# Patient Record
Sex: MALE | Race: WHITE | NOT HISPANIC OR LATINO | URBAN - METROPOLITAN AREA
[De-identification: names, ages, dates, MRNs, and addresses within clinical notes are randomized per-mention and may not be internally consistent; named-entity substitution may affect disease eponyms.]

---

## 2017-01-11 ENCOUNTER — HOSPITAL ENCOUNTER (OUTPATIENT)
Dept: URGENT CARE | Facility: CLINIC | Age: 22
Setting detail: SPECIMEN
Discharge: HOME | End: 2017-01-11
Attending: FAMILY MEDICINE | Admitting: FAMILY MEDICINE

## 2017-01-11 LAB
ALBUMIN SERPL-MCNC: 4.4 G/DL (ref 3.5–4.8)
ALBUMIN/GLOB SERPL: 1.8 {RATIO} (ref 1–1.7)
ALP SERPL-CCNC: 56 IU/L (ref 32–91)
ALT SERPL-CCNC: 15 IU/L (ref 17–63)
ANION GAP SERPL CALC-SCNC: 12 MMOL/L (ref 10–20)
AST SERPL-CCNC: 25 IU/L (ref 15–41)
BILIRUB SERPL-MCNC: 0.7 MG/DL (ref 0.3–1.2)
BUN SERPL-MCNC: 6 MG/DL (ref 8–20)
BUN/CREAT SERPL: 7.5 (ref 6.2–20.3)
CALCIUM SERPL-MCNC: 9.9 MG/DL (ref 8.9–10.3)
CHLORIDE SERPL-SCNC: 105 MMOL/L (ref 101–111)
CONV CO2: 30 MMOL/L (ref 22–32)
CONV TOTAL PROTEIN: 6.8 G/DL (ref 6.1–7.9)
CREAT UR-MCNC: 0.8 MG/DL (ref 0.7–1.2)
GLOBULIN UR ELPH-MCNC: 2.4 G/DL (ref 2.5–3.8)
GLUCOSE SERPL-MCNC: 94 MG/DL (ref 65–99)
POTASSIUM SERPL-SCNC: 4 MMOL/L (ref 3.6–5.1)
SODIUM SERPL-SCNC: 143 MMOL/L (ref 136–144)

## 2019-10-28 ENCOUNTER — APPOINTMENT (OUTPATIENT)
Dept: GENERAL RADIOLOGY | Facility: HOSPITAL | Age: 24
End: 2019-10-28

## 2019-10-28 ENCOUNTER — HOSPITAL ENCOUNTER (INPATIENT)
Dept: CARDIOLOGY | Facility: HOSPITAL | Age: 24
Discharge: HOME | End: 2019-10-28

## 2019-10-28 ENCOUNTER — APPOINTMENT (OUTPATIENT)
Dept: CT IMAGING | Facility: HOSPITAL | Age: 24
End: 2019-10-28

## 2019-10-28 ENCOUNTER — HOSPITAL ENCOUNTER (INPATIENT)
Facility: HOSPITAL | Age: 24
LOS: 12 days | Discharge: HOME | End: 2019-11-09
Attending: EMERGENCY MEDICINE | Admitting: INTERNAL MEDICINE

## 2019-10-28 VITALS
BODY MASS INDEX: 19.7 KG/M2 | HEIGHT: 68 IN | DIASTOLIC BLOOD PRESSURE: 59 MMHG | WEIGHT: 130 LBS | SYSTOLIC BLOOD PRESSURE: 114 MMHG

## 2019-10-28 DIAGNOSIS — R79.89 ELEVATED LFTS: ICD-10-CM

## 2019-10-28 DIAGNOSIS — J96.01 ACUTE RESPIRATORY FAILURE WITH HYPOXIA (HCC): ICD-10-CM

## 2019-10-28 DIAGNOSIS — N17.9 ACUTE KIDNEY INJURY (HCC): ICD-10-CM

## 2019-10-28 DIAGNOSIS — R41.82 ALTERED MENTAL STATUS, UNSPECIFIED ALTERED MENTAL STATUS TYPE: Primary | ICD-10-CM

## 2019-10-28 DIAGNOSIS — T68.XXXA HYPOTHERMIA, INITIAL ENCOUNTER: ICD-10-CM

## 2019-10-28 DIAGNOSIS — D72.829 LEUKOCYTOSIS, UNSPECIFIED TYPE: ICD-10-CM

## 2019-10-28 LAB
ALBUMIN SERPL-MCNC: 5.3 G/DL (ref 3.5–5.2)
ALBUMIN/GLOB SERPL: 1.7 G/DL
ALP SERPL-CCNC: 177 U/L (ref 39–117)
ALT SERPL W P-5'-P-CCNC: 1967 U/L (ref 1–41)
AMPHET+METHAMPHET UR QL: POSITIVE
ANION GAP SERPL CALCULATED.3IONS-SCNC: 11 MMOL/L (ref 5–15)
ANION GAP SERPL CALCULATED.3IONS-SCNC: 32 MMOL/L (ref 5–15)
APAP SERPL-MCNC: <5 MCG/ML (ref 10–30)
APTT PPP: 31.9 SECONDS (ref 24–31)
ARTERIAL PATENCY WRIST A: ABNORMAL
AST SERPL-CCNC: 1747 U/L (ref 1–40)
ATMOSPHERIC PRESS: ABNORMAL MM[HG]
B PERT DNA SPEC QL NAA+PROBE: NOT DETECTED
BACTERIA UR QL AUTO: ABNORMAL /HPF
BARBITURATES UR QL SCN: NEGATIVE
BASE EXCESS BLDA CALC-SCNC: -8.9 MMOL/L (ref 0–3)
BDY SITE: ABNORMAL
BENZODIAZ UR QL SCN: NEGATIVE
BH CV ECHO MEAS - ACS: 2.3 CM
BH CV ECHO MEAS - AO MAX PG (FULL): 2.3 MMHG
BH CV ECHO MEAS - AO MAX PG: 7.2 MMHG
BH CV ECHO MEAS - AO MEAN PG (FULL): 1.5 MMHG
BH CV ECHO MEAS - AO MEAN PG: 4 MMHG
BH CV ECHO MEAS - AO ROOT AREA (BSA CORRECTED): 1.8
BH CV ECHO MEAS - AO ROOT AREA: 7.7 CM^2
BH CV ECHO MEAS - AO ROOT DIAM: 3.1 CM
BH CV ECHO MEAS - AO V2 MAX: 134.4 CM/SEC
BH CV ECHO MEAS - AO V2 MEAN: 96 CM/SEC
BH CV ECHO MEAS - AO V2 VTI: 19.9 CM
BH CV ECHO MEAS - AVA(I,A): 2 CM^2
BH CV ECHO MEAS - AVA(I,D): 2 CM^2
BH CV ECHO MEAS - AVA(V,A): 2.2 CM^2
BH CV ECHO MEAS - AVA(V,D): 2.2 CM^2
BH CV ECHO MEAS - BSA(HAYCOCK): 1.7 M^2
BH CV ECHO MEAS - BSA: 1.7 M^2
BH CV ECHO MEAS - BZI_BMI: 19.8 KILOGRAMS/M^2
BH CV ECHO MEAS - BZI_METRIC_HEIGHT: 172.7 CM
BH CV ECHO MEAS - BZI_METRIC_WEIGHT: 59 KG
BH CV ECHO MEAS - EDV(CUBED): 63.9 ML
BH CV ECHO MEAS - EDV(MOD-SP4): 82.4 ML
BH CV ECHO MEAS - EDV(TEICH): 69.9 ML
BH CV ECHO MEAS - EF(CUBED): 65.4 %
BH CV ECHO MEAS - EF(MOD-BP): 53 %
BH CV ECHO MEAS - EF(MOD-SP4): 53.2 %
BH CV ECHO MEAS - EF(TEICH): 57.5 %
BH CV ECHO MEAS - ESV(CUBED): 22.1 ML
BH CV ECHO MEAS - ESV(MOD-SP4): 38.5 ML
BH CV ECHO MEAS - ESV(TEICH): 29.7 ML
BH CV ECHO MEAS - FS: 29.8 %
BH CV ECHO MEAS - IVS/LVPW: 1
BH CV ECHO MEAS - IVSD: 1.1 CM
BH CV ECHO MEAS - LA DIMENSION(2D): 3.4 CM
BH CV ECHO MEAS - LV DIASTOLIC VOL/BSA (35-75): 48.4 ML/M^2
BH CV ECHO MEAS - LV MASS(C)D: 134.4 GRAMS
BH CV ECHO MEAS - LV MASS(C)DI: 79 GRAMS/M^2
BH CV ECHO MEAS - LV MAX PG: 4.9 MMHG
BH CV ECHO MEAS - LV MEAN PG: 2.5 MMHG
BH CV ECHO MEAS - LV SYSTOLIC VOL/BSA (12-30): 22.6 ML/M^2
BH CV ECHO MEAS - LV V1 MAX: 111.2 CM/SEC
BH CV ECHO MEAS - LV V1 MEAN: 73.5 CM/SEC
BH CV ECHO MEAS - LV V1 VTI: 15.5 CM
BH CV ECHO MEAS - LVIDD: 4 CM
BH CV ECHO MEAS - LVIDS: 2.8 CM
BH CV ECHO MEAS - LVOT AREA: 2.6 CM^2
BH CV ECHO MEAS - LVOT DIAM: 1.8 CM
BH CV ECHO MEAS - LVPWD: 1 CM
BH CV ECHO MEAS - MV A MAX VEL: 50.2 CM/SEC
BH CV ECHO MEAS - MV DEC SLOPE: 816.8 CM/SEC^2
BH CV ECHO MEAS - MV DEC TIME: 0.11 SEC
BH CV ECHO MEAS - MV E MAX VEL: 93.9 CM/SEC
BH CV ECHO MEAS - MV E/A: 1.9
BH CV ECHO MEAS - MV MAX PG: 4.7 MMHG
BH CV ECHO MEAS - MV MEAN PG: 2 MMHG
BH CV ECHO MEAS - MV V2 MAX: 108.9 CM/SEC
BH CV ECHO MEAS - MV V2 MEAN: 67.4 CM/SEC
BH CV ECHO MEAS - MV V2 VTI: 27.5 CM
BH CV ECHO MEAS - MVA(VTI): 1.5 CM^2
BH CV ECHO MEAS - PA MAX PG (FULL): 2.7 MMHG
BH CV ECHO MEAS - PA MAX PG: 4.4 MMHG
BH CV ECHO MEAS - PA V2 MAX: 105.4 CM/SEC
BH CV ECHO MEAS - PVA(V,A): 1.1 CM^2
BH CV ECHO MEAS - PVA(V,D): 1.1 CM^2
BH CV ECHO MEAS - QP/QS: 0.46
BH CV ECHO MEAS - RV MAX PG: 1.7 MMHG
BH CV ECHO MEAS - RV MEAN PG: 0.84 MMHG
BH CV ECHO MEAS - RV V1 MAX: 65.7 CM/SEC
BH CV ECHO MEAS - RV V1 MEAN: 43.2 CM/SEC
BH CV ECHO MEAS - RV V1 VTI: 10.2 CM
BH CV ECHO MEAS - RVDD: 3.2 CM
BH CV ECHO MEAS - RVOT AREA: 1.8 CM^2
BH CV ECHO MEAS - RVOT DIAM: 1.5 CM
BH CV ECHO MEAS - SI(AO): 89.9 ML/M^2
BH CV ECHO MEAS - SI(CUBED): 24.6 ML/M^2
BH CV ECHO MEAS - SI(LVOT): 23.7 ML/M^2
BH CV ECHO MEAS - SI(MOD-SP4): 25.8 ML/M^2
BH CV ECHO MEAS - SI(TEICH): 23.6 ML/M^2
BH CV ECHO MEAS - SV(AO): 153 ML
BH CV ECHO MEAS - SV(CUBED): 41.8 ML
BH CV ECHO MEAS - SV(LVOT): 40.4 ML
BH CV ECHO MEAS - SV(MOD-SP4): 43.9 ML
BH CV ECHO MEAS - SV(RVOT): 18.6 ML
BH CV ECHO MEAS - SV(TEICH): 40.2 ML
BH CV ECHO MEAS - TR MAX VEL: 226.7 CM/SEC
BILIRUB SERPL-MCNC: 0.3 MG/DL (ref 0.2–1.2)
BILIRUB UR QL STRIP: NEGATIVE
BUN BLD-MCNC: 16 MG/DL (ref 6–20)
BUN BLD-MCNC: 23 MG/DL (ref 6–20)
BUN/CREAT SERPL: 4.9 (ref 7–25)
BUN/CREAT SERPL: 8.2 (ref 7–25)
C PNEUM DNA NPH QL NAA+NON-PROBE: NOT DETECTED
CA-I SERPL ISE-MCNC: 1.24 MMOL/L (ref 1.2–1.3)
CALCIUM SPEC-SCNC: 10.1 MG/DL (ref 8.6–10.5)
CALCIUM SPEC-SCNC: 7.3 MG/DL (ref 8.6–10.5)
CANNABINOIDS SERPL QL: POSITIVE
CHLORIDE SERPL-SCNC: 100 MMOL/L (ref 98–107)
CHLORIDE SERPL-SCNC: 110 MMOL/L (ref 98–107)
CK SERPL-CCNC: 3669 U/L (ref 20–200)
CK SERPL-CCNC: 9219 U/L (ref 20–200)
CLARITY UR: CLEAR
CO2 BLDA-SCNC: 19.5 MMOL/L (ref 22–29)
CO2 SERPL-SCNC: 17 MMOL/L (ref 22–29)
CO2 SERPL-SCNC: 24 MMOL/L (ref 22–29)
COCAINE UR QL: NEGATIVE
COLOR UR: YELLOW
CREAT BLD-MCNC: 2.79 MG/DL (ref 0.76–1.27)
CREAT BLD-MCNC: 3.24 MG/DL (ref 0.76–1.27)
CRP SERPL-MCNC: 0.83 MG/DL (ref 0–0.5)
D-LACTATE SERPL-SCNC: 10.6 MMOL/L (ref 0.5–2)
D-LACTATE SERPL-SCNC: 2.2 MMOL/L (ref 0.5–2)
DEPRECATED RDW RBC AUTO: 45.9 FL (ref 37–54)
ERYTHROCYTE [DISTWIDTH] IN BLOOD BY AUTOMATED COUNT: 13.6 % (ref 12.3–15.4)
ETHANOL UR QL: 0.01 %
FLUAV H1 2009 PAND RNA NPH QL NAA+PROBE: NOT DETECTED
FLUAV H1 HA GENE NPH QL NAA+PROBE: NOT DETECTED
FLUAV H3 RNA NPH QL NAA+PROBE: NOT DETECTED
FLUAV SUBTYP SPEC NAA+PROBE: NOT DETECTED
FLUBV RNA ISLT QL NAA+PROBE: NOT DETECTED
GFR SERPL CREATININE-BSD FRML MDRD: 23 ML/MIN/1.73
GFR SERPL CREATININE-BSD FRML MDRD: 28 ML/MIN/1.73
GLOBULIN UR ELPH-MCNC: 3.2 GM/DL
GLUCOSE BLD-MCNC: 110 MG/DL (ref 65–99)
GLUCOSE BLD-MCNC: 92 MG/DL (ref 65–99)
GLUCOSE BLDC GLUCOMTR-MCNC: 129 MG/DL (ref 70–105)
GLUCOSE BLDC GLUCOMTR-MCNC: 71 MG/DL (ref 70–105)
GLUCOSE UR STRIP-MCNC: NEGATIVE MG/DL
HADV DNA SPEC NAA+PROBE: NOT DETECTED
HCO3 BLDA-SCNC: 18.2 MMOL/L (ref 21–28)
HCOV 229E RNA SPEC QL NAA+PROBE: NOT DETECTED
HCOV HKU1 RNA SPEC QL NAA+PROBE: NOT DETECTED
HCOV NL63 RNA SPEC QL NAA+PROBE: NOT DETECTED
HCOV OC43 RNA SPEC QL NAA+PROBE: NOT DETECTED
HCT VFR BLD AUTO: 49.8 % (ref 37.5–51)
HEMODILUTION: NO
HGB BLD-MCNC: 16.2 G/DL (ref 13–17.7)
HGB UR QL STRIP.AUTO: ABNORMAL
HMPV RNA NPH QL NAA+NON-PROBE: NOT DETECTED
HOLD SPECIMEN: NORMAL
HOROWITZ INDEX BLD+IHG-RTO: 21 %
HPIV1 RNA SPEC QL NAA+PROBE: NOT DETECTED
HPIV2 RNA SPEC QL NAA+PROBE: NOT DETECTED
HPIV3 RNA NPH QL NAA+PROBE: NOT DETECTED
HPIV4 P GENE NPH QL NAA+PROBE: NOT DETECTED
HYALINE CASTS UR QL AUTO: ABNORMAL /LPF
INR PPP: 1.34 (ref 0.9–1.1)
KETONES UR QL STRIP: NEGATIVE
LEUKOCYTE ESTERASE UR QL STRIP.AUTO: NEGATIVE
LV EF 2D ECHO EST: 60 %
LYMPHOCYTES # BLD MANUAL: 3.68 10*3/MM3 (ref 0.7–3.1)
LYMPHOCYTES NFR BLD MANUAL: 12 % (ref 19.6–45.3)
LYMPHOCYTES NFR BLD MANUAL: 3 % (ref 5–12)
M PNEUMO IGG SER IA-ACNC: NOT DETECTED
MAGNESIUM SERPL-MCNC: 2.9 MG/DL (ref 1.6–2.6)
MCH RBC QN AUTO: 30.7 PG (ref 26.6–33)
MCHC RBC AUTO-ENTMCNC: 32.6 G/DL (ref 31.5–35.7)
MCV RBC AUTO: 94.3 FL (ref 79–97)
METAMYELOCYTES NFR BLD MANUAL: 1 % (ref 0–0)
METHADONE UR QL SCN: NEGATIVE
MODALITY: ABNORMAL
MONOCYTES # BLD AUTO: 0.92 10*3/MM3 (ref 0.1–0.9)
NEUTROPHILS # BLD AUTO: 25.79 10*3/MM3 (ref 1.7–7)
NEUTROPHILS NFR BLD MANUAL: 72 % (ref 42.7–76)
NEUTS BAND NFR BLD MANUAL: 12 % (ref 0–5)
NITRITE UR QL STRIP: NEGATIVE
OPIATES UR QL: NEGATIVE
OXYCODONE UR QL SCN: NEGATIVE
PCO2 BLDA: 42.7 MM HG (ref 35–48)
PH BLDA: 7.24 PH UNITS (ref 7.35–7.45)
PH UR STRIP.AUTO: 7.5 [PH] (ref 5–8)
PHOSPHATE SERPL-MCNC: 7.4 MG/DL (ref 2.5–4.5)
PLAT MORPH BLD: NORMAL
PLATELET # BLD AUTO: 237 10*3/MM3 (ref 140–450)
PMV BLD AUTO: 7 FL (ref 6–12)
PO2 BLDA: 41.2 MM HG (ref 83–108)
POTASSIUM BLD-SCNC: 4 MMOL/L (ref 3.5–5.2)
POTASSIUM BLD-SCNC: 4.1 MMOL/L (ref 3.5–5.2)
PROT SERPL-MCNC: 8.5 G/DL (ref 6–8.5)
PROT UR QL STRIP: NEGATIVE
PROTHROMBIN TIME: 13.4 SECONDS (ref 9.6–11.7)
RBC # BLD AUTO: 5.28 10*6/MM3 (ref 4.14–5.8)
RBC # UR: ABNORMAL /HPF
RBC MORPH BLD: NORMAL
REF LAB TEST METHOD: ABNORMAL
RHINOVIRUS RNA SPEC NAA+PROBE: DETECTED
RSV RNA NPH QL NAA+NON-PROBE: NOT DETECTED
SAO2 % BLDCOA: 67.6 % (ref 94–98)
SODIUM BLD-SCNC: 145 MMOL/L (ref 136–145)
SODIUM BLD-SCNC: 149 MMOL/L (ref 136–145)
SP GR UR STRIP: 1.01 (ref 1–1.03)
SQUAMOUS #/AREA URNS HPF: ABNORMAL /HPF
TROPONIN T SERPL-MCNC: 1.26 NG/ML (ref 0–0.03)
TROPONIN T SERPL-MCNC: 2.51 NG/ML (ref 0–0.03)
URINE MYOGLOBIN, QUALITATIVE: POSITIVE
UROBILINOGEN UR QL STRIP: ABNORMAL
WBC MORPH BLD: NORMAL
WBC NRBC COR # BLD: 30.7 10*3/MM3 (ref 3.4–10.8)
WBC UR QL AUTO: ABNORMAL /HPF
WHOLE BLOOD HOLD SPECIMEN: NORMAL
WHOLE BLOOD HOLD SPECIMEN: NORMAL

## 2019-10-28 PROCEDURE — 94799 UNLISTED PULMONARY SVC/PX: CPT

## 2019-10-28 PROCEDURE — 0099U HC BIOFIRE FILMARRAY RESP PANEL 1: CPT | Performed by: INTERNAL MEDICINE

## 2019-10-28 PROCEDURE — 36600 WITHDRAWAL OF ARTERIAL BLOOD: CPT

## 2019-10-28 PROCEDURE — 80307 DRUG TEST PRSMV CHEM ANLYZR: CPT | Performed by: EMERGENCY MEDICINE

## 2019-10-28 PROCEDURE — 31500 INSERT EMERGENCY AIRWAY: CPT

## 2019-10-28 PROCEDURE — 25010000002 CEFEPIME PER 500 MG: Performed by: NURSE PRACTITIONER

## 2019-10-28 PROCEDURE — 87081 CULTURE SCREEN ONLY: CPT | Performed by: NURSE PRACTITIONER

## 2019-10-28 PROCEDURE — 25010000002 VANCOMYCIN 10 G RECONSTITUTED SOLUTION: Performed by: NURSE PRACTITIONER

## 2019-10-28 PROCEDURE — 87205 SMEAR GRAM STAIN: CPT | Performed by: INTERNAL MEDICINE

## 2019-10-28 PROCEDURE — 93306 TTE W/DOPPLER COMPLETE: CPT

## 2019-10-28 PROCEDURE — C1751 CATH, INF, PER/CENT/MIDLINE: HCPCS

## 2019-10-28 PROCEDURE — 85025 COMPLETE CBC W/AUTO DIFF WBC: CPT | Performed by: EMERGENCY MEDICINE

## 2019-10-28 PROCEDURE — 25010000002 SUCCINYLCHOLINE PER 20 MG

## 2019-10-28 PROCEDURE — 83735 ASSAY OF MAGNESIUM: CPT | Performed by: EMERGENCY MEDICINE

## 2019-10-28 PROCEDURE — 71250 CT THORAX DX C-: CPT

## 2019-10-28 PROCEDURE — 94002 VENT MGMT INPAT INIT DAY: CPT

## 2019-10-28 PROCEDURE — 80053 COMPREHEN METABOLIC PANEL: CPT | Performed by: EMERGENCY MEDICINE

## 2019-10-28 PROCEDURE — 99285 EMERGENCY DEPT VISIT HI MDM: CPT

## 2019-10-28 PROCEDURE — 25010000002 MIDAZOLAM PER 1 MG: Performed by: EMERGENCY MEDICINE

## 2019-10-28 PROCEDURE — 0BH17EZ INSERTION OF ENDOTRACHEAL AIRWAY INTO TRACHEA, VIA NATURAL OR ARTIFICIAL OPENING: ICD-10-PCS | Performed by: EMERGENCY MEDICINE

## 2019-10-28 PROCEDURE — 87185 SC STD ENZYME DETCJ PER NZM: CPT | Performed by: EMERGENCY MEDICINE

## 2019-10-28 PROCEDURE — 85007 BL SMEAR W/DIFF WBC COUNT: CPT | Performed by: EMERGENCY MEDICINE

## 2019-10-28 PROCEDURE — 85610 PROTHROMBIN TIME: CPT | Performed by: EMERGENCY MEDICINE

## 2019-10-28 PROCEDURE — 83874 ASSAY OF MYOGLOBIN: CPT | Performed by: NURSE PRACTITIONER

## 2019-10-28 PROCEDURE — 82330 ASSAY OF CALCIUM: CPT | Performed by: EMERGENCY MEDICINE

## 2019-10-28 PROCEDURE — 5A1935Z RESPIRATORY VENTILATION, LESS THAN 24 CONSECUTIVE HOURS: ICD-10-PCS | Performed by: EMERGENCY MEDICINE

## 2019-10-28 PROCEDURE — 80074 ACUTE HEPATITIS PANEL: CPT | Performed by: NURSE PRACTITIONER

## 2019-10-28 PROCEDURE — 93306 TTE W/DOPPLER COMPLETE: CPT | Performed by: INTERNAL MEDICINE

## 2019-10-28 PROCEDURE — 70450 CT HEAD/BRAIN W/O DYE: CPT

## 2019-10-28 PROCEDURE — 87040 BLOOD CULTURE FOR BACTERIA: CPT | Performed by: EMERGENCY MEDICINE

## 2019-10-28 PROCEDURE — 81001 URINALYSIS AUTO W/SCOPE: CPT | Performed by: EMERGENCY MEDICINE

## 2019-10-28 PROCEDURE — 25010000002 MIDAZOLAM 50 MG/10ML SOLUTION: Performed by: EMERGENCY MEDICINE

## 2019-10-28 PROCEDURE — 82550 ASSAY OF CK (CPK): CPT | Performed by: EMERGENCY MEDICINE

## 2019-10-28 PROCEDURE — 82962 GLUCOSE BLOOD TEST: CPT

## 2019-10-28 PROCEDURE — 87070 CULTURE OTHR SPECIMN AEROBIC: CPT | Performed by: INTERNAL MEDICINE

## 2019-10-28 PROCEDURE — P9612 CATHETERIZE FOR URINE SPEC: HCPCS

## 2019-10-28 PROCEDURE — 83605 ASSAY OF LACTIC ACID: CPT

## 2019-10-28 PROCEDURE — 94760 N-INVAS EAR/PLS OXIMETRY 1: CPT

## 2019-10-28 PROCEDURE — 71045 X-RAY EXAM CHEST 1 VIEW: CPT

## 2019-10-28 PROCEDURE — 86140 C-REACTIVE PROTEIN: CPT | Performed by: EMERGENCY MEDICINE

## 2019-10-28 PROCEDURE — 80307 DRUG TEST PRSMV CHEM ANLYZR: CPT | Performed by: NURSE PRACTITIONER

## 2019-10-28 PROCEDURE — 25010000002 ENOXAPARIN PER 10 MG: Performed by: NURSE PRACTITIONER

## 2019-10-28 PROCEDURE — B548ZZA ULTRASONOGRAPHY OF SUPERIOR VENA CAVA, GUIDANCE: ICD-10-PCS | Performed by: INTERNAL MEDICINE

## 2019-10-28 PROCEDURE — 25010000002 FENTANYL CITRATE (PF) 100 MCG/2ML SOLUTION

## 2019-10-28 PROCEDURE — 82803 BLOOD GASES ANY COMBINATION: CPT

## 2019-10-28 PROCEDURE — 82550 ASSAY OF CK (CPK): CPT | Performed by: NURSE PRACTITIONER

## 2019-10-28 PROCEDURE — 02HV33Z INSERTION OF INFUSION DEVICE INTO SUPERIOR VENA CAVA, PERCUTANEOUS APPROACH: ICD-10-PCS | Performed by: INTERNAL MEDICINE

## 2019-10-28 PROCEDURE — 84100 ASSAY OF PHOSPHORUS: CPT | Performed by: EMERGENCY MEDICINE

## 2019-10-28 PROCEDURE — 93005 ELECTROCARDIOGRAM TRACING: CPT | Performed by: EMERGENCY MEDICINE

## 2019-10-28 PROCEDURE — 25010000002 DOPAMINE PER 40 MG: Performed by: NURSE PRACTITIONER

## 2019-10-28 PROCEDURE — 87086 URINE CULTURE/COLONY COUNT: CPT | Performed by: EMERGENCY MEDICINE

## 2019-10-28 PROCEDURE — 84484 ASSAY OF TROPONIN QUANT: CPT | Performed by: NURSE PRACTITIONER

## 2019-10-28 PROCEDURE — 85730 THROMBOPLASTIN TIME PARTIAL: CPT | Performed by: EMERGENCY MEDICINE

## 2019-10-28 PROCEDURE — 87076 CULTURE ANAEROBE IDENT EACH: CPT | Performed by: EMERGENCY MEDICINE

## 2019-10-28 RX ORDER — MIDAZOLAM HYDROCHLORIDE 1 MG/ML
2 INJECTION INTRAMUSCULAR; INTRAVENOUS ONCE
Status: COMPLETED | OUTPATIENT
Start: 2019-10-28 | End: 2019-10-28

## 2019-10-28 RX ORDER — DEXMEDETOMIDINE HYDROCHLORIDE 4 UG/ML
.2-1.5 INJECTION, SOLUTION INTRAVENOUS
Status: DISCONTINUED | OUTPATIENT
Start: 2019-10-28 | End: 2019-10-29

## 2019-10-28 RX ORDER — SUCCINYLCHOLINE CHLORIDE 20 MG/ML
INJECTION INTRAMUSCULAR; INTRAVENOUS
Status: COMPLETED
Start: 2019-10-28 | End: 2019-10-28

## 2019-10-28 RX ORDER — FENTANYL CITRATE 50 UG/ML
50 INJECTION, SOLUTION INTRAMUSCULAR; INTRAVENOUS
Status: DISCONTINUED | OUTPATIENT
Start: 2019-10-28 | End: 2019-10-29

## 2019-10-28 RX ORDER — NOREPINEPHRINE BIT/0.9 % NACL 8 MG/250ML
.02-.3 INFUSION BOTTLE (ML) INTRAVENOUS
Status: DISCONTINUED | OUTPATIENT
Start: 2019-10-28 | End: 2019-10-31

## 2019-10-28 RX ORDER — FENTANYL CITRATE 50 UG/ML
50 INJECTION, SOLUTION INTRAMUSCULAR; INTRAVENOUS ONCE
Status: COMPLETED | OUTPATIENT
Start: 2019-10-28 | End: 2019-10-28

## 2019-10-28 RX ORDER — VANCOMYCIN HYDROCHLORIDE
1500 ONCE
Status: COMPLETED | OUTPATIENT
Start: 2019-10-28 | End: 2019-10-28

## 2019-10-28 RX ORDER — SODIUM CHLORIDE 0.9 % (FLUSH) 0.9 %
10 SYRINGE (ML) INJECTION AS NEEDED
Status: DISCONTINUED | OUTPATIENT
Start: 2019-10-28 | End: 2019-11-09 | Stop reason: HOSPADM

## 2019-10-28 RX ORDER — HYDROXYZINE 50 MG/1
50 TABLET, FILM COATED ORAL EVERY 6 HOURS PRN
COMMUNITY

## 2019-10-28 RX ORDER — ETOMIDATE 2 MG/ML
INJECTION INTRAVENOUS
Status: DISPENSED
Start: 2019-10-28 | End: 2019-10-29

## 2019-10-28 RX ORDER — SUCCINYLCHOLINE CHLORIDE 20 MG/ML
1.5 INJECTION INTRAMUSCULAR; INTRAVENOUS ONCE
Status: COMPLETED | OUTPATIENT
Start: 2019-10-28 | End: 2019-10-28

## 2019-10-28 RX ORDER — SODIUM CHLORIDE 0.9 % (FLUSH) 0.9 %
10 SYRINGE (ML) INJECTION EVERY 12 HOURS SCHEDULED
Status: DISCONTINUED | OUTPATIENT
Start: 2019-10-28 | End: 2019-11-09 | Stop reason: HOSPADM

## 2019-10-28 RX ORDER — SODIUM CHLORIDE 0.9 % (FLUSH) 0.9 %
10 SYRINGE (ML) INJECTION EVERY 12 HOURS SCHEDULED
Status: DISCONTINUED | OUTPATIENT
Start: 2019-10-28 | End: 2019-11-07

## 2019-10-28 RX ORDER — NICOTINE POLACRILEX 4 MG
15 LOZENGE BUCCAL
Status: DISCONTINUED | OUTPATIENT
Start: 2019-10-28 | End: 2019-11-02

## 2019-10-28 RX ORDER — MIDAZOLAM HYDROCHLORIDE 1 MG/ML
INJECTION INTRAMUSCULAR; INTRAVENOUS
Status: DISPENSED
Start: 2019-10-28 | End: 2019-10-29

## 2019-10-28 RX ORDER — IPRATROPIUM BROMIDE AND ALBUTEROL SULFATE 2.5; .5 MG/3ML; MG/3ML
3 SOLUTION RESPIRATORY (INHALATION)
Status: DISCONTINUED | OUTPATIENT
Start: 2019-10-28 | End: 2019-11-07

## 2019-10-28 RX ORDER — SODIUM CHLORIDE 0.9 % (FLUSH) 0.9 %
20 SYRINGE (ML) INJECTION AS NEEDED
Status: DISCONTINUED | OUTPATIENT
Start: 2019-10-28 | End: 2019-11-09 | Stop reason: HOSPADM

## 2019-10-28 RX ORDER — CITALOPRAM 20 MG/1
20 TABLET ORAL DAILY
COMMUNITY
End: 2023-01-03

## 2019-10-28 RX ORDER — MIDAZOLAM HYDROCHLORIDE 1 MG/ML
2 INJECTION INTRAMUSCULAR; INTRAVENOUS CONTINUOUS
Status: DISCONTINUED | OUTPATIENT
Start: 2019-10-28 | End: 2019-10-28

## 2019-10-28 RX ORDER — DOPAMINE HYDROCHLORIDE 160 MG/100ML
2-20 INJECTION, SOLUTION INTRAVENOUS
Status: DISCONTINUED | OUTPATIENT
Start: 2019-10-28 | End: 2019-10-28 | Stop reason: SDUPTHER

## 2019-10-28 RX ORDER — ETOMIDATE 2 MG/ML
0.3 INJECTION INTRAVENOUS ONCE
Status: COMPLETED | OUTPATIENT
Start: 2019-10-28 | End: 2019-10-28

## 2019-10-28 RX ORDER — MIDAZOLAM HCL IN 0.9 % NACL/PF 1 MG/ML
2 PLASTIC BAG, INJECTION (ML) INTRAVENOUS
Status: DISCONTINUED | OUTPATIENT
Start: 2019-10-28 | End: 2019-10-29

## 2019-10-28 RX ORDER — SODIUM CHLORIDE 0.9 % (FLUSH) 0.9 %
10 SYRINGE (ML) INJECTION AS NEEDED
Status: DISCONTINUED | OUTPATIENT
Start: 2019-10-28 | End: 2019-11-04

## 2019-10-28 RX ORDER — FENTANYL CITRATE 50 UG/ML
INJECTION, SOLUTION INTRAMUSCULAR; INTRAVENOUS
Status: COMPLETED
Start: 2019-10-28 | End: 2019-10-28

## 2019-10-28 RX ORDER — DOPAMINE HYDROCHLORIDE 160 MG/100ML
2-20 INJECTION, SOLUTION INTRAVENOUS
Status: DISCONTINUED | OUTPATIENT
Start: 2019-10-28 | End: 2019-10-28

## 2019-10-28 RX ORDER — PANTOPRAZOLE SODIUM 40 MG/10ML
40 INJECTION, POWDER, LYOPHILIZED, FOR SOLUTION INTRAVENOUS
Status: DISCONTINUED | OUTPATIENT
Start: 2019-10-28 | End: 2019-11-01

## 2019-10-28 RX ORDER — DEXTROSE MONOHYDRATE 25 G/50ML
25 INJECTION, SOLUTION INTRAVENOUS
Status: DISCONTINUED | OUTPATIENT
Start: 2019-10-28 | End: 2019-11-02

## 2019-10-28 RX ADMIN — DEXMEDETOMIDINE HYDROCHLORIDE 0.5 MCG/KG/HR: 100 INJECTION, SOLUTION INTRAVENOUS at 22:23

## 2019-10-28 RX ADMIN — MIDAZOLAM 2 MG: 1 INJECTION INTRAMUSCULAR; INTRAVENOUS at 12:59

## 2019-10-28 RX ADMIN — SUCCINYLCHOLINE CHLORIDE 88.6 MG: 20 INJECTION INTRAMUSCULAR; INTRAVENOUS at 12:50

## 2019-10-28 RX ADMIN — PANTOPRAZOLE SODIUM 40 MG: 40 INJECTION, POWDER, FOR SOLUTION INTRAVENOUS at 17:23

## 2019-10-28 RX ADMIN — Medication 10 ML: at 21:08

## 2019-10-28 RX ADMIN — IPRATROPIUM BROMIDE AND ALBUTEROL SULFATE 3 ML: .5; 3 SOLUTION RESPIRATORY (INHALATION) at 19:38

## 2019-10-28 RX ADMIN — FENTANYL CITRATE 50 MCG: 50 INJECTION, SOLUTION INTRAMUSCULAR; INTRAVENOUS at 21:24

## 2019-10-28 RX ADMIN — Medication 0.02 MCG/KG/MIN: at 18:15

## 2019-10-28 RX ADMIN — MIDAZOLAM 2 MG/HR: 5 INJECTION INTRAMUSCULAR; INTRAVENOUS at 13:24

## 2019-10-28 RX ADMIN — Medication 10 ML: at 21:09

## 2019-10-28 RX ADMIN — ETOMIDATE 17.7 MG: 40 INJECTION, SOLUTION INTRAVENOUS at 13:03

## 2019-10-28 RX ADMIN — Medication 10 ML: at 15:12

## 2019-10-28 RX ADMIN — MIDAZOLAM 2 MG: 1 INJECTION INTRAMUSCULAR; INTRAVENOUS at 11:51

## 2019-10-28 RX ADMIN — DOPAMINE HYDROCHLORIDE 2 MCG/KG/MIN: 160 INJECTION, SOLUTION INTRAVENOUS at 14:19

## 2019-10-28 RX ADMIN — VANCOMYCIN HYDROCHLORIDE 1500 MG: 10 INJECTION, POWDER, LYOPHILIZED, FOR SOLUTION INTRAVENOUS at 18:16

## 2019-10-28 RX ADMIN — DEXTROSE MONOHYDRATE: 5 INJECTION, SOLUTION INTRAVENOUS at 15:04

## 2019-10-28 RX ADMIN — ENOXAPARIN SODIUM 30 MG: 30 INJECTION SUBCUTANEOUS at 17:23

## 2019-10-28 RX ADMIN — DEXTROSE MONOHYDRATE: 5 INJECTION, SOLUTION INTRAVENOUS at 22:33

## 2019-10-28 RX ADMIN — IPRATROPIUM BROMIDE AND ALBUTEROL SULFATE 3 ML: .5; 3 SOLUTION RESPIRATORY (INHALATION) at 23:28

## 2019-10-28 RX ADMIN — CEFEPIME HYDROCHLORIDE 2 G: 2 INJECTION, POWDER, FOR SOLUTION INTRAVENOUS at 14:19

## 2019-10-28 RX ADMIN — SODIUM CHLORIDE 2000 ML: 900 INJECTION, SOLUTION INTRAVENOUS at 13:15

## 2019-10-28 RX ADMIN — SODIUM CHLORIDE 2000 ML: 900 INJECTION, SOLUTION INTRAVENOUS at 13:48

## 2019-10-28 RX ADMIN — SUCCINYLCHOLINE CHLORIDE 88.6 MG: 20 INJECTION, SOLUTION INTRAMUSCULAR; INTRAVENOUS at 12:50

## 2019-10-29 ENCOUNTER — HOSPITAL ENCOUNTER (INPATIENT)
Dept: CARDIOLOGY | Facility: HOSPITAL | Age: 24
Discharge: HOME | End: 2019-10-29

## 2019-10-29 ENCOUNTER — APPOINTMENT (OUTPATIENT)
Dept: GENERAL RADIOLOGY | Facility: HOSPITAL | Age: 24
End: 2019-10-29

## 2019-10-29 ENCOUNTER — APPOINTMENT (OUTPATIENT)
Dept: ULTRASOUND IMAGING | Facility: HOSPITAL | Age: 24
End: 2019-10-29

## 2019-10-29 LAB
ANION GAP SERPL CALCULATED.3IONS-SCNC: 11 MMOL/L (ref 5–15)
APTT PPP: 28.7 SECONDS (ref 24–31)
APTT PPP: 70 SECONDS (ref 61–76.5)
APTT PPP: 95.6 SECONDS (ref 61–76.5)
ARTERIAL PATENCY WRIST A: POSITIVE
ATMOSPHERIC PRESS: ABNORMAL MM[HG]
BACTERIA SPEC AEROBE CULT: NO GROWTH
BASE EXCESS BLDA CALC-SCNC: -0.3 MMOL/L (ref 0–3)
BDY SITE: ABNORMAL
BH CV LOWER VASCULAR LEFT COMMON FEMORAL AUGMENT: NORMAL
BH CV LOWER VASCULAR LEFT COMMON FEMORAL COMPETENT: NORMAL
BH CV LOWER VASCULAR LEFT COMMON FEMORAL COMPRESS: NORMAL
BH CV LOWER VASCULAR LEFT COMMON FEMORAL PHASIC: NORMAL
BH CV LOWER VASCULAR LEFT COMMON FEMORAL SPONT: NORMAL
BH CV LOWER VASCULAR LEFT DISTAL FEMORAL COMPRESS: NORMAL
BH CV LOWER VASCULAR LEFT GASTRONEMIUS COMPRESS: NORMAL
BH CV LOWER VASCULAR LEFT GREATER SAPH AK COMPRESS: NORMAL
BH CV LOWER VASCULAR LEFT GREATER SAPH BK COMPRESS: NORMAL
BH CV LOWER VASCULAR LEFT LESSER SAPH COMPRESS: NORMAL
BH CV LOWER VASCULAR LEFT MID FEMORAL AUGMENT: NORMAL
BH CV LOWER VASCULAR LEFT MID FEMORAL COMPETENT: NORMAL
BH CV LOWER VASCULAR LEFT MID FEMORAL COMPRESS: NORMAL
BH CV LOWER VASCULAR LEFT MID FEMORAL PHASIC: NORMAL
BH CV LOWER VASCULAR LEFT MID FEMORAL SPONT: NORMAL
BH CV LOWER VASCULAR LEFT PERONEAL COMPRESS: NORMAL
BH CV LOWER VASCULAR LEFT POPLITEAL AUGMENT: NORMAL
BH CV LOWER VASCULAR LEFT POPLITEAL COMPETENT: NORMAL
BH CV LOWER VASCULAR LEFT POPLITEAL COMPRESS: NORMAL
BH CV LOWER VASCULAR LEFT POPLITEAL PHASIC: NORMAL
BH CV LOWER VASCULAR LEFT POPLITEAL SPONT: NORMAL
BH CV LOWER VASCULAR LEFT POSTERIOR TIBIAL COMPRESS: NORMAL
BH CV LOWER VASCULAR LEFT PROXIMAL FEMORAL COMPRESS: NORMAL
BH CV LOWER VASCULAR LEFT SAPHENOFEMORAL JUNCTION AUGMENT: NORMAL
BH CV LOWER VASCULAR LEFT SAPHENOFEMORAL JUNCTION COMPETENT: NORMAL
BH CV LOWER VASCULAR LEFT SAPHENOFEMORAL JUNCTION COMPRESS: NORMAL
BH CV LOWER VASCULAR LEFT SAPHENOFEMORAL JUNCTION PHASIC: NORMAL
BH CV LOWER VASCULAR LEFT SAPHENOFEMORAL JUNCTION SPONT: NORMAL
BH CV LOWER VASCULAR RIGHT COMMON FEMORAL AUGMENT: NORMAL
BH CV LOWER VASCULAR RIGHT COMMON FEMORAL COMPETENT: NORMAL
BH CV LOWER VASCULAR RIGHT COMMON FEMORAL COMPRESS: NORMAL
BH CV LOWER VASCULAR RIGHT COMMON FEMORAL PHASIC: NORMAL
BH CV LOWER VASCULAR RIGHT COMMON FEMORAL SPONT: NORMAL
BH CV LOWER VASCULAR RIGHT DISTAL FEMORAL COMPRESS: NORMAL
BH CV LOWER VASCULAR RIGHT GASTRONEMIUS COMPRESS: NORMAL
BH CV LOWER VASCULAR RIGHT GREATER SAPH AK COMPRESS: NORMAL
BH CV LOWER VASCULAR RIGHT GREATER SAPH BK COMPRESS: NORMAL
BH CV LOWER VASCULAR RIGHT LESSER SAPH COMPRESS: NORMAL
BH CV LOWER VASCULAR RIGHT MID FEMORAL AUGMENT: NORMAL
BH CV LOWER VASCULAR RIGHT MID FEMORAL COMPETENT: NORMAL
BH CV LOWER VASCULAR RIGHT MID FEMORAL COMPRESS: NORMAL
BH CV LOWER VASCULAR RIGHT MID FEMORAL PHASIC: NORMAL
BH CV LOWER VASCULAR RIGHT MID FEMORAL SPONT: NORMAL
BH CV LOWER VASCULAR RIGHT PERONEAL COMPRESS: NORMAL
BH CV LOWER VASCULAR RIGHT POPLITEAL AUGMENT: NORMAL
BH CV LOWER VASCULAR RIGHT POPLITEAL COMPETENT: NORMAL
BH CV LOWER VASCULAR RIGHT POPLITEAL COMPRESS: NORMAL
BH CV LOWER VASCULAR RIGHT POPLITEAL PHASIC: NORMAL
BH CV LOWER VASCULAR RIGHT POPLITEAL SPONT: NORMAL
BH CV LOWER VASCULAR RIGHT POSTERIOR TIBIAL COMPRESS: NORMAL
BH CV LOWER VASCULAR RIGHT PROXIMAL FEMORAL COMPRESS: NORMAL
BH CV LOWER VASCULAR RIGHT SAPHENOFEMORAL JUNCTION AUGMENT: NORMAL
BH CV LOWER VASCULAR RIGHT SAPHENOFEMORAL JUNCTION COMPETENT: NORMAL
BH CV LOWER VASCULAR RIGHT SAPHENOFEMORAL JUNCTION COMPRESS: NORMAL
BH CV LOWER VASCULAR RIGHT SAPHENOFEMORAL JUNCTION PHASIC: NORMAL
BH CV LOWER VASCULAR RIGHT SAPHENOFEMORAL JUNCTION SPONT: NORMAL
BUN BLD-MCNC: 34 MG/DL (ref 6–20)
BUN/CREAT SERPL: 10.1 (ref 7–25)
CALCIUM SPEC-SCNC: 6.8 MG/DL (ref 8.6–10.5)
CHLORIDE SERPL-SCNC: 102 MMOL/L (ref 98–107)
CK SERPL-CCNC: ABNORMAL U/L (ref 20–200)
CO2 BLDA-SCNC: 28 MMOL/L (ref 22–29)
CO2 SERPL-SCNC: 31 MMOL/L (ref 22–29)
CREAT BLD-MCNC: 3.37 MG/DL (ref 0.76–1.27)
DEPRECATED RDW RBC AUTO: 42.4 FL (ref 37–54)
DEPRECATED RDW RBC AUTO: 43.3 FL (ref 37–54)
EOSINOPHIL # BLD MANUAL: 0.19 10*3/MM3 (ref 0–0.4)
EOSINOPHIL NFR BLD MANUAL: 1 % (ref 0.3–6.2)
ERYTHROCYTE [DISTWIDTH] IN BLOOD BY AUTOMATED COUNT: 13.7 % (ref 12.3–15.4)
ERYTHROCYTE [DISTWIDTH] IN BLOOD BY AUTOMATED COUNT: 13.8 % (ref 12.3–15.4)
GFR SERPL CREATININE-BSD FRML MDRD: 22 ML/MIN/1.73
GLUCOSE BLD-MCNC: 164 MG/DL (ref 65–99)
GLUCOSE BLDC GLUCOMTR-MCNC: 114 MG/DL (ref 70–105)
GLUCOSE BLDC GLUCOMTR-MCNC: 116 MG/DL (ref 70–105)
GLUCOSE BLDC GLUCOMTR-MCNC: 122 MG/DL (ref 70–105)
GLUCOSE BLDC GLUCOMTR-MCNC: 131 MG/DL (ref 70–105)
GLUCOSE BLDC GLUCOMTR-MCNC: 148 MG/DL (ref 70–105)
HAV IGM SERPL QL IA: ABNORMAL
HBV CORE IGM SERPL QL IA: ABNORMAL
HBV SURFACE AG SERPL QL IA: ABNORMAL
HCO3 BLDA-SCNC: 26.4 MMOL/L (ref 21–28)
HCT VFR BLD AUTO: 36.7 % (ref 37.5–51)
HCT VFR BLD AUTO: 38.9 % (ref 37.5–51)
HCV AB SER DONR QL: REACTIVE
HEMODILUTION: NO
HGB BLD-MCNC: 12.9 G/DL (ref 13–17.7)
HGB BLD-MCNC: 13.5 G/DL (ref 13–17.7)
HOROWITZ INDEX BLD+IHG-RTO: 60 %
INR PPP: 1.4 (ref 0.9–1.1)
LYMPHOCYTES # BLD MANUAL: 2.67 10*3/MM3 (ref 0.7–3.1)
LYMPHOCYTES # BLD MANUAL: 2.72 10*3/MM3 (ref 0.7–3.1)
LYMPHOCYTES NFR BLD MANUAL: 10 % (ref 5–12)
LYMPHOCYTES NFR BLD MANUAL: 13 % (ref 19.6–45.3)
LYMPHOCYTES NFR BLD MANUAL: 14 % (ref 19.6–45.3)
LYMPHOCYTES NFR BLD MANUAL: 3 % (ref 5–12)
MAGNESIUM SERPL-MCNC: 1.6 MG/DL (ref 1.6–2.6)
MCH RBC QN AUTO: 31.4 PG (ref 26.6–33)
MCH RBC QN AUTO: 31.4 PG (ref 26.6–33)
MCHC RBC AUTO-ENTMCNC: 34.8 G/DL (ref 31.5–35.7)
MCHC RBC AUTO-ENTMCNC: 35.1 G/DL (ref 31.5–35.7)
MCV RBC AUTO: 89.5 FL (ref 79–97)
MCV RBC AUTO: 90.4 FL (ref 79–97)
MODALITY: ABNORMAL
MONOCYTES # BLD AUTO: 0.58 10*3/MM3 (ref 0.1–0.9)
MONOCYTES # BLD AUTO: 2.05 10*3/MM3 (ref 0.1–0.9)
MRSA SPEC QL CULT: NORMAL
MYOGLOBIN SERPL-MCNC: ABNORMAL NG/ML (ref 28–72)
NEUTROPHILS # BLD AUTO: 15.79 10*3/MM3 (ref 1.7–7)
NEUTROPHILS # BLD AUTO: 15.91 10*3/MM3 (ref 1.7–7)
NEUTROPHILS NFR BLD MANUAL: 47 % (ref 42.7–76)
NEUTROPHILS NFR BLD MANUAL: 72 % (ref 42.7–76)
NEUTS BAND NFR BLD MANUAL: 10 % (ref 0–5)
NEUTS BAND NFR BLD MANUAL: 30 % (ref 0–5)
PCO2 BLDA: 50.9 MM HG (ref 35–48)
PEEP RESPIRATORY: 5 CM[H2O]
PH BLDA: 7.32 PH UNITS (ref 7.35–7.45)
PH UR: 5 [PH] (ref 4.5–8)
PLAT MORPH BLD: NORMAL
PLAT MORPH BLD: NORMAL
PLATELET # BLD AUTO: 125 10*3/MM3 (ref 140–450)
PLATELET # BLD AUTO: 132 10*3/MM3 (ref 140–450)
PMV BLD AUTO: 7.3 FL (ref 6–12)
PMV BLD AUTO: 7.4 FL (ref 6–12)
PO2 BLDA: 119.5 MM HG (ref 83–108)
POTASSIUM BLD-SCNC: 4.3 MMOL/L (ref 3.5–5.2)
PROTHROMBIN TIME: 13.9 SECONDS (ref 9.6–11.7)
RBC # BLD AUTO: 4.1 10*6/MM3 (ref 4.14–5.8)
RBC # BLD AUTO: 4.3 10*6/MM3 (ref 4.14–5.8)
RBC MORPH BLD: NORMAL
RBC MORPH BLD: NORMAL
RESPIRATORY RATE: 18
SAO2 % BLDCOA: 98.3 % (ref 94–98)
SCAN SLIDE: NORMAL
SCAN SLIDE: NORMAL
SODIUM BLD-SCNC: 144 MMOL/L (ref 136–145)
TROPONIN T SERPL-MCNC: 2.34 NG/ML (ref 0–0.03)
TROPONIN T SERPL-MCNC: 3.34 NG/ML (ref 0–0.03)
VANCOMYCIN SERPL-MCNC: 25 MCG/ML (ref 5–40)
VENTILATOR MODE: ABNORMAL
VT ON VENT VENT: 400 ML
WBC MORPH BLD: NORMAL
WBC MORPH BLD: NORMAL
WBC NRBC COR # BLD: 19.4 10*3/MM3 (ref 3.4–10.8)
WBC NRBC COR # BLD: 20.5 10*3/MM3 (ref 3.4–10.8)

## 2019-10-29 PROCEDURE — 82550 ASSAY OF CK (CPK): CPT | Performed by: INTERNAL MEDICINE

## 2019-10-29 PROCEDURE — 80202 ASSAY OF VANCOMYCIN: CPT | Performed by: NURSE PRACTITIONER

## 2019-10-29 PROCEDURE — 84484 ASSAY OF TROPONIN QUANT: CPT | Performed by: NURSE PRACTITIONER

## 2019-10-29 PROCEDURE — 94003 VENT MGMT INPAT SUBQ DAY: CPT

## 2019-10-29 PROCEDURE — 94799 UNLISTED PULMONARY SVC/PX: CPT

## 2019-10-29 PROCEDURE — 80048 BASIC METABOLIC PNL TOTAL CA: CPT | Performed by: NURSE PRACTITIONER

## 2019-10-29 PROCEDURE — 92610 EVALUATE SWALLOWING FUNCTION: CPT

## 2019-10-29 PROCEDURE — 25010000002 MAGNESIUM SULFATE 2 GM/50ML SOLUTION: Performed by: INTERNAL MEDICINE

## 2019-10-29 PROCEDURE — 85610 PROTHROMBIN TIME: CPT | Performed by: NURSE PRACTITIONER

## 2019-10-29 PROCEDURE — 85730 THROMBOPLASTIN TIME PARTIAL: CPT | Performed by: NURSE PRACTITIONER

## 2019-10-29 PROCEDURE — 83735 ASSAY OF MAGNESIUM: CPT | Performed by: NURSE PRACTITIONER

## 2019-10-29 PROCEDURE — 85025 COMPLETE CBC W/AUTO DIFF WBC: CPT | Performed by: NURSE PRACTITIONER

## 2019-10-29 PROCEDURE — 81003 URINALYSIS AUTO W/O SCOPE: CPT | Performed by: INTERNAL MEDICINE

## 2019-10-29 PROCEDURE — 25010000002 CEFEPIME PER 500 MG: Performed by: NURSE PRACTITIONER

## 2019-10-29 PROCEDURE — 71045 X-RAY EXAM CHEST 1 VIEW: CPT

## 2019-10-29 PROCEDURE — 25010000002 ONDANSETRON PER 1 MG: Performed by: NURSE PRACTITIONER

## 2019-10-29 PROCEDURE — 99222 1ST HOSP IP/OBS MODERATE 55: CPT | Performed by: INTERNAL MEDICINE

## 2019-10-29 PROCEDURE — 25010000002 FENTANYL CITRATE (PF) 100 MCG/2ML SOLUTION: Performed by: NURSE PRACTITIONER

## 2019-10-29 PROCEDURE — 93970 EXTREMITY STUDY: CPT

## 2019-10-29 PROCEDURE — 25010000002 HEPARIN (PORCINE) PER 1000 UNITS: Performed by: NURSE PRACTITIONER

## 2019-10-29 PROCEDURE — 85007 BL SMEAR W/DIFF WBC COUNT: CPT | Performed by: NURSE PRACTITIONER

## 2019-10-29 PROCEDURE — 85730 THROMBOPLASTIN TIME PARTIAL: CPT | Performed by: INTERNAL MEDICINE

## 2019-10-29 PROCEDURE — 82962 GLUCOSE BLOOD TEST: CPT

## 2019-10-29 PROCEDURE — 76775 US EXAM ABDO BACK WALL LIM: CPT

## 2019-10-29 PROCEDURE — 36600 WITHDRAWAL OF ARTERIAL BLOOD: CPT

## 2019-10-29 PROCEDURE — 82803 BLOOD GASES ANY COMBINATION: CPT

## 2019-10-29 PROCEDURE — 94760 N-INVAS EAR/PLS OXIMETRY 1: CPT

## 2019-10-29 RX ORDER — HEPARIN SODIUM 10000 [USP'U]/100ML
18 INJECTION, SOLUTION INTRAVENOUS
Status: DISCONTINUED | OUTPATIENT
Start: 2019-10-29 | End: 2019-10-31

## 2019-10-29 RX ORDER — SODIUM CHLORIDE FOR INHALATION 0.9 %
3 VIAL, NEBULIZER (ML) INHALATION
Status: DISCONTINUED | OUTPATIENT
Start: 2019-10-29 | End: 2019-10-29

## 2019-10-29 RX ORDER — SODIUM CHLORIDE 9 MG/ML
175 INJECTION, SOLUTION INTRAVENOUS CONTINUOUS
Status: DISCONTINUED | OUTPATIENT
Start: 2019-10-29 | End: 2019-10-30

## 2019-10-29 RX ORDER — HYDROXYZINE HYDROCHLORIDE 25 MG/1
50 TABLET, FILM COATED ORAL EVERY 6 HOURS PRN
Status: DISCONTINUED | OUTPATIENT
Start: 2019-10-29 | End: 2019-11-02

## 2019-10-29 RX ORDER — HEPARIN SODIUM 10000 [USP'U]/100ML
18 INJECTION, SOLUTION INTRAVENOUS
Status: DISCONTINUED | OUTPATIENT
Start: 2019-10-29 | End: 2019-10-29

## 2019-10-29 RX ORDER — MAGNESIUM SULFATE HEPTAHYDRATE 40 MG/ML
2 INJECTION, SOLUTION INTRAVENOUS ONCE
Status: COMPLETED | OUTPATIENT
Start: 2019-10-29 | End: 2019-10-29

## 2019-10-29 RX ORDER — ONDANSETRON 2 MG/ML
4 INJECTION INTRAMUSCULAR; INTRAVENOUS EVERY 6 HOURS PRN
Status: DISCONTINUED | OUTPATIENT
Start: 2019-10-29 | End: 2019-11-04

## 2019-10-29 RX ADMIN — HYDROXYZINE HYDROCHLORIDE 50 MG: 25 TABLET ORAL at 20:55

## 2019-10-29 RX ADMIN — RACEPINEPHRINE HYDROCHLORIDE 0.5 ML: 11.25 SOLUTION RESPIRATORY (INHALATION) at 09:42

## 2019-10-29 RX ADMIN — Medication 10 ML: at 20:02

## 2019-10-29 RX ADMIN — HEPARIN SODIUM 22 UNITS/KG/HR: 10000 INJECTION, SOLUTION INTRAVENOUS at 09:50

## 2019-10-29 RX ADMIN — PANTOPRAZOLE SODIUM 40 MG: 40 INJECTION, POWDER, FOR SOLUTION INTRAVENOUS at 05:39

## 2019-10-29 RX ADMIN — IPRATROPIUM BROMIDE AND ALBUTEROL SULFATE 3 ML: .5; 3 SOLUTION RESPIRATORY (INHALATION) at 07:20

## 2019-10-29 RX ADMIN — IPRATROPIUM BROMIDE AND ALBUTEROL SULFATE 3 ML: .5; 3 SOLUTION RESPIRATORY (INHALATION) at 19:13

## 2019-10-29 RX ADMIN — HEPARIN SODIUM 22 UNITS/KG/HR: 10000 INJECTION, SOLUTION INTRAVENOUS at 09:44

## 2019-10-29 RX ADMIN — Medication 10 ML: at 08:48

## 2019-10-29 RX ADMIN — Medication 0.23 MCG/KG/MIN: at 04:33

## 2019-10-29 RX ADMIN — CEFEPIME HYDROCHLORIDE 2 G: 2 INJECTION, POWDER, FOR SOLUTION INTRAVENOUS at 03:19

## 2019-10-29 RX ADMIN — IPRATROPIUM BROMIDE AND ALBUTEROL SULFATE 3 ML: .5; 3 SOLUTION RESPIRATORY (INHALATION) at 14:15

## 2019-10-29 RX ADMIN — SODIUM CHLORIDE 175 ML/HR: 900 INJECTION, SOLUTION INTRAVENOUS at 20:04

## 2019-10-29 RX ADMIN — FENTANYL CITRATE 50 MCG: 50 INJECTION, SOLUTION INTRAMUSCULAR; INTRAVENOUS at 03:44

## 2019-10-29 RX ADMIN — SODIUM CHLORIDE 150 ML/HR: 900 INJECTION, SOLUTION INTRAVENOUS at 10:32

## 2019-10-29 RX ADMIN — ONDANSETRON 4 MG: 2 INJECTION INTRAMUSCULAR; INTRAVENOUS at 15:00

## 2019-10-29 RX ADMIN — DEXTROSE MONOHYDRATE: 5 INJECTION, SOLUTION INTRAVENOUS at 04:33

## 2019-10-29 RX ADMIN — IPRATROPIUM BROMIDE AND ALBUTEROL SULFATE 3 ML: .5; 3 SOLUTION RESPIRATORY (INHALATION) at 04:01

## 2019-10-29 RX ADMIN — IPRATROPIUM BROMIDE AND ALBUTEROL SULFATE 3 ML: .5; 3 SOLUTION RESPIRATORY (INHALATION) at 11:20

## 2019-10-29 RX ADMIN — SODIUM CHLORIDE 175 ML/HR: 900 INJECTION, SOLUTION INTRAVENOUS at 16:11

## 2019-10-29 RX ADMIN — MAGNESIUM SULFATE HEPTAHYDRATE 2 G: 40 INJECTION, SOLUTION INTRAVENOUS at 10:32

## 2019-10-30 ENCOUNTER — APPOINTMENT (OUTPATIENT)
Dept: GENERAL RADIOLOGY | Facility: HOSPITAL | Age: 24
End: 2019-10-30

## 2019-10-30 LAB
ANION GAP SERPL CALCULATED.3IONS-SCNC: 17 MMOL/L (ref 5–15)
ANION GAP SERPL CALCULATED.3IONS-SCNC: 18 MMOL/L (ref 5–15)
APTT PPP: 107.6 SECONDS (ref 24–31)
APTT PPP: 34.1 SECONDS (ref 61–76.5)
APTT PPP: 46.3 SECONDS (ref 61–76.5)
ARTERIAL PATENCY WRIST A: POSITIVE
ATMOSPHERIC PRESS: ABNORMAL MM[HG]
BASE EXCESS BLDA CALC-SCNC: -4.1 MMOL/L (ref 0–3)
BASOPHILS # BLD AUTO: 0.1 10*3/MM3 (ref 0–0.2)
BASOPHILS NFR BLD AUTO: 0.4 % (ref 0–1.5)
BDY SITE: ABNORMAL
BUN BLD-MCNC: 45 MG/DL (ref 6–20)
BUN BLD-MCNC: 52 MG/DL (ref 6–20)
BUN/CREAT SERPL: 10.9 (ref 7–25)
BUN/CREAT SERPL: 11.2 (ref 7–25)
CALCIUM SPEC-SCNC: 6.5 MG/DL (ref 8.6–10.5)
CALCIUM SPEC-SCNC: 6.6 MG/DL (ref 8.6–10.5)
CHLORIDE SERPL-SCNC: 91 MMOL/L (ref 98–107)
CHLORIDE SERPL-SCNC: 97 MMOL/L (ref 98–107)
CK SERPL-CCNC: ABNORMAL U/L (ref 20–200)
CO2 BLDA-SCNC: 22 MMOL/L (ref 22–29)
CO2 SERPL-SCNC: 21 MMOL/L (ref 22–29)
CO2 SERPL-SCNC: 25 MMOL/L (ref 22–29)
CREAT BLD-MCNC: 4.03 MG/DL (ref 0.76–1.27)
CREAT BLD-MCNC: 4.75 MG/DL (ref 0.76–1.27)
DEPRECATED RDW RBC AUTO: 42.4 FL (ref 37–54)
EOSINOPHIL # BLD AUTO: 0 10*3/MM3 (ref 0–0.4)
EOSINOPHIL NFR BLD AUTO: 0 % (ref 0.3–6.2)
ERYTHROCYTE [DISTWIDTH] IN BLOOD BY AUTOMATED COUNT: 13.5 % (ref 12.3–15.4)
GFR SERPL CREATININE-BSD FRML MDRD: 15 ML/MIN/1.73
GFR SERPL CREATININE-BSD FRML MDRD: 18 ML/MIN/1.73
GLUCOSE BLD-MCNC: 138 MG/DL (ref 65–99)
GLUCOSE BLD-MCNC: 156 MG/DL (ref 65–99)
GLUCOSE BLDC GLUCOMTR-MCNC: 123 MG/DL (ref 70–105)
GLUCOSE BLDC GLUCOMTR-MCNC: 140 MG/DL (ref 70–105)
GLUCOSE BLDC GLUCOMTR-MCNC: 144 MG/DL (ref 70–105)
GLUCOSE BLDC GLUCOMTR-MCNC: 150 MG/DL (ref 70–105)
HBV SURFACE AG SERPL QL IA: NORMAL
HCO3 BLDA-SCNC: 20.9 MMOL/L (ref 21–28)
HCT VFR BLD AUTO: 35.8 % (ref 37.5–51)
HEMODILUTION: NO
HGB BLD-MCNC: 12.4 G/DL (ref 13–17.7)
HOROWITZ INDEX BLD+IHG-RTO: 100 %
LYMPHOCYTES # BLD AUTO: 0.6 10*3/MM3 (ref 0.7–3.1)
LYMPHOCYTES NFR BLD AUTO: 3.8 % (ref 19.6–45.3)
MAGNESIUM SERPL-MCNC: 1.9 MG/DL (ref 1.6–2.6)
MCH RBC QN AUTO: 31 PG (ref 26.6–33)
MCHC RBC AUTO-ENTMCNC: 34.8 G/DL (ref 31.5–35.7)
MCV RBC AUTO: 89.3 FL (ref 79–97)
MODALITY: ABNORMAL
MONOCYTES # BLD AUTO: 0.2 10*3/MM3 (ref 0.1–0.9)
MONOCYTES NFR BLD AUTO: 1.1 % (ref 5–12)
NEUTROPHILS # BLD AUTO: 14.6 10*3/MM3 (ref 1.7–7)
NEUTROPHILS NFR BLD AUTO: 94.7 % (ref 42.7–76)
NRBC BLD AUTO-RTO: 0 /100 WBC (ref 0–0.2)
PCO2 BLDA: 36.8 MM HG (ref 35–48)
PEEP RESPIRATORY: 5 CM[H2O]
PH BLDA: 7.36 PH UNITS (ref 7.35–7.45)
PH UR: 5 [PH] (ref 4.5–8)
PLATELET # BLD AUTO: 96 10*3/MM3 (ref 140–450)
PMV BLD AUTO: 7.9 FL (ref 6–12)
PO2 BLDA: 111.7 MM HG (ref 83–108)
POTASSIUM BLD-SCNC: 4.3 MMOL/L (ref 3.5–5.2)
POTASSIUM BLD-SCNC: 4.5 MMOL/L (ref 3.5–5.2)
RBC # BLD AUTO: 4.01 10*6/MM3 (ref 4.14–5.8)
RESPIRATORY RATE: 12
SAO2 % BLDCOA: 98.2 % (ref 94–98)
SODIUM BLD-SCNC: 130 MMOL/L (ref 136–145)
SODIUM BLD-SCNC: 139 MMOL/L (ref 136–145)
TROPONIN T SERPL-MCNC: 1.37 NG/ML (ref 0–0.03)
TROPONIN T SERPL-MCNC: 1.99 NG/ML (ref 0–0.03)
VANCOMYCIN SERPL-MCNC: 10.5 MCG/ML (ref 5–40)
VENTILATOR MODE: ABNORMAL
VT ON VENT VENT: 500 ML
WBC NRBC COR # BLD: 15.4 10*3/MM3 (ref 3.4–10.8)

## 2019-10-30 PROCEDURE — 63710000001 INSULIN LISPRO (HUMAN) PER 5 UNITS: Performed by: NURSE PRACTITIONER

## 2019-10-30 PROCEDURE — 82962 GLUCOSE BLOOD TEST: CPT

## 2019-10-30 PROCEDURE — 25010000002 HEPARIN (PORCINE) PER 1000 UNITS: Performed by: INTERNAL MEDICINE

## 2019-10-30 PROCEDURE — 84484 ASSAY OF TROPONIN QUANT: CPT | Performed by: NURSE PRACTITIONER

## 2019-10-30 PROCEDURE — 94660 CPAP INITIATION&MGMT: CPT

## 2019-10-30 PROCEDURE — 82803 BLOOD GASES ANY COMBINATION: CPT

## 2019-10-30 PROCEDURE — 85025 COMPLETE CBC W/AUTO DIFF WBC: CPT | Performed by: NURSE PRACTITIONER

## 2019-10-30 PROCEDURE — 25010000002 METHYLPREDNISOLONE PER 40 MG: Performed by: NURSE PRACTITIONER

## 2019-10-30 PROCEDURE — 94799 UNLISTED PULMONARY SVC/PX: CPT

## 2019-10-30 PROCEDURE — 82550 ASSAY OF CK (CPK): CPT | Performed by: INTERNAL MEDICINE

## 2019-10-30 PROCEDURE — 5A1D70Z PERFORMANCE OF URINARY FILTRATION, INTERMITTENT, LESS THAN 6 HOURS PER DAY: ICD-10-PCS | Performed by: INTERNAL MEDICINE

## 2019-10-30 PROCEDURE — 94640 AIRWAY INHALATION TREATMENT: CPT

## 2019-10-30 PROCEDURE — B548ZZA ULTRASONOGRAPHY OF SUPERIOR VENA CAVA, GUIDANCE: ICD-10-PCS | Performed by: INTERNAL MEDICINE

## 2019-10-30 PROCEDURE — 99233 SBSQ HOSP IP/OBS HIGH 50: CPT | Performed by: INTERNAL MEDICINE

## 2019-10-30 PROCEDURE — 80202 ASSAY OF VANCOMYCIN: CPT | Performed by: INTERNAL MEDICINE

## 2019-10-30 PROCEDURE — 80048 BASIC METABOLIC PNL TOTAL CA: CPT | Performed by: NURSE PRACTITIONER

## 2019-10-30 PROCEDURE — C1751 CATH, INF, PER/CENT/MIDLINE: HCPCS

## 2019-10-30 PROCEDURE — 25010000002 CEFEPIME PER 500 MG: Performed by: INTERNAL MEDICINE

## 2019-10-30 PROCEDURE — 25010000002 VANCOMYCIN 10 G RECONSTITUTED SOLUTION: Performed by: INTERNAL MEDICINE

## 2019-10-30 PROCEDURE — 85730 THROMBOPLASTIN TIME PARTIAL: CPT | Performed by: INTERNAL MEDICINE

## 2019-10-30 PROCEDURE — 81003 URINALYSIS AUTO W/O SCOPE: CPT | Performed by: INTERNAL MEDICINE

## 2019-10-30 PROCEDURE — 80048 BASIC METABOLIC PNL TOTAL CA: CPT | Performed by: INTERNAL MEDICINE

## 2019-10-30 PROCEDURE — 83735 ASSAY OF MAGNESIUM: CPT | Performed by: NURSE PRACTITIONER

## 2019-10-30 PROCEDURE — 02HV33Z INSERTION OF INFUSION DEVICE INTO SUPERIOR VENA CAVA, PERCUTANEOUS APPROACH: ICD-10-PCS | Performed by: INTERNAL MEDICINE

## 2019-10-30 PROCEDURE — 36600 WITHDRAWAL OF ARTERIAL BLOOD: CPT

## 2019-10-30 PROCEDURE — 94760 N-INVAS EAR/PLS OXIMETRY 1: CPT

## 2019-10-30 PROCEDURE — 85730 THROMBOPLASTIN TIME PARTIAL: CPT | Performed by: NURSE PRACTITIONER

## 2019-10-30 PROCEDURE — 87340 HEPATITIS B SURFACE AG IA: CPT | Performed by: INTERNAL MEDICINE

## 2019-10-30 PROCEDURE — 71045 X-RAY EXAM CHEST 1 VIEW: CPT

## 2019-10-30 RX ORDER — METHYLPREDNISOLONE SODIUM SUCCINATE 40 MG/ML
40 INJECTION, POWDER, LYOPHILIZED, FOR SOLUTION INTRAMUSCULAR; INTRAVENOUS EVERY 8 HOURS
Status: COMPLETED | OUTPATIENT
Start: 2019-10-30 | End: 2019-10-31

## 2019-10-30 RX ORDER — HEPARIN SODIUM 1000 [USP'U]/ML
3000 INJECTION, SOLUTION INTRAVENOUS; SUBCUTANEOUS
Status: DISCONTINUED | OUTPATIENT
Start: 2019-10-30 | End: 2019-11-08

## 2019-10-30 RX ORDER — MANNITOL 20 G/100ML
125 INJECTION, SOLUTION INTRAVENOUS ONCE
Status: COMPLETED | OUTPATIENT
Start: 2019-10-30 | End: 2019-10-30

## 2019-10-30 RX ORDER — SODIUM CHLORIDE FOR INHALATION 0.9 %
3 VIAL, NEBULIZER (ML) INHALATION ONCE
Status: COMPLETED | OUTPATIENT
Start: 2019-10-30 | End: 2019-10-30

## 2019-10-30 RX ORDER — BUMETANIDE 0.25 MG/ML
1 INJECTION INTRAMUSCULAR; INTRAVENOUS ONCE
Status: DISCONTINUED | OUTPATIENT
Start: 2019-10-30 | End: 2019-11-02

## 2019-10-30 RX ADMIN — METHYLPREDNISOLONE SODIUM SUCCINATE 40 MG: 40 INJECTION, POWDER, FOR SOLUTION INTRAMUSCULAR; INTRAVENOUS at 00:29

## 2019-10-30 RX ADMIN — Medication 10 ML: at 08:24

## 2019-10-30 RX ADMIN — Medication 3 ML: at 00:30

## 2019-10-30 RX ADMIN — METHYLPREDNISOLONE SODIUM SUCCINATE 40 MG: 40 INJECTION, POWDER, FOR SOLUTION INTRAMUSCULAR; INTRAVENOUS at 08:23

## 2019-10-30 RX ADMIN — Medication 10 ML: at 20:51

## 2019-10-30 RX ADMIN — METHYLPREDNISOLONE SODIUM SUCCINATE 40 MG: 40 INJECTION, POWDER, FOR SOLUTION INTRAMUSCULAR; INTRAVENOUS at 17:13

## 2019-10-30 RX ADMIN — HEPARIN SODIUM 3000 UNITS: 1000 INJECTION INTRAVENOUS; SUBCUTANEOUS at 19:30

## 2019-10-30 RX ADMIN — IPRATROPIUM BROMIDE AND ALBUTEROL SULFATE 3 ML: .5; 3 SOLUTION RESPIRATORY (INHALATION) at 10:50

## 2019-10-30 RX ADMIN — IPRATROPIUM BROMIDE AND ALBUTEROL SULFATE 3 ML: .5; 3 SOLUTION RESPIRATORY (INHALATION) at 15:20

## 2019-10-30 RX ADMIN — IPRATROPIUM BROMIDE AND ALBUTEROL SULFATE 3 ML: .5; 3 SOLUTION RESPIRATORY (INHALATION) at 23:27

## 2019-10-30 RX ADMIN — PANTOPRAZOLE SODIUM 40 MG: 40 INJECTION, POWDER, FOR SOLUTION INTRAVENOUS at 05:22

## 2019-10-30 RX ADMIN — RACEPINEPHRINE HYDROCHLORIDE 0.5 ML: 11.25 SOLUTION RESPIRATORY (INHALATION) at 00:29

## 2019-10-30 RX ADMIN — VANCOMYCIN HYDROCHLORIDE 1250 MG: 10 INJECTION, POWDER, LYOPHILIZED, FOR SOLUTION INTRAVENOUS at 08:23

## 2019-10-30 RX ADMIN — IPRATROPIUM BROMIDE AND ALBUTEROL SULFATE 3 ML: .5; 3 SOLUTION RESPIRATORY (INHALATION) at 00:07

## 2019-10-30 RX ADMIN — Medication 10 ML: at 20:53

## 2019-10-30 RX ADMIN — IPRATROPIUM BROMIDE AND ALBUTEROL SULFATE 3 ML: .5; 3 SOLUTION RESPIRATORY (INHALATION) at 20:07

## 2019-10-30 RX ADMIN — CEFEPIME HYDROCHLORIDE 2 G: 2 INJECTION, POWDER, FOR SOLUTION INTRAVENOUS at 05:21

## 2019-10-30 RX ADMIN — MANNITOL 25 G: 20 INJECTION, SOLUTION INTRAVENOUS at 09:43

## 2019-10-30 RX ADMIN — IPRATROPIUM BROMIDE AND ALBUTEROL SULFATE 3 ML: .5; 3 SOLUTION RESPIRATORY (INHALATION) at 07:44

## 2019-10-30 RX ADMIN — HEPARIN SODIUM AND DEXTROSE 16 UNITS/KG/HR: 10000; 5 INJECTION INTRAVENOUS at 06:20

## 2019-10-30 RX ADMIN — INSULIN LISPRO 2 UNITS: 100 INJECTION, SOLUTION INTRAVENOUS; SUBCUTANEOUS at 08:23

## 2019-10-31 ENCOUNTER — APPOINTMENT (OUTPATIENT)
Dept: GENERAL RADIOLOGY | Facility: HOSPITAL | Age: 24
End: 2019-10-31

## 2019-10-31 ENCOUNTER — APPOINTMENT (OUTPATIENT)
Dept: CT IMAGING | Facility: HOSPITAL | Age: 24
End: 2019-10-31

## 2019-10-31 LAB
ANION GAP SERPL CALCULATED.3IONS-SCNC: 15 MMOL/L (ref 5–15)
APTT PPP: 62.7 SECONDS (ref 61–76.5)
APTT PPP: 67.4 SECONDS (ref 61–76.5)
BACTERIA SPEC AEROBE CULT: ABNORMAL
BACTERIA SPEC RESP CULT: NORMAL
BASOPHILS # BLD AUTO: 0 10*3/MM3 (ref 0–0.2)
BASOPHILS NFR BLD AUTO: 0.2 % (ref 0–1.5)
BUN BLD-MCNC: 37 MG/DL (ref 6–20)
BUN/CREAT SERPL: 9 (ref 7–25)
CALCIUM SPEC-SCNC: 7.6 MG/DL (ref 8.6–10.5)
CHLORIDE SERPL-SCNC: 91 MMOL/L (ref 98–107)
CK SERPL-CCNC: 4185 U/L (ref 20–200)
CK SERPL-CCNC: 6001 U/L (ref 20–200)
CK SERPL-CCNC: 7765 U/L (ref 20–200)
CK SERPL-CCNC: ABNORMAL U/L (ref 20–200)
CO2 SERPL-SCNC: 27 MMOL/L (ref 22–29)
CREAT BLD-MCNC: 4.11 MG/DL (ref 0.76–1.27)
DEPRECATED RDW RBC AUTO: 41.6 FL (ref 37–54)
EOSINOPHIL # BLD AUTO: 0 10*3/MM3 (ref 0–0.4)
EOSINOPHIL NFR BLD AUTO: 0 % (ref 0.3–6.2)
ERYTHROCYTE [DISTWIDTH] IN BLOOD BY AUTOMATED COUNT: 13.4 % (ref 12.3–15.4)
GFR SERPL CREATININE-BSD FRML MDRD: 18 ML/MIN/1.73
GLUCOSE BLD-MCNC: 143 MG/DL (ref 65–99)
GLUCOSE BLDC GLUCOMTR-MCNC: 74 MG/DL (ref 70–105)
GLUCOSE BLDC GLUCOMTR-MCNC: 81 MG/DL (ref 70–105)
GLUCOSE BLDC GLUCOMTR-MCNC: 94 MG/DL (ref 70–105)
GRAM STN SPEC: ABNORMAL
GRAM STN SPEC: ABNORMAL
GRAM STN SPEC: NORMAL
GRAM STN SPEC: NORMAL
HCT VFR BLD AUTO: 34 % (ref 37.5–51)
HGB BLD-MCNC: 11.8 G/DL (ref 13–17.7)
ISOLATED FROM: ABNORMAL
LYMPHOCYTES # BLD AUTO: 0.7 10*3/MM3 (ref 0.7–3.1)
LYMPHOCYTES NFR BLD AUTO: 3.2 % (ref 19.6–45.3)
MAGNESIUM SERPL-MCNC: 2 MG/DL (ref 1.6–2.6)
MCH RBC QN AUTO: 30.8 PG (ref 26.6–33)
MCHC RBC AUTO-ENTMCNC: 34.7 G/DL (ref 31.5–35.7)
MCV RBC AUTO: 88.6 FL (ref 79–97)
MONOCYTES # BLD AUTO: 0.7 10*3/MM3 (ref 0.1–0.9)
MONOCYTES NFR BLD AUTO: 3.3 % (ref 5–12)
NEUTROPHILS # BLD AUTO: 20.4 10*3/MM3 (ref 1.7–7)
NEUTROPHILS NFR BLD AUTO: 93.3 % (ref 42.7–76)
NRBC BLD AUTO-RTO: 0.2 /100 WBC (ref 0–0.2)
PLATELET # BLD AUTO: 93 10*3/MM3 (ref 140–450)
PMV BLD AUTO: 8 FL (ref 6–12)
POTASSIUM BLD-SCNC: 3.8 MMOL/L (ref 3.5–5.2)
RBC # BLD AUTO: 3.84 10*6/MM3 (ref 4.14–5.8)
SODIUM BLD-SCNC: 133 MMOL/L (ref 136–145)
WBC NRBC COR # BLD: 21.9 10*3/MM3 (ref 3.4–10.8)

## 2019-10-31 PROCEDURE — 94660 CPAP INITIATION&MGMT: CPT

## 2019-10-31 PROCEDURE — 94760 N-INVAS EAR/PLS OXIMETRY 1: CPT

## 2019-10-31 PROCEDURE — 85025 COMPLETE CBC W/AUTO DIFF WBC: CPT | Performed by: NURSE PRACTITIONER

## 2019-10-31 PROCEDURE — 94799 UNLISTED PULMONARY SVC/PX: CPT

## 2019-10-31 PROCEDURE — 25010000002 CEFEPIME PER 500 MG: Performed by: INTERNAL MEDICINE

## 2019-10-31 PROCEDURE — 99232 SBSQ HOSP IP/OBS MODERATE 35: CPT | Performed by: INTERNAL MEDICINE

## 2019-10-31 PROCEDURE — 25010000002 METHYLPREDNISOLONE PER 40 MG: Performed by: NURSE PRACTITIONER

## 2019-10-31 PROCEDURE — 85730 THROMBOPLASTIN TIME PARTIAL: CPT | Performed by: NURSE PRACTITIONER

## 2019-10-31 PROCEDURE — 82962 GLUCOSE BLOOD TEST: CPT

## 2019-10-31 PROCEDURE — 83735 ASSAY OF MAGNESIUM: CPT | Performed by: NURSE PRACTITIONER

## 2019-10-31 PROCEDURE — 71250 CT THORAX DX C-: CPT

## 2019-10-31 PROCEDURE — 25010000002 PIPERACILLIN SOD-TAZOBACTAM PER 1 G: Performed by: INTERNAL MEDICINE

## 2019-10-31 PROCEDURE — 71045 X-RAY EXAM CHEST 1 VIEW: CPT

## 2019-10-31 PROCEDURE — 80048 BASIC METABOLIC PNL TOTAL CA: CPT | Performed by: NURSE PRACTITIONER

## 2019-10-31 PROCEDURE — 25010000002 HEPARIN (PORCINE) PER 1000 UNITS: Performed by: INTERNAL MEDICINE

## 2019-10-31 PROCEDURE — 25010000002 METHYLPREDNISOLONE PER 40 MG: Performed by: INTERNAL MEDICINE

## 2019-10-31 PROCEDURE — 82550 ASSAY OF CK (CPK): CPT | Performed by: INTERNAL MEDICINE

## 2019-10-31 RX ORDER — HEPARIN SODIUM 10000 [USP'U]/100ML
15 INJECTION, SOLUTION INTRAVENOUS
Status: DISCONTINUED | OUTPATIENT
Start: 2019-10-31 | End: 2019-11-01

## 2019-10-31 RX ADMIN — Medication 10 ML: at 08:59

## 2019-10-31 RX ADMIN — Medication 10 ML: at 20:42

## 2019-10-31 RX ADMIN — IPRATROPIUM BROMIDE AND ALBUTEROL SULFATE 3 ML: .5; 3 SOLUTION RESPIRATORY (INHALATION) at 11:30

## 2019-10-31 RX ADMIN — PIPERACILLIN AND TAZOBACTAM 3.38 G: 3; .375 INJECTION, POWDER, LYOPHILIZED, FOR SOLUTION INTRAVENOUS at 12:36

## 2019-10-31 RX ADMIN — IPRATROPIUM BROMIDE AND ALBUTEROL SULFATE 3 ML: .5; 3 SOLUTION RESPIRATORY (INHALATION) at 03:13

## 2019-10-31 RX ADMIN — IPRATROPIUM BROMIDE AND ALBUTEROL SULFATE 3 ML: .5; 3 SOLUTION RESPIRATORY (INHALATION) at 23:15

## 2019-10-31 RX ADMIN — METHYLPREDNISOLONE SODIUM SUCCINATE 40 MG: 40 INJECTION, POWDER, FOR SOLUTION INTRAMUSCULAR; INTRAVENOUS at 00:37

## 2019-10-31 RX ADMIN — METHYLPREDNISOLONE SODIUM SUCCINATE 40 MG: 40 INJECTION, POWDER, FOR SOLUTION INTRAMUSCULAR; INTRAVENOUS at 08:57

## 2019-10-31 RX ADMIN — HEPARIN SODIUM 3000 UNITS: 1000 INJECTION INTRAVENOUS; SUBCUTANEOUS at 14:15

## 2019-10-31 RX ADMIN — Medication 10 ML: at 08:58

## 2019-10-31 RX ADMIN — PIPERACILLIN AND TAZOBACTAM 3.38 G: 3; .375 INJECTION, POWDER, LYOPHILIZED, FOR SOLUTION INTRAVENOUS at 20:41

## 2019-10-31 RX ADMIN — IPRATROPIUM BROMIDE AND ALBUTEROL SULFATE 3 ML: .5; 3 SOLUTION RESPIRATORY (INHALATION) at 15:39

## 2019-10-31 RX ADMIN — IPRATROPIUM BROMIDE AND ALBUTEROL SULFATE 3 ML: .5; 3 SOLUTION RESPIRATORY (INHALATION) at 19:31

## 2019-10-31 RX ADMIN — Medication 10 ML: at 20:41

## 2019-10-31 RX ADMIN — IPRATROPIUM BROMIDE AND ALBUTEROL SULFATE 3 ML: .5; 3 SOLUTION RESPIRATORY (INHALATION) at 07:45

## 2019-10-31 RX ADMIN — CEFEPIME HYDROCHLORIDE 2 G: 2 INJECTION, POWDER, FOR SOLUTION INTRAVENOUS at 05:09

## 2019-10-31 RX ADMIN — METHYLPREDNISOLONE SODIUM SUCCINATE 40 MG: 40 INJECTION, POWDER, FOR SOLUTION INTRAMUSCULAR; INTRAVENOUS at 17:54

## 2019-10-31 RX ADMIN — PANTOPRAZOLE SODIUM 40 MG: 40 INJECTION, POWDER, FOR SOLUTION INTRAVENOUS at 05:09

## 2019-11-01 ENCOUNTER — APPOINTMENT (OUTPATIENT)
Dept: GENERAL RADIOLOGY | Facility: HOSPITAL | Age: 24
End: 2019-11-01

## 2019-11-01 ENCOUNTER — APPOINTMENT (OUTPATIENT)
Dept: CT IMAGING | Facility: HOSPITAL | Age: 24
End: 2019-11-01

## 2019-11-01 LAB
ALBUMIN FLD-MCNC: 1.9 G/DL
ANION GAP SERPL CALCULATED.3IONS-SCNC: 15 MMOL/L (ref 5–15)
ANION GAP SERPL CALCULATED.3IONS-SCNC: 19 MMOL/L (ref 5–15)
APPEARANCE FLD: CLEAR
APTT PPP: 24.8 SECONDS (ref 24–31)
APTT PPP: 42.1 SECONDS (ref 61–76.5)
BUN BLD-MCNC: 52 MG/DL (ref 6–20)
BUN BLD-MCNC: 59 MG/DL (ref 6–20)
BUN/CREAT SERPL: 11.5 (ref 7–25)
BUN/CREAT SERPL: 11.8 (ref 7–25)
CALCIUM SPEC-SCNC: 8.1 MG/DL (ref 8.6–10.5)
CALCIUM SPEC-SCNC: 8.3 MG/DL (ref 8.6–10.5)
CHLORIDE SERPL-SCNC: 99 MMOL/L (ref 98–107)
CHLORIDE SERPL-SCNC: 99 MMOL/L (ref 98–107)
CK SERPL-CCNC: 1137 U/L (ref 20–200)
CK SERPL-CCNC: 1798 U/L (ref 20–200)
CK SERPL-CCNC: 2375 U/L (ref 20–200)
CK SERPL-CCNC: 3016 U/L (ref 20–200)
CO2 SERPL-SCNC: 21 MMOL/L (ref 22–29)
CO2 SERPL-SCNC: 23 MMOL/L (ref 22–29)
COLOR FLD: YELLOW
CREAT BLD-MCNC: 4.54 MG/DL (ref 0.76–1.27)
CREAT BLD-MCNC: 5.02 MG/DL (ref 0.76–1.27)
DEPRECATED RDW RBC AUTO: 42.9 FL (ref 37–54)
ERYTHROCYTE [DISTWIDTH] IN BLOOD BY AUTOMATED COUNT: 13.6 % (ref 12.3–15.4)
GFR SERPL CREATININE-BSD FRML MDRD: 14 ML/MIN/1.73
GFR SERPL CREATININE-BSD FRML MDRD: 16 ML/MIN/1.73
GFR SERPL CREATININE-BSD FRML MDRD: ABNORMAL ML/MIN/{1.73_M2}
GIANT PLATELETS: ABNORMAL
GIE STN SPEC: NORMAL
GLUCOSE BLD-MCNC: 115 MG/DL (ref 65–99)
GLUCOSE BLD-MCNC: 117 MG/DL (ref 65–99)
GLUCOSE BLDC GLUCOMTR-MCNC: 113 MG/DL (ref 70–105)
GLUCOSE BLDC GLUCOMTR-MCNC: 99 MG/DL (ref 70–105)
GLUCOSE FLD-MCNC: 108 MG/DL
HCT VFR BLD AUTO: 32.2 % (ref 37.5–51)
HGB BLD-MCNC: 11 G/DL (ref 13–17.7)
HIV1+2 AB SER QL: NORMAL
LDH FLD-CCNC: >1066 U/L
LYMPHOCYTES # BLD MANUAL: 0.7 10*3/MM3 (ref 0.7–3.1)
LYMPHOCYTES NFR BLD MANUAL: 3 % (ref 5–12)
LYMPHOCYTES NFR BLD MANUAL: 4 % (ref 19.6–45.3)
LYMPHOCYTES NFR FLD MANUAL: 6 %
MAGNESIUM SERPL-MCNC: 2.5 MG/DL (ref 1.6–2.6)
MCH RBC QN AUTO: 30.8 PG (ref 26.6–33)
MCHC RBC AUTO-ENTMCNC: 34.3 G/DL (ref 31.5–35.7)
MCV RBC AUTO: 89.9 FL (ref 79–97)
MESOTHL CELL NFR FLD MANUAL: 8 %
METHOD: NORMAL
MONOCYTES # BLD AUTO: 0.53 10*3/MM3 (ref 0.1–0.9)
MONOCYTES NFR FLD: 7 %
NEUTROPHILS # BLD AUTO: 16.28 10*3/MM3 (ref 1.7–7)
NEUTROPHILS NFR BLD MANUAL: 73 % (ref 42.7–76)
NEUTROPHILS NFR FLD MANUAL: 79 %
NEUTS BAND NFR BLD MANUAL: 20 % (ref 0–5)
NEUTS VAC BLD QL SMEAR: ABNORMAL
NUC CELL # FLD: 356 /MM3
PH FLD: 8 [PH] (ref 6.5–7.5)
PLATELET # BLD AUTO: 111 10*3/MM3 (ref 140–450)
PMV BLD AUTO: 8.3 FL (ref 6–12)
POTASSIUM BLD-SCNC: 3.9 MMOL/L (ref 3.5–5.2)
POTASSIUM BLD-SCNC: 4.1 MMOL/L (ref 3.5–5.2)
PROT FLD-MCNC: 3.1 G/DL
RBC # BLD AUTO: 3.58 10*6/MM3 (ref 4.14–5.8)
RBC MORPH BLD: NORMAL
SCAN SLIDE: NORMAL
SODIUM BLD-SCNC: 137 MMOL/L (ref 136–145)
SODIUM BLD-SCNC: 139 MMOL/L (ref 136–145)
WBC NRBC COR # BLD: 17.5 10*3/MM3 (ref 3.4–10.8)

## 2019-11-01 PROCEDURE — 84157 ASSAY OF PROTEIN OTHER: CPT | Performed by: NURSE PRACTITIONER

## 2019-11-01 PROCEDURE — 85730 THROMBOPLASTIN TIME PARTIAL: CPT | Performed by: NURSE PRACTITIONER

## 2019-11-01 PROCEDURE — 25010000002 ONDANSETRON PER 1 MG: Performed by: NURSE PRACTITIONER

## 2019-11-01 PROCEDURE — 94799 UNLISTED PULMONARY SVC/PX: CPT

## 2019-11-01 PROCEDURE — 87206 SMEAR FLUORESCENT/ACID STAI: CPT | Performed by: NURSE PRACTITIONER

## 2019-11-01 PROCEDURE — 25010000002 PIPERACILLIN SOD-TAZOBACTAM PER 1 G: Performed by: INTERNAL MEDICINE

## 2019-11-01 PROCEDURE — 87522 HEPATITIS C REVRS TRNSCRPJ: CPT | Performed by: INTERNAL MEDICINE

## 2019-11-01 PROCEDURE — 87015 SPECIMEN INFECT AGNT CONCNTJ: CPT | Performed by: NURSE PRACTITIONER

## 2019-11-01 PROCEDURE — 80048 BASIC METABOLIC PNL TOTAL CA: CPT | Performed by: NURSE PRACTITIONER

## 2019-11-01 PROCEDURE — 71045 X-RAY EXAM CHEST 1 VIEW: CPT

## 2019-11-01 PROCEDURE — 88184 FLOWCYTOMETRY/ TC 1 MARKER: CPT

## 2019-11-01 PROCEDURE — 94760 N-INVAS EAR/PLS OXIMETRY 1: CPT

## 2019-11-01 PROCEDURE — 74176 CT ABD & PELVIS W/O CONTRAST: CPT

## 2019-11-01 PROCEDURE — G0432 EIA HIV-1/HIV-2 SCREEN: HCPCS | Performed by: NURSE PRACTITIONER

## 2019-11-01 PROCEDURE — 87902 NFCT AGT GNTYP ALYS HEP C: CPT | Performed by: INTERNAL MEDICINE

## 2019-11-01 PROCEDURE — 25010000002 FUROSEMIDE PER 20 MG: Performed by: INTERNAL MEDICINE

## 2019-11-01 PROCEDURE — 92526 ORAL FUNCTION THERAPY: CPT

## 2019-11-01 PROCEDURE — 94660 CPAP INITIATION&MGMT: CPT

## 2019-11-01 PROCEDURE — 82550 ASSAY OF CK (CPK): CPT | Performed by: INTERNAL MEDICINE

## 2019-11-01 PROCEDURE — 99233 SBSQ HOSP IP/OBS HIGH 50: CPT | Performed by: INTERNAL MEDICINE

## 2019-11-01 PROCEDURE — 85007 BL SMEAR W/DIFF WBC COUNT: CPT | Performed by: NURSE PRACTITIONER

## 2019-11-01 PROCEDURE — 87205 SMEAR GRAM STAIN: CPT | Performed by: NURSE PRACTITIONER

## 2019-11-01 PROCEDURE — 85025 COMPLETE CBC W/AUTO DIFF WBC: CPT | Performed by: NURSE PRACTITIONER

## 2019-11-01 PROCEDURE — 87102 FUNGUS ISOLATION CULTURE: CPT | Performed by: NURSE PRACTITIONER

## 2019-11-01 PROCEDURE — 84311 SPECTROPHOTOMETRY: CPT | Performed by: NURSE PRACTITIONER

## 2019-11-01 PROCEDURE — 83735 ASSAY OF MAGNESIUM: CPT | Performed by: NURSE PRACTITIONER

## 2019-11-01 PROCEDURE — 82042 OTHER SOURCE ALBUMIN QUAN EA: CPT | Performed by: NURSE PRACTITIONER

## 2019-11-01 PROCEDURE — 82945 GLUCOSE OTHER FLUID: CPT | Performed by: NURSE PRACTITIONER

## 2019-11-01 PROCEDURE — 25010000003 AMPICILLIN-SULBACTAM PER 1.5 G: Performed by: INTERNAL MEDICINE

## 2019-11-01 PROCEDURE — 87040 BLOOD CULTURE FOR BACTERIA: CPT | Performed by: INTERNAL MEDICINE

## 2019-11-01 PROCEDURE — 80048 BASIC METABOLIC PNL TOTAL CA: CPT | Performed by: INTERNAL MEDICINE

## 2019-11-01 PROCEDURE — 87075 CULTR BACTERIA EXCEPT BLOOD: CPT | Performed by: NURSE PRACTITIONER

## 2019-11-01 PROCEDURE — 25010000002 HEPARIN (PORCINE) PER 1000 UNITS: Performed by: INTERNAL MEDICINE

## 2019-11-01 PROCEDURE — 87070 CULTURE OTHR SPECIMN AEROBIC: CPT | Performed by: NURSE PRACTITIONER

## 2019-11-01 PROCEDURE — 0W993ZX DRAINAGE OF RIGHT PLEURAL CAVITY, PERCUTANEOUS APPROACH, DIAGNOSTIC: ICD-10-PCS | Performed by: NURSE PRACTITIONER

## 2019-11-01 PROCEDURE — 87116 MYCOBACTERIA CULTURE: CPT | Performed by: NURSE PRACTITIONER

## 2019-11-01 PROCEDURE — 88185 FLOWCYTOMETRY/TC ADD-ON: CPT

## 2019-11-01 PROCEDURE — 84478 ASSAY OF TRIGLYCERIDES: CPT | Performed by: NURSE PRACTITIONER

## 2019-11-01 PROCEDURE — 83615 LACTATE (LD) (LDH) ENZYME: CPT | Performed by: NURSE PRACTITIONER

## 2019-11-01 PROCEDURE — 83986 ASSAY PH BODY FLUID NOS: CPT | Performed by: NURSE PRACTITIONER

## 2019-11-01 PROCEDURE — 88108 CYTOPATH CONCENTRATE TECH: CPT | Performed by: NURSE PRACTITIONER

## 2019-11-01 PROCEDURE — 89051 BODY FLUID CELL COUNT: CPT | Performed by: NURSE PRACTITIONER

## 2019-11-01 PROCEDURE — 85730 THROMBOPLASTIN TIME PARTIAL: CPT | Performed by: INTERNAL MEDICINE

## 2019-11-01 PROCEDURE — 82962 GLUCOSE BLOOD TEST: CPT

## 2019-11-01 RX ORDER — ACETAMINOPHEN 325 MG/1
650 TABLET ORAL ONCE
Status: COMPLETED | OUTPATIENT
Start: 2019-11-01 | End: 2019-11-01

## 2019-11-01 RX ORDER — ALBUMIN (HUMAN) 12.5 G/50ML
12.5 SOLUTION INTRAVENOUS AS NEEDED
Status: ACTIVE | OUTPATIENT
Start: 2019-11-01 | End: 2019-11-02

## 2019-11-01 RX ORDER — LIDOCAINE HYDROCHLORIDE 20 MG/ML
INJECTION, SOLUTION INFILTRATION; PERINEURAL
Status: COMPLETED
Start: 2019-11-01 | End: 2019-11-01

## 2019-11-01 RX ORDER — PANTOPRAZOLE SODIUM 40 MG/1
40 TABLET, DELAYED RELEASE ORAL
Status: DISCONTINUED | OUTPATIENT
Start: 2019-11-02 | End: 2019-11-04

## 2019-11-01 RX ADMIN — Medication 10 ML: at 08:15

## 2019-11-01 RX ADMIN — Medication 10 ML: at 20:23

## 2019-11-01 RX ADMIN — Medication 10 ML: at 20:24

## 2019-11-01 RX ADMIN — LIDOCAINE HYDROCHLORIDE: 20 INJECTION, SOLUTION INFILTRATION; PERINEURAL at 02:07

## 2019-11-01 RX ADMIN — FUROSEMIDE 20 MG/HR: 10 INJECTION, SOLUTION INTRAVENOUS at 10:20

## 2019-11-01 RX ADMIN — Medication 10 ML: at 08:16

## 2019-11-01 RX ADMIN — PIPERACILLIN AND TAZOBACTAM 3.38 G: 3; .375 INJECTION, POWDER, LYOPHILIZED, FOR SOLUTION INTRAVENOUS at 08:15

## 2019-11-01 RX ADMIN — HYDROXYZINE HYDROCHLORIDE 50 MG: 25 TABLET ORAL at 00:13

## 2019-11-01 RX ADMIN — HEPARIN SODIUM AND DEXTROSE 18 UNITS/KG/HR: 10000; 5 INJECTION INTRAVENOUS at 06:33

## 2019-11-01 RX ADMIN — IPRATROPIUM BROMIDE AND ALBUTEROL SULFATE 3 ML: .5; 3 SOLUTION RESPIRATORY (INHALATION) at 14:46

## 2019-11-01 RX ADMIN — IPRATROPIUM BROMIDE AND ALBUTEROL SULFATE 3 ML: .5; 3 SOLUTION RESPIRATORY (INHALATION) at 07:58

## 2019-11-01 RX ADMIN — HYDROXYZINE HYDROCHLORIDE 50 MG: 25 TABLET ORAL at 16:15

## 2019-11-01 RX ADMIN — IPRATROPIUM BROMIDE AND ALBUTEROL SULFATE 3 ML: .5; 3 SOLUTION RESPIRATORY (INHALATION) at 23:12

## 2019-11-01 RX ADMIN — IPRATROPIUM BROMIDE AND ALBUTEROL SULFATE 3 ML: .5; 3 SOLUTION RESPIRATORY (INHALATION) at 04:14

## 2019-11-01 RX ADMIN — Medication 10 ML: at 20:26

## 2019-11-01 RX ADMIN — IPRATROPIUM BROMIDE AND ALBUTEROL SULFATE 3 ML: .5; 3 SOLUTION RESPIRATORY (INHALATION) at 19:02

## 2019-11-01 RX ADMIN — ONDANSETRON 4 MG: 2 INJECTION INTRAMUSCULAR; INTRAVENOUS at 02:20

## 2019-11-01 RX ADMIN — ACETAMINOPHEN 650 MG: 325 TABLET ORAL at 20:58

## 2019-11-01 RX ADMIN — IPRATROPIUM BROMIDE AND ALBUTEROL SULFATE 3 ML: .5; 3 SOLUTION RESPIRATORY (INHALATION) at 11:14

## 2019-11-01 RX ADMIN — ONDANSETRON 4 MG: 2 INJECTION INTRAMUSCULAR; INTRAVENOUS at 20:23

## 2019-11-01 RX ADMIN — SODIUM CHLORIDE 3 G: 900 INJECTION INTRAVENOUS at 15:10

## 2019-11-01 NOTE — PROGRESS NOTES
CARDIOLOGY FOLLOW-UP PROGRESS NOTE      Reason for follow-up:    DAR/RVE-secondary to extensive interstitial lung disease and hypoxemia  Elevated troponin; RV/LV strain  Respiratory failure  Pan lobar pneumonia-probably aspiration  ARF/rhabdo  Polysubstance abuse     Attending: Olu Fuentes MD      SUBJECTIVE:    Currently maintained on BiPAP   Anticipating pleurocentesis today    Review of Systems   General: Lethargic, weak on BiPAP; provides very little in the way of verbal responses but typically responds in the negative to questioning  Eyes: denies blurring, diplopia  Ear/Nose/Throat: denies ear pain, nosebleeds; modest sore throat from extubating himself  Cardiovascular: See HPI  Respiratory: denies excessive sputum, hemoptysis, wheezing  Gastrointestinal: denies nausea, vomiting, change in bowel habits, abdominal pain  Genitourinary: denies dysuria and hematuria  Musculoskeletal: denies back pain, joint pain, joint swelling, muscle cramps, weakness  Skin: denies rashes, itching, suspicious lesions  Neurologic: denies focal neuro deficits  Psychiatric: denies depression, anxiety  Endocrine: denies cold intolerance, heat intolerance  Hematologic/Lymphatic: denies abnormal bruising, bleeding  Allergic/Immunologic: denies urticaria or persistent infections      Allergies: Penicillins    Scheduled Meds:    ampicillin-sulbactam 3 g Intravenous Q24H   bumetanide 1 mg Intravenous Once   insulin lispro 0-7 Units Subcutaneous 4x Daily With Meals & Nightly   ipratropium-albuterol 3 mL Nebulization Q4H - RT   [START ON 11/2/2019] pantoprazole 40 mg Oral Q AM   sodium chloride 10 mL Intravenous Q12H   sodium chloride 10 mL Intravenous Q12H   sodium chloride 10 mL Intravenous Q12H   sodium chloride 10 mL Intravenous Q12H       Continuous Infusions:    furosemide (LASIX) infusion 1 mg/mL 20 mg/hr Last Rate: 20 mg/hr (11/01/19 1020)   heparin 15 Units/kg/hr Last Rate: 18 Units/kg/hr (11/01/19 0633)   hold 1 each         PRN Meds:.•  albumin human  •  dextrose  •  dextrose  •  glucagon (human recombinant)  •  heparin (porcine)  •  heparin  •  heparin  •  hold  •  hydrOXYzine  •  ondansetron  •  racemic epinephrine **AND** [DISCONTINUED] sodium chloride  •  sodium chloride  •  sodium chloride  •  sodium chloride  •  sodium chloride    Objective     Vital Signs  Vitals:    11/01/19 1210 11/01/19 1240 11/01/19 1446 11/01/19 1450   BP: 118/89 126/73     Pulse: 80 89 93    Resp:   20 20   Temp:       TempSrc:       SpO2: 98% 97% 95%    Weight:       Height:         Wt Readings from Last 1 Encounters:   11/01/19 70.1 kg (154 lb 8.7 oz)       Physical Exam:     General Appearance:   Lethargic but responsive to verbal questioning despite being on BiPAP   Neck:  Supple without JVD   Lungs:    Currently on precision flow with adequate global airflow and coarse tubular lung sounds with scattered crackles both bases    Heart:   Remains in sinus rhythm with hyperdynamic heart tones, prominent S2 no S3-S4 click murmur or rub detected   Chest Wall/Thorax:    No abnormalities observed   Abdomen:     Normal bowel sounds, no masses, no organomegaly, soft        non-tender, non-distended, no guarding, no rebound                tenderness   Extremities:  Range of motion adequate, no edema, no cyanosis, no             redness   Pulses:   Pulses palpable and equal bilaterally   Skin:   No bleeding, bruising or rash   Neurologic:  No focal sensory or motor deficits.  Mental status appropriate        Results Review:     CBC    Results from last 7 days   Lab Units 11/01/19  0500 10/31/19  0427 10/30/19  0535 10/29/19  0841 10/29/19  0539 10/28/19  1154   WBC 10*3/mm3 17.50* 21.90* 15.40* 19.40* 20.50* 30.70*   HEMOGLOBIN g/dL 11.0* 11.8* 12.4* 12.9* 13.5 16.2   PLATELETS 10*3/mm3 111* 93* 96* 125* 132* 237     BMP   Results from last 7 days   Lab Units 11/01/19  0816 11/01/19  0500 10/31/19  0427 10/30/19  1334 10/30/19  0535 10/29/19  0539  10/28/19  1707 10/28/19  1155   SODIUM mmol/L 139 137 133* 130* 139 144 145 149*   POTASSIUM mmol/L 4.1 3.9 3.8 4.5 4.3 4.3 4.1 4.0   CHLORIDE mmol/L 99 99 91* 91* 97* 102 110* 100   CO2 mmol/L 21.0* 23.0 27.0 21.0* 25.0 31.0* 24.0 17.0*   BUN mg/dL 59* 52* 37* 52* 45* 34* 23* 16   CREATININE mg/dL 5.02* 4.54* 4.11* 4.75* 4.03* 3.37* 2.79* 3.24*   GLUCOSE mg/dL 117* 115* 143* 138* 156* 164* 110* 92   MAGNESIUM mg/dL  --  2.5 2.0  --  1.9 1.6  --  2.9*   PHOSPHORUS mg/dL  --   --   --   --   --   --   --  7.4*     Coag   Results from last 7 days   Lab Units 11/01/19  1342 11/01/19  0500 10/31/19  1234 10/31/19  0427 10/30/19  2054 10/30/19  1119 10/30/19  0534  10/29/19  0841 10/28/19  1155   INR   --   --   --   --   --   --   --   --  1.40* 1.34*   APTT seconds 42.1* 24.8 67.4 62.7 46.3* 34.1* 107.6*   < > 28.7 31.9*    < > = values in this interval not displayed.     CMP   Results from last 7 days   Lab Units 11/01/19  0816 11/01/19  0500 10/31/19  0427 10/30/19  1334 10/30/19  0535 10/29/19  0539 10/28/19  1707 10/28/19  1155   SODIUM mmol/L 139 137 133* 130* 139 144 145 149*   POTASSIUM mmol/L 4.1 3.9 3.8 4.5 4.3 4.3 4.1 4.0   CHLORIDE mmol/L 99 99 91* 91* 97* 102 110* 100   CO2 mmol/L 21.0* 23.0 27.0 21.0* 25.0 31.0* 24.0 17.0*   BUN mg/dL 59* 52* 37* 52* 45* 34* 23* 16   CREATININE mg/dL 5.02* 4.54* 4.11* 4.75* 4.03* 3.37* 2.79* 3.24*   GLUCOSE mg/dL 117* 115* 143* 138* 156* 164* 110* 92   ALBUMIN g/dL  --   --   --   --   --   --   --  5.30*   BILIRUBIN mg/dL  --   --   --   --   --   --   --  0.3   ALK PHOS U/L  --   --   --   --   --   --   --  177*   AST (SGOT) U/L  --   --   --   --   --   --   --  1,747*   ALT (SGPT) U/L  --   --   --   --   --   --   --  1,967*     ABG    Results from last 7 days   Lab Units 10/30/19  1106 10/29/19  0420 10/28/19  1234   PH, ARTERIAL pH units 7.362 7.323* 7.238*   PCO2, ARTERIAL mm Hg 36.8 50.9* 42.7   PO2 ART mm Hg 111.7* 119.5* 41.2*   O2 SATURATION ART % 98.2* 98.3*  67.6*   BASE EXCESS ART mmol/L -4.1* -0.3* -8.9*     Radiology(recent) Ct Chest Without Contrast    Result Date: 10/31/2019  Interval increase in the size of the pleural effusions and interval worsening of bilateral lower lobe and upper lobe areas of infiltrate and air bronchograms. The findings are consistent with worsening of pneumonia.  Electronically Signed By-Arturo Thompson On:10/31/2019 9:45 PM This report was finalized on [id number] by  Arturo Thompson, .    Xr Chest 1 View    Result Date: 11/1/2019  1.Decreased right pleural effusion and decreased right lung opacities postthoracentesis. No definite pneumothorax is seen. 2.Persistent left pleural effusion with left mid and lower lung airspace opacities and consolidation. 3.Right arm PICC and right IJ catheter appear in expected positions.  Electronically Signed By-DR. Omari Glover MD On:11/1/2019 6:54 AM This report was finalized on [id number] by DR. Omari Glover MD.    Xr Chest 1 View    Result Date: 10/31/2019  1. Support lines and tubes in expected position. 2. Bilateral interstitial and alveolar opacities concerning for edema or infection. 3. Bilateral layering pleural effusions, right greater than left. The right-sided pleural effusion is mildly decreased from the prior exam.  Electronically Signed By-Baldemar Dailey On:10/31/2019 10:36 AM This report was finalized on [id number] by  Baldemar Dailey, .      Cardiac Studies:  Echo shows moderate right atrial and right ventricular enlargement with normal global contractility no specific valvular pathology nor shunting and no pericardial effusion or increased RVSP    Medication Review:   Scheduled Meds:    ampicillin-sulbactam 3 g Intravenous Q24H   bumetanide 1 mg Intravenous Once   insulin lispro 0-7 Units Subcutaneous 4x Daily With Meals & Nightly   ipratropium-albuterol 3 mL Nebulization Q4H - RT   [START ON 11/2/2019] pantoprazole 40 mg Oral Q AM   sodium chloride 10 mL Intravenous Q12H   sodium  chloride 10 mL Intravenous Q12H   sodium chloride 10 mL Intravenous Q12H   sodium chloride 10 mL Intravenous Q12H     Continuous Infusions:    furosemide (LASIX) infusion 1 mg/mL 20 mg/hr Last Rate: 20 mg/hr (11/01/19 1020)   heparin 15 Units/kg/hr Last Rate: 18 Units/kg/hr (11/01/19 0633)   hold 1 each      PRN Meds:.•  albumin human  •  dextrose  •  dextrose  •  glucagon (human recombinant)  •  heparin (porcine)  •  heparin  •  heparin  •  hold  •  hydrOXYzine  •  ondansetron  •  racemic epinephrine **AND** [DISCONTINUED] sodium chloride  •  sodium chloride  •  sodium chloride  •  sodium chloride  •  sodium chloride    Assessment:  24-year-old WM with long history of polysubstance abuse admitted after 12 hours of being found down in a barn with hypothermia and hypotension and rhabdomyolysis with urine screen positive for methamphetamines and THC     Elevated troponin most likely due to polysubstance abuse with RV and LV strain/demand ischemia; doubt acute ischemic coronary event with no evidence of wall motion abnormalities by echocardiogram and no ST nor T wave abnormalities on ECG     Right heart enlargement with RAD and RVE.significant valvular heart disease or evidence of pulmonary embolus-likely related to pan lobar pneumonia  -Doubt PE in view of normal RV systolic pressure and negative lower extremity venous Doppler exam       Rhabdomyolysis with KENA/ATN-creatinine greater than 5 today consistent with worsening renal insufficiency     Respiratory failure with CT evidence of pan lobar pneumonia; improved post 1 L pleurocentesis on 10/31/2019  -No evidence of pulmonary emboli based on:  chest CT, normal RV systolic pressure and negative lower extremity venous Dopplers bilaterally     Shock liver-secondary to hypotension, hypothermia, polysubstance abuse.    Plan:  Continue very aggressive supportive care and medical therapy including  Precision flow FiO2  Empiric antibiotic coverage  Acute hemodialysis  Now  off of pressor support  Heparin drip- will discontinue with no evidence of pulmonary emboli  Would not proceed with VQ scan at this time is highly unlikely to demonstrate meaningful probability with profound ventilation abnormalities  We will follow peripherally over the weekend and see as needed for any specific hemodynamic/rhythm issues  We will repeat echo once pneumonia and hypoxemia resolves    I discussed the patients findings and my recommendations with patient and bedside nurse for coordination of care      KAYCE Mullins MD  11/01/19  4:42 PM

## 2019-11-01 NOTE — PROGRESS NOTES
Pulmonary/ Critical Care/ sleep medicine progress Note        Patient Name:  Vicente Cunha    :  1995    Medical Record:  [id number]    Primary Care Physician     Gary Llanos MD    JOE Cunha is a 24 y.o. male who presented to the emergency department via EMS for evaluation of altered mental status.  EMS reported that the patient was found down in a bar and this morning where he had apparently been overnight.  By report the patient is a polysubstance abuser and was offered shelter in the barn as he is homeless.  The family stated that they saw the patient last at 9 PM last night and then found him in the morning this morning at 11 AM.  He may have received bystander CPR however this is unclear and there are no family members to verify this account.  EMS stated that when they arrived the patient had altered mental status with groaning and agitation but no purposeful responses.  EMS administered 8 mg of Narcan without improvement.  In the emergency department the patient continued to be hypoxic and minimally responsive and was intubated.  No other information is obtainable as there is no family at the bedside.  On review of documentation the patient has had other emergency department evaluations elated to heroin use    In the emergency department the patient was found to have a temperature of 90.4 °F, systolic blood pressure in the 60s.  The patient received 2 L warm IV fluid resuscitation and a bear hugger was placed.  The patient continued to be hypotensive and hypothermic and 2 more liters of warmed IV fluid were ordered.  Blood cultures and urinalysis were obtained and the patient received vancomycin and cefepime empirically.  WBCs were elevated at 30,000.  Creatinine 3.24.  Elevated LFTs.  CK elevated.  Urine drug screen was positive for methamphetamines and THC    Patient will be admitted for further evaluation and treatment of sepsis, respiratory failure, hypothermia    10/29/19:  Self  extubated this AM, on 4L.    10/30/19:  Now on BIPAP.  Decreased urine output   10/31/19:  Remains on AVAPs, decreased urine output.  On HD  : Status post thoracentesis this morning.  Respiratory status improving and now off BiPAP.  Remains on precision flow    Review of Systems    As above    Medical History    Past Medical History:   Diagnosis Date   • Anxiety    • Depression       Polysubstance abuse    Surgical History    History reviewed. No pertinent surgical history.     Family History    History reviewed. No pertinent family history.    Social History    Social History     Tobacco Use   • Smoking status: Unknown If Ever Smoked   Substance Use Topics   • Alcohol use: Not on file   Polysubstance abuse    Allergies    Allergies   Allergen Reactions   • Penicillins Anxiety       Medications    Scheduled Meds:    bumetanide 1 mg Intravenous Once   insulin lispro 0-7 Units Subcutaneous 4x Daily With Meals & Nightly   ipratropium-albuterol 3 mL Nebulization Q4H - RT   pantoprazole 40 mg Intravenous Q AM   piperacillin-tazobactam 3.375 g Intravenous Q12H   sodium chloride 10 mL Intravenous Q12H   sodium chloride 10 mL Intravenous Q12H   sodium chloride 10 mL Intravenous Q12H   sodium chloride 10 mL Intravenous Q12H     Continuous Infusions:    furosemide (LASIX) infusion 1 mg/mL 20 mg/hr    heparin 15 Units/kg/hr Last Rate: 18 Units/kg/hr (19 0633)   hold 1 each      PRN Meds:.•  albumin human  •  dextrose  •  dextrose  •  glucagon (human recombinant)  •  heparin (porcine)  •  heparin  •  heparin  •  hold  •  hydrOXYzine  •  ondansetron  •  racemic epinephrine **AND** [DISCONTINUED] sodium chloride  •  sodium chloride  •  sodium chloride  •  sodium chloride  •  sodium chloride      Physical Exam    tMax 24 hrs:  Temp (24hrs), Av.3 °F (36.8 °C), Min:96.7 °F (35.9 °C), Max:99.6 °F (37.6 °C)    Vitals Ranges:  Temp:  [96.7 °F (35.9 °C)-99.6 °F (37.6 °C)] 96.7 °F (35.9 °C)  Heart Rate:  [] 91  Resp:   [18-27] 18  BP: (133-148)/() 136/84  Intake and Output Last 3 Shifts:  I/O last 3 completed shifts:  In: 810 [I.V.:710; IV Piggyback:100]  Out: 3465 [Urine:65; Other:3400]    Constitutional:  NAD  Eyes:  PERRL, conjunctiva normal   HENT:  Atraumatic, external ears normal, nose normal, oral ET tube  Respiratory: coarse but clear no rales, no wheezing   Cardiovascular: Sinus tachycardia, no murmurs, no gallops, no rubs   GI:  Soft, nondistended, normal bowel sounds  : Cannon catheter present  Musculoskeletal:  No edema, no cyanosis or clubbing  Integument:  Well hydrated, no rash   Neurologic: awake and alert   Psychiatric: calm    labs    Lab Results (last 24 hours)     Procedure Component Value Units Date/Time    CK [[id number]]  (Abnormal) Collected:  11/01/19 0816    Specimen:  Blood Updated:  11/01/19 0916     Creatine Kinase 1,798 U/L     HIV-1 & HIV-2 Antibodies [[id number]] Collected:  11/01/19 0816    Specimen:  Blood Updated:  11/01/19 0906    Narrative:       The following orders were created for panel order HIV-1 & HIV-2 Antibodies.  Procedure                               Abnormality         Status                     ---------                               -----------         ------                     HIV-1 / O / 2 Ag / Antib...[[id number]]  Normal              Final result                 Please view results for these tests on the individual orders.    HIV-1 / O / 2 Ag / Antibody 4th Generation [[id number]]  (Normal) Collected:  11/01/19 0816    Specimen:  Blood Updated:  11/01/19 0906     HIV-1/ HIV-2 Non-Reactive     Comment: A non-reactive test result does not preclude the possibility of exposure to HIV or infection with HIV. An antibody response to recent exposure may take several months to reach detectable levels.       Narrative:       The HIV antibody/antigen combo assay is a qualitative assay for HIV that includes the p24 antigen as well as antibodies to HIV types 1 and 2. This test is  intended to be used as a screening assay in the diagnosis of HIV infection in patients over the age of 2.    Body Fluid Culture - Body Fluid, Pleural Cavity [[id number]] Collected:  11/01/19 0202    Specimen:  Body Fluid from Pleural Cavity Updated:  11/01/19 0803     Gram Stain Rare (1+) WBCs per low power field      No organisms seen    CBC & Differential [[id number]] Collected:  11/01/19 0500    Specimen:  Blood Updated:  11/01/19 0550    Narrative:       The following orders were created for panel order CBC & Differential.  Procedure                               Abnormality         Status                     ---------                               -----------         ------                     CBC Auto Differential[[id number]]        Abnormal            Final result                 Please view results for these tests on the individual orders.    CBC Auto Differential [[id number]]  (Abnormal) Collected:  11/01/19 0500    Specimen:  Blood Updated:  11/01/19 0550     WBC 17.50 10*3/mm3      RBC 3.58 10*6/mm3      Hemoglobin 11.0 g/dL      Hematocrit 32.2 %      MCV 89.9 fL      MCH 30.8 pg      MCHC 34.3 g/dL      RDW 13.6 %      RDW-SD 42.9 fl      MPV 8.3 fL      Platelets 111 10*3/mm3     Scan Slide [[id number]] Collected:  11/01/19 0500    Specimen:  Blood Updated:  11/01/19 0550     Scan Slide --     Comment: See Manual Differential Results       Manual Differential [[id number]]  (Abnormal) Collected:  11/01/19 0500    Specimen:  Blood Updated:  11/01/19 0550     Neutrophil % 73.0 %      Lymphocyte % 4.0 %      Monocyte % 3.0 %      Bands %  20.0 %      Neutrophils Absolute 16.28 10*3/mm3      Lymphocytes Absolute 0.70 10*3/mm3      Monocytes Absolute 0.53 10*3/mm3      RBC Morphology Normal     Vacuolated Neutrophils Slight/1+     Giant Platelets Slight/1+    CK [[id number]]  (Abnormal) Collected:  11/01/19 0500    Specimen:  Blood Updated:  11/01/19 0542     Creatine Kinase 2,375 U/L     Magnesium [[id number]]   (Normal) Collected:  11/01/19 0500    Specimen:  Blood Updated:  11/01/19 0532     Magnesium 2.5 mg/dL     Basic Metabolic Panel [[id number]]  (Abnormal) Collected:  11/01/19 0500    Specimen:  Blood Updated:  11/01/19 0528     Glucose 115 mg/dL      BUN 52 mg/dL      Creatinine 4.54 mg/dL      Sodium 137 mmol/L      Potassium 3.9 mmol/L      Chloride 99 mmol/L      CO2 23.0 mmol/L      Calcium 8.3 mg/dL      eGFR Non African Amer 16 mL/min/1.73      BUN/Creatinine Ratio 11.5     Anion Gap 15.0 mmol/L     Narrative:       GFR Normal >60  Chronic Kidney Disease <60  Kidney Failure <15    aPTT [[id number]]  (Normal) Collected:  11/01/19 0500    Specimen:  Blood Updated:  11/01/19 0522     PTT 24.8 seconds      Comment: Result checked        Body Fluid Cell Count With Differential - Body Fluid, Pleural Cavity [[id number]] Collected:  11/01/19 0202    Specimen:  Body Fluid from Pleural Cavity Updated:  11/01/19 0434    Narrative:       The following orders were created for panel order Body Fluid Cell Count With Differential - Body Fluid, Pleural Cavity.  Procedure                               Abnormality         Status                     ---------                               -----------         ------                     Body fluid cell count - ...[[id number]]                      Final result               Body fluid differential ...[[id number]]                      Final result                 Please view results for these tests on the individual orders.    Body fluid differential - Body Fluid, Pleural Cavity [[id number]] Collected:  11/01/19 0202    Specimen:  Body Fluid from Pleural Cavity Updated:  11/01/19 0434     Neutrophils, Fluid 79 %      Lymphocytes, Fluid 6 %      Monocytes, Fluid 7 %      Mesothelial Cells, Fluid 8 %     Narrative:       Concentrated Smear by Cytocentrifuge Prep.    Body fluid cell count - Body Fluid, Pleural Cavity [[id number]] Collected:  11/01/19 0202    Specimen:  Body Fluid from Pleural  Cavity Updated:  11/01/19 0432     Color, Fluid Yellow     Appearance, Fluid Clear     Nucleated Cells, Fluid 356 /mm3      Method: Automated DXH Analyzer    pH, Body Fluid - Body Fluid, Pleural Cavity [[id number]]  (Abnormal) Collected:  11/01/19 0202    Specimen:  Body Fluid from Pleural Cavity Updated:  11/01/19 0409     pH, Fluid 8.00    Lactate Dehydrogenase, Body Fluid - Body Fluid, Pleural Cavity [[id number]] Collected:  11/01/19 0202    Specimen:  Body Fluid from Pleural Cavity Updated:  11/01/19 0312    Non-gynecologic Cytology [[id number]] Collected:  11/01/19 0202    Specimen:  Body Fluid from Pleural Cavity Updated:  11/01/19 0312    Flow Cytometry [[id number]] Collected:  11/01/19 0202    Specimen:  Body Fluid from Pleural Cavity Updated:  11/01/19 0312    AFB Culture - Body Fluid, Pleural Cavity [[id number]] Collected:  11/01/19 0202    Specimen:  Body Fluid from Pleural Cavity Updated:  11/01/19 0312    Fungus Culture - Body Fluid, Pleural Cavity [[id number]] Collected:  11/01/19 0202    Specimen:  Body Fluid from Pleural Cavity Updated:  11/01/19 0312    Fungus Smear - Body Fluid, Pleural Cavity [[id number]] Collected:  11/01/19 0202    Specimen:  Body Fluid from Pleural Cavity Updated:  11/01/19 0312    Albumin, Fluid - Pleural Fluid, Pleural Cavity [[id number]] Collected:  11/01/19 0201    Specimen:  Pleural Fluid from Pleural Cavity Updated:  11/01/19 0312    Protein, Body Fluid - Pleural Fluid, Pleural Cavity [[id number]] Collected:  11/01/19 0201    Specimen:  Pleural Fluid from Pleural Cavity Updated:  11/01/19 0312    Glucose, Body Fluid - Pleural Fluid, Pleural Cavity [[id number]] Collected:  11/01/19 0201    Specimen:  Pleural Fluid from Pleural Cavity Updated:  11/01/19 0312    Triglycerides, Body Fluid - Pleural Fluid, Pleural Cavity [[id number]] Collected:  11/01/19 0201    Specimen:  Pleural Fluid from Pleural Cavity Updated:  11/01/19 0312    Cholesterol, Body Fluid - Pleural Fluid, Pleural Cavity  [[id number]] Collected:  11/01/19 0201    Specimen:  Pleural Fluid from Pleural Cavity Updated:  11/01/19 0312    Anaerobic Culture - Pleural Fluid, Pleural Cavity [[id number]] Collected:  11/01/19 0201    Specimen:  Pleural Fluid from Pleural Cavity Updated:  11/01/19 0312    CK [[id number]]  (Abnormal) Collected:  10/31/19 2334    Specimen:  Blood Updated:  11/01/19 0017     Creatine Kinase 3,016 U/L     POC Glucose Once [[id number]]  (Normal) Collected:  10/31/19 2029    Specimen:  Blood Updated:  10/31/19 2030     Glucose 94 mg/dL      Comment: Serial Number: [id number]:  [id number]       CK [[id number]]  (Abnormal) Collected:  10/31/19 1754    Specimen:  Blood Updated:  10/31/19 1836     Creatine Kinase 4,185 U/L     POC Glucose Once [[id number]]  (Normal) Collected:  10/31/19 1759    Specimen:  Blood Updated:  10/31/19 1808     Glucose 74 mg/dL      Comment: Serial Number: [id number]:  [id number]       CK [[id number]]  (Abnormal) Collected:  10/31/19 1234    Specimen:  Blood Updated:  10/31/19 1322     Creatine Kinase 6,001 U/L     Blood Culture - Blood, Arm, Left [[id number]]  (Abnormal) Collected:  10/28/19 1155    Specimen:  Blood from Arm, Left Updated:  10/31/19 1310     Blood Culture Scant growth (1+) Actinomyces species     Isolated from Anaerobic Bottle     Gram Stain Anaerobic Bottle Gram positive bacilli      Aerobic Bottle Gram positive bacilli    Narrative:       Blood culture does not meet the specified criteria for PCR identification.  If pregnant, immunocompromised, or clinical concern for meningitis, call lab to run BCID for Listeria monocytogenes.    aPTT [[id number]]  (Normal) Collected:  10/31/19 1234    Specimen:  Blood Updated:  10/31/19 1259     PTT 67.4 seconds     POC Glucose Once [[id number]]  (Normal) Collected:  10/31/19 1235    Specimen:  Blood Updated:  10/31/19 1259     Glucose 81 mg/dL      Comment: Serial Number: [id number]:  [id number]             Imaging & Other  Studies    Imaging Results (last 72 hours)     Procedure Component Value Units Date/Time    XR Chest 1 View [[id number]] Collected:  10/28/19 1258     Updated:  10/28/19 1302    Narrative:       DATE OF EXAM:  10/28/2019 12:56 PM     PROCEDURE:  XR CHEST 1 VW-     INDICATIONS:  Severe Sepsis Protocol     COMPARISON:  Study of December 23, 2015     TECHNIQUE:   Single radiographic AP view of the chest was obtained.     FINDINGS:  Today's study was obtained October 28, 2019 at 12:50 PM.     The ET tube is in good position above the martha. The heart size is  normal. The peripheral lung zones are clear. The heart borders and  hemidiaphragms appear sharp except, for mild vague density in the left  lung base which may represent early atelectasis or pneumonia medially.  The upper abdomen is unremarkable. The bony structures are intact.       Impression:          1. Satisfactory placement of ET tube with tip in good position above  martha.  2. Minimal opacity left lung base medially  3. The lungs are otherwise clear  4. Mild change from study of December 23, 2015     Electronically Signed By-Everett Verdin Jr. On:10/28/2019 1:00 PM  This report was finalized on [id number] by  Everett Verdin Jr., .          Assessment    Acute hypoxic respiratory failure secondary to polysubstance abuse, now volume overlaoded  Altered mental status  Septic shock-resolved  Acute kidney injury-worsening with oliguria  Liver injury  Rhabdomyolysis, creatinine kinase 3669 on admission  Polysubstance abuse, UDS positive for methamphetamine and THC  Hypothermia due to environmental exposure  Hyperphosphatemia      Plan    Intubated for mechanical ventilation.    Self extubated 10/29/19, required BiPAP post extubation now on Precision flow 30L/80% FiO2  S/p thoracentesis 11/1/19.  Will follow pleural fluid studies.  Reviewed ABG  Patient received an excess of 30 mL/kg IV fluid resuscitation  Continue IV Zosyn for aspiration pneumonia.  D/C  vancomycin  Cultures negative to date   Urinalysis reflexed for culture, no growth  Sputum culture pending  Sodium bicarb drip changed to NS, stop IVFs  CK > 18,053  Off LEVO  Mannitol given per renal 10/30  Bare Hugger as needed   heparin gtt. 2D echocardiogram reviewed.  Enlarged RA and RV.  Consult cardiology   LE doppler negative   VQ scan pending however less likely a PE. Remains Pending  Worsening renal function, nephrology following  Creat 4.03, worsening  Daryl placed 10/30  HD yesterday and today       Will recommend behavioral health evaluation and possible outpatient treatment for polysubstance abuse upon discharge    PUD prophylaxis with Protonix  DVT prophylaxis with hepatin gtt     PICC line. Will ERNESTINE Brito     Remains critically ill.  Critical care time 32 minutes excluding any time spent for the procedures

## 2019-11-01 NOTE — THERAPY TREATMENT NOTE
Acute Care - Speech Language Pathology   Swallow Progress Note  Ronald     Patient Name: Vicente Cunha  : 1995  MRN: [id number]  Today's Date: 2019               Admit Date: 10/28/2019    Visit Dx:      ICD-10-CM ICD-9-CM   1. Altered mental status, unspecified altered mental status type R41.82 780.97   2. Acute respiratory failure with hypoxia (CMS/[facility]) J96.01 518.81   3. Acute kidney injury (CMS/[facility]) N17.9 584.9   4. Elevated LFTs R94.5 790.6   5. Leukocytosis, unspecified type D72.829 288.60   6. Hypothermia, initial encounter T68.XXXA 991.6     Patient Active Problem List   Diagnosis   • Altered mental status       Therapy Treatment      Outcome Summary         SLP GOALS     Row Name 19 1600       Oral Nutrition/Hydration Goal 1 (SLP)    Oral Nutrition/Hydration Goal 1, SLP  The patient will improve swallow function to be able to maintain least restrictive po diet, exhibiting no complications of aspiration, adequate po intake and use of safe swallow compensations as utilized by trained staff/caregivers.   -SM    Time Frame (Oral Nutrition/Hydration Goal 1, SLP)  by discharge  -SM    Barriers (Oral Nutrition/Hydration Goal 1, SLP)  Pt has had a decline decline in respiratory status past 2 days requiring increased need for supplemental support including BiPAP and now pt on precision flow, 30 L, 80% via NC. Pt was NPO for 2 days prior however now resumed previously recommended diet of regular consistency, thin liquids. PT reported he only ate jello for breakfast this am due to poor appetite.   -SM    Progress/Outcomes (Oral Nutrition/Hydration Goal 1, SLP)  goal ongoing  -SM       Oral Nutrition/Hydration Goal 2 (SLP)    Oral Nutrition/Hydration Goal 2, SLP  The patient will participate in a full meal assessment to assess ability to consume recommended diet with no complications of aspiration and/or recommendations for possible diet change as indicated  -SM    Time Frame (Oral  Nutrition/Hydration Goal 2, SLP)  1 day;2 days  -SM    Barriers (Oral Nutrition/Hydration Goal 2, SLP)  Pt assessed today during established noon meal. Pt ordered water and vegetable soup for lunch only. (Pt had Gatorade in room and drank this during janene). Pt able to sit up in bed at 90 degree angle. Pt self feed. Pt appropriately taking small bites, small sips, eating at slow rate of intake and clearing oral cavity before next bite. No overt s/s of difficulty noted. Pt denied difficulty during breakfast. Nurse confirms good tolerance of meals and no difficulties with medications.   -SM       Oral Nutrition/Hydration Goal (SLP)    Oral Nutrition/Hydration Goal, SLP  Pt/caregiver teaching with further goals as indicated  -SM    Time Frame (Oral Nutrition/Hydration Goal, SLP)  by discharge  -SM    Barriers (Oral Nutrition/Hydration Goal, SLP)  No caregivers in room today. Teaching conducted with pt. He expressed understanding and in agreement w/plan  -SM      User Key  (r) = Recorded By, (t) = Taken By, (c) = Cosigned By    Initials Name Provider Type    Margi Molina, SLP Speech and Language Pathologist          EDUCATION  The patient has been educated in the following areas:   Dysphagia (Swallowing Impairment).    SLP Recommendation and Plan   Recommend pt continue current diet (regular consistency, thin liquids)  ST will continue to follow to insure safety of po diet, use of safe swallow compensations and additional goals as indicated  Continue pt/caregiver teaching.  Available for instrumental assessment of swallow as indicated        Time Calculation:                  Margi Almanzar, SLP  11/1/2019

## 2019-11-01 NOTE — PROGRESS NOTES
"                                                                                                                                      Nephrology  Progress Note                                        [facility]    Patient Identification    Name: Vicente Cunha  Age: 24 y.o.  Sex: male  :  1995  MRN: [id number]      DATE OF SERVICE:  2019        Subective    Patient is on 80% Oxymizer  No urine output     Objective   Scheduled Meds:    bumetanide 1 mg Intravenous Once   insulin lispro 0-7 Units Subcutaneous 4x Daily With Meals & Nightly   ipratropium-albuterol 3 mL Nebulization Q4H - RT   pantoprazole 40 mg Intravenous Q AM   piperacillin-tazobactam 3.375 g Intravenous Q12H   sodium chloride 10 mL Intravenous Q12H   sodium chloride 10 mL Intravenous Q12H   sodium chloride 10 mL Intravenous Q12H   sodium chloride 10 mL Intravenous Q12H         Continuous Infusions:    heparin 15 Units/kg/hr Last Rate: 18 Units/kg/hr (19 0633)   hold 1 each        PRN Meds:•  albumin human  •  dextrose  •  dextrose  •  glucagon (human recombinant)  •  heparin (porcine)  •  heparin  •  heparin  •  hold  •  hydrOXYzine  •  ondansetron  •  racemic epinephrine **AND** [DISCONTINUED] sodium chloride  •  sodium chloride  •  sodium chloride  •  sodium chloride  •  sodium chloride     Exam:  /84   Pulse 96   Temp 98.2 °F (36.8 °C) (Axillary)   Resp 22   Ht 172.7 cm (68\")   Wt 70.1 kg (154 lb 8.7 oz)   SpO2 95%   BMI 23.50 kg/m²     Intake/Output last 3 shifts:  I/O last 3 completed shifts:  In: 1102 [P.O.:240; I.V.:862]  Out: 2550 [Urine:150; Other:2400]    Intake/Output this shift:  I/O this shift:  In: 548 [I.V.:448; IV Piggyback:100]  Out: 1015 [Urine:15; Other:1000]    Physical exam:  Awake and 80% Oxymizer  PERTL  No JVD  Chest: decreased BS occasional ronchi  CVS Regular rate and rhythm, S1 and S2 normal  Abdomen:  Soft, non-tender, bowel sounds active   LE: 2 edema  Skin:  No rashes or " lesions       Data Review:  All labs (24hrs):   Recent Results (from the past 24 hour(s))   aPTT    Collection Time: 10/31/19 12:34 PM   Result Value Ref Range    PTT 67.4 61.0 - 76.5 seconds   CK    Collection Time: 10/31/19 12:34 PM   Result Value Ref Range    Creatine Kinase 6,001 (H) 20 - 200 U/L   POC Glucose Once    Collection Time: 10/31/19 12:35 PM   Result Value Ref Range    Glucose 81 70 - 105 mg/dL   CK    Collection Time: 10/31/19  5:54 PM   Result Value Ref Range    Creatine Kinase 4,185 (H) 20 - 200 U/L   POC Glucose Once    Collection Time: 10/31/19  5:59 PM   Result Value Ref Range    Glucose 74 70 - 105 mg/dL   POC Glucose Once    Collection Time: 10/31/19  8:29 PM   Result Value Ref Range    Glucose 94 70 - 105 mg/dL   CK    Collection Time: 10/31/19 11:34 PM   Result Value Ref Range    Creatine Kinase 3,016 (H) 20 - 200 U/L   pH, Body Fluid - Body Fluid, Pleural Cavity    Collection Time: 11/01/19  2:02 AM   Result Value Ref Range    pH, Fluid 8.00 (H) 6.50 - 7.50   Body fluid cell count - Body Fluid, Pleural Cavity    Collection Time: 11/01/19  2:02 AM   Result Value Ref Range    Color, Fluid Yellow     Appearance, Fluid Clear Clear    Nucleated Cells, Fluid 356 /mm3    Method: Automated DXH Analyzer    Body fluid differential - Body Fluid, Pleural Cavity    Collection Time: 11/01/19  2:02 AM   Result Value Ref Range    Neutrophils, Fluid 79 %    Lymphocytes, Fluid 6 %    Monocytes, Fluid 7 %    Mesothelial Cells, Fluid 8 %   Basic Metabolic Panel    Collection Time: 11/01/19  5:00 AM   Result Value Ref Range    Glucose 115 (H) 65 - 99 mg/dL    BUN 52 (H) 6 - 20 mg/dL    Creatinine 4.54 (H) 0.76 - 1.27 mg/dL    Sodium 137 136 - 145 mmol/L    Potassium 3.9 3.5 - 5.2 mmol/L    Chloride 99 98 - 107 mmol/L    CO2 23.0 22.0 - 29.0 mmol/L    Calcium 8.3 (L) 8.6 - 10.5 mg/dL    eGFR Non African Amer 16 (L) >60 mL/min/1.73    BUN/Creatinine Ratio 11.5 7.0 - 25.0    Anion Gap 15.0 5.0 - 15.0 mmol/L    Magnesium    Collection Time: 11/01/19  5:00 AM   Result Value Ref Range    Magnesium 2.5 1.6 - 2.6 mg/dL   aPTT    Collection Time: 11/01/19  5:00 AM   Result Value Ref Range    PTT 24.8 24.0 - 31.0 seconds   CK    Collection Time: 11/01/19  5:00 AM   Result Value Ref Range    Creatine Kinase 2,375 (H) 20 - 200 U/L   CBC Auto Differential    Collection Time: 11/01/19  5:00 AM   Result Value Ref Range    WBC 17.50 (H) 3.40 - 10.80 10*3/mm3    RBC 3.58 (L) 4.14 - 5.80 10*6/mm3    Hemoglobin 11.0 (L) 13.0 - 17.7 g/dL    Hematocrit 32.2 (L) 37.5 - 51.0 %    MCV 89.9 79.0 - 97.0 fL    MCH 30.8 26.6 - 33.0 pg    MCHC 34.3 31.5 - 35.7 g/dL    RDW 13.6 12.3 - 15.4 %    RDW-SD 42.9 37.0 - 54.0 fl    MPV 8.3 6.0 - 12.0 fL    Platelets 111 (L) 140 - 450 10*3/mm3   Scan Slide    Collection Time: 11/01/19  5:00 AM   Result Value Ref Range    Scan Slide     Manual Differential    Collection Time: 11/01/19  5:00 AM   Result Value Ref Range    Neutrophil % 73.0 42.7 - 76.0 %    Lymphocyte % 4.0 (L) 19.6 - 45.3 %    Monocyte % 3.0 (L) 5.0 - 12.0 %    Bands %  20.0 (H) 0.0 - 5.0 %    Neutrophils Absolute 16.28 (H) 1.70 - 7.00 10*3/mm3    Lymphocytes Absolute 0.70 0.70 - 3.10 10*3/mm3    Monocytes Absolute 0.53 0.10 - 0.90 10*3/mm3    RBC Morphology Normal Normal    Vacuolated Neutrophils Slight/1+ None Seen    Giant Platelets Slight/1+ None Seen          Imaging:  [unfilled]    Assessment/Plan:     Altered mental status       Acute kidney injury nonoliguric worsening kidney function due to rhabdomyolysis and ATN              -Dialysis again today and tomorrow   -Add Lasix  Rhabdomyolysis              -CPK peaked at 20,000 on October 29 now 2300   -Mannitol and dialysis              -Alkalinization of the urine  Respiratory failure  Metabolic acidosis  Altered mental status  Hypothermia  Hyperphosphatemia  Liver injury  Hypomagnesemia    Dialysis today possible dialysis tomorrow again   Cussed with the nurse and the patient

## 2019-11-01 NOTE — PROGRESS NOTES
Nutrition Services    Patient Name:  Vicente Cunha  YOB: 1995  MRN: [id number]  Admit Date:  10/28/2019    Short Note: Diet now advanced back to regular. RDN will continue with follow up as planned.    Electronically signed by:  Ruth Stovall RD  11/01/19 3:24 PM

## 2019-11-01 NOTE — PROGRESS NOTES
Continued Stay Note  MILENA Ronald     Patient Name: Vicente Cunha  MRN: [id number]  Today's Date: 11/1/2019    Admit Date: 10/28/2019    Discharge Plan     Row Name 11/01/19 1105       Plan    Plan  pt has been accepted at [facility] upon discharge though pt may not be medically stable and may need rehab first .     Plan Comments  Pt is in with polysubstance abuse , aspirate PNA and rhabdo . Pt currently is on avaps and is getting dialysis .         Discharge Codes    No documentation.             Fransisca Bui RN

## 2019-11-01 NOTE — PLAN OF CARE
Problem: Patient Care Overview  Goal: Plan of Care Review   11/01/19 0612   Coping/Psychosocial   Plan of Care Reviewed With patient;significant other   Plan of Care Review   Progress improving   OTHER   Outcome Summary Right thoracentesis performed overnight, 1L removed. Pt reported feeling better post thoracentesis. Fio2 titrated down to 80% on AVAPS with pt tolerating well. He was put on precision flow this morning at 30L/80%, tolerating well. Plan to do left side thoracentesis tonight.     Goal: Individualization and Mutuality  Outcome: Ongoing (interventions implemented as appropriate)    Goal: Discharge Needs Assessment  Outcome: Ongoing (interventions implemented as appropriate)    Goal: Interprofessional Rounds/Family Conf  Outcome: Ongoing (interventions implemented as appropriate)      Problem: Skin Injury Risk (Adult)  Goal: Skin Health and Integrity  Outcome: Ongoing (interventions implemented as appropriate)

## 2019-11-01 NOTE — CONSULTS
Infectious Diseases Consult Note    Referring Provider: Olu Fuentes MD    Reason for Consultation: Positive blood culture    Patient Care Team:  Gary Llanos MD as PCP - General    Chief complaint   Abdominal pain and shortness of breath    Subjective     History of present illness:     This is 24-year-old white male with past medical history significant for IV drug use.  Patient has been using IV drugs since age 15.  The patient was apparently admitted to [facility] on October 28, 2019 after he was found down in [place].  He was found to have acute kidney failure and rhabdomyolysis.  The patient was intubated initially and he was self extubated.  The patient is currently on high flow oxygen.  The patient remained with low urine output and he was started this admission on hemodialysis.  I was consulted to evaluate patient since patient has positive blood culture on admission for actinomyces.  Apparently it was only one set after one set collected.  The patient denied having any dental disease or any jaw lesion.  He was complaining of some pain in the right upper quadrant.  The patient was found to have bilateral pleural effusion and he underwent right thoracentesis earlier.  Post to have a left thoracentesis later.      Review of Systems   Review of Systems   Respiratory: Positive for cough and shortness of breath.    Gastrointestinal: Positive for abdominal pain.   Genitourinary:        Decreased urine output       Medications  Medications Prior to Admission   Medication Sig Dispense Refill Last Dose   • citalopram (CeleXA) 20 MG tablet Take 20 mg by mouth Daily.      • hydrOXYzine (ATARAX) 50 MG tablet Take 50 mg by mouth Every 6 (Six) Hours As Needed for Itching, Allergies or Anxiety.          History  Past Medical History:   Diagnosis Date   • Anxiety    • Depression      History reviewed. No pertinent surgical history.    Family History  History reviewed. No pertinent family  history.    Social History       Allergies  Penicillins    Objective     Vital Signs   Vital Signs (last 24 hours)       10/31 0700  -  11/01 0659 11/01 0700  -  11/01 1150   Most Recent    Temp (°F) 97.6 -  99.6      96.7     96.7 (35.9)    Heart Rate 85 -  116    86 -  91     86    Resp 22 -  (!)40      18     18    /83 -  148/96       136/84    SpO2 (%) 90 -  100    96 -  99     99          Physical Exam:  Physical Exam   Constitutional: He is oriented to person, place, and time. He appears well-developed and well-nourished.   HENT:   Head: Normocephalic and atraumatic.   Eyes: EOM are normal. Pupils are equal, round, and reactive to light.   Neck: Normal range of motion. Neck supple.   Cardiovascular: Normal rate, regular rhythm and normal heart sounds.   Pulmonary/Chest: He is in respiratory distress. He has rales.   Diminished breathing sounds at the left base   Abdominal: Soft. Bowel sounds are normal. He exhibits no distension and no mass. There is tenderness. There is no rebound and no guarding.   Musculoskeletal: Normal range of motion. He exhibits no edema or deformity.   Neurological: He is alert and oriented to person, place, and time. No cranial nerve deficit.   Skin: Skin is warm. No rash noted. No erythema.   Psychiatric: He has a normal mood and affect.   Nursing note and vitals reviewed.      Microbiology  Microbiology Results (last 10 days)     Procedure Component Value - Date/Time    AFB Culture - Body Fluid, Pleural Cavity [[id number]] Collected:  11/01/19 0202    Lab Status:  Preliminary result Specimen:  Body Fluid from Pleural Cavity Updated:  11/01/19 1036     AFB Stain No acid fast bacilli seen    Body Fluid Culture - Body Fluid, Pleural Cavity [[id number]] Collected:  11/01/19 0202    Lab Status:  Preliminary result Specimen:  Body Fluid from Pleural Cavity Updated:  11/01/19 0803     Gram Stain Rare (1+) WBCs per low power field      No organisms seen    Fungus Smear - Body Fluid,  Pleural Cavity [[id number]] Collected:  11/01/19 0202    Lab Status:  Final result Specimen:  Body Fluid from Pleural Cavity Updated:  11/01/19 1122     Fungal Stain No fungal elements seen    Respiratory Culture - Sputum, ET Suction [[id number]] Collected:  10/28/19 2215    Lab Status:  Final result Specimen:  Sputum from ET Suction Updated:  10/31/19 0937     Respiratory Culture Scant growth (1+) Normal Respiratory Aby     Gram Stain Many (4+) WBCs per low power field      Many (4+) Mixed bacterial morphotypes seen on Gram Stain    MRSA Screen Culture - Swab, Nares [[id number]]  (Normal) Collected:  10/28/19 1515    Lab Status:  Final result Specimen:  Swab from Nares Updated:  10/29/19 1813     MRSA SCREEN CX No Methicillin Resistant Staphylococcus aureus isolated    Respiratory Panel, PCR - Swab, Nasopharynx [[id number]]  (Abnormal) Collected:  10/28/19 1515    Lab Status:  Final result Specimen:  Swab from Nasopharynx Updated:  10/28/19 1741     ADENOVIRUS, PCR Not Detected     Coronavirus 229E Not Detected     Coronavirus HKU1 Not Detected     Coronavirus NL63 Not Detected     Coronavirus OC43 Not Detected     Human Metapneumovirus Not Detected     Human Rhinovirus/Enterovirus Detected     Influenza B PCR Not Detected     Parainfluenza Virus 1 Not Detected     Parainfluenza Virus 2 Not Detected     Parainfluenza Virus 3 Not Detected     Parainfluenza Virus 4 Not Detected     Bordetella pertussis pcr Not Detected     Influenza A H1 2009 PCR Not Detected     Chlamydophila pneumoniae PCR Not Detected     Mycoplasma pneumo by PCR Not Detected     Influenza A PCR Not Detected     Influenza A H3 Not Detected     Influenza A H1 Not Detected     RSV, PCR Not Detected    Urine Culture - Urine, Urine, Catheter [[id number]]  (Normal) Collected:  10/28/19 1212    Lab Status:  Final result Specimen:  Urine, Catheter Updated:  10/29/19 1210     Urine Culture No growth    Blood Culture - Blood, Arm, Left [[id number]]  (Abnormal)  Collected:  10/28/19 1155    Lab Status:  Final result Specimen:  Blood from Arm, Left Updated:  10/31/19 1310     Blood Culture Scant growth (1+) Actinomyces species     Isolated from Anaerobic Bottle     Gram Stain Anaerobic Bottle Gram positive bacilli      Aerobic Bottle Gram positive bacilli    Narrative:       Blood culture does not meet the specified criteria for PCR identification.  If pregnant, immunocompromised, or clinical concern for meningitis, call lab to run BCID for Listeria monocytogenes.          Laboratory  Results from last 7 days   Lab Units 11/01/19  0500   WBC 10*3/mm3 17.50*   HEMOGLOBIN g/dL 11.0*   HEMATOCRIT % 32.2*   PLATELETS 10*3/mm3 111*     Results from last 7 days   Lab Units 11/01/19  0500   SODIUM mmol/L 137   POTASSIUM mmol/L 3.9   CHLORIDE mmol/L 99   CO2 mmol/L 23.0   BUN mg/dL 52*   CREATININE mg/dL 4.54*   GLUCOSE mg/dL 115*   CALCIUM mg/dL 8.3*     Results from last 7 days   Lab Units 11/01/19  0500   SODIUM mmol/L 137   POTASSIUM mmol/L 3.9   CHLORIDE mmol/L 99   CO2 mmol/L 23.0   BUN mg/dL 52*   CREATININE mg/dL 4.54*   GLUCOSE mg/dL 115*   CALCIUM mg/dL 8.3*     Results from last 7 days   Lab Units 11/01/19  0816   CK TOTAL U/L 1,798*               Radiology  Imaging Results (Last 72 Hours)     Procedure Component Value Units Date/Time    XR Chest 1 View [[id number]] Collected:  11/01/19 0645     Updated:  11/01/19 0657    Narrative:       DATE OF EXAM:  11/1/2019 1:50 AM     PROCEDURE:  XR CHEST 1 VW-     INDICATIONS:  post thoracentesis; R41.82-Altered mental status, unspecified;  J96.01-Acute respiratory failure with hypoxia; N17.9-Acute kidney  failure, unspecified; R94.5-Abnormal results of liver function studies;  D72.829-Elevated white blood cell count, unspecified;  T68.XXXA-Hypothermia, initial encounter     COMPARISON:  October 31, 2019     TECHNIQUE:   Single radiographic view of the chest was obtained.     FINDINGS:  There appears to have been interval right  thoracentesis with decreased  right pleural effusion. Small right pleural effusion persists. There are  also decreased airspace opacities in the right mid and lower lung.       No definite pneumothorax is seen.  There is a persistent left pleural  effusion with left mid and lower lung airspace opacities and left  basilar consolidation. Right IJ catheter and right PICC terminating in  the lower SVC. Heart size and mediastinal contour appear within normal  limits.       Impression:       1.Decreased right pleural effusion and decreased right lung opacities  postthoracentesis. No definite pneumothorax is seen.  2.Persistent left pleural effusion with left mid and lower lung airspace  opacities and consolidation.  3.Right arm PICC and right IJ catheter appear in expected positions.     Electronically Signed By-DR. Omari Glover MD On:11/1/2019 6:54 AM  This report was finalized on [id number] by DR. Omari Glover MD.    CT Chest Without Contrast [[id number]] Collected:  10/31/19 2140     Updated:  10/31/19 2156    Narrative:          DATE OF EXAM:  10/31/2019 9:19 PM     PROCEDURE:  CT CHEST WO CONTRAST-     INDICATIONS:   Pleural effusion; R41.82-Altered mental status, unspecified;  J96.01-Acute respiratory failure with hypoxia; N17.9-Acute kidney  failure, unspecified; R94.5-Abnormal results of liver function studies;  D72.829-Elevated white blood cell count, unspecified;  T68.XXXA-Hypothermia, initial encounter     COMPARISON:   No Comparisons Available     TECHNIQUE:  Routine transaxial slices were obtained through the chest without the  administration of intravenous contrast. Reconstructed coronal and  sagittal images were also obtained. Automated exposure control and  iterative construction methods were used.     FINDINGS:  There are moderate-sized bilateral pleural effusions. There is bilateral  lower lobe airspace consolidation with air bronchograms most likely  pneumonia. This has worsened. Patchy areas of  groundglass attenuation in  the bilateral upper lobes has also worsened in the interval.        Impression:       Interval increase in the size of the pleural effusions and interval  worsening of bilateral lower lobe and upper lobe areas of infiltrate and  air bronchograms. The findings are consistent with worsening of  pneumonia.     Electronically Signed ByMandeep Thompson On:10/31/2019 9:45 PM  This report was finalized on [id number] by  Arturo Thompson, .    XR Chest 1 View [[id number]] Collected:  10/31/19 1034     Updated:  10/31/19 1038    Narrative:       Examination: XR CHEST 1 VW-     Date of Exam: 10/31/2019 9:41 AM     Indication: SOA; R41.82-Altered mental status, unspecified; J96.01-Acute  respiratory failure with hypoxia; N17.9-Acute kidney failure,  unspecified; R94.5-Abnormal results of liver function studies;  D72.829-Elevated white blood cell count, unspecified;  T68.XXXA-Hypothermia, initial encounter.     Comparison: Chest radiograph dated 10/30/2019     Technique: Portable AP view of the chest was obtained.     Findings:  There is a right-sided central line with tip terminating over the  cavoatrial junction. There is a right-sided PICC line with tip  terminating over the mid SVC. The cardiomediastinal silhouette is within  normal limits. There are bilateral interstitial and alveolar opacities  which appear similar to the prior examination. There are bilateral  layering pleural effusions which appears decreased in size on the right  when compared to the prior exam. This appears similar on the left.       Impression:       1. Support lines and tubes in expected position.  2. Bilateral interstitial and alveolar opacities concerning for edema or  infection.  3. Bilateral layering pleural effusions, right greater than left. The  right-sided pleural effusion is mildly decreased from the prior exam.     Electronically Signed By-Baldemar Dailey On:10/31/2019 10:36 AM  This report was finalized on  [id number] by  Baldemar Dailey .    XR Chest 1 View [[id number]] Collected:  10/30/19 1450     Updated:  10/30/19 1454    Narrative:       DATE OF EXAM:  10/30/2019 2:38 PM     PROCEDURE:  XR CHEST 1 VW-     INDICATIONS:  shiley placement; R41.82-Altered mental status, unspecified;  J96.01-Acute respiratory failure with hypoxia; N17.9-Acute kidney  failure, unspecified; R94.5-Abnormal results of liver function studies;  D72.829-Elevated white blood cell count, unspecified;  T68.XXXA-Hypothermia, initial encounter       COMPARISON:  AP portable chest 10/29/2019     TECHNIQUE:   Single radiographic view of the chest was obtained.     FINDINGS:  Marked interval worsening in chest findings since yesterday's exam. New  dense airspace disease changes have developed within both lungs,  greatest in the mid to lower lung zones. New small to moderate right  pleural effusion has developed. Heart size is stable and within normal  limits. Right arm approach PICC remains in the cavoatrial junction.        A right IJ approach central line has been placed with tip terminating at  the cavoatrial junction. The ET tube is been removed. No visible  pneumothorax.       Impression:          1. Right IJ central line placement with the tip terminating at the  cavoatrial junction. No visible pneumothorax.        2. Marked interval development of mid to lower lung zone dense airspace  disease could reflect changes of pneumonia or sequelae of aspiration.  Development of small to moderate right pleural effusion.  3. ET tube is been removed.     Electronically Signed By-Dr. Usha Guallpa MD On:10/30/2019 2:52 PM  This report was finalized on [id number] by Dr. Usha Guallpa MD.    US Renal Bilateral [[id number]] Collected:  10/29/19 1845     Updated:  10/29/19 1850    Narrative:       DATE OF EXAM:  10/29/2019 4:31 PM     PROCEDURE:  US RENAL BILATERAL-     INDICATIONS:  cayetano; R41.82-Altered mental status, unspecified; J96.01-Acute  respiratory  failure with hypoxia; N17.9-Acute kidney failure, unspecified;  R94.5-Abnormal results of liver function studies; D72.829-Elevated white  blood cell count, unspecified; T68.XXXA-Hypothermia, initial encounter     COMPARISON:  No Comparisons Available     TECHNIQUE:   Grayscale and color Doppler ultrasound evaluation of the kidneys and  urinary bladder was performed.        FINDINGS:  The right kidney is 11.9 x 5.4 x 4.7 cm. The left kidney is 12.1 x 5.5 x  4.9 cm. There is increased cortical echogenicity with prominence of the  renal pyramids consistent with medical renal disease. There is no  hydronephrosis. There is a prominent right extrarenal pelvis. There is  trace perinephric fluid around the right kidney. The bladder is not well  seen. Incidental note is made of a left pleural effusion.        Impression:       Increased renal cortical echogenicity consistent with medical renal  disease. No hydronephrosis. Mild right perinephric fluid with incidental  left pleural effusion.     Electronically Signed By-Arturo Thompson On:10/29/2019 6:47 PM  This report was finalized on [id number] by  Arturo Thompson, .          Cardiology      Results Review:  I have reviewed all clinical data, test, lab, and imaging results.       Schedule Meds    bumetanide 1 mg Intravenous Once   insulin lispro 0-7 Units Subcutaneous 4x Daily With Meals & Nightly   ipratropium-albuterol 3 mL Nebulization Q4H - RT   [START ON 11/2/2019] pantoprazole 40 mg Oral Q AM   piperacillin-tazobactam 3.375 g Intravenous Q12H   sodium chloride 10 mL Intravenous Q12H   sodium chloride 10 mL Intravenous Q12H   sodium chloride 10 mL Intravenous Q12H   sodium chloride 10 mL Intravenous Q12H       Infusion Meds    furosemide (LASIX) infusion 1 mg/mL 20 mg/hr Last Rate: 20 mg/hr (11/01/19 1020)   heparin 15 Units/kg/hr Last Rate: 18 Units/kg/hr (11/01/19 0633)   hold 1 each        PRN Meds  •  albumin human  •  dextrose  •  dextrose  •  glucagon  (human recombinant)  •  heparin (porcine)  •  heparin  •  heparin  •  hold  •  hydrOXYzine  •  ondansetron  •  racemic epinephrine **AND** [DISCONTINUED] sodium chloride  •  sodium chloride  •  sodium chloride  •  sodium chloride  •  sodium chloride      Assessment/Plan       Assessment    Positive blood culture for actinomyces species on admission.  Unfortunately ER performed only one set of blood culture.  This is usually not sufficient to diagnose bacteremia.  Actinomyces is a very rare cause of true bacteremia and usually associated with oral maxillary disease or intra-abdominal abscesses or occasionally pulmonary disease.  The patient has pulmonary infiltrates but not consistent with actinomycosis.    IV drug use.  Patient was using IV heroin and IV methamphetamine    Hepatitis C infection.  Treatment naïve    Bilateral pulmonary infiltrates associated with bilateral effusion.  Most likely secondary to aspiration pneumonia    Rhabdomyolysis    Acute kidney failure.  Most likely multifactorial secondary to rhabdomyolysis and sepsis.  The patient is currently on hemodialysis    Rhinovirus infection      Plan    Discontinue IV Zosyn  Start patient on Unasyn 3 grams IV daily  Repeat 2 sets of blood cultures  CT scan of the abdomen and pelvis.  Unfortunately we cannot do it with a contrast because of acute kidney failure  Supportive care  Check for hepatitis C RNA and reflex genotype  Labs in a.m.  Droplet isolation for rhinovirus    Valarie Castro MD  11/01/19  11:50 AM     Note is dictated utilizing voice recognition software/Dragon

## 2019-11-01 NOTE — PROCEDURES
"Bedside Thoracentesis Without  Radiology  Date/Time: 11/1/2019 1:30 AM  Performed by: George Williamson APRN  Authorized by: George Williamson APRN   Consent: Written consent obtained.  Consent given by: patient  Patient understanding: patient states understanding of the procedure being performed  Patient consent: the patient's understanding of the procedure matches consent given  Procedure consent: procedure consent matches procedure scheduled  Relevant documents: relevant documents present and verified  Test results: test results available and properly labeled  Site marked: the operative site was marked  Imaging studies: imaging studies available  Required items: required blood products, implants, devices, and special equipment available  Time out: Immediately prior to procedure a \"time out\" was called to verify the correct patient, procedure, equipment, support staff and site/side marked as required.  Procedure purpose: diagnostic and therapeutic  Indications: pleural effusion  Preparation: Patient was prepped and draped in the usual sterile fashion.  Local anesthesia used: yes    Anesthesia:  Local anesthesia used: yes  Local Anesthetic: lidocaine 1% without epinephrine  Preparation: skin prepped with ChloraPrep  Ultrasound guidance: yes  Location: right posterior  Puncture method: over-the-needle catheter  Number of attempts: 1  Drainage amount: 1000 ml  Drainage characteristics: clear  Patient tolerance: Patient tolerated the procedure well with no immediate complications  Chest x-ray performed: yes  Chest x-ray interpreted by me.  Chest x-ray findings: pleural effusion              "

## 2019-11-02 ENCOUNTER — APPOINTMENT (OUTPATIENT)
Dept: ULTRASOUND IMAGING | Facility: HOSPITAL | Age: 24
End: 2019-11-02

## 2019-11-02 LAB
ALBUMIN SERPL-MCNC: 3.2 G/DL (ref 3.5–5.2)
ALBUMIN/GLOB SERPL: 1.3 G/DL
ALP SERPL-CCNC: 57 U/L (ref 39–117)
ALT SERPL W P-5'-P-CCNC: 627 U/L (ref 1–41)
ANION GAP SERPL CALCULATED.3IONS-SCNC: 14 MMOL/L (ref 5–15)
APTT PPP: 25 SECONDS (ref 24–31)
AST SERPL-CCNC: 124 U/L (ref 1–40)
BILIRUB SERPL-MCNC: 0.5 MG/DL (ref 0.2–1.2)
BUN BLD-MCNC: 61 MG/DL (ref 6–20)
BUN/CREAT SERPL: 11.6 (ref 7–25)
CALCIUM SPEC-SCNC: 8.4 MG/DL (ref 8.6–10.5)
CHLORIDE SERPL-SCNC: 100 MMOL/L (ref 98–107)
CK SERPL-CCNC: 595 U/L (ref 20–200)
CK SERPL-CCNC: 601 U/L (ref 20–200)
CK SERPL-CCNC: 663 U/L (ref 20–200)
CK SERPL-CCNC: 849 U/L (ref 20–200)
CO2 SERPL-SCNC: 21 MMOL/L (ref 22–29)
CREAT BLD-MCNC: 5.27 MG/DL (ref 0.76–1.27)
DEPRECATED RDW RBC AUTO: 41.6 FL (ref 37–54)
ERYTHROCYTE [DISTWIDTH] IN BLOOD BY AUTOMATED COUNT: 13.4 % (ref 12.3–15.4)
GFR SERPL CREATININE-BSD FRML MDRD: 13 ML/MIN/1.73
GFR SERPL CREATININE-BSD FRML MDRD: ABNORMAL ML/MIN/{1.73_M2}
GLOBULIN UR ELPH-MCNC: 2.4 GM/DL
GLUCOSE BLD-MCNC: 91 MG/DL (ref 65–99)
GLUCOSE BLDC GLUCOMTR-MCNC: 85 MG/DL (ref 70–105)
HCT VFR BLD AUTO: 29.2 % (ref 37.5–51)
HGB BLD-MCNC: 10.3 G/DL (ref 13–17.7)
LYMPHOCYTES # BLD MANUAL: 1.46 10*3/MM3 (ref 0.7–3.1)
LYMPHOCYTES NFR BLD MANUAL: 10 % (ref 19.6–45.3)
LYMPHOCYTES NFR BLD MANUAL: 5 % (ref 5–12)
MAGNESIUM SERPL-MCNC: 2.6 MG/DL (ref 1.6–2.6)
MCH RBC QN AUTO: 31.5 PG (ref 26.6–33)
MCHC RBC AUTO-ENTMCNC: 35.2 G/DL (ref 31.5–35.7)
MCV RBC AUTO: 89.5 FL (ref 79–97)
METAMYELOCYTES NFR BLD MANUAL: 1 % (ref 0–0)
MONOCYTES # BLD AUTO: 0.73 10*3/MM3 (ref 0.1–0.9)
MYELOCYTES NFR BLD MANUAL: 1 % (ref 0–0)
NEUTROPHILS # BLD AUTO: 11.97 10*3/MM3 (ref 1.7–7)
NEUTROPHILS NFR BLD MANUAL: 74 % (ref 42.7–76)
NEUTS BAND NFR BLD MANUAL: 8 % (ref 0–5)
PLAT MORPH BLD: NORMAL
PLATELET # BLD AUTO: 122 10*3/MM3 (ref 140–450)
PMV BLD AUTO: 7.1 FL (ref 6–12)
POTASSIUM BLD-SCNC: 3.7 MMOL/L (ref 3.5–5.2)
PROT SERPL-MCNC: 5.6 G/DL (ref 6–8.5)
RBC # BLD AUTO: 3.26 10*6/MM3 (ref 4.14–5.8)
RBC MORPH BLD: NORMAL
SCAN SLIDE: NORMAL
SODIUM BLD-SCNC: 135 MMOL/L (ref 136–145)
TRIGL FLD-MCNC: 39 MG/DL
VARIANT LYMPHS NFR BLD MANUAL: 1 % (ref 0–5)
WBC MORPH BLD: NORMAL
WBC NRBC COR # BLD: 14.6 10*3/MM3 (ref 3.4–10.8)

## 2019-11-02 PROCEDURE — 83735 ASSAY OF MAGNESIUM: CPT | Performed by: NURSE PRACTITIONER

## 2019-11-02 PROCEDURE — 25010000002 FUROSEMIDE PER 20 MG: Performed by: INTERNAL MEDICINE

## 2019-11-02 PROCEDURE — 94799 UNLISTED PULMONARY SVC/PX: CPT

## 2019-11-02 PROCEDURE — 76705 ECHO EXAM OF ABDOMEN: CPT

## 2019-11-02 PROCEDURE — 82962 GLUCOSE BLOOD TEST: CPT

## 2019-11-02 PROCEDURE — 25010000002 ONDANSETRON PER 1 MG: Performed by: NURSE PRACTITIONER

## 2019-11-02 PROCEDURE — 99222 1ST HOSP IP/OBS MODERATE 55: CPT | Performed by: SURGERY

## 2019-11-02 PROCEDURE — 25010000003 AMPICILLIN-SULBACTAM PER 1.5 G: Performed by: INTERNAL MEDICINE

## 2019-11-02 PROCEDURE — 85730 THROMBOPLASTIN TIME PARTIAL: CPT | Performed by: NURSE PRACTITIONER

## 2019-11-02 PROCEDURE — 80053 COMPREHEN METABOLIC PANEL: CPT | Performed by: INTERNAL MEDICINE

## 2019-11-02 PROCEDURE — 87324 CLOSTRIDIUM AG IA: CPT | Performed by: INTERNAL MEDICINE

## 2019-11-02 PROCEDURE — 87449 NOS EACH ORGANISM AG IA: CPT | Performed by: INTERNAL MEDICINE

## 2019-11-02 PROCEDURE — 25010000002 HEPARIN (PORCINE) PER 1000 UNITS: Performed by: INTERNAL MEDICINE

## 2019-11-02 PROCEDURE — 82550 ASSAY OF CK (CPK): CPT | Performed by: INTERNAL MEDICINE

## 2019-11-02 PROCEDURE — 25010000002 DIPHENHYDRAMINE PER 50 MG: Performed by: NURSE PRACTITIONER

## 2019-11-02 PROCEDURE — 85007 BL SMEAR W/DIFF WBC COUNT: CPT | Performed by: NURSE PRACTITIONER

## 2019-11-02 PROCEDURE — 5A1D70Z PERFORMANCE OF URINARY FILTRATION, INTERMITTENT, LESS THAN 6 HOURS PER DAY: ICD-10-PCS | Performed by: INTERNAL MEDICINE

## 2019-11-02 PROCEDURE — 85025 COMPLETE CBC W/AUTO DIFF WBC: CPT | Performed by: NURSE PRACTITIONER

## 2019-11-02 RX ORDER — ALBUMIN (HUMAN) 12.5 G/50ML
12.5 SOLUTION INTRAVENOUS AS NEEDED
Status: ACTIVE | OUTPATIENT
Start: 2019-11-02 | End: 2019-11-03

## 2019-11-02 RX ORDER — HYDROXYZINE HYDROCHLORIDE 25 MG/1
50 TABLET, FILM COATED ORAL EVERY 6 HOURS PRN
Status: DISCONTINUED | OUTPATIENT
Start: 2019-11-02 | End: 2019-11-09 | Stop reason: HOSPADM

## 2019-11-02 RX ORDER — HEPARIN SODIUM 1000 [USP'U]/ML
3000 INJECTION, SOLUTION INTRAVENOUS; SUBCUTANEOUS ONCE
Status: COMPLETED | OUTPATIENT
Start: 2019-11-02 | End: 2019-11-02

## 2019-11-02 RX ORDER — ACETAMINOPHEN 325 MG/1
650 TABLET ORAL EVERY 6 HOURS PRN
Status: DISCONTINUED | OUTPATIENT
Start: 2019-11-02 | End: 2019-11-09 | Stop reason: HOSPADM

## 2019-11-02 RX ORDER — ONDANSETRON 2 MG/ML
4 INJECTION INTRAMUSCULAR; INTRAVENOUS EVERY 4 HOURS PRN
Status: DISCONTINUED | OUTPATIENT
Start: 2019-11-02 | End: 2019-11-09 | Stop reason: HOSPADM

## 2019-11-02 RX ORDER — DIPHENHYDRAMINE HYDROCHLORIDE 50 MG/ML
25 INJECTION INTRAMUSCULAR; INTRAVENOUS ONCE
Status: COMPLETED | OUTPATIENT
Start: 2019-11-02 | End: 2019-11-02

## 2019-11-02 RX ADMIN — Medication 10 ML: at 21:05

## 2019-11-02 RX ADMIN — HYDROXYZINE HYDROCHLORIDE 50 MG: 25 TABLET, FILM COATED ORAL at 23:11

## 2019-11-02 RX ADMIN — Medication 10 ML: at 09:53

## 2019-11-02 RX ADMIN — ACETAMINOPHEN 650 MG: 325 TABLET ORAL at 21:44

## 2019-11-02 RX ADMIN — IPRATROPIUM BROMIDE AND ALBUTEROL SULFATE 3 ML: .5; 3 SOLUTION RESPIRATORY (INHALATION) at 06:19

## 2019-11-02 RX ADMIN — SODIUM CHLORIDE 3 G: 900 INJECTION INTRAVENOUS at 15:56

## 2019-11-02 RX ADMIN — Medication 10 ML: at 21:04

## 2019-11-02 RX ADMIN — HYDROXYZINE HYDROCHLORIDE 50 MG: 25 TABLET, FILM COATED ORAL at 17:02

## 2019-11-02 RX ADMIN — IPRATROPIUM BROMIDE AND ALBUTEROL SULFATE 3 ML: .5; 3 SOLUTION RESPIRATORY (INHALATION) at 23:35

## 2019-11-02 RX ADMIN — IPRATROPIUM BROMIDE AND ALBUTEROL SULFATE 3 ML: .5; 3 SOLUTION RESPIRATORY (INHALATION) at 18:40

## 2019-11-02 RX ADMIN — HEPARIN SODIUM 3000 UNITS: 1000 INJECTION INTRAVENOUS; SUBCUTANEOUS at 13:26

## 2019-11-02 RX ADMIN — IPRATROPIUM BROMIDE AND ALBUTEROL SULFATE 3 ML: .5; 3 SOLUTION RESPIRATORY (INHALATION) at 10:15

## 2019-11-02 RX ADMIN — IPRATROPIUM BROMIDE AND ALBUTEROL SULFATE 3 ML: .5; 3 SOLUTION RESPIRATORY (INHALATION) at 14:43

## 2019-11-02 RX ADMIN — Medication 10 ML: at 21:06

## 2019-11-02 RX ADMIN — IPRATROPIUM BROMIDE AND ALBUTEROL SULFATE 3 ML: .5; 3 SOLUTION RESPIRATORY (INHALATION) at 03:48

## 2019-11-02 RX ADMIN — DIPHENHYDRAMINE HYDROCHLORIDE 25 MG: 50 INJECTION, SOLUTION INTRAMUSCULAR; INTRAVENOUS at 08:01

## 2019-11-02 RX ADMIN — ONDANSETRON 4 MG: 2 INJECTION INTRAMUSCULAR; INTRAVENOUS at 08:02

## 2019-11-02 RX ADMIN — FUROSEMIDE 20 MG/HR: 10 INJECTION, SOLUTION INTRAVENOUS at 00:16

## 2019-11-02 RX ADMIN — PANTOPRAZOLE SODIUM 40 MG: 40 TABLET, DELAYED RELEASE ORAL at 05:47

## 2019-11-02 NOTE — PROGRESS NOTES
"                                                                                                                                      Nephrology  Progress Note                                        [facility]    Patient Identification    Name: Vicente Cunha  Age: 24 y.o.  Sex: male  :  1995  MRN: [id number]      DATE OF SERVICE:  2019        Subective    Chest finished dialysis oxygenating better     Objective   Scheduled Meds:    ampicillin-sulbactam 3 g Intravenous Q24H   bumetanide 1 mg Intravenous Once   insulin lispro 0-7 Units Subcutaneous 4x Daily With Meals & Nightly   ipratropium-albuterol 3 mL Nebulization Q4H - RT   pantoprazole 40 mg Oral Q AM   sodium chloride 10 mL Intravenous Q12H   sodium chloride 10 mL Intravenous Q12H   sodium chloride 10 mL Intravenous Q12H   sodium chloride 10 mL Intravenous Q12H         Continuous Infusions:    furosemide (LASIX) infusion 1 mg/mL 20 mg/hr Last Rate: 20 mg/hr (19 0016)   hold 1 each        PRN Meds:•  albumin human  •  dextrose  •  dextrose  •  glucagon (human recombinant)  •  heparin (porcine)  •  hold  •  hydrOXYzine  •  ondansetron  •  ondansetron  •  racemic epinephrine **AND** [DISCONTINUED] sodium chloride  •  sodium chloride  •  sodium chloride  •  sodium chloride  •  sodium chloride     Exam:  /79   Pulse 92   Temp 97.8 °F (36.6 °C)   Resp 19   Ht 172.7 cm (68\")   Wt 70.1 kg (154 lb 8.7 oz)   SpO2 90%   BMI 23.50 kg/m²     Intake/Output last 3 shifts:  I/O last 3 completed shifts:  In: 1517 [I.V.:1417; IV Piggyback:100]  Out: 3155 [Urine:155; Other:3000]    Intake/Output this shift:  No intake/output data recorded.    Physical exam:  Awake and 40% Oxymizer  PERTL  No JVD  Chest: decreased BS occasional ronchi  CVS Regular rate and rhythm, S1 and S2 normal  Abdomen:  Soft, non-tender, bowel sounds active   LE: 2 edema  Skin:  No rashes or lesions       Data Review:  All labs (24hrs):   Recent Results " (from the past 24 hour(s))   POC Glucose Once    Collection Time: 11/01/19 12:33 PM   Result Value Ref Range    Glucose 99 70 - 105 mg/dL   aPTT    Collection Time: 11/01/19  1:42 PM   Result Value Ref Range    PTT 42.1 (L) 61.0 - 76.5 seconds   CK    Collection Time: 11/01/19  6:34 PM   Result Value Ref Range    Creatine Kinase 1,137 (H) 20 - 200 U/L   POC Glucose Once    Collection Time: 11/01/19  8:27 PM   Result Value Ref Range    Glucose 113 (H) 70 - 105 mg/dL   CK    Collection Time: 11/02/19 12:16 AM   Result Value Ref Range    Creatine Kinase 849 (H) 20 - 200 U/L   CBC Auto Differential    Collection Time: 11/02/19  5:47 AM   Result Value Ref Range    WBC 14.60 (H) 3.40 - 10.80 10*3/mm3    RBC 3.26 (L) 4.14 - 5.80 10*6/mm3    Hemoglobin 10.3 (L) 13.0 - 17.7 g/dL    Hematocrit 29.2 (L) 37.5 - 51.0 %    MCV 89.5 79.0 - 97.0 fL    MCH 31.5 26.6 - 33.0 pg    MCHC 35.2 31.5 - 35.7 g/dL    RDW 13.4 12.3 - 15.4 %    RDW-SD 41.6 37.0 - 54.0 fl    MPV 7.1 6.0 - 12.0 fL    Platelets 122 (L) 140 - 450 10*3/mm3   Magnesium    Collection Time: 11/02/19  5:47 AM   Result Value Ref Range    Magnesium 2.6 1.6 - 2.6 mg/dL   aPTT    Collection Time: 11/02/19  5:47 AM   Result Value Ref Range    PTT 25.0 24.0 - 31.0 seconds   CK    Collection Time: 11/02/19  5:47 AM   Result Value Ref Range    Creatine Kinase 663 (H) 20 - 200 U/L   Comprehensive Metabolic Panel    Collection Time: 11/02/19  5:47 AM   Result Value Ref Range    Glucose 91 65 - 99 mg/dL    BUN 61 (H) 6 - 20 mg/dL    Creatinine 5.27 (H) 0.76 - 1.27 mg/dL    Sodium 135 (L) 136 - 145 mmol/L    Potassium 3.7 3.5 - 5.2 mmol/L    Chloride 100 98 - 107 mmol/L    CO2 21.0 (L) 22.0 - 29.0 mmol/L    Calcium 8.4 (L) 8.6 - 10.5 mg/dL    Total Protein 5.6 (L) 6.0 - 8.5 g/dL    Albumin 3.20 (L) 3.50 - 5.20 g/dL    ALT (SGPT) 627 (H) 1 - 41 U/L    AST (SGOT) 124 (H) 1 - 40 U/L    Alkaline Phosphatase 57 39 - 117 U/L    Total Bilirubin 0.5 0.2 - 1.2 mg/dL    eGFR Non African  Amer 13 (L) >60 mL/min/1.73    eGFR  African Amer      Globulin 2.4 gm/dL    A/G Ratio 1.3 g/dL    BUN/Creatinine Ratio 11.6 7.0 - 25.0    Anion Gap 14.0 5.0 - 15.0 mmol/L   Scan Slide    Collection Time: 11/02/19  5:47 AM   Result Value Ref Range    Scan Slide     Manual Differential    Collection Time: 11/02/19  5:47 AM   Result Value Ref Range    Neutrophil % 74.0 42.7 - 76.0 %    Lymphocyte % 10.0 (L) 19.6 - 45.3 %    Monocyte % 5.0 5.0 - 12.0 %    Bands %  8.0 (H) 0.0 - 5.0 %    Metamyelocyte % 1.0 (H) 0.0 - 0.0 %    Myelocyte % 1.0 (H) 0.0 - 0.0 %    Atypical Lymphocyte % 1.0 0.0 - 5.0 %    Neutrophils Absolute 11.97 (H) 1.70 - 7.00 10*3/mm3    Lymphocytes Absolute 1.46 0.70 - 3.10 10*3/mm3    Monocytes Absolute 0.73 0.10 - 0.90 10*3/mm3    RBC Morphology Normal Normal    WBC Morphology Normal Normal    Platelet Morphology Normal Normal          Imaging:  [unfilled]    Assessment/Plan:     Altered mental status       Acute kidney injury nonoliguric worsening kidney function due to rhabdomyolysis and ATN              -Dialysis again today and tomorrow   -Add Lasix  Rhabdomyolysis              -CPK trending down   -Mannitol and dialysis              -Alkalinization of the urine  Respiratory failure  Metabolic acidosis  Altered mental status  Hypothermia  Hyperphosphatemia  Liver injury  Hypomagnesemia    Dialysis today possible dialysis tomorrow again   Cussed with the nurse and the patient

## 2019-11-02 NOTE — CONSULTS
Inpatient General Surgery Consult  Consult performed by: Tyrell Ty MD  Consult ordered by: Nadine Chavez MD  Reason for consult: Clinical correlation of abdominal CT findings evaluate for small bowel obstruction and acute cholecystitis          General Surgery Consult Note        Subjective     24-year-old man with a history of IV drug abuse and hepatitis C who had an acute drug overdose with methamphetamines and was found down.  He was brought to the hospital about a week ago and has been aggressively treated for his critical illness.  He has had ongoing dialysis for his kidney injury and rhabdo, is on high flow nasal cannula but ultimately over the last couple of days has describes some vague abdominal pain.  He says that it is generalized across his abdomen sometimes in the left side sometimes in the right side it is worse with all oral intake.  He has had some nausea but no vomiting.  He has had loose bowel movements.  He denies any significant abdominal distention or constipation.  He has never had this pain before.  He does have a leukocytosis to 14,000 and elevation in his AST and ALT therefore a CT scan was performed which is read as some small bowel distention proximally and generalized decompression distally concern for possible partial small bowel obstruction and some gallbladder wall thickening concerning for possible cholecystitis.  I have been asked to see this patient and clinically correlate these imaging findings.    History  Past Medical History:   Diagnosis Date   • Anxiety    • Depression      History reviewed. No pertinent surgical history.  History reviewed. No pertinent family history.  Social History     Tobacco Use   • Smoking status: Unknown If Ever Smoked   Substance Use Topics   • Alcohol use: Not on file   • Drug use: Not on file     Medications Prior to Admission   Medication Sig Dispense Refill Last Dose   • citalopram (CeleXA) 20 MG tablet Take 20 mg by mouth Daily.      •  hydrOXYzine (ATARAX) 50 MG tablet Take 50 mg by mouth Every 6 (Six) Hours As Needed for Itching, Allergies or Anxiety.        Allergies:  Penicillins    Review of Systems   Constitutional: Negative for chills and fever.   HENT: Negative for sore throat and trouble swallowing.    Eyes: Negative for blurred vision and double vision.   Respiratory: Positive for cough and shortness of breath.    Cardiovascular: Positive for chest pain. Negative for leg swelling.   Gastrointestinal: Positive for abdominal pain and nausea. Negative for abdominal distention, blood in stool, constipation, diarrhea and vomiting.   Genitourinary: Negative for dysuria and hematuria.   Skin: Negative for rash and bruise.   Neurological: Negative for dizziness and confusion.   Psychiatric/Behavioral: Negative for agitation and depressed mood.        Objective     Vital Signs  Temp:  [97.7 °F (36.5 °C)-98.8 °F (37.1 °C)] 98.6 °F (37 °C)  Heart Rate:  [] 94  Resp:  [18-21] 19  BP: (113-135)/(67-98) 131/91    Physical Exam   Constitutional: He appears well-developed and well-nourished.   HENT:   Head: Normocephalic and atraumatic.   Eyes: Conjunctivae are normal. No scleral icterus.   Neck: No tracheal deviation present.   Cardiovascular: Normal rate and intact distal pulses.   Pulmonary/Chest: Effort normal. No respiratory distress.   On the high flow nasal cannula   Abdominal: Soft.   Nondistended generalized tenderness to palpation doubt peritonitis but he does have some focal tenderness to palpation right upper quadrant   Musculoskeletal: He exhibits no deformity.   Generalized edema   Lymphadenopathy:     He has no cervical adenopathy.   Neurological: He is alert. No cranial nerve deficit.   Skin: Skin is warm and dry.   Psychiatric: He has a normal mood and affect. His behavior is normal.       Results Review:  Lab Results (last 24 hours)     Procedure Component Value Units Date/Time    Blood Culture - Blood, Blood, Central Line  [[id number]] Collected:  11/01/19 1342    Specimen:  Blood, Central Line Updated:  11/02/19 1445     Blood Culture No growth at 24 hours    Clostridium Difficile EIA - Stool, Per Rectum [[id number]] Collected:  11/02/19 1348    Specimen:  Stool from Per Rectum Updated:  11/02/19 1354    Triglycerides, Body Fluid - Pleural Fluid, Pleural Cavity [[id number]] Collected:  11/01/19 0201    Specimen:  Pleural Fluid from Pleural Cavity Updated:  11/02/19 1307     Triglycerides, Fluid 39 mg/dL      Comment: The reference intervals and other method performance specifications  have not been established for this test. The test result should be  integrated into the clinical context for interpretation.  The reference interval(s) and other method performance specifications  have not been established for this body fluid. The test result must be  integrated into the clinical context for interpretation.       Narrative:       Performed at:  [address] [place] [address]  [id number]  : Everette Read PhD, Phone:  [phone]    CK [[id number]]  (Abnormal) Collected:  11/02/19 1057    Specimen:  Blood Updated:  11/02/19 1143     Creatine Kinase 601 U/L     Blood Culture - Blood, Arm, Right [[id number]] Collected:  11/01/19 1020    Specimen:  Blood from Arm, Right Updated:  11/02/19 1045     Blood Culture No growth at 24 hours    POC Glucose Once [[id number]]  (Normal) Collected:  11/02/19 0816    Specimen:  Blood Updated:  11/02/19 0827     Glucose 85 mg/dL      Comment: Serial Number: [id number]:  [id number]       Body Fluid Culture - Body Fluid, Pleural Cavity [[id number]] Collected:  11/01/19 0202    Specimen:  Body Fluid from Pleural Cavity Updated:  11/02/19 0806     Body Fluid Culture No growth at 24 hours     Gram Stain Rare (1+) WBCs per low power field      No organisms seen    Scan Slide [[id number]] Collected:  11/02/19 0547    Specimen:  Blood Updated:  11/02/19 0724     Scan Slide --      Comment: See Manual Differential Results       Manual Differential [[id number]]  (Abnormal) Collected:  11/02/19 0547    Specimen:  Blood Updated:  11/02/19 0724     Neutrophil % 74.0 %      Lymphocyte % 10.0 %      Monocyte % 5.0 %      Bands %  8.0 %      Metamyelocyte % 1.0 %      Myelocyte % 1.0 %      Atypical Lymphocyte % 1.0 %      Neutrophils Absolute 11.97 10*3/mm3      Lymphocytes Absolute 1.46 10*3/mm3      Monocytes Absolute 0.73 10*3/mm3      RBC Morphology Normal     WBC Morphology Normal     Platelet Morphology Normal    Narrative:       Slide Reviewed    CBC & Differential [[id number]] Collected:  11/02/19 0547    Specimen:  Blood Updated:  11/02/19 0724    Narrative:       The following orders were created for panel order CBC & Differential.  Procedure                               Abnormality         Status                     ---------                               -----------         ------                     CBC Auto Differential[[id number]]        Abnormal            Final result                 Please view results for these tests on the individual orders.    CBC Auto Differential [[id number]]  (Abnormal) Collected:  11/02/19 0547    Specimen:  Blood Updated:  11/02/19 0724     WBC 14.60 10*3/mm3      RBC 3.26 10*6/mm3      Hemoglobin 10.3 g/dL      Hematocrit 29.2 %      MCV 89.5 fL      MCH 31.5 pg      MCHC 35.2 g/dL      RDW 13.4 %      RDW-SD 41.6 fl      MPV 7.1 fL      Platelets 122 10*3/mm3     Comprehensive Metabolic Panel [[id number]]  (Abnormal) Collected:  11/02/19 0547    Specimen:  Blood Updated:  11/02/19 0630     Glucose 91 mg/dL      BUN 61 mg/dL      Creatinine 5.27 mg/dL      Sodium 135 mmol/L      Potassium 3.7 mmol/L      Chloride 100 mmol/L      CO2 21.0 mmol/L      Calcium 8.4 mg/dL      Total Protein 5.6 g/dL      Albumin 3.20 g/dL      ALT (SGPT) 627 U/L      AST (SGOT) 124 U/L      Alkaline Phosphatase 57 U/L      Total Bilirubin 0.5 mg/dL      eGFR Non African Amer 13  mL/min/1.73      Comment: <15 Indicative of kidney failure.        eGFR   Amer --     Comment: <15 Indicative of kidney failure.        Globulin 2.4 gm/dL      A/G Ratio 1.3 g/dL      BUN/Creatinine Ratio 11.6     Anion Gap 14.0 mmol/L     Narrative:       GFR Normal >60  Chronic Kidney Disease <60  Kidney Failure <15    Magnesium [[id number]]  (Normal) Collected:  11/02/19 0547    Specimen:  Blood Updated:  11/02/19 0628     Magnesium 2.6 mg/dL     CK [[id number]]  (Abnormal) Collected:  11/02/19 0547    Specimen:  Blood Updated:  11/02/19 0628     Creatine Kinase 663 U/L     aPTT [[id number]]  (Normal) Collected:  11/02/19 0547    Specimen:  Blood Updated:  11/02/19 0608     PTT 25.0 seconds     CK [[id number]]  (Abnormal) Collected:  11/02/19 0016    Specimen:  Blood Updated:  11/02/19 0039     Creatine Kinase 849 U/L     POC Glucose Once [[id number]]  (Abnormal) Collected:  11/01/19 2027    Specimen:  Blood Updated:  11/01/19 2032     Glucose 113 mg/dL      Comment: Serial Number: [id number]:  [id number]       CK [[id number]]  (Abnormal) Collected:  11/01/19 1834    Specimen:  Blood Updated:  11/01/19 1856     Creatine Kinase 1,137 U/L     Hepatitis C RNA, Quantitative, PCR (graph) [[id number]] Collected:  11/01/19 1834    Specimen:  Blood Updated:  11/01/19 1838    Hepatitis C Genotype [[id number]] Collected:  11/01/19 1342    Specimen:  Blood Updated:  11/01/19 1503        Imaging Results (Last 24 Hours)     Procedure Component Value Units Date/Time    CT Abdomen Pelvis Without Contrast [[id number]] Collected:  11/01/19 2213     Updated:  11/01/19 2244    Narrative:          DATE OF EXAM:  11/1/2019 10:10 PM     PROCEDURE:  CT ABDOMEN PELVIS WO CONTRAST-     INDICATIONS:  Abdominal pain and bacteremia; R41.82-Altered mental status,  unspecified; J96.01-Acute respiratory failure with hypoxia; N17.9-Acute  kidney failure, unspecified; R94.5-Abnormal results of liver function  studies; D72.829-Elevated white  blood cell count, unspecified;  T68.XXXA-Hypothermia, initial encounter     COMPARISON:  No Comparisons Available     TECHNIQUE:  Routine transaxial slices were obtained through the abdomen and pelvis  without the administration of intravenous contrast. Reconstructed  coronal and sagittal images were also obtained. Automated exposure  control and iterative construction methods were used.     FINDINGS:  Moderate size bilateral pleural effusions. Bilateral basilar areas of  airspace disease with air bronchograms consistent with pneumonia.     There is fluid around the gallbladder versus gallbladder wall  thickening. There is slight fat stranding around the right kidney of  uncertain significance or etiology. The unenhanced kidneys, adrenal  glands, pancreas, liver and spleen are otherwise normal.     There are several dilated loops of small bowel in the left upper  quadrant of the abdomen transition point of the small bowel appears to  be in the lower abdomen near the pelvic inlet. The distal small bowel  loops in the colon are decompressed. There is some fluid in the deep  pelvis. There is a Cannon catheter in bladder.        Impression:          1. Bilateral pleural effusions are moderate in size with areas of  airspace disease which may be pneumonia.  2. Gallbladder wall thickening versus pericholecystic fluid.  3. Dilated small bowel loops throughout the left upper quadrant of the  abdomen with decompression of the distal small bowel. The findings are  likely secondary to at least incomplete small bowel obstruction. Exact  etiology and location is difficult to determine without oral or IV  contrast suspected transition point is in the left lower abdomen.     Electronically Signed By-Arturo Thompson On:11/1/2019 10:21 PM  This report was finalized on [id number] by  Arturo Thompson, .          I reviewed the patient's other test results and agree with the interpretation  I reviewed the patient's new imaging results and  agree with the interpretation.    Assessment/Plan     Active Problems:    Altered mental status  Pneumonia  Acute kidney injury, rhabdomyolysis  Abdominal pain  Polysubstance abuse    I suspect his abdominal pain is likely multifactorial related to volume overload and could possibly even be due to his pneumonias.  On his CT scan he does have pleural effusions and opacification of the lungs which can suggest and he does have a known pneumonia.  He is tolerating a diet and having loose bowel movements.  He does have right upper quadrant abdominal pain.  He has a known history of hepatitis C.    A right upper quadrant ultrasound is pending will follow the results and see if he has gallstones, however gallbladder wall thickening is expected given his volume overload and also his hepatitis.        I do not recommend cholecystectomy nor do I recommend laparotomy for his bowel obstruction.  Recommend diet as tolerated and bowel if constipated.       I discussed the patients findings and my recommendations with patient.     Tyrell Ty MD  11/02/19  2:46 PM

## 2019-11-02 NOTE — PROGRESS NOTES
Infectious Diseases Progress Note      LOS: 5 days   Patient Care Team:  Gary Llanos MD as PCP - General    Chief Complaint: Shortness of breath and abdominal pain    Subjective     The patient had no fever during the last 24 hours.  The patient remained hemodynamically stable.  The patient continued low urine output.  He remained hypoxic and currently on Precision flow on 25 L of oxygen with 60%.  He complained about diarrhea started yesterday had multiple bouts    Review of Systems:   Review of Systems   Constitutional: Negative.    HENT: Negative.    Eyes: Negative.    Respiratory: Positive for shortness of breath.    Cardiovascular: Negative.    Gastrointestinal: Positive for abdominal pain and diarrhea.   Genitourinary: Negative.    Musculoskeletal: Negative.    Skin: Negative.    Neurological: Negative.    Hematological: Negative.    Psychiatric/Behavioral: Negative.         Objective     Vital Signs  Temp:  [97.7 °F (36.5 °C)-98.8 °F (37.1 °C)] 98.6 °F (37 °C)  Heart Rate:  [] 94  Resp:  [18-21] 19  BP: (113-135)/(67-98) 131/91    Physical Exam:  Physical Exam   Constitutional: He is oriented to person, place, and time. He appears well-developed and well-nourished.   HENT:   Head: Normocephalic and atraumatic.   Eyes: EOM are normal. Pupils are equal, round, and reactive to light.   Neck: Normal range of motion. Neck supple.   Cardiovascular: Normal rate, regular rhythm and normal heart sounds.   Pulmonary/Chest: He is in respiratory distress. He has no wheezes. He has rales.   Abdominal: Soft. Bowel sounds are normal. He exhibits no distension and no mass. There is tenderness. There is no rebound and no guarding.   Right upper quadrant tenderness   Musculoskeletal: Normal range of motion. He exhibits no edema or deformity.   Neurological: He is alert and oriented to person, place, and time. No cranial nerve deficit.   Skin: Skin is warm. No rash noted. No erythema.   Psychiatric: He has a  normal mood and affect.   Nursing note and vitals reviewed.       Results Review:    I have reviewed all clinical data, test, lab, and imaging results.     Radiology  Ct Abdomen Pelvis Without Contrast    Result Date: 11/1/2019   DATE OF EXAM: 11/1/2019 10:10 PM  PROCEDURE: CT ABDOMEN PELVIS WO CONTRAST-  INDICATIONS: Abdominal pain and bacteremia; R41.82-Altered mental status, unspecified; J96.01-Acute respiratory failure with hypoxia; N17.9-Acute kidney failure, unspecified; R94.5-Abnormal results of liver function studies; D72.829-Elevated white blood cell count, unspecified; T68.XXXA-Hypothermia, initial encounter  COMPARISON: No Comparisons Available  TECHNIQUE: Routine transaxial slices were obtained through the abdomen and pelvis without the administration of intravenous contrast. Reconstructed coronal and sagittal images were also obtained. Automated exposure control and iterative construction methods were used.  FINDINGS: Moderate size bilateral pleural effusions. Bilateral basilar areas of airspace disease with air bronchograms consistent with pneumonia.  There is fluid around the gallbladder versus gallbladder wall thickening. There is slight fat stranding around the right kidney of uncertain significance or etiology. The unenhanced kidneys, adrenal glands, pancreas, liver and spleen are otherwise normal.  There are several dilated loops of small bowel in the left upper quadrant of the abdomen transition point of the small bowel appears to be in the lower abdomen near the pelvic inlet. The distal small bowel loops in the colon are decompressed. There is some fluid in the deep pelvis. There is a Cannon catheter in bladder.       1. Bilateral pleural effusions are moderate in size with areas of airspace disease which may be pneumonia. 2. Gallbladder wall thickening versus pericholecystic fluid. 3. Dilated small bowel loops throughout the left upper quadrant of the abdomen with decompression of the distal  small bowel. The findings are likely secondary to at least incomplete small bowel obstruction. Exact etiology and location is difficult to determine without oral or IV contrast suspected transition point is in the left lower abdomen.  Electronically Signed By-Arturo Thompson On:11/1/2019 10:21 PM This report was finalized on [id number] by  Arturo Thompson, .      Cardiology    Laboratory  Results from last 7 days   Lab Units 11/02/19  0547   WBC 10*3/mm3 14.60*   HEMOGLOBIN g/dL 10.3*   HEMATOCRIT % 29.2*   PLATELETS 10*3/mm3 122*     Results from last 7 days   Lab Units 11/02/19  0547   SODIUM mmol/L 135*   POTASSIUM mmol/L 3.7   CHLORIDE mmol/L 100   CO2 mmol/L 21.0*   BUN mg/dL 61*   CREATININE mg/dL 5.27*   GLUCOSE mg/dL 91   CALCIUM mg/dL 8.4*     Results from last 7 days   Lab Units 11/02/19  0547   SODIUM mmol/L 135*   POTASSIUM mmol/L 3.7   CHLORIDE mmol/L 100   CO2 mmol/L 21.0*   BUN mg/dL 61*   CREATININE mg/dL 5.27*   GLUCOSE mg/dL 91   CALCIUM mg/dL 8.4*     Results from last 7 days   Lab Units 11/02/19  1057   CK TOTAL U/L 601*             Microbiology   Microbiology Results (last 10 days)     Procedure Component Value - Date/Time    Blood Culture - Blood, Arm, Right [[id number]] Collected:  11/01/19 1020    Lab Status:  Preliminary result Specimen:  Blood from Arm, Right Updated:  11/02/19 1045     Blood Culture No growth at 24 hours    AFB Culture - Body Fluid, Pleural Cavity [[id number]] Collected:  11/01/19 0202    Lab Status:  Preliminary result Specimen:  Body Fluid from Pleural Cavity Updated:  11/01/19 1036     AFB Stain No acid fast bacilli seen    Body Fluid Culture - Body Fluid, Pleural Cavity [[id number]] Collected:  11/01/19 0202    Lab Status:  Preliminary result Specimen:  Body Fluid from Pleural Cavity Updated:  11/02/19 0806     Body Fluid Culture No growth at 24 hours     Gram Stain Rare (1+) WBCs per low power field      No organisms seen    Fungus Smear - Body Fluid, Pleural Cavity  [[id number]] Collected:  11/01/19 0202    Lab Status:  Final result Specimen:  Body Fluid from Pleural Cavity Updated:  11/01/19 1122     Fungal Stain No fungal elements seen    Respiratory Culture - Sputum, ET Suction [[id number]] Collected:  10/28/19 2215    Lab Status:  Final result Specimen:  Sputum from ET Suction Updated:  10/31/19 0937     Respiratory Culture Scant growth (1+) Normal Respiratory Aby     Gram Stain Many (4+) WBCs per low power field      Many (4+) Mixed bacterial morphotypes seen on Gram Stain    MRSA Screen Culture - Swab, Nares [[id number]]  (Normal) Collected:  10/28/19 1515    Lab Status:  Final result Specimen:  Swab from Nares Updated:  10/29/19 1813     MRSA SCREEN CX No Methicillin Resistant Staphylococcus aureus isolated    Respiratory Panel, PCR - Swab, Nasopharynx [[id number]]  (Abnormal) Collected:  10/28/19 1515    Lab Status:  Final result Specimen:  Swab from Nasopharynx Updated:  10/28/19 1741     ADENOVIRUS, PCR Not Detected     Coronavirus 229E Not Detected     Coronavirus HKU1 Not Detected     Coronavirus NL63 Not Detected     Coronavirus OC43 Not Detected     Human Metapneumovirus Not Detected     Human Rhinovirus/Enterovirus Detected     Influenza B PCR Not Detected     Parainfluenza Virus 1 Not Detected     Parainfluenza Virus 2 Not Detected     Parainfluenza Virus 3 Not Detected     Parainfluenza Virus 4 Not Detected     Bordetella pertussis pcr Not Detected     Influenza A H1 2009 PCR Not Detected     Chlamydophila pneumoniae PCR Not Detected     Mycoplasma pneumo by PCR Not Detected     Influenza A PCR Not Detected     Influenza A H3 Not Detected     Influenza A H1 Not Detected     RSV, PCR Not Detected    Urine Culture - Urine, Urine, Catheter [[id number]]  (Normal) Collected:  10/28/19 1212    Lab Status:  Final result Specimen:  Urine, Catheter Updated:  10/29/19 1210     Urine Culture No growth    Blood Culture - Blood, Arm, Left [[id number]]  (Abnormal) Collected:   10/28/19 1155    Lab Status:  Final result Specimen:  Blood from Arm, Left Updated:  10/31/19 1310     Blood Culture Scant growth (1+) Actinomyces species     Isolated from Anaerobic Bottle     Gram Stain Anaerobic Bottle Gram positive bacilli      Aerobic Bottle Gram positive bacilli    Narrative:       Blood culture does not meet the specified criteria for PCR identification.  If pregnant, immunocompromised, or clinical concern for meningitis, call lab to run BCID for Listeria monocytogenes.          Medication Review:       Schedule Meds    ampicillin-sulbactam 3 g Intravenous Q24H   bumetanide 1 mg Intravenous Once   heparin (porcine) 3,000 Units Intracatheter Once   insulin lispro 0-7 Units Subcutaneous 4x Daily With Meals & Nightly   ipratropium-albuterol 3 mL Nebulization Q4H - RT   pantoprazole 40 mg Oral Q AM   sodium chloride 10 mL Intravenous Q12H   sodium chloride 10 mL Intravenous Q12H   sodium chloride 10 mL Intravenous Q12H   sodium chloride 10 mL Intravenous Q12H       Infusion Meds    furosemide (LASIX) infusion 1 mg/mL 20 mg/hr Last Rate: 20 mg/hr (11/02/19 0016)   hold 1 each        PRN Meds  •  albumin human  •  albumin human  •  dextrose  •  dextrose  •  glucagon (human recombinant)  •  heparin (porcine)  •  hold  •  hydrOXYzine  •  ondansetron  •  ondansetron  •  racemic epinephrine **AND** [DISCONTINUED] sodium chloride  •  sodium chloride  •  sodium chloride  •  sodium chloride  •  sodium chloride        Assessment/Plan       Antimicrobial Therapy   1.  IV Unasyn      day  2.      Day  3.      Day  4.      Day  5.      Day      Assessment     Positive blood culture for actinomyces species on admission.  Unfortunately ER performed only one set of blood culture.  This is usually not sufficient to diagnose bacteremia.  Actinomyces is a very rare cause of true bacteremia and usually associated with oral maxillary disease or intra-abdominal abscesses or occasionally pulmonary disease.  The patient  has pulmonary infiltrates but not consistent with actinomycosis.     IV drug use.  Patient was using IV heroin and IV methamphetamine     Hepatitis C infection.  Treatment naïve     Bilateral pulmonary infiltrates associated with bilateral effusion.  Most likely secondary to aspiration pneumonia     Rhabdomyolysis     Acute kidney failure.  Most likely multifactorial secondary to rhabdomyolysis and sepsis.  The patient is currently on hemodialysis     Rhinovirus infection    Diarrhea.  Rule out C. difficile colitis    Right upper quadrant pain.  CT scan of the abdomen and pelvis was done without contrast and showed thickening of the gallbladder.  We need to rule out acute cholecystitis        Plan     Continue patient on Unasyn 3 grams IV daily  Repeat 2 sets of blood cultures pending so far negative  Right upper quadrant ultrasound  Supportive care  Check for hepatitis C RNA and reflex genotype.  Pending  Labs in a.m.  Droplet isolation for rhinovirus  Stool for C. difficile toxin and antigen if patient continued to have diarrhea           Valarie Castro MD  11/02/19  1:04 PM      Note is dictated utilizing voice recognition software/Dragon

## 2019-11-02 NOTE — PLAN OF CARE
Problem: Fall Risk (Adult)  Goal: Absence of Fall  Outcome: Ongoing (interventions implemented as appropriate)   11/02/19 0445   Fall Risk (Adult)   Absence of Fall achieves outcome       Problem: Patient Care Overview  Goal: Plan of Care Review  Outcome: Ongoing (interventions implemented as appropriate)   11/02/19 0445   Coping/Psychosocial   Plan of Care Reviewed With patient;significant other   Plan of Care Review   Progress improving   OTHER   Outcome Summary Pt stable over night. On precision flow at 25L and 60%. States he feels much better than yesterday other than some abdominal pain. Will continue to monitor.       Problem: Skin Injury Risk (Adult)  Goal: Skin Health and Integrity  Outcome: Ongoing (interventions implemented as appropriate)   11/02/19 0445   Skin Injury Risk (Adult)   Skin Health and Integrity making progress toward outcome

## 2019-11-02 NOTE — PLAN OF CARE
Problem: Restraint, Behavioral (Acute Care)  Goal: Behavioral Restraint: Discontinuation Criteria Achieved  Outcome: Resolved for upgrade, new template will be applied      Problem: Fall Risk (Adult)  Goal: Absence of Fall  Outcome: Ongoing (interventions implemented as appropriate)      Problem: Patient Care Overview  Goal: Plan of Care Review  Outcome: Ongoing (interventions implemented as appropriate)    Goal: Individualization and Mutuality  Outcome: Ongoing (interventions implemented as appropriate)      Problem: Skin Injury Risk (Adult)  Goal: Skin Health and Integrity  Outcome: Ongoing (interventions implemented as appropriate)

## 2019-11-02 NOTE — PROGRESS NOTES
Pulmonary/ Critical Care/ sleep medicine progress Note        Patient Name:  Vicente Cunha    :  1995    Medical Record:  [id number]    Primary Care Physician     Gary Llanos MD    JOE Cunha is a 24 y.o. male who presented to the emergency department via EMS for evaluation of altered mental status.  EMS reported that the patient was found down in a bar and this morning where he had apparently been overnight.  By report the patient is a polysubstance abuser and was offered shelter in the barn as he is homeless.  The family stated that they saw the patient last at 9 PM last night and then found him in the morning this morning at 11 AM.  He may have received bystander CPR however this is unclear and there are no family members to verify this account.  EMS stated that when they arrived the patient had altered mental status with groaning and agitation but no purposeful responses.  EMS administered 8 mg of Narcan without improvement.  In the emergency department the patient continued to be hypoxic and minimally responsive and was intubated.  No other information is obtainable as there is no family at the bedside.  On review of documentation the patient has had other emergency department evaluations elated to heroin use    In the emergency department the patient was found to have a temperature of 90.4 °F, systolic blood pressure in the 60s.  The patient received 2 L warm IV fluid resuscitation and a bear hugger was placed.  The patient continued to be hypotensive and hypothermic and 2 more liters of warmed IV fluid were ordered.  Blood cultures and urinalysis were obtained and the patient received vancomycin and cefepime empirically.  WBCs were elevated at 30,000.  Creatinine 3.24.  Elevated LFTs.  CK elevated.  Urine drug screen was positive for methamphetamines and THC    Patient will be admitted for further evaluation and treatment of sepsis, respiratory failure, hypothermia    Subjective:  still on precion flow, on lasix drip, undergoing HD; he complains of RUQ pain which is severe    Review of Systems    As above    Medical History    Past Medical History:   Diagnosis Date   • Anxiety    • Depression       Polysubstance abuse    Surgical History    History reviewed. No pertinent surgical history.     Family History    History reviewed. No pertinent family history.    Social History    Social History     Tobacco Use   • Smoking status: Unknown If Ever Smoked   Substance Use Topics   • Alcohol use: Not on file   Polysubstance abuse    Allergies    Allergies   Allergen Reactions   • Penicillins Anxiety       Medications    Scheduled Meds:    ampicillin-sulbactam 3 g Intravenous Q24H   heparin (porcine) 3,000 Units Intracatheter Once   insulin lispro 0-7 Units Subcutaneous 4x Daily With Meals & Nightly   ipratropium-albuterol 3 mL Nebulization Q4H - RT   pantoprazole 40 mg Oral Q AM   sodium chloride 10 mL Intravenous Q12H   sodium chloride 10 mL Intravenous Q12H   sodium chloride 10 mL Intravenous Q12H   sodium chloride 10 mL Intravenous Q12H     Continuous Infusions:    furosemide (LASIX) infusion 1 mg/mL 20 mg/hr Last Rate: 20 mg/hr (19 0016)   hold 1 each      PRN Meds:.•  albumin human  •  albumin human  •  dextrose  •  dextrose  •  glucagon (human recombinant)  •  heparin (porcine)  •  hold  •  hydrOXYzine  •  ondansetron  •  ondansetron  •  racemic epinephrine **AND** [DISCONTINUED] sodium chloride  •  sodium chloride  •  sodium chloride  •  sodium chloride  •  sodium chloride      Physical Exam    tMax 24 hrs:  Temp (24hrs), Av.2 °F (36.8 °C), Min:97.7 °F (36.5 °C), Max:98.8 °F (37.1 °C)    Vitals Ranges:  Temp:  [97.7 °F (36.5 °C)-98.8 °F (37.1 °C)] 98.6 °F (37 °C)  Heart Rate:  [] 94  Resp:  [18-21] 19  BP: (113-135)/(67-98) 131/91  Intake and Output Last 3 Shifts:  I/O last 3 completed shifts:  In: 1517 [I.V.:1417; IV Piggyback:100]  Out: 3155 [Urine:155;  Other:3000]    Constitutional:  NAD  Eyes:  PERRL, conjunctiva normal   HENT:  Atraumatic, external ears normal, nose normal, oral ET tube  Respiratory: coarse but clear no rales, no wheezing   Cardiovascular: Sinus tachycardia, no murmurs, no gallops, no rubs   GI:  Soft, nondistended, normal bowel sounds, Right upper quadrant tender  : Cannon catheter present  Musculoskeletal:  No edema, no cyanosis or clubbing  Integument:  Well hydrated, no rash   Neurologic: awake and alert   Psychiatric: calm    labs    Lab Results (last 24 hours)     Procedure Component Value Units Date/Time    Triglycerides, Body Fluid - Pleural Fluid, Pleural Cavity [[id number]] Collected:  11/01/19 0201    Specimen:  Pleural Fluid from Pleural Cavity Updated:  11/02/19 1307     Triglycerides, Fluid 39 mg/dL      Comment: The reference intervals and other method performance specifications  have not been established for this test. The test result should be  integrated into the clinical context for interpretation.  The reference interval(s) and other method performance specifications  have not been established for this body fluid. The test result must be  integrated into the clinical context for interpretation.       Narrative:       Performed at:  [address] [place], [place] [id number]  : Everette Read PhD, Phone:  [phone]    CK [[id number]]  (Abnormal) Collected:  11/02/19 1057    Specimen:  Blood Updated:  11/02/19 1143     Creatine Kinase 601 U/L     Blood Culture - Blood, Arm, Right [[id number]] Collected:  11/01/19 1020    Specimen:  Blood from Arm, Right Updated:  11/02/19 1045     Blood Culture No growth at 24 hours    POC Glucose Once [[id number]]  (Normal) Collected:  11/02/19 0816    Specimen:  Blood Updated:  11/02/19 0827     Glucose 85 mg/dL      Comment: Serial Number: [id number]:  [id number]       Body Fluid Culture - Body Fluid, Pleural Cavity [[id number]] Collected:  11/01/19 0202     Specimen:  Body Fluid from Pleural Cavity Updated:  11/02/19 0806     Body Fluid Culture No growth at 24 hours     Gram Stain Rare (1+) WBCs per low power field      No organisms seen    Scan Slide [[id number]] Collected:  11/02/19 0547    Specimen:  Blood Updated:  11/02/19 0724     Scan Slide --     Comment: See Manual Differential Results       Manual Differential [[id number]]  (Abnormal) Collected:  11/02/19 0547    Specimen:  Blood Updated:  11/02/19 0724     Neutrophil % 74.0 %      Lymphocyte % 10.0 %      Monocyte % 5.0 %      Bands %  8.0 %      Metamyelocyte % 1.0 %      Myelocyte % 1.0 %      Atypical Lymphocyte % 1.0 %      Neutrophils Absolute 11.97 10*3/mm3      Lymphocytes Absolute 1.46 10*3/mm3      Monocytes Absolute 0.73 10*3/mm3      RBC Morphology Normal     WBC Morphology Normal     Platelet Morphology Normal    Narrative:       Slide Reviewed    CBC & Differential [[id number]] Collected:  11/02/19 0547    Specimen:  Blood Updated:  11/02/19 0724    Narrative:       The following orders were created for panel order CBC & Differential.  Procedure                               Abnormality         Status                     ---------                               -----------         ------                     CBC Auto Differential[[id number]]        Abnormal            Final result                 Please view results for these tests on the individual orders.    CBC Auto Differential [[id number]]  (Abnormal) Collected:  11/02/19 0547    Specimen:  Blood Updated:  11/02/19 0724     WBC 14.60 10*3/mm3      RBC 3.26 10*6/mm3      Hemoglobin 10.3 g/dL      Hematocrit 29.2 %      MCV 89.5 fL      MCH 31.5 pg      MCHC 35.2 g/dL      RDW 13.4 %      RDW-SD 41.6 fl      MPV 7.1 fL      Platelets 122 10*3/mm3     Comprehensive Metabolic Panel [[id number]]  (Abnormal) Collected:  11/02/19 0547    Specimen:  Blood Updated:  11/02/19 0630     Glucose 91 mg/dL      BUN 61 mg/dL      Creatinine 5.27 mg/dL       Sodium 135 mmol/L      Potassium 3.7 mmol/L      Chloride 100 mmol/L      CO2 21.0 mmol/L      Calcium 8.4 mg/dL      Total Protein 5.6 g/dL      Albumin 3.20 g/dL      ALT (SGPT) 627 U/L      AST (SGOT) 124 U/L      Alkaline Phosphatase 57 U/L      Total Bilirubin 0.5 mg/dL      eGFR Non African Amer 13 mL/min/1.73      Comment: <15 Indicative of kidney failure.        eGFR   Amer --     Comment: <15 Indicative of kidney failure.        Globulin 2.4 gm/dL      A/G Ratio 1.3 g/dL      BUN/Creatinine Ratio 11.6     Anion Gap 14.0 mmol/L     Narrative:       GFR Normal >60  Chronic Kidney Disease <60  Kidney Failure <15    Magnesium [[id number]]  (Normal) Collected:  11/02/19 0547    Specimen:  Blood Updated:  11/02/19 0628     Magnesium 2.6 mg/dL     CK [[id number]]  (Abnormal) Collected:  11/02/19 0547    Specimen:  Blood Updated:  11/02/19 0628     Creatine Kinase 663 U/L     aPTT [[id number]]  (Normal) Collected:  11/02/19 0547    Specimen:  Blood Updated:  11/02/19 0608     PTT 25.0 seconds     CK [[id number]]  (Abnormal) Collected:  11/02/19 0016    Specimen:  Blood Updated:  11/02/19 0039     Creatine Kinase 849 U/L     POC Glucose Once [[id number]]  (Abnormal) Collected:  11/01/19 2027    Specimen:  Blood Updated:  11/01/19 2032     Glucose 113 mg/dL      Comment: Serial Number: [id number]:  [id number]       CK [[id number]]  (Abnormal) Collected:  11/01/19 1834    Specimen:  Blood Updated:  11/01/19 1856     Creatine Kinase 1,137 U/L     Hepatitis C RNA, Quantitative, PCR (graph) [[id number]] Collected:  11/01/19 1834    Specimen:  Blood Updated:  11/01/19 1838    Hepatitis C Genotype [[id number]] Collected:  11/01/19 1342    Specimen:  Blood Updated:  11/01/19 1503    Non-gynecologic Cytology [[id number]] Collected:  11/01/19 0202    Specimen:  Body Fluid from Pleural Cavity Updated:  11/01/19 1437    aPTT [[id number]]  (Abnormal) Collected:  11/01/19 1342    Specimen:  Blood Updated:  11/01/19 1433      PTT 42.1 seconds      Comment: Result checked       Blood Culture - Blood, Blood, Central Line [[id number]] Collected:  11/01/19 1342    Specimen:  Blood, Central Line Updated:  11/01/19 1433          Imaging & Other Studies    Imaging Results (last 72 hours)     Procedure Component Value Units Date/Time    XR Chest 1 View [[id number]] Collected:  10/28/19 1258     Updated:  10/28/19 1302    Narrative:       DATE OF EXAM:  10/28/2019 12:56 PM     PROCEDURE:  XR CHEST 1 VW-     INDICATIONS:  Severe Sepsis Protocol     COMPARISON:  Study of December 23, 2015     TECHNIQUE:   Single radiographic AP view of the chest was obtained.     FINDINGS:  Today's study was obtained October 28, 2019 at 12:50 PM.     The ET tube is in good position above the martha. The heart size is  normal. The peripheral lung zones are clear. The heart borders and  hemidiaphragms appear sharp except, for mild vague density in the left  lung base which may represent early atelectasis or pneumonia medially.  The upper abdomen is unremarkable. The bony structures are intact.       Impression:          1. Satisfactory placement of ET tube with tip in good position above  martha.  2. Minimal opacity left lung base medially  3. The lungs are otherwise clear  4. Mild change from study of December 23, 2015     Electronically Signed By-Everett Verdin Jr. On:10/28/2019 1:00 PM  This report was finalized on [id number] by  Everett Verdin Jr., .          Assessment    Acute hypoxic respiratory failure secondary to polysubstance abuse, now volume overlaoded  Altered mental status  Septic shock-resolved  Acute kidney injury-worsening with oliguria  Liver injury  Rhabdomyolysis, creatinine kinase 3669 on admission  Polysubstance abuse, UDS positive for methamphetamine and THC  Hypothermia due to environmental exposure  Hyperphosphatemia  ?SBO  ? cholecystitis    Plan      Self extubated 10/29/19, required BiPAP post extubation now on Precision flow 25 L/50%  FiO2  S/p thoracentesis 11/1/19.  Will follow pleural fluid studies.  On unasyn per ID for pneumonia, cxr reviewed and dense consolidation noted, s/p thora with 1000 cc evacuated, not empyema but exudative   Cultures negative to date   Now on lasix drip and HD, will stop lasix drip, I suspect his hypoxia is due to likely pneumonia, HD for fluid removal  CK > 18,053  Off LEVO  Mannitol given per renal 10/30  heparin gtt. 2D echocardiogram reviewed.  Enlarged RA and RV.  LE doppler negative   Worsening renal function, nephrology following  Creat 5 worsening  Shiley placed 10/30  HD  today   CT abdomen reviewed with ? SBO and GB thickening, patient with severe pain RUQ- will consult surgery    Will recommend behavioral health evaluation and possible outpatient treatment for polysubstance abuse upon discharge    PUD prophylaxis with Protonix  DVT prophylaxis with hepatin gtt     PICC line. Will ERNESTINE  Daryl     Remains critically ill.  Critical care time 32 minutes excluding any time spent for the procedures

## 2019-11-03 LAB
ALBUMIN SERPL-MCNC: 3.1 G/DL (ref 3.5–5.2)
ALBUMIN/GLOB SERPL: 1.2 G/DL
ALP SERPL-CCNC: 57 U/L (ref 39–117)
ALT SERPL W P-5'-P-CCNC: 413 U/L (ref 1–41)
ANION GAP SERPL CALCULATED.3IONS-SCNC: 13 MMOL/L (ref 5–15)
APTT PPP: 25.2 SECONDS (ref 24–31)
AST SERPL-CCNC: 67 U/L (ref 1–40)
BILIRUB SERPL-MCNC: 0.5 MG/DL (ref 0.2–1.2)
BUN BLD-MCNC: 44 MG/DL (ref 6–20)
BUN/CREAT SERPL: 8.1 (ref 7–25)
C DIFF GDH STL QL: NEGATIVE
CALCIUM SPEC-SCNC: 8.3 MG/DL (ref 8.6–10.5)
CHLORIDE SERPL-SCNC: 102 MMOL/L (ref 98–107)
CK SERPL-CCNC: 357 U/L (ref 20–200)
CO2 SERPL-SCNC: 23 MMOL/L (ref 22–29)
CREAT BLD-MCNC: 5.46 MG/DL (ref 0.76–1.27)
DEPRECATED RDW RBC AUTO: 42.4 FL (ref 37–54)
EOSINOPHIL # BLD MANUAL: 0.41 10*3/MM3 (ref 0–0.4)
EOSINOPHIL NFR BLD MANUAL: 3 % (ref 0.3–6.2)
ERYTHROCYTE [DISTWIDTH] IN BLOOD BY AUTOMATED COUNT: 13.7 % (ref 12.3–15.4)
GFR SERPL CREATININE-BSD FRML MDRD: 13 ML/MIN/1.73
GFR SERPL CREATININE-BSD FRML MDRD: ABNORMAL ML/MIN/{1.73_M2}
GLOBULIN UR ELPH-MCNC: 2.6 GM/DL
GLUCOSE BLD-MCNC: 88 MG/DL (ref 65–99)
GLUCOSE BLDC GLUCOMTR-MCNC: 92 MG/DL (ref 70–105)
HCT VFR BLD AUTO: 31.2 % (ref 37.5–51)
HGB BLD-MCNC: 11 G/DL (ref 13–17.7)
LYMPHOCYTES # BLD MANUAL: 0.95 10*3/MM3 (ref 0.7–3.1)
LYMPHOCYTES NFR BLD MANUAL: 6 % (ref 5–12)
LYMPHOCYTES NFR BLD MANUAL: 7 % (ref 19.6–45.3)
MAGNESIUM SERPL-MCNC: 2.4 MG/DL (ref 1.6–2.6)
MCH RBC QN AUTO: 31.2 PG (ref 26.6–33)
MCHC RBC AUTO-ENTMCNC: 35.3 G/DL (ref 31.5–35.7)
MCV RBC AUTO: 88.5 FL (ref 79–97)
MONOCYTES # BLD AUTO: 0.81 10*3/MM3 (ref 0.1–0.9)
NEUTROPHILS # BLD AUTO: 11.34 10*3/MM3 (ref 1.7–7)
NEUTROPHILS NFR BLD MANUAL: 81 % (ref 42.7–76)
NEUTS BAND NFR BLD MANUAL: 3 % (ref 0–5)
PLAT MORPH BLD: NORMAL
PLATELET # BLD AUTO: 146 10*3/MM3 (ref 140–450)
PMV BLD AUTO: 6.6 FL (ref 6–12)
POTASSIUM BLD-SCNC: 3.6 MMOL/L (ref 3.5–5.2)
PROT SERPL-MCNC: 5.7 G/DL (ref 6–8.5)
RBC # BLD AUTO: 3.52 10*6/MM3 (ref 4.14–5.8)
RBC MORPH BLD: NORMAL
SCAN SLIDE: NORMAL
SODIUM BLD-SCNC: 138 MMOL/L (ref 136–145)
WBC MORPH BLD: NORMAL
WBC NRBC COR # BLD: 13.5 10*3/MM3 (ref 3.4–10.8)

## 2019-11-03 PROCEDURE — 94799 UNLISTED PULMONARY SVC/PX: CPT

## 2019-11-03 PROCEDURE — 82550 ASSAY OF CK (CPK): CPT | Performed by: INTERNAL MEDICINE

## 2019-11-03 PROCEDURE — 85025 COMPLETE CBC W/AUTO DIFF WBC: CPT | Performed by: NURSE PRACTITIONER

## 2019-11-03 PROCEDURE — 85730 THROMBOPLASTIN TIME PARTIAL: CPT | Performed by: NURSE PRACTITIONER

## 2019-11-03 PROCEDURE — 99232 SBSQ HOSP IP/OBS MODERATE 35: CPT | Performed by: SURGERY

## 2019-11-03 PROCEDURE — 85007 BL SMEAR W/DIFF WBC COUNT: CPT | Performed by: NURSE PRACTITIONER

## 2019-11-03 PROCEDURE — 5A1D70Z PERFORMANCE OF URINARY FILTRATION, INTERMITTENT, LESS THAN 6 HOURS PER DAY: ICD-10-PCS | Performed by: INTERNAL MEDICINE

## 2019-11-03 PROCEDURE — 94660 CPAP INITIATION&MGMT: CPT

## 2019-11-03 PROCEDURE — 25010000002 ONDANSETRON PER 1 MG: Performed by: NURSE PRACTITIONER

## 2019-11-03 PROCEDURE — 80053 COMPREHEN METABOLIC PANEL: CPT | Performed by: INTERNAL MEDICINE

## 2019-11-03 PROCEDURE — 83735 ASSAY OF MAGNESIUM: CPT | Performed by: NURSE PRACTITIONER

## 2019-11-03 PROCEDURE — 25010000002 HEPARIN (PORCINE) PER 1000 UNITS: Performed by: INTERNAL MEDICINE

## 2019-11-03 PROCEDURE — 25010000003 AMPICILLIN-SULBACTAM PER 1.5 G: Performed by: INTERNAL MEDICINE

## 2019-11-03 RX ORDER — HEPARIN SODIUM 1000 [USP'U]/ML
2000 INJECTION, SOLUTION INTRAVENOUS; SUBCUTANEOUS ONCE
Status: COMPLETED | OUTPATIENT
Start: 2019-11-03 | End: 2019-11-03

## 2019-11-03 RX ORDER — HEPARIN SODIUM 1000 [USP'U]/ML
3000 INJECTION, SOLUTION INTRAVENOUS; SUBCUTANEOUS ONCE
Status: COMPLETED | OUTPATIENT
Start: 2019-11-03 | End: 2019-11-03

## 2019-11-03 RX ADMIN — HEPARIN SODIUM 2000 UNITS: 1000 INJECTION INTRAVENOUS; SUBCUTANEOUS at 12:57

## 2019-11-03 RX ADMIN — Medication 10 ML: at 08:59

## 2019-11-03 RX ADMIN — Medication 10 ML: at 20:11

## 2019-11-03 RX ADMIN — IPRATROPIUM BROMIDE AND ALBUTEROL SULFATE 3 ML: .5; 3 SOLUTION RESPIRATORY (INHALATION) at 18:57

## 2019-11-03 RX ADMIN — IPRATROPIUM BROMIDE AND ALBUTEROL SULFATE 3 ML: .5; 3 SOLUTION RESPIRATORY (INHALATION) at 06:53

## 2019-11-03 RX ADMIN — IPRATROPIUM BROMIDE AND ALBUTEROL SULFATE 3 ML: .5; 3 SOLUTION RESPIRATORY (INHALATION) at 23:16

## 2019-11-03 RX ADMIN — ACETAMINOPHEN 650 MG: 325 TABLET ORAL at 15:51

## 2019-11-03 RX ADMIN — HEPARIN SODIUM 3000 UNITS: 1000 INJECTION INTRAVENOUS; SUBCUTANEOUS at 13:00

## 2019-11-03 RX ADMIN — SODIUM CHLORIDE 3 G: 900 INJECTION INTRAVENOUS at 15:10

## 2019-11-03 RX ADMIN — ACETAMINOPHEN 650 MG: 325 TABLET ORAL at 05:33

## 2019-11-03 RX ADMIN — IPRATROPIUM BROMIDE AND ALBUTEROL SULFATE 3 ML: .5; 3 SOLUTION RESPIRATORY (INHALATION) at 10:34

## 2019-11-03 RX ADMIN — HYDROXYZINE HYDROCHLORIDE 50 MG: 25 TABLET, FILM COATED ORAL at 15:51

## 2019-11-03 RX ADMIN — ONDANSETRON 4 MG: 2 INJECTION INTRAMUSCULAR; INTRAVENOUS at 20:09

## 2019-11-03 RX ADMIN — HYDROXYZINE HYDROCHLORIDE 50 MG: 25 TABLET, FILM COATED ORAL at 22:09

## 2019-11-03 RX ADMIN — PANTOPRAZOLE SODIUM 40 MG: 40 TABLET, DELAYED RELEASE ORAL at 05:33

## 2019-11-03 RX ADMIN — HYDROXYZINE HYDROCHLORIDE 50 MG: 25 TABLET, FILM COATED ORAL at 05:33

## 2019-11-03 RX ADMIN — IPRATROPIUM BROMIDE AND ALBUTEROL SULFATE 3 ML: .5; 3 SOLUTION RESPIRATORY (INHALATION) at 14:39

## 2019-11-03 RX ADMIN — Medication 10 ML: at 20:10

## 2019-11-03 RX ADMIN — ACETAMINOPHEN 650 MG: 325 TABLET ORAL at 22:09

## 2019-11-03 NOTE — PROGRESS NOTES
General Surgery Progress Note     LOS: 6 days   Patient Care Team:  Gary Llanos MD as PCP - General    Subjective     Interval History:   Actually says his abdominal pain is much better than yesterday.  Tolerating food and he says his bowel movements are more formed.  Right upper quadrant ultrasound yesterday showed gallbladder wall thickening which was expected given his overall volume status and underlying hepatitis and the gallbladder did contain some sludge.    Objective     Vital Signs  Temp:  [97.8 °F (36.6 °C)-99.2 °F (37.3 °C)] 97.8 °F (36.6 °C)  Heart Rate:  [] 95  Resp:  [16-20] 18  BP: (125-173)/() 145/97    Physical Exam   Constitutional: He appears well-developed and well-nourished.   HENT:   Head: Normocephalic and atraumatic.   Eyes: Conjunctivae are normal. No scleral icterus.   Cardiovascular: Normal rate.   Pulmonary/Chest: Effort normal.   Abdominal: Soft.   Minimal distention mild tenderness to palpation better than yesterday no peritoneal signs   Neurological: He is alert. No cranial nerve deficit.   Skin: Skin is warm and dry.   Psychiatric: He has a normal mood and affect. His behavior is normal.          Results Review:    Lab Results (last 24 hours)     Procedure Component Value Units Date/Time    Blood Culture - Blood, Arm, Right [[id number]] Collected:  11/01/19 1020    Specimen:  Blood from Arm, Right Updated:  11/03/19 0945     Blood Culture No growth at 2 days    CBC & Differential [[id number]] Collected:  11/03/19 0535    Specimen:  Blood Updated:  11/03/19 0837    Narrative:       The following orders were created for panel order CBC & Differential.  Procedure                               Abnormality         Status                     ---------                               -----------         ------                     CBC Auto Differential[[id number]]        Abnormal            Final result                 Please view results for these tests on the individual  orders.    CBC Auto Differential [[id number]]  (Abnormal) Collected:  11/03/19 0535    Specimen:  Blood Updated:  11/03/19 0837     WBC 13.50 10*3/mm3      RBC 3.52 10*6/mm3      Hemoglobin 11.0 g/dL      Hematocrit 31.2 %      MCV 88.5 fL      MCH 31.2 pg      MCHC 35.3 g/dL      RDW 13.7 %      RDW-SD 42.4 fl      MPV 6.6 fL      Platelets 146 10*3/mm3     Scan Slide [[id number]] Collected:  11/03/19 0535    Specimen:  Blood Updated:  11/03/19 0837     Scan Slide --     Comment: See Manual Differential Results       Manual Differential [[id number]]  (Abnormal) Collected:  11/03/19 0535    Specimen:  Blood Updated:  11/03/19 0837     Neutrophil % 81.0 %      Lymphocyte % 7.0 %      Monocyte % 6.0 %      Eosinophil % 3.0 %      Bands %  3.0 %      Neutrophils Absolute 11.34 10*3/mm3      Lymphocytes Absolute 0.95 10*3/mm3      Monocytes Absolute 0.81 10*3/mm3      Eosinophils Absolute 0.41 10*3/mm3      RBC Morphology Normal     WBC Morphology Normal     Platelet Morphology Normal    Body Fluid Culture - Body Fluid, Pleural Cavity [[id number]] Collected:  11/01/19 0202    Specimen:  Body Fluid from Pleural Cavity Updated:  11/03/19 0812     Body Fluid Culture No growth at 2 days     Gram Stain Rare (1+) WBCs per low power field      No organisms seen    Clostridium Difficile EIA - Stool, Per Rectum [[id number]]  (Normal) Collected:  11/02/19 1348    Specimen:  Stool from Per Rectum Updated:  11/03/19 0714     C Diff GDH / Toxin Negative    CK [[id number]]  (Abnormal) Collected:  11/03/19 0535    Specimen:  Blood Updated:  11/03/19 0631     Creatine Kinase 357 U/L     Comprehensive Metabolic Panel [[id number]]  (Abnormal) Collected:  11/03/19 0535    Specimen:  Blood Updated:  11/03/19 0631     Glucose 88 mg/dL      BUN 44 mg/dL      Creatinine 5.46 mg/dL      Sodium 138 mmol/L      Potassium 3.6 mmol/L      Chloride 102 mmol/L      CO2 23.0 mmol/L      Calcium 8.3 mg/dL      Total Protein 5.7 g/dL      Albumin 3.10 g/dL       ALT (SGPT) 413 U/L      AST (SGOT) 67 U/L      Alkaline Phosphatase 57 U/L      Total Bilirubin 0.5 mg/dL      eGFR Non African Amer 13 mL/min/1.73      Comment: <15 Indicative of kidney failure.        eGFR   Amer --     Comment: <15 Indicative of kidney failure.        Globulin 2.6 gm/dL      A/G Ratio 1.2 g/dL      BUN/Creatinine Ratio 8.1     Anion Gap 13.0 mmol/L     Narrative:       GFR Normal >60  Chronic Kidney Disease <60  Kidney Failure <15    Magnesium [[id number]]  (Normal) Collected:  11/03/19 0535    Specimen:  Blood Updated:  11/03/19 0631     Magnesium 2.4 mg/dL     aPTT [[id number]]  (Normal) Collected:  11/03/19 0535    Specimen:  Blood Updated:  11/03/19 0611     PTT 25.2 seconds     CK [[id number]]  (Abnormal) Collected:  11/02/19 1703    Specimen:  Blood Updated:  11/02/19 1740     Creatine Kinase 595 U/L     Blood Culture - Blood, Blood, Central Line [[id number]] Collected:  11/01/19 1342    Specimen:  Blood, Central Line Updated:  11/02/19 1445     Blood Culture No growth at 24 hours        Imaging Results (Last 24 Hours)     Procedure Component Value Units Date/Time    US Gallbladder [[id number]] Collected:  11/02/19 1712     Updated:  11/02/19 1715    Narrative:       US GALLBLADDER-     Date of Exam: 11/2/2019 4:06 PM     Indication: Right upper quadrant tenderness and abdominal CT scan;  R41.82-Altered mental status, unspecified; J96.01-Acute respiratory  failure with hypoxia; N17.9-Acute kidney failure, unspecified;  R94.5-Abnormal results of liver function studies; D72.829-Elevated white  blood cell count, unspecified; T68.XXXA-Hypothermia, initial encounter.     Comparison: CT 11/01/2019     Technique: Transverse and sagittal ultrasound images of the right upper  quadrant were obtained. Doppler evaluation was also conducted.     FINDINGS:  Visualized portions of the head and body of the pancreas are normal.  Evaluation is limited due to surrounding bowel gas.     Liver has  homogenous echogenicity and normal echotexture.  No focal  hepatic lesions.  Portal and hepatic veins are patent and have normal  flow direction and waveforms.      Gallbladder is normal in caliber. Pericholecystic fluid is present.  Sludge is present within the gallbladder lumen. The sonographic Turner's  sign is negative. No definite stones identified. The biliary system is  nondilated.      The right kidney is normal in size with no evidence of hydronephrosis.  No suspicious focal lesions.        Impression:       A small amount of sludge is present within the gallbladder lumen. Mild  gallbladder wall thickening is present which is nonspecific. There is no  evidence of biliary ductal dilatation. The sonographic Turner's sign was  negative. Acute cholecystitis cannot be excluded.     Electronically Signed By-Joanne Granda On:11/2/2019 5:13 PM  This report was finalized on [id number] by  Joanne Granda, .         I reviewed the patient's new clinical results.    Medication Review:    Current Facility-Administered Medications:   •  acetaminophen (TYLENOL) tablet 650 mg, 650 mg, Oral, Q6H PRN, George Williamson APRN, 650 mg at 11/03/19 0533  •  albumin human 25 % IV SOLN 12.5 g, 12.5 g, Intravenous, PRN, Jn Ortega MD  •  ampicillin-sulbactam (UNASYN) 3 g in sodium chloride 0.9 % 100 mL IVPB-MBP, 3 g, Intravenous, Q24H, Valarie Castro MD, 3 g at 11/02/19 1556  •  heparin (porcine) injection 3,000 Units, 3,000 Units, Intracatheter, Once per day on Mon Wed Fri, Jn Ortega MD, 3,000 Units at 10/31/19 1415  •  Hold medication, 1 each, Does not apply, Continuous PRN, George Williamson APRN  •  hydrOXYzine (ATARAX) tablet 50 mg, 50 mg, Oral, Q6H PRN, Olu Fuentes MD, 50 mg at 11/03/19 0533  •  influenza vac split quad (FLUZONE,FLUARIX,AFLURIA) injection 0.5 mL, 0.5 mL, Intramuscular, During Hospitalization, Olu Fuentes MD  •  ipratropium-albuterol (DUO-NEB) nebulizer solution 3 mL, 3 mL, Nebulization, Q4H - RT,  Day, Elodia, APRN, 3 mL at 11/03/19 1034  •  ondansetron (ZOFRAN) injection 4 mg, 4 mg, Intravenous, Q6H PRN, Brandee Batista, APRN, 4 mg at 11/01/19 2023  •  ondansetron (ZOFRAN) injection 4 mg, 4 mg, Intravenous, Q4H PRN, Day, Elodia, APRN, 4 mg at 11/02/19 0802  •  pantoprazole (PROTONIX) EC tablet 40 mg, 40 mg, Oral, Q AM, Olu Fuentes MD, 40 mg at 11/03/19 0533  •  racemic epinephrine (RACEPINEPHRINE) nebulizer solution 0.5 mL, 0.5 mL, Nebulization, Q2H PRN, 0.5 mL at 10/29/19 0942 **AND** [DISCONTINUED] sodium chloride 0.9 % nebulizer solution 3 mL, 3 mL, Nebulization, TID - RT, Olu Fuentes MD  •  sodium chloride 0.9 % flush 10 mL, 10 mL, Intravenous, PRN, Antonio Lance MD  •  sodium chloride 0.9 % flush 10 mL, 10 mL, Intravenous, Q12H, Day, Elodia, APRN, 10 mL at 11/03/19 0859  •  sodium chloride 0.9 % flush 10 mL, 10 mL, Intravenous, PRN, Day, Elodia, APRN  •  sodium chloride 0.9 % flush 10 mL, 10 mL, Intravenous, Q12H, Day, Elodia, APRN, 10 mL at 11/03/19 0859  •  sodium chloride 0.9 % flush 10 mL, 10 mL, Intravenous, Q12H, Day, Elodia, APRN, 10 mL at 11/03/19 0859  •  sodium chloride 0.9 % flush 10 mL, 10 mL, Intravenous, Q12H, Day, Elodia, APRN, 10 mL at 11/03/19 0859  •  sodium chloride 0.9 % flush 10 mL, 10 mL, Intravenous, PRN, Day, Elodia, APRN  •  sodium chloride 0.9 % flush 20 mL, 20 mL, Intravenous, PRN, Day, Elodia, APRN    Assessment/Plan     Active Problems:    Altered mental status  Rule out cholecystitis rule out bowel obstruction    Patient symptoms are improving.  Recommend diet as tolerated and bowel regimen as needed.  With regards the right upper quadrant abdominal pain is likely related to underlying edema and his hepatitis.  I do not recommend cholecystectomy or percutaneous cholecystostomy tube at this time.  Abdomen is flat with mild tenderness to palpation he is having bowel function I do not think this represents a bowel obstruction.    Please call if clinical change for  reevaluation      Tyrell Ty MD  11/03/19  1:32 PM

## 2019-11-03 NOTE — PROGRESS NOTES
Infectious Diseases Progress Note      LOS: 6 days   Patient Care Team:  Gary Llanos MD as PCP - General    Chief Complaint: Feeling much better today     Subjective     The patient had no fever during the last 24 hours.  The patient remained hemodynamically stable but is currently receiving dialysis.  States that he is not having any abdominal pain and that his diarrhea is much less today.  He denies any significant shortness of breath or any other major complaints.  He is currently on 3 L of oxygen by high flow nasal cannula.          Review of Systems:   Review of Systems   Constitutional: Negative.    HENT: Negative.    Eyes: Negative.    Respiratory: Negative.    Cardiovascular: Negative.    Gastrointestinal: Positive for diarrhea.   Genitourinary: Negative.    Musculoskeletal: Negative.    Skin: Negative.    Neurological: Negative.    Hematological: Negative.    Psychiatric/Behavioral: Negative.         Objective     Vital Signs  Temp:  [97.7 °F (36.5 °C)-98.7 °F (37.1 °C)] 97.7 °F (36.5 °C)  Heart Rate:  [] 100  Resp:  [16-20] 16  BP: (125-155)/() 154/100    Physical Exam:  Physical Exam   Constitutional: He is oriented to person, place, and time. He appears well-developed and well-nourished.   HENT:   Head: Normocephalic and atraumatic.   Eyes: EOM are normal. Pupils are equal, round, and reactive to light.   Neck: Normal range of motion. Neck supple.   Cardiovascular: Normal rate, regular rhythm and normal heart sounds.   Pulmonary/Chest: Effort normal. No respiratory distress. He has no wheezes.   Diminished in lower lobes   Abdominal: Soft. Bowel sounds are normal. He exhibits no distension and no mass. There is tenderness. There is no rebound and no guarding.   Right upper quadrant tenderness   Musculoskeletal: Normal range of motion. He exhibits no edema or deformity.   Neurological: He is alert and oriented to person, place, and time. No cranial nerve deficit.   Skin: Skin is warm.  No rash noted. No erythema.   Psychiatric: He has a normal mood and affect.   Nursing note and vitals reviewed.       Results Review:    I have reviewed all clinical data, test, lab, and imaging results.     Radiology  No Radiology Exams Resulted Within Past 24 Hours    Cardiology    Laboratory  Results from last 7 days   Lab Units 11/03/19  0535   WBC 10*3/mm3 13.50*   HEMOGLOBIN g/dL 11.0*   HEMATOCRIT % 31.2*   PLATELETS 10*3/mm3 146     Results from last 7 days   Lab Units 11/03/19  0535   SODIUM mmol/L 138   POTASSIUM mmol/L 3.6   CHLORIDE mmol/L 102   CO2 mmol/L 23.0   BUN mg/dL 44*   CREATININE mg/dL 5.46*   GLUCOSE mg/dL 88   CALCIUM mg/dL 8.3*     Results from last 7 days   Lab Units 11/03/19  0535   SODIUM mmol/L 138   POTASSIUM mmol/L 3.6   CHLORIDE mmol/L 102   CO2 mmol/L 23.0   BUN mg/dL 44*   CREATININE mg/dL 5.46*   GLUCOSE mg/dL 88   CALCIUM mg/dL 8.3*     Results from last 7 days   Lab Units 11/03/19  0535   CK TOTAL U/L 357*             Microbiology   Microbiology Results (last 10 days)     Procedure Component Value - Date/Time    Clostridium Difficile EIA - Stool, Per Rectum [[id number]]  (Normal) Collected:  11/02/19 1348    Lab Status:  Final result Specimen:  Stool from Per Rectum Updated:  11/03/19 0714     C Diff GDH / Toxin Negative    Blood Culture - Blood, Blood, Central Line [[id number]] Collected:  11/01/19 1342    Lab Status:  Preliminary result Specimen:  Blood, Central Line Updated:  11/03/19 1345     Blood Culture No growth at 2 days    Blood Culture - Blood, Arm, Right [[id number]] Collected:  11/01/19 1020    Lab Status:  Preliminary result Specimen:  Blood from Arm, Right Updated:  11/03/19 0945     Blood Culture No growth at 2 days    AFB Culture - Body Fluid, Pleural Cavity [[id number]] Collected:  11/01/19 0202    Lab Status:  Preliminary result Specimen:  Body Fluid from Pleural Cavity Updated:  11/01/19 1036     AFB Stain No acid fast bacilli seen    Body Fluid Culture - Body  Fluid, Pleural Cavity [[id number]] Collected:  11/01/19 0202    Lab Status:  Preliminary result Specimen:  Body Fluid from Pleural Cavity Updated:  11/03/19 0812     Body Fluid Culture No growth at 2 days     Gram Stain Rare (1+) WBCs per low power field      No organisms seen    Fungus Smear - Body Fluid, Pleural Cavity [[id number]] Collected:  11/01/19 0202    Lab Status:  Final result Specimen:  Body Fluid from Pleural Cavity Updated:  11/01/19 1122     Fungal Stain No fungal elements seen    Respiratory Culture - Sputum, ET Suction [[id number]] Collected:  10/28/19 2215    Lab Status:  Final result Specimen:  Sputum from ET Suction Updated:  10/31/19 0937     Respiratory Culture Scant growth (1+) Normal Respiratory Aby     Gram Stain Many (4+) WBCs per low power field      Many (4+) Mixed bacterial morphotypes seen on Gram Stain    MRSA Screen Culture - Swab, Nares [[id number]]  (Normal) Collected:  10/28/19 1515    Lab Status:  Final result Specimen:  Swab from Nares Updated:  10/29/19 1813     MRSA SCREEN CX No Methicillin Resistant Staphylococcus aureus isolated    Respiratory Panel, PCR - Swab, Nasopharynx [[id number]]  (Abnormal) Collected:  10/28/19 1515    Lab Status:  Final result Specimen:  Swab from Nasopharynx Updated:  10/28/19 1741     ADENOVIRUS, PCR Not Detected     Coronavirus 229E Not Detected     Coronavirus HKU1 Not Detected     Coronavirus NL63 Not Detected     Coronavirus OC43 Not Detected     Human Metapneumovirus Not Detected     Human Rhinovirus/Enterovirus Detected     Influenza B PCR Not Detected     Parainfluenza Virus 1 Not Detected     Parainfluenza Virus 2 Not Detected     Parainfluenza Virus 3 Not Detected     Parainfluenza Virus 4 Not Detected     Bordetella pertussis pcr Not Detected     Influenza A H1 2009 PCR Not Detected     Chlamydophila pneumoniae PCR Not Detected     Mycoplasma pneumo by PCR Not Detected     Influenza A PCR Not Detected     Influenza A H3 Not Detected      Influenza A H1 Not Detected     RSV, PCR Not Detected    Urine Culture - Urine, Urine, Catheter [[id number]]  (Normal) Collected:  10/28/19 1212    Lab Status:  Final result Specimen:  Urine, Catheter Updated:  10/29/19 1210     Urine Culture No growth    Blood Culture - Blood, Arm, Left [[id number]]  (Abnormal) Collected:  10/28/19 1155    Lab Status:  Final result Specimen:  Blood from Arm, Left Updated:  10/31/19 1310     Blood Culture Scant growth (1+) Actinomyces species     Isolated from Anaerobic Bottle     Gram Stain Anaerobic Bottle Gram positive bacilli      Aerobic Bottle Gram positive bacilli    Narrative:       Blood culture does not meet the specified criteria for PCR identification.  If pregnant, immunocompromised, or clinical concern for meningitis, call lab to run BCID for Listeria monocytogenes.          Medication Review:       Schedule Meds    ampicillin-sulbactam 3 g Intravenous Q24H   ipratropium-albuterol 3 mL Nebulization Q4H - RT   pantoprazole 40 mg Oral Q AM   sodium chloride 10 mL Intravenous Q12H   sodium chloride 10 mL Intravenous Q12H   sodium chloride 10 mL Intravenous Q12H   sodium chloride 10 mL Intravenous Q12H       Infusion Meds    hold 1 each       PRN Meds  •  acetaminophen  •  albumin human  •  heparin (porcine)  •  hold  •  hydrOXYzine  •  influenza vaccine  •  ondansetron  •  ondansetron  •  racemic epinephrine **AND** [DISCONTINUED] sodium chloride  •  sodium chloride  •  sodium chloride  •  sodium chloride  •  sodium chloride        Assessment/Plan       Antimicrobial Therapy   1.  IV Unasyn     Day 3  2.      Day  3.      Day  4.      Day  5.      Day      Assessment     Positive blood culture for actinomyces species on admission.  Unfortunately ER performed only one set of blood culture.  This is usually not sufficient to diagnose bacteremia.  Actinomyces is a very rare cause of true bacteremia and usually associated with oral maxillary disease or intra-abdominal abscesses  or occasionally pulmonary disease.  The patient has pulmonary infiltrates but not consistent with actinomycosis.  -Repeat blood cultures are negative so far     IV drug use.  Patient was using IV heroin and IV methamphetamine     Hepatitis C infection.  Treatment naïve     Bilateral pulmonary infiltrates associated with bilateral effusion.  Most likely secondary to aspiration pneumonia     Rhabdomyolysis     Acute kidney failure.  Most likely multifactorial secondary to rhabdomyolysis and sepsis.  The patient is currently on hemodialysis     Rhinovirus infection    Diarrhea.  C. difficile colitis screen was negative    Right upper quadrant pain.  CT scan of the abdomen and pelvis was done without contrast and showed thickening of the gallbladder.  -Doppler ultrasound did not show any definitive acute cholecystitis       Plan     Continue patient on Unasyn 3 grams IV daily  Supportive care  Check for hepatitis C RNA and reflex genotype.  Pending  Labs in a.m.  Droplet isolation for rhinovirus             STEPHANE Falcon  11/03/19  6:54 PM      Note is dictated utilizing voice recognition software/Dragon

## 2019-11-03 NOTE — PROGRESS NOTES
"                                                                                                                                      Nephrology  Progress Note                                        [facility]    Patient Identification    Name: Vicente Cunha  Age: 24 y.o.  Sex: male  :  1995  MRN: [id number]      DATE OF SERVICE:  11/3/2019        Subective    better     Objective   Scheduled Meds:    ampicillin-sulbactam 3 g Intravenous Q24H   ipratropium-albuterol 3 mL Nebulization Q4H - RT   pantoprazole 40 mg Oral Q AM   sodium chloride 10 mL Intravenous Q12H   sodium chloride 10 mL Intravenous Q12H   sodium chloride 10 mL Intravenous Q12H   sodium chloride 10 mL Intravenous Q12H         Continuous Infusions:    hold 1 each       PRN Meds:•  acetaminophen  •  albumin human  •  heparin (porcine)  •  hold  •  hydrOXYzine  •  ondansetron  •  ondansetron  •  racemic epinephrine **AND** [DISCONTINUED] sodium chloride  •  sodium chloride  •  sodium chloride  •  sodium chloride  •  sodium chloride     Exam:  /91 (BP Location: Left arm, Patient Position: Sitting)   Pulse 93   Temp 98.7 °F (37.1 °C) (Oral)   Resp 18   Ht 172.7 cm (68\")   Wt 68 kg (149 lb 14.6 oz)   SpO2 99%   BMI 22.79 kg/m²     Intake/Output last 3 shifts:  I/O last 3 completed shifts:  In:  [P.O.:480; I.V.:1468; IV Piggyback:100]  Out: 2865 [Urine:315; Emesis/NG output:50; Other:2500]    Intake/Output this shift:  No intake/output data recorded.    Physical exam:  Awake    \PERTL  No JVD  Chest: decreased BS occasional ronchi  CVS Regular rate and rhythm, S1 and S2 normal  Abdomen:  Soft, non-tender, bowel sounds active   LE: 2 edema  Skin:  No rashes or lesions       Data Review:  All labs (24hrs):   Recent Results (from the past 24 hour(s))   CK    Collection Time: 19 10:57 AM   Result Value Ref Range    Creatine Kinase 601 (H) 20 - 200 U/L   Clostridium Difficile EIA - Stool, Per Rectum    Collection " Time: 11/02/19  1:48 PM   Result Value Ref Range    C Diff GDH / Toxin Negative Negative   CK    Collection Time: 11/02/19  5:03 PM   Result Value Ref Range    Creatine Kinase 595 (H) 20 - 200 U/L   Magnesium    Collection Time: 11/03/19  5:35 AM   Result Value Ref Range    Magnesium 2.4 1.6 - 2.6 mg/dL   aPTT    Collection Time: 11/03/19  5:35 AM   Result Value Ref Range    PTT 25.2 24.0 - 31.0 seconds   CK    Collection Time: 11/03/19  5:35 AM   Result Value Ref Range    Creatine Kinase 357 (H) 20 - 200 U/L   Comprehensive Metabolic Panel    Collection Time: 11/03/19  5:35 AM   Result Value Ref Range    Glucose 88 65 - 99 mg/dL    BUN 44 (H) 6 - 20 mg/dL    Creatinine 5.46 (H) 0.76 - 1.27 mg/dL    Sodium 138 136 - 145 mmol/L    Potassium 3.6 3.5 - 5.2 mmol/L    Chloride 102 98 - 107 mmol/L    CO2 23.0 22.0 - 29.0 mmol/L    Calcium 8.3 (L) 8.6 - 10.5 mg/dL    Total Protein 5.7 (L) 6.0 - 8.5 g/dL    Albumin 3.10 (L) 3.50 - 5.20 g/dL    ALT (SGPT) 413 (H) 1 - 41 U/L    AST (SGOT) 67 (H) 1 - 40 U/L    Alkaline Phosphatase 57 39 - 117 U/L    Total Bilirubin 0.5 0.2 - 1.2 mg/dL    eGFR Non African Amer 13 (L) >60 mL/min/1.73    eGFR  African Amer      Globulin 2.6 gm/dL    A/G Ratio 1.2 g/dL    BUN/Creatinine Ratio 8.1 7.0 - 25.0    Anion Gap 13.0 5.0 - 15.0 mmol/L   CBC Auto Differential    Collection Time: 11/03/19  5:35 AM   Result Value Ref Range    WBC 13.50 (H) 3.40 - 10.80 10*3/mm3    RBC 3.52 (L) 4.14 - 5.80 10*6/mm3    Hemoglobin 11.0 (L) 13.0 - 17.7 g/dL    Hematocrit 31.2 (L) 37.5 - 51.0 %    MCV 88.5 79.0 - 97.0 fL    MCH 31.2 26.6 - 33.0 pg    MCHC 35.3 31.5 - 35.7 g/dL    RDW 13.7 12.3 - 15.4 %    RDW-SD 42.4 37.0 - 54.0 fl    MPV 6.6 6.0 - 12.0 fL    Platelets 146 140 - 450 10*3/mm3   Scan Slide    Collection Time: 11/03/19  5:35 AM   Result Value Ref Range    Scan Slide     Manual Differential    Collection Time: 11/03/19  5:35 AM   Result Value Ref Range    Neutrophil % 81.0 (H) 42.7 - 76.0 %     Lymphocyte % 7.0 (L) 19.6 - 45.3 %    Monocyte % 6.0 5.0 - 12.0 %    Eosinophil % 3.0 0.3 - 6.2 %    Bands %  3.0 0.0 - 5.0 %    Neutrophils Absolute 11.34 (H) 1.70 - 7.00 10*3/mm3    Lymphocytes Absolute 0.95 0.70 - 3.10 10*3/mm3    Monocytes Absolute 0.81 0.10 - 0.90 10*3/mm3    Eosinophils Absolute 0.41 (H) 0.00 - 0.40 10*3/mm3    RBC Morphology Normal Normal    WBC Morphology Normal Normal    Platelet Morphology Normal Normal          Imaging:  [unfilled]    Assessment/Plan:     Altered mental status       Acute kidney injury nonoliguric worsening kidney function due to rhabdomyolysis and ATN              -Dialysis again today and tomorrow   -cont Lasix  Rhabdomyolysis              -CPK trending down   -Mannitol and dialysis              -Alkalinization of the urine  Respiratory failure  Metabolic acidosis  Altered mental status  Hypothermia  Hyperphosphatemia  Liver injury  Hypomagnesemia    Dialysis today possible dialysis tomorrow again   Cussed with the nurse and the patient

## 2019-11-03 NOTE — PROGRESS NOTES
Pulmonary/ Critical Care/ sleep medicine progress Note        Patient Name:  Vicente Cunha    :  1995    Medical Record:  [id number]    Primary Care Physician     Gary Llanos MD    JOE Cunha is a 24 y.o. male who presented to the emergency department via EMS for evaluation of altered mental status.  EMS reported that the patient was found down in a bar and this morning where he had apparently been overnight.  By report the patient is a polysubstance abuser and was offered shelter in the barn as he is homeless.  The family stated that they saw the patient last at 9 PM last night and then found him in the morning this morning at 11 AM.  He may have received bystander CPR however this is unclear and there are no family members to verify this account.  EMS stated that when they arrived the patient had altered mental status with groaning and agitation but no purposeful responses.  EMS administered 8 mg of Narcan without improvement.  In the emergency department the patient continued to be hypoxic and minimally responsive and was intubated.  No other information is obtainable as there is no family at the bedside.  On review of documentation the patient has had other emergency department evaluations elated to heroin use    In the emergency department the patient was found to have a temperature of 90.4 °F, systolic blood pressure in the 60s.  The patient received 2 L warm IV fluid resuscitation and a bear hugger was placed.  The patient continued to be hypotensive and hypothermic and 2 more liters of warmed IV fluid were ordered.  Blood cultures and urinalysis were obtained and the patient received vancomycin and cefepime empirically.  WBCs were elevated at 30,000.  Creatinine 3.24.  Elevated LFTs.  CK elevated.  Urine drug screen was positive for methamphetamines and THC    Patient will be admitted for further evaluation and treatment of sepsis, respiratory failure, hypothermia    Subjective:  offprecision flow, now on 6 L HF; undergoing HD    Review of Systems    As above    Medical History    Past Medical History:   Diagnosis Date   • Anxiety    • Depression       Polysubstance abuse    Surgical History    History reviewed. No pertinent surgical history.     Family History    History reviewed. No pertinent family history.    Social History    Social History     Tobacco Use   • Smoking status: Unknown If Ever Smoked   Substance Use Topics   • Alcohol use: Not on file   Polysubstance abuse    Allergies    Allergies   Allergen Reactions   • Penicillins Anxiety       Medications    Scheduled Meds:    ampicillin-sulbactam 3 g Intravenous Q24H   heparin (porcine) 2,000 Units Intravenous Once   heparin (porcine) 3,000 Units Intracatheter Once   ipratropium-albuterol 3 mL Nebulization Q4H - RT   pantoprazole 40 mg Oral Q AM   sodium chloride 10 mL Intravenous Q12H   sodium chloride 10 mL Intravenous Q12H   sodium chloride 10 mL Intravenous Q12H   sodium chloride 10 mL Intravenous Q12H     Continuous Infusions:    hold 1 each     PRN Meds:.•  acetaminophen  •  albumin human  •  heparin (porcine)  •  hold  •  hydrOXYzine  •  ondansetron  •  ondansetron  •  racemic epinephrine **AND** [DISCONTINUED] sodium chloride  •  sodium chloride  •  sodium chloride  •  sodium chloride  •  sodium chloride      Physical Exam    tMax 24 hrs:  Temp (24hrs), Av.6 °F (37 °C), Min:97.8 °F (36.6 °C), Max:99.2 °F (37.3 °C)    Vitals Ranges:  Temp:  [97.8 °F (36.6 °C)-99.2 °F (37.3 °C)] 97.8 °F (36.6 °C)  Heart Rate:  [] 96  Resp:  [16-20] 18  BP: (125-173)/() 132/91  Intake and Output Last 3 Shifts:  I/O last 3 completed shifts:  In:  [P.O.:480; I.V.:1468; IV Piggyback:100]  Out: 286 [Urine:315; Emesis/NG output:50; Other:2500]    Constitutional:  NAD  Eyes:  PERRL, conjunctiva normal   HENT:  Atraumatic, external ears normal, nose normal, oral ET tube  Respiratory: coarse but clear no rales, no wheezing    Cardiovascular: Sinus tachycardia, no murmurs, no gallops, no rubs   GI:  Soft, nondistended, normal bowel sounds, Right upper quadrant tender  : Cannon catheter present  Musculoskeletal:  No edema, no cyanosis or clubbing  Integument:  Well hydrated, no rash   Neurologic: awake and alert   Psychiatric: calm    labs    Lab Results (last 24 hours)     Procedure Component Value Units Date/Time    Blood Culture - Blood, Arm, Right [[id number]] Collected:  11/01/19 1020    Specimen:  Blood from Arm, Right Updated:  11/03/19 0945     Blood Culture No growth at 2 days    CBC & Differential [[id number]] Collected:  11/03/19 0535    Specimen:  Blood Updated:  11/03/19 0837    Narrative:       The following orders were created for panel order CBC & Differential.  Procedure                               Abnormality         Status                     ---------                               -----------         ------                     CBC Auto Differential[[id number]]        Abnormal            Final result                 Please view results for these tests on the individual orders.    CBC Auto Differential [[id number]]  (Abnormal) Collected:  11/03/19 0535    Specimen:  Blood Updated:  11/03/19 0837     WBC 13.50 10*3/mm3      RBC 3.52 10*6/mm3      Hemoglobin 11.0 g/dL      Hematocrit 31.2 %      MCV 88.5 fL      MCH 31.2 pg      MCHC 35.3 g/dL      RDW 13.7 %      RDW-SD 42.4 fl      MPV 6.6 fL      Platelets 146 10*3/mm3     Scan Slide [[id number]] Collected:  11/03/19 0535    Specimen:  Blood Updated:  11/03/19 0837     Scan Slide --     Comment: See Manual Differential Results       Manual Differential [[id number]]  (Abnormal) Collected:  11/03/19 0535    Specimen:  Blood Updated:  11/03/19 0837     Neutrophil % 81.0 %      Lymphocyte % 7.0 %      Monocyte % 6.0 %      Eosinophil % 3.0 %      Bands %  3.0 %      Neutrophils Absolute 11.34 10*3/mm3      Lymphocytes Absolute 0.95 10*3/mm3      Monocytes Absolute 0.81  10*3/mm3      Eosinophils Absolute 0.41 10*3/mm3      RBC Morphology Normal     WBC Morphology Normal     Platelet Morphology Normal    Body Fluid Culture - Body Fluid, Pleural Cavity [[id number]] Collected:  11/01/19 0202    Specimen:  Body Fluid from Pleural Cavity Updated:  11/03/19 0812     Body Fluid Culture No growth at 2 days     Gram Stain Rare (1+) WBCs per low power field      No organisms seen    Clostridium Difficile EIA - Stool, Per Rectum [[id number]]  (Normal) Collected:  11/02/19 1348    Specimen:  Stool from Per Rectum Updated:  11/03/19 0714     C Diff GDH / Toxin Negative    CK [[id number]]  (Abnormal) Collected:  11/03/19 0535    Specimen:  Blood Updated:  11/03/19 0631     Creatine Kinase 357 U/L     Comprehensive Metabolic Panel [[id number]]  (Abnormal) Collected:  11/03/19 0535    Specimen:  Blood Updated:  11/03/19 0631     Glucose 88 mg/dL      BUN 44 mg/dL      Creatinine 5.46 mg/dL      Sodium 138 mmol/L      Potassium 3.6 mmol/L      Chloride 102 mmol/L      CO2 23.0 mmol/L      Calcium 8.3 mg/dL      Total Protein 5.7 g/dL      Albumin 3.10 g/dL      ALT (SGPT) 413 U/L      AST (SGOT) 67 U/L      Alkaline Phosphatase 57 U/L      Total Bilirubin 0.5 mg/dL      eGFR Non African Amer 13 mL/min/1.73      Comment: <15 Indicative of kidney failure.        eGFR   Amer --     Comment: <15 Indicative of kidney failure.        Globulin 2.6 gm/dL      A/G Ratio 1.2 g/dL      BUN/Creatinine Ratio 8.1     Anion Gap 13.0 mmol/L     Narrative:       GFR Normal >60  Chronic Kidney Disease <60  Kidney Failure <15    Magnesium [[id number]]  (Normal) Collected:  11/03/19 0535    Specimen:  Blood Updated:  11/03/19 0631     Magnesium 2.4 mg/dL     aPTT [[id number]]  (Normal) Collected:  11/03/19 0535    Specimen:  Blood Updated:  11/03/19 0611     PTT 25.2 seconds     CK [[id number]]  (Abnormal) Collected:  11/02/19 1703    Specimen:  Blood Updated:  11/02/19 1740     Creatine Kinase 595 U/L     Blood  Culture - Blood, Blood, Central Line [[id number]] Collected:  11/01/19 1342    Specimen:  Blood, Central Line Updated:  11/02/19 1445     Blood Culture No growth at 24 hours          Imaging & Other Studies    Imaging Results (last 72 hours)     Procedure Component Value Units Date/Time    XR Chest 1 View [[id number]] Collected:  10/28/19 1258     Updated:  10/28/19 1302    Narrative:       DATE OF EXAM:  10/28/2019 12:56 PM     PROCEDURE:  XR CHEST 1 VW-     INDICATIONS:  Severe Sepsis Protocol     COMPARISON:  Study of December 23, 2015     TECHNIQUE:   Single radiographic AP view of the chest was obtained.     FINDINGS:  Today's study was obtained October 28, 2019 at 12:50 PM.     The ET tube is in good position above the martha. The heart size is  normal. The peripheral lung zones are clear. The heart borders and  hemidiaphragms appear sharp except, for mild vague density in the left  lung base which may represent early atelectasis or pneumonia medially.  The upper abdomen is unremarkable. The bony structures are intact.       Impression:          1. Satisfactory placement of ET tube with tip in good position above  martha.  2. Minimal opacity left lung base medially  3. The lungs are otherwise clear  4. Mild change from study of December 23, 2015     Electronically Signed By-Everett Verdin Jr. On:10/28/2019 1:00 PM  This report was finalized on [id number] by  Everett Verdin Jr., .          Assessment    Acute hypoxic respiratory failure secondary to polysubstance abuse, now volume overlaoded  Altered mental status  Septic shock-resolved  Acute kidney injury-worsening with oliguria  Liver injury  Rhabdomyolysis, creatinine kinase 3669 on admission  Polysubstance abuse, UDS positive for methamphetamine and THC  Hypothermia due to environmental exposure  Hyperphosphatemia  ?SBO  ? cholecystitis    Plan      Self extubated 10/29/19, required BiPAP post extubation and Precision flow, now on 6L NC  S/p thoracentesis  11/1/19.    On unasyn per ID for pneumonia, cxr reviewed and dense consolidation noted, s/p thora with 1000 cc evacuated, not empyema but exudative   Cultures blood pos for actinomycetes now, and on Unasyn per ID  Now on HD and improving oxygenation,  I suspect his hypoxia is due to likely pneumonia, HD for fluid removal  CK > 18,053  Off LEVO  Mannitol given per renal 10/30  heparin gtt. 2D echocardiogram reviewed.  Enlarged RA and RV.  LE doppler negative   Worsening renal function, nephrology following  Creat 5 worsening, on HD  Shiley placed 10/30  Elevated LFTs- hep C ab pos   CT abdomen reviewed with ? SBO and GB thickening, patient with severe pain RUQ- surgery followed by surgery, having BM, soft, per surgery no intervention. RUQ pain felt to be due to hepatitis    Will recommend behavioral health evaluation and possible outpatient treatment for polysubstance abuse upon discharge    PUD prophylaxis with Protonix  DVT prophylaxis with hepatin gtt     PICC line. Will ERNESTINE  Daryl     Remains critically ill.  Critical care time 32 minutes excluding any time spent for the procedures

## 2019-11-03 NOTE — NURSING NOTE
Rounded with Dr Chavez, she decreased patients oxygen down to 3L HF. She wants patient to begin to weaning process.

## 2019-11-03 NOTE — PLAN OF CARE
Problem: Fall Risk (Adult)  Goal: Absence of Fall  Outcome: Ongoing (interventions implemented as appropriate)   11/03/19 0359   Fall Risk (Adult)   Absence of Fall making progress toward outcome       Problem: Patient Care Overview  Goal: Plan of Care Review  Outcome: Ongoing (interventions implemented as appropriate)   11/03/19 0359   Coping/Psychosocial   Plan of Care Reviewed With patient   Plan of Care Review   Progress improving   OTHER   Outcome Summary Pt stable overnight. Complaining of left sided abdominal and back pain, PRN tylenol ordered/given. Requiring 6L high flow nasal canula to keep sats greater than 92%       Problem: Skin Injury Risk (Adult)  Goal: Skin Health and Integrity  Outcome: Ongoing (interventions implemented as appropriate)   11/03/19 0359   Skin Injury Risk (Adult)   Skin Health and Integrity making progress toward outcome

## 2019-11-03 NOTE — PLAN OF CARE
Problem: Fall Risk (Adult)  Goal: Absence of Fall  Outcome: Ongoing (interventions implemented as appropriate)      Problem: Patient Care Overview  Goal: Plan of Care Review  Outcome: Ongoing (interventions implemented as appropriate)      Problem: Skin Injury Risk (Adult)  Goal: Skin Health and Integrity  Outcome: Ongoing (interventions implemented as appropriate)      Problem: Restraint, Nonbehavioral (Nonviolent)  Goal: Rationale and Justification  Outcome: Outcome(s) achieved Date Met: 11/03/19

## 2019-11-04 PROBLEM — N17.0 ATN (ACUTE TUBULAR NECROSIS): Status: ACTIVE | Noted: 2019-11-04

## 2019-11-04 PROBLEM — T68.XXXA HYPOTHERMIA: Status: RESOLVED | Noted: 2019-11-04 | Resolved: 2019-11-04

## 2019-11-04 PROBLEM — A41.9 SHOCK, SEPTIC: Status: RESOLVED | Noted: 2019-11-04 | Resolved: 2019-11-04

## 2019-11-04 PROBLEM — J96.01 ACUTE RESPIRATORY FAILURE WITH HYPOXIA (HCC): Status: RESOLVED | Noted: 2019-11-04 | Resolved: 2019-11-04

## 2019-11-04 PROBLEM — A41.9 SHOCK, SEPTIC (HCC): Status: ACTIVE | Noted: 2019-11-04

## 2019-11-04 PROBLEM — B19.20 HEPATITIS C: Status: ACTIVE | Noted: 2019-11-04

## 2019-11-04 PROBLEM — J96.01 ACUTE RESPIRATORY FAILURE WITH HYPOXIA: Status: ACTIVE | Noted: 2019-11-04

## 2019-11-04 PROBLEM — N17.9 ACUTE RENAL FAILURE (ARF) (HCC): Status: ACTIVE | Noted: 2019-11-04

## 2019-11-04 PROBLEM — R65.21 SHOCK, SEPTIC (HCC): Status: RESOLVED | Noted: 2019-11-04 | Resolved: 2019-11-04

## 2019-11-04 PROBLEM — F41.9 ANXIETY: Chronic | Status: ACTIVE | Noted: 2019-11-04

## 2019-11-04 PROBLEM — T68.XXXA HYPOTHERMIA: Status: ACTIVE | Noted: 2019-11-04

## 2019-11-04 PROBLEM — F19.10 POLYSUBSTANCE ABUSE (HCC): Chronic | Status: ACTIVE | Noted: 2019-11-04

## 2019-11-04 PROBLEM — R65.21 SHOCK, SEPTIC: Status: ACTIVE | Noted: 2019-11-04

## 2019-11-04 PROBLEM — R41.82 ALTERED MENTAL STATUS: Status: RESOLVED | Noted: 2019-10-28 | Resolved: 2019-11-04

## 2019-11-04 PROBLEM — M62.82 RHABDOMYOLYSIS: Status: ACTIVE | Noted: 2019-11-04

## 2019-11-04 LAB
ANION GAP SERPL CALCULATED.3IONS-SCNC: 12 MMOL/L (ref 5–15)
APTT PPP: 24.3 SECONDS (ref 24–31)
BACTERIA FLD CULT: NO GROWTH
BASOPHILS # BLD AUTO: 0.1 10*3/MM3 (ref 0–0.2)
BASOPHILS NFR BLD AUTO: 0.5 % (ref 0–1.5)
BUN BLD-MCNC: 30 MG/DL (ref 6–20)
BUN/CREAT SERPL: 6 (ref 7–25)
CALCIUM SPEC-SCNC: 8.7 MG/DL (ref 8.6–10.5)
CHLORIDE SERPL-SCNC: 103 MMOL/L (ref 98–107)
CK SERPL-CCNC: 239 U/L (ref 20–200)
CO2 SERPL-SCNC: 23 MMOL/L (ref 22–29)
CREAT BLD-MCNC: 4.98 MG/DL (ref 0.76–1.27)
DEPRECATED RDW RBC AUTO: 42 FL (ref 37–54)
EOSINOPHIL # BLD AUTO: 0.5 10*3/MM3 (ref 0–0.4)
EOSINOPHIL NFR BLD AUTO: 3.9 % (ref 0.3–6.2)
ERYTHROCYTE [DISTWIDTH] IN BLOOD BY AUTOMATED COUNT: 13.5 % (ref 12.3–15.4)
GFR SERPL CREATININE-BSD FRML MDRD: 14 ML/MIN/1.73
GFR SERPL CREATININE-BSD FRML MDRD: ABNORMAL ML/MIN/{1.73_M2}
GLUCOSE BLD-MCNC: 116 MG/DL (ref 65–99)
GRAM STN SPEC: NORMAL
GRAM STN SPEC: NORMAL
HCT VFR BLD AUTO: 31.2 % (ref 37.5–51)
HCV GENTYP SERPL NAA+PROBE: NORMAL
HGB BLD-MCNC: 11.2 G/DL (ref 13–17.7)
LAB AP CASE REPORT: NORMAL
LAB AP FLOW CYTOMETRY SUMMARY: NORMAL
LYMPHOCYTES # BLD AUTO: 1.8 10*3/MM3 (ref 0.7–3.1)
LYMPHOCYTES NFR BLD AUTO: 13.8 % (ref 19.6–45.3)
Lab: NORMAL
MAGNESIUM SERPL-MCNC: 2.2 MG/DL (ref 1.6–2.6)
MCH RBC QN AUTO: 31.8 PG (ref 26.6–33)
MCHC RBC AUTO-ENTMCNC: 35.9 G/DL (ref 31.5–35.7)
MCV RBC AUTO: 88.5 FL (ref 79–97)
MONOCYTES # BLD AUTO: 1.6 10*3/MM3 (ref 0.1–0.9)
MONOCYTES NFR BLD AUTO: 12.4 % (ref 5–12)
NEUTROPHILS # BLD AUTO: 8.9 10*3/MM3 (ref 1.7–7)
NEUTROPHILS NFR BLD AUTO: 69.4 % (ref 42.7–76)
NRBC BLD AUTO-RTO: 0.1 /100 WBC (ref 0–0.2)
PATH REPORT.FINAL DX SPEC: NORMAL
PATH REPORT.GROSS SPEC: NORMAL
PLATELET # BLD AUTO: 167 10*3/MM3 (ref 140–450)
PMV BLD AUTO: 6.6 FL (ref 6–12)
POTASSIUM BLD-SCNC: 3.7 MMOL/L (ref 3.5–5.2)
RBC # BLD AUTO: 3.52 10*6/MM3 (ref 4.14–5.8)
REF LAB TEST METHOD: NORMAL
SODIUM BLD-SCNC: 138 MMOL/L (ref 136–145)
WBC NRBC COR # BLD: 12.8 10*3/MM3 (ref 3.4–10.8)

## 2019-11-04 PROCEDURE — 25010000002 ONDANSETRON PER 1 MG: Performed by: NURSE PRACTITIONER

## 2019-11-04 PROCEDURE — 25010000002 HEPARIN (PORCINE) PER 1000 UNITS: Performed by: INTERNAL MEDICINE

## 2019-11-04 PROCEDURE — 94799 UNLISTED PULMONARY SVC/PX: CPT

## 2019-11-04 PROCEDURE — 85025 COMPLETE CBC W/AUTO DIFF WBC: CPT | Performed by: NURSE PRACTITIONER

## 2019-11-04 PROCEDURE — 80048 BASIC METABOLIC PNL TOTAL CA: CPT | Performed by: NURSE PRACTITIONER

## 2019-11-04 PROCEDURE — 25010000002 HYDRALAZINE PER 20 MG: Performed by: INTERNAL MEDICINE

## 2019-11-04 PROCEDURE — 5A1D70Z PERFORMANCE OF URINARY FILTRATION, INTERMITTENT, LESS THAN 6 HOURS PER DAY: ICD-10-PCS | Performed by: INTERNAL MEDICINE

## 2019-11-04 PROCEDURE — 83735 ASSAY OF MAGNESIUM: CPT | Performed by: NURSE PRACTITIONER

## 2019-11-04 PROCEDURE — 99222 1ST HOSP IP/OBS MODERATE 55: CPT | Performed by: NURSE PRACTITIONER

## 2019-11-04 PROCEDURE — 82550 ASSAY OF CK (CPK): CPT | Performed by: INTERNAL MEDICINE

## 2019-11-04 PROCEDURE — 92526 ORAL FUNCTION THERAPY: CPT

## 2019-11-04 PROCEDURE — 25010000003 AMPICILLIN-SULBACTAM PER 1.5 G: Performed by: INTERNAL MEDICINE

## 2019-11-04 PROCEDURE — 85730 THROMBOPLASTIN TIME PARTIAL: CPT | Performed by: NURSE PRACTITIONER

## 2019-11-04 RX ORDER — HEPARIN SODIUM 5000 [USP'U]/ML
5000 INJECTION, SOLUTION INTRAVENOUS; SUBCUTANEOUS EVERY 8 HOURS SCHEDULED
Status: DISCONTINUED | OUTPATIENT
Start: 2019-11-04 | End: 2019-11-09 | Stop reason: HOSPADM

## 2019-11-04 RX ORDER — HYDRALAZINE HYDROCHLORIDE 20 MG/ML
10 INJECTION INTRAMUSCULAR; INTRAVENOUS EVERY 6 HOURS PRN
Status: DISCONTINUED | OUTPATIENT
Start: 2019-11-04 | End: 2019-11-09 | Stop reason: HOSPADM

## 2019-11-04 RX ADMIN — Medication 10 ML: at 20:36

## 2019-11-04 RX ADMIN — IPRATROPIUM BROMIDE AND ALBUTEROL SULFATE 3 ML: .5; 3 SOLUTION RESPIRATORY (INHALATION) at 15:09

## 2019-11-04 RX ADMIN — IPRATROPIUM BROMIDE AND ALBUTEROL SULFATE 3 ML: .5; 3 SOLUTION RESPIRATORY (INHALATION) at 19:41

## 2019-11-04 RX ADMIN — Medication 10 ML: at 08:02

## 2019-11-04 RX ADMIN — HYDROXYZINE HYDROCHLORIDE 50 MG: 25 TABLET, FILM COATED ORAL at 06:08

## 2019-11-04 RX ADMIN — IPRATROPIUM BROMIDE AND ALBUTEROL SULFATE 3 ML: .5; 3 SOLUTION RESPIRATORY (INHALATION) at 23:55

## 2019-11-04 RX ADMIN — HEPARIN SODIUM 3000 UNITS: 1000 INJECTION INTRAVENOUS; SUBCUTANEOUS at 12:30

## 2019-11-04 RX ADMIN — PANTOPRAZOLE SODIUM 40 MG: 40 TABLET, DELAYED RELEASE ORAL at 06:08

## 2019-11-04 RX ADMIN — HYDROXYZINE HYDROCHLORIDE 50 MG: 25 TABLET, FILM COATED ORAL at 19:20

## 2019-11-04 RX ADMIN — ONDANSETRON 4 MG: 2 INJECTION INTRAMUSCULAR; INTRAVENOUS at 19:14

## 2019-11-04 RX ADMIN — HEPARIN SODIUM 5000 UNITS: 5000 INJECTION INTRAVENOUS; SUBCUTANEOUS at 21:51

## 2019-11-04 RX ADMIN — HYDROXYZINE HYDROCHLORIDE 50 MG: 25 TABLET, FILM COATED ORAL at 13:35

## 2019-11-04 RX ADMIN — ACETAMINOPHEN 650 MG: 325 TABLET ORAL at 06:08

## 2019-11-04 RX ADMIN — Medication 10 ML: at 08:03

## 2019-11-04 RX ADMIN — SODIUM CHLORIDE 3 G: 900 INJECTION INTRAVENOUS at 13:02

## 2019-11-04 RX ADMIN — HYDRALAZINE HYDROCHLORIDE 10 MG: 20 INJECTION INTRAMUSCULAR; INTRAVENOUS at 13:02

## 2019-11-04 RX ADMIN — ACETAMINOPHEN 650 MG: 325 TABLET ORAL at 19:20

## 2019-11-04 RX ADMIN — IPRATROPIUM BROMIDE AND ALBUTEROL SULFATE 3 ML: .5; 3 SOLUTION RESPIRATORY (INHALATION) at 08:26

## 2019-11-04 RX ADMIN — IPRATROPIUM BROMIDE AND ALBUTEROL SULFATE 3 ML: .5; 3 SOLUTION RESPIRATORY (INHALATION) at 12:02

## 2019-11-04 NOTE — CONSULTS
[facility] Medicine Services        Patient Name:  Vicente Cunha  YOB: 1995  MRN:  [id number]  Admit Date:  10/28/2019    Patient Care Team:  Gary Llanos MD as PCP - General            History Present Illness     Chief Complaint   Patient presents with   • Ingestion     heroin and meth use last seen 830 last night pt was found in barn.       This is a 24 y.o. male who presented to the emergency department via EMS on 10/28/2019 for evaluation of altered mental status. EMS reported that the patient was found down in a barn on 10/28/2019 where he had apparently been overnight. By report the patient is a polysubstance abuser and was offered shelter in the barn as he is homeless. The family stated that they saw the patient last at 9 PM on 10/27 and then found him the next day at 11 AM. He may have received bystander CPR however this is unclear and there are no family members to verify this account. EMS stated that when they arrived the patient had altered mental status with groaning and agitation but no purposeful responses. EMS administered 8 mg of Narcan without improvement. He was brought to the ER for further evaluation.    In the emergency department the patient continued to be hypoxic and minimally responsive and was intubated. On review of documentation the patient has had other emergency department evaluations related to heroin use. He was found to have a temperature of 90.4 °F and a systolic blood pressure in the 60s. The patient received 2 L warm IV fluid resuscitation and a bear hugger was placed. The patient continued to be hypotensive and hypothermic and 2 more liters of warmed IV fluid were ordered. Blood cultures and urinalysis were obtained and the patient received vancomycin and cefepime empirically. WBCs were elevated at 30,000. Creatinine 3.24. Elevated LFTs. CK elevated. Urine drug screen was positive for methamphetamines and THC. He was admitted to ICU  for close monitoring and further treatment.     In the ICU IV antibiotics were continued pending blood, sputum, and urine cultures secondary to elevated WBC and lactate. Levophed was started with a target MAP of >65. The patient was noted to have an enlarged R atrium and ventricle on echo and cardiology was consulted. His renal function continued to deteriorate and nephrology was consulted. He was started on STAT dialysis and mannitol due to KENA due to rhabdomyolysis and ATN. He self-extubated on 10/29/19 and placed on BiPAP. He later required AVAP. Subsequent chest x-ray revaled significant effusion and airspace disease and thoracentesis was performed with 1L exudative fluid removed and subsequent improvement in respiratory status. Infectious disease was consulted who recommended changing antibiotics to Unasyn.     The patient developed acute severe RUQ abdominal pain. CT of abdomen/pelvis showed questionable SBO and gallbladder thickening. He was noted to be positive for hepatitis C. General surgery was consulted who felt symptoms were likely related to pt hepatitis and did not require any surgical intervention at this time.     The patient's respiratory status has continued to improve and he is currently on room air with O2 sat of 96%. His renal function is slowly improving with a current BUN: 30 and creatinine: 4.98. He is receiving dialysis today (11/4). We were consulted for medical management as he will be transferred to PCU for further monitoring and treatment.       Review of Systems   Constitution: Positive for weakness and malaise/fatigue.   Gastrointestinal: Positive for abdominal pain.   All other systems reviewed and are negative.          Personal History     Past Medical History:   Diagnosis Date   • Anxiety    • Depression      History reviewed. No pertinent surgical history.  Family History   Family history unknown: Yes     Social History     Tobacco Use   • Smoking status: Unknown If Ever Smoked    • Smokeless tobacco: Never Used   Substance Use Topics   • Alcohol use: Not on file   • Drug use: Yes     Types: Methamphetamines, Marijuana     Prior to Admission medications    Medication Sig Start Date End Date Taking? Authorizing Provider   citalopram (CeleXA) 20 MG tablet Take 20 mg by mouth Daily.   Yes ProviderAshvin MD   hydrOXYzine (ATARAX) 50 MG tablet Take 50 mg by mouth Every 6 (Six) Hours As Needed for Itching, Allergies or Anxiety.   Yes ProviderAshvin MD     Allergies:    Allergies   Allergen Reactions   • Penicillins Anxiety         Objective     Vital Signs  Temp:  [97.3 °F (36.3 °C)-99.3 °F (37.4 °C)] 98.9 °F (37.2 °C)  Heart Rate:  [] 107  Resp:  [16-18] 18  BP: (110-164)/() 161/108  SpO2:  [90 %-100 %] 97 %  on  Flow (L/min):  [2] 2;   Device (Oxygen Therapy): room air  Body mass index is 20.28 kg/m².    Physical Exam   Constitutional: He appears well-developed and well-nourished.   HENT:   Head: Normocephalic and atraumatic.   Mouth/Throat: Oropharynx is clear and moist.   Eyes: Conjunctivae and EOM are normal. Pupils are equal, round, and reactive to light.   Neck: Normal range of motion. Neck supple.   Cardiovascular: Normal rate, regular rhythm, normal heart sounds and intact distal pulses.   Pulmonary/Chest: Effort normal. He has decreased breath sounds.   On room air   Abdominal: Soft. He exhibits no distension. Bowel sounds are decreased.   Musculoskeletal: Normal range of motion. He exhibits no edema.   Neurological: He is alert.   Oriented x2   Skin: Skin is warm and dry. Capillary refill takes less than 2 seconds.   Vitals reviewed.      Results Review:  I reviewed the patient's new clinical results.  I reviewed the patient's new imaging results and agree with the interpretation.  I reviewed the patient's other test results and agree with the interpretation  I personally viewed and interpreted the patient's EKG/Telemetry data  Discussed with ED  provider.    Results from last 7 days   Lab Units 11/04/19 0434 11/03/19  0535 11/02/19  0547 11/01/19  0500   WBC 10*3/mm3 12.80* 13.50* 14.60* 17.50*   HEMOGLOBIN g/dL 11.2* 11.0* 10.3* 11.0*   HEMATOCRIT % 31.2* 31.2* 29.2* 32.2*   PLATELETS 10*3/mm3 167 146 122* 111*   MONOCYTES % %  --  6.0 5.0 3.0*     Results from last 7 days   Lab Units 11/04/19 0434 11/03/19  0535 11/02/19  0547   SODIUM mmol/L 138 138 135*   POTASSIUM mmol/L 3.7 3.6 3.7   CHLORIDE mmol/L 103 102 100   CO2 mmol/L 23.0 23.0 21.0*   BUN mg/dL 30* 44* 61*   CREATININE mg/dL 4.98* 5.46* 5.27*   GLUCOSE mg/dL 116* 88 91   CALCIUM mg/dL 8.7 8.3* 8.4*     Results from last 7 days   Lab Units 11/04/19 0434 11/03/19  0535 11/02/19  0547   SODIUM mmol/L 138 138 135*   POTASSIUM mmol/L 3.7 3.6 3.7   CHLORIDE mmol/L 103 102 100   CO2 mmol/L 23.0 23.0 21.0*   BUN mg/dL 30* 44* 61*   CREATININE mg/dL 4.98* 5.46* 5.27*   CALCIUM mg/dL 8.7 8.3* 8.4*   BILIRUBIN mg/dL  --  0.5 0.5   ALK PHOS U/L  --  57 57   ALT (SGPT) U/L  --  413* 627*   AST (SGOT) U/L  --  67* 124*   GLUCOSE mg/dL 116* 88 91     Results from last 7 days   Lab Units 11/04/19 0434 11/03/19  0535 11/02/19  0547   MAGNESIUM mg/dL 2.2 2.4 2.6     Lab Results   Component Value Date    CALCIUM 8.7 11/04/2019    PHOS 7.4 (H) 10/28/2019     No results found for: HGBA1C  No results found for: CHOL, CHLPL, TRIG, HDL, LDL, LDLDIRECT  No results found for: LIPASE  Results from last 7 days   Lab Units 10/30/19  1106   PH, ARTERIAL pH units 7.362   PO2 ART mm Hg 111.7*   PCO2, ARTERIAL mm Hg 36.8   HCO3 ART mmol/L 20.9*         Microbiology Results (last 10 days)     Procedure Component Value - Date/Time    Clostridium Difficile EIA - Stool, Per Rectum [[id number]]  (Normal) Collected:  11/02/19 1348    Lab Status:  Final result Specimen:  Stool from Per Rectum Updated:  11/03/19 0714     C Diff GDH / Toxin Negative    Blood Culture - Blood, Blood, Central Line [[id number]] Collected:  11/01/19 5549     Lab Status:  Preliminary result Specimen:  Blood, Central Line Updated:  11/04/19 1345     Blood Culture No growth at 3 days    Blood Culture - Blood, Arm, Right [[id number]] Collected:  11/01/19 1020    Lab Status:  Preliminary result Specimen:  Blood from Arm, Right Updated:  11/04/19 0945     Blood Culture No growth at 3 days    AFB Culture - Body Fluid, Pleural Cavity [[id number]] Collected:  11/01/19 0202    Lab Status:  Preliminary result Specimen:  Body Fluid from Pleural Cavity Updated:  11/01/19 1036     AFB Stain No acid fast bacilli seen    Body Fluid Culture - Body Fluid, Pleural Cavity [[id number]] Collected:  11/01/19 0202    Lab Status:  Final result Specimen:  Body Fluid from Pleural Cavity Updated:  11/04/19 0706     Body Fluid Culture No growth     Gram Stain Rare (1+) WBCs per low power field      No organisms seen    Fungus Smear - Body Fluid, Pleural Cavity [[id number]] Collected:  11/01/19 0202    Lab Status:  Final result Specimen:  Body Fluid from Pleural Cavity Updated:  11/01/19 1122     Fungal Stain No fungal elements seen    Anaerobic Culture - Pleural Fluid, Pleural Cavity [[id number]] Collected:  11/01/19 0201    Lab Status:  Preliminary result Specimen:  Pleural Fluid from Pleural Cavity Updated:  11/04/19 1236     Anaerobic Culture No anaerobes isolated at 3 days    Respiratory Culture - Sputum, ET Suction [[id number]] Collected:  10/28/19 2215    Lab Status:  Final result Specimen:  Sputum from ET Suction Updated:  10/31/19 0937     Respiratory Culture Scant growth (1+) Normal Respiratory Aby     Gram Stain Many (4+) WBCs per low power field      Many (4+) Mixed bacterial morphotypes seen on Gram Stain    MRSA Screen Culture - Swab, Nares [[id number]]  (Normal) Collected:  10/28/19 1515    Lab Status:  Final result Specimen:  Swab from Nares Updated:  10/29/19 1813     MRSA SCREEN CX No Methicillin Resistant Staphylococcus aureus isolated    Respiratory Panel, PCR - Swab, Nasopharynx  [[id number]]  (Abnormal) Collected:  10/28/19 1515    Lab Status:  Final result Specimen:  Swab from Nasopharynx Updated:  10/28/19 1741     ADENOVIRUS, PCR Not Detected     Coronavirus 229E Not Detected     Coronavirus HKU1 Not Detected     Coronavirus NL63 Not Detected     Coronavirus OC43 Not Detected     Human Metapneumovirus Not Detected     Human Rhinovirus/Enterovirus Detected     Influenza B PCR Not Detected     Parainfluenza Virus 1 Not Detected     Parainfluenza Virus 2 Not Detected     Parainfluenza Virus 3 Not Detected     Parainfluenza Virus 4 Not Detected     Bordetella pertussis pcr Not Detected     Influenza A H1 2009 PCR Not Detected     Chlamydophila pneumoniae PCR Not Detected     Mycoplasma pneumo by PCR Not Detected     Influenza A PCR Not Detected     Influenza A H3 Not Detected     Influenza A H1 Not Detected     RSV, PCR Not Detected    Urine Culture - Urine, Urine, Catheter [[id number]]  (Normal) Collected:  10/28/19 1212    Lab Status:  Final result Specimen:  Urine, Catheter Updated:  10/29/19 1210     Urine Culture No growth    Blood Culture - Blood, Arm, Left [[id number]]  (Abnormal) Collected:  10/28/19 1155    Lab Status:  Final result Specimen:  Blood from Arm, Left Updated:  10/31/19 1310     Blood Culture Scant growth (1+) Actinomyces species     Isolated from Anaerobic Bottle     Gram Stain Anaerobic Bottle Gram positive bacilli      Aerobic Bottle Gram positive bacilli    Narrative:       Blood culture does not meet the specified criteria for PCR identification.  If pregnant, immunocompromised, or clinical concern for meningitis, call lab to run BCID for Listeria monocytogenes.          ECG/EMG Results (most recent)     Procedure Component Value Units Date/Time    Adult Transthoracic Echo Complete W/ Cont if Necessary Per Protocol [[id number]] Collected:  10/28/19 1814     Updated:  10/28/19 2055     BSA 1.7 m^2      BH CV ECHO ROCÍO - RVDD 3.2 cm      IVSd 1.1 cm      LVIDd 4.0 cm       LVIDs 2.8 cm      LVPWd 1.0 cm      IVS/LVPW 1.0     FS 29.8 %      EDV(Teich) 69.9 ml      ESV(Teich) 29.7 ml      EF(Teich) 57.5 %      EDV(cubed) 63.9 ml      ESV(cubed) 22.1 ml      EF(cubed) 65.4 %      LV mass(C)d 134.4 grams      LV mass(C)dI 79.0 grams/m^2      SV(Teich) 40.2 ml      SI(Teich) 23.6 ml/m^2      SV(cubed) 41.8 ml      SI(cubed) 24.6 ml/m^2      Ao root diam 3.1 cm      Ao root area 7.7 cm^2      ACS 2.3 cm      LVOT diam 1.8 cm      LVOT area 2.6 cm^2      RVOT diam 1.5 cm      RVOT area 1.8 cm^2      EDV(MOD-sp4) 82.4 ml      ESV(MOD-sp4) 38.5 ml      EF(MOD-sp4) 53.2 %      SV(MOD-sp4) 43.9 ml      SI(MOD-sp4) 25.8 ml/m^2      Ao root area (BSA corrected) 1.8     LV [place] Vol (BSA corrected) 48.4 ml/m^2      LV Sys Vol (BSA corrected) 22.6 ml/m^2      MV E max chloe 93.9 cm/sec      MV A max chloe 50.2 cm/sec      MV E/A 1.9     MV V2 max 108.9 cm/sec      MV max PG 4.7 mmHg      MV V2 mean 67.4 cm/sec      MV mean PG 2.0 mmHg      MV V2 VTI 27.5 cm      MVA(VTI) 1.5 cm^2      MV dec slope 816.8 cm/sec^2      MV dec time 0.11 sec      Ao pk chloe 134.4 cm/sec      Ao max PG 7.2 mmHg      Ao max PG (full) 2.3 mmHg      Ao V2 mean 96.0 cm/sec      Ao mean PG 4.0 mmHg      Ao mean PG (full) 1.5 mmHg      Ao V2 VTI 19.9 cm      KARI(I,A) 2.0 cm^2      KARI(I,D) 2.0 cm^2      KARI(V,A) 2.2 cm^2      KARI(V,D) 2.2 cm^2      LV V1 max PG 4.9 mmHg      LV V1 mean PG 2.5 mmHg      LV V1 max 111.2 cm/sec      LV V1 mean 73.5 cm/sec      LV V1 VTI 15.5 cm      SV(Ao) 153.0 ml      SI(Ao) 89.9 ml/m^2      SV(LVOT) 40.4 ml      SV(RVOT) 18.6 ml      SI(LVOT) 23.7 ml/m^2      PA V2 max 105.4 cm/sec      PA max PG 4.4 mmHg      PA max PG (full) 2.7 mmHg      BH CV ECHO ROCÍO - PVA(V,A) 1.1 cm^2      BH CV ECHO ROCÍO - PVA(V,D) 1.1 cm^2      RV V1 max PG 1.7 mmHg      RV V1 mean PG 0.84 mmHg      RV V1 max 65.7 cm/sec      RV V1 mean 43.2 cm/sec      RV V1 VTI 10.2 cm      TR max chloe 226.7 cm/sec      Qp/Qs 0.46     BH  CV ECHO ROCÍO - BZI_BMI 19.8 kilograms/m^2       CV ECHO ROCÍO - BSA(HAYCOCK) 1.7 m^2       CV ECHO ROCÍO - BZI_METRIC_WEIGHT 59.0 kg       CV ECHO ROCÍO - BZI_METRIC_HEIGHT 172.7 cm      EF(MOD-bp) 53.0 %      LA dimension(2D) 3.4 cm      Echo EF Estimated 60 %     Narrative:         · Estimated EF = 60%.  · Left ventricular systolic function is normal.     Indications  Respiratory failure      Technically satisfactory study.  Mitral valve is structurally normal.  Tricuspid valve is structurally normal.  Aortic valve is structurally normal.  Pulmonic valve could not be well visualized.  No evidence for mitral tricuspid or aortic regurgitation is seen by   Doppler study.  Left atrium is normal in size.  Right atrium is significantly enlarged  Left ventricle is normal in size and contractility with ejection fraction   of 60%.  Right ventricle is definitely enlarged  Atrial septum is intact.  Aorta is normal.  No pericardial effusion or intracardiac thrombus is seen.    Impression  Significant right atrium right ventricle enlargement.  No evidence for mitral tricuspid or aortic regurgitation is seen by   Doppler study.  No pericardial effusion or intracardiac thrombus is present.  Left ventricle is normal in size and contractility with ejection fraction   of 60%.  [place] no pericardial effusion or intracardiac thrombus is seen.        ECG 12 Lead [[id number]] Collected:  10/28/19 1214     Updated:  10/29/19 0808    Narrative:       HEART RATE= 119  bpm  RR Interval= 504  ms  MO Interval= 117  ms  P Horizontal Axis= 4  deg  P Front Axis= 86  deg  QRSD Interval= 128  ms  QT Interval= 369  ms  QRS Axis= 112  deg  T Wave Axis= 39  deg  - ABNORMAL ECG -  Sinus tachycardia  Ventricular premature complex  Nonspecific intraventricular conduction delay  ST elev, probable normal early repol pattern  No previous ECG available for comparison  Electronically Signed By: Antonio Lance (Thomas) 29-Oct-2019 08:08:34  Date and Time of  Study: 2019-10-28 12:14:30          Results for orders placed during the hospital encounter of 10/28/19   Duplex Venous Lower Extremity - Bilateral CAR    Narrative · Normal bilateral lower extremity venous duplex scan.          Results for orders placed during the hospital encounter of 10/28/19   Adult Transthoracic Echo Complete W/ Cont if Necessary Per Protocol    Narrative · Estimated EF = 60%.  · Left ventricular systolic function is normal.     Indications  Respiratory failure      Technically satisfactory study.  Mitral valve is structurally normal.  Tricuspid valve is structurally normal.  Aortic valve is structurally normal.  Pulmonic valve could not be well visualized.  No evidence for mitral tricuspid or aortic regurgitation is seen by   Doppler study.  Left atrium is normal in size.  Right atrium is significantly enlarged  Left ventricle is normal in size and contractility with ejection fraction   of 60%.  Right ventricle is definitely enlarged  Atrial septum is intact.  Aorta is normal.  No pericardial effusion or intracardiac thrombus is seen.    Impression  Significant right atrium right ventricle enlargement.  No evidence for mitral tricuspid or aortic regurgitation is seen by   Doppler study.  No pericardial effusion or intracardiac thrombus is present.  Left ventricle is normal in size and contractility with ejection fraction   of 60%.  [place] no pericardial effusion or intracardiac thrombus is seen.           Ct Abdomen Pelvis Without Contrast    Result Date: 11/1/2019   1. Bilateral pleural effusions are moderate in size with areas of airspace disease which may be pneumonia. 2. Gallbladder wall thickening versus pericholecystic fluid. 3. Dilated small bowel loops throughout the left upper quadrant of the abdomen with decompression of the distal small bowel. The findings are likely secondary to at least incomplete small bowel obstruction. Exact etiology and location is difficult to determine  without oral or IV contrast suspected transition point is in the left lower abdomen.  Electronically Signed ByMandeep Thompson On:11/1/2019 10:21 PM This report was finalized on [id number] by  Julius Malone    Ct Head Without Contrast    Result Date: 10/28/2019  Normal  Electronically Signed ByMandeep Thompson On:10/28/2019 9:54 PM This report was finalized on [id number] by  Julius Malone    Ct Chest Without Contrast    Result Date: 10/31/2019  Interval increase in the size of the pleural effusions and interval worsening of bilateral lower lobe and upper lobe areas of infiltrate and air bronchograms. The findings are consistent with worsening of pneumonia.  Electronically Signed ByMandeep Thompson On:10/31/2019 9:45 PM This report was finalized on [id number] by  Julius Malone    Ct Chest Without Contrast    Result Date: 10/28/2019  Bilateral lower lobe air bronchograms and dense consolidations consistent with pneumonia. Patchy areas in the posterior upper lobes are also most consistent with pneumonia  Electronically Signed ByMandeep Thompson On:10/28/2019 10:03 PM This report was finalized on [id number] by  Julius Malone    Us Gallbladder    Result Date: 11/2/2019  A small amount of sludge is present within the gallbladder lumen. Mild gallbladder wall thickening is present which is nonspecific. There is no evidence of biliary ductal dilatation. The sonographic Turner's sign was negative. Acute cholecystitis cannot be excluded.  Electronically Signed ByKary Granda On:11/2/2019 5:13 PM This report was finalized on [id number] by  Julius Moreno    Xr Chest 1 View    Result Date: 11/1/2019  1.Decreased right pleural effusion and decreased right lung opacities postthoracentesis. No definite pneumothorax is seen. 2.Persistent left pleural effusion with left mid and lower lung airspace opacities and consolidation. 3.Right arm PICC and right IJ catheter appear in expected positions.  Electronically Signed ByROBERTO CARLOS  Omari Glover MD On:11/1/2019 6:54 AM This report was finalized on [id number] by DR. Omari Glover MD.    Xr Chest 1 View    Result Date: 10/31/2019  1. Support lines and tubes in expected position. 2. Bilateral interstitial and alveolar opacities concerning for edema or infection. 3. Bilateral layering pleural effusions, right greater than left. The right-sided pleural effusion is mildly decreased from the prior exam.  Electronically Signed By-Baldemar Dailey On:10/31/2019 10:36 AM This report was finalized on [id number] by  Baldemar Dailey, .    Xr Chest 1 View    Result Date: 10/30/2019   1. Right IJ central line placement with the tip terminating at the cavoatrial junction. No visible pneumothorax.   2. Marked interval development of mid to lower lung zone dense airspace disease could reflect changes of pneumonia or sequelae of aspiration. Development of small to moderate right pleural effusion. 3. ET tube is been removed.  Electronically Signed By-Dr. Usha Guallpa MD On:10/30/2019 2:52 PM This report was finalized on [id number] by Dr. Usha Guallpa MD.    Xr Chest 1 View    Result Date: 10/29/2019  1.Endotracheal tube in good position. 2.Bibasilar consolidations, which could represent aspiration or pneumonia.  Electronically Signed By-DR. Bam Rosales MD On:10/29/2019 7:39 AM This report was finalized on [id number] by DR. Bam Rosales MD.    Xr Chest 1 View    Result Date: 10/28/2019   1. Satisfactory placement of ET tube with tip in good position above martha. 2. Minimal opacity left lung base medially 3. The lungs are otherwise clear 4. Mild change from study of December 23, 2015  Electronically Signed By-Everett Verdin Jr. On:10/28/2019 1:00 PM This report was finalized on [id number] by  Everett Verdin Jr., .    Us Renal Bilateral    Result Date: 10/29/2019  Increased renal cortical echogenicity consistent with medical renal disease. No hydronephrosis. Mild right perinephric fluid  "with incidental left pleural effusion.  Electronically Signed By-Artuor Thompson On:10/29/2019 6:47 PM This report was finalized on [id number] by  Arturo Thompson, .        Estimated Creatinine Clearance: 19.2 mL/min (A) (by C-G formula based on SCr of 4.98 mg/dL (H)).      Assessment/Plan     Active Hospital Problems    Diagnosis POA   • Acute renal failure (ARF) (CMS/HCC) [N17.9] Yes     Priority: High   • Rhabdomyolysis [M62.82] Yes     Priority: Medium   • ATN (acute tubular necrosis) (CMS/HCC) [N17.0] Yes     Priority: Medium   • Hepatitis C [B19.20] Yes     Priority: Low   • Polysubstance abuse (CMS/HCC) [F19.10] Yes   • Anxiety [F41.9] Yes       Acute hypoxic respiratory failure   -initially required mechanical ventilation  -self-extubated on 10/29/2019  -s/p thoracentesis with 1 L fluid removed  -now on room air  -continue duoneb q 4 hours   -encourage incentive spirometry     Acute Encephalopathy  -likely multifactorial  -improving    Septic shock----resolved     Hypothermia due to environmental exposure---resolved    Possible Bacteremia / Aspiration pneumonia  -Positive blood culture for actinomyces species on admission  -ID consulted; per ID:  \"ER performed only one set of blood culture.  This is usually not sufficient to diagnose bacteremia.  Actinomyces is a very rare cause of true bacteremia and usually associated with oral maxillary disease or intra-abdominal abscesses or occasionally pulmonary disease.  The patient has pulmonary infiltrates but not consistent with actinomycosis.\"  -repeat blood cultures are negative so far  -continue Unasyn per ID recommendation     KENA secondary to Rhabdomyolysis and ATN  -nephrology following  -on hemodialysis, Lasix and mannitol   -monitor BMP    Hepatitis C   -new onset, treatment naive   -needs outpatient follow up with GI    Acute RUQ pain, likely secondary to hepatitis----resolved  -general surgery signed off    Anxiety   -continue hydroxyzine     Polysubstance " abuse   -UDS positive for methamphetamine and THC on admission  -encouraged cessation  -DC plan for inpatient rehab program      VTE Prophylaxis - heparin      Disposition:    Defer ----- plan to DC to inpatient substance abuse rehab at [facility], but patient may need inpatient rehab for physical therapy and hemodialysis      Code Status - Full code.       STEPHANE Pabon [place] Hospitalist Team  11/04/19  5:25 PM

## 2019-11-04 NOTE — PROGRESS NOTES
"Adult Nutrition  Assessment/PES    Patient Name:  Vicente Cunha  YOB: 1995  MRN: [id number]  Admit Date:  10/28/2019    Assessment Date:  11/4/2019    Comments:  PO intakes improving. Agree with Regular diet.    Reason for Assessment     Row Name           Reason for Assessment    Reason For Assessment Follow up protocol    MST =2 on 10/29     Diagnosis H&P: Polysubstance abuse    Current Problems/Procedures: Found down in a barn.    Acute resp fx 2* polysubstance abuse - intubated & self-extubated same day (10/29), then on intermittent AVAPs - now on room air. S/p thoracentesis 11/1. AMS - resolved. Septic shock. Liver injury. [facility], [facility] - s/p HD 11/3 & 11/4. Polysubstance abuse, +meth & THC.  Gen sx consulted on 11/2 to evaluate for SBO & acute cholecystitis - symptoms improving & no surgery, signed off.         Nutrition/Diet History     Row Name           Nutrition/Diet History    Typical Food/Fluid Intake 10/31: Pt mainly nodding to yes or no questions. Reports he has no appetite at present. Denies recent wt loss or change in food intake. Nods \"no\" when asked if difficulty accessing food/meals/getting groceries. Nods \"Yes\" when asked if he has help with friends/family for meals and groceries    10/29: Unable to assess, pt sleeping at time of visit     Functional Status Homeless     Food Allergies No food allergies /EMR     Factors Affecting Nutritional Intake Dialysis, fatigue         Anthropometrics     Row Name         Anthropometrics    Height  172.7 cm (68\")     Weight 63 kg (138 lb 14.2 oz)    11/04/19 - noted wt trending back down, likely closer to actual wt. Previous wt possibly errors?           Admit Weight    Admit Weight  59 kg (130 lb 1.1 oz)    10/28/19           Ideal Body Weight (IBW)    Ideal Body Weight (IBW) (kg) 70 kg (154 lb)     % Ideal Body Weight 90%        Usual Body Weight (UBW)    Usual Body Weight 130 lb     % Usual Body Weight Unable to determine     Weight History " "No wt hx        Body Mass Index (BMI)    BMI (kg/m2) 21.1         Labs/Tests/Procedures/Meds     Row Name           Labs/Procedures/Meds    Lab Results Reviewed  reviewed, pertinent    Na+ 138 WNL; K+ 3.7 WNL; Glucose 116 H/88 WNL; BUN 30 H; Crt 4.98 H; Ca 8.7 L; Mg 2.2 WNL; Hgb 11.2 L; Hct 31.2 L        Medications    Pertinent Medications Reviewed  reviewed, pertinent    Unasyn         Physical Findings     Row Name           Physical Findings    Overall Physical Appearance 11/4: Pt sitting up in bed eating lunch with ST evaluating.     10/31: Completed NFPE, pt with strong muscle tone & well-nourished.    10/29: Pt sleeping at time of visit, consider NFPE as appropriate at next encounter     Gastrointestinal +BM on 11/3   (x1 day ago)     Tubes N/A     Oral/Mouth Cavity ST saw on 10/29 post-extubation - recommended regular diet with thin liquids. Last note on 11/1, continue previous recommendation     Skin Skin intact         Estimated/Assessed Needs     Row Name         Calculation Measurements    Weight Used For Calculations  59.2 kg (130 lb 8.2 oz) - using this wt as appears closer to actual wt     Height  172.7 cm (68\")        Estimated/Assessed Needs       KCAL/KG    35 Kcal/Kg (kcal) 2068 kcal->35 kcal/kg          Protein Requirements    Weight Used For Protein Calculations 59.2 kg (130 lb 8.2 oz)      Est Protein Requirement Amount (gms/kg) 1.5 gm/kg      Estimated Protein Requirements (gms/day) 88 gm PRO        Fluid Requirements    Estimated Fluid Requirements (mL/day) Urine o/p+500 mL->[facility] recs          Nutrition Prescription Ordered     Row Name           Nutrition Prescription PO    Current PO Diet Regular     Supplement N/A        Nutrition Prescription EN    Enteral Route -     Modulars -     TF Delivery Method -     Continuous TF Goal Rate (mL/hr) -     Continuous TF Current Rate (mL/hr) -     Water flush (mL)  -     Water Flush Frequency -         Evaluation of Received Nutrient/Fluid Intake     " Row Name           PO Evaluation    % PO Intake Clinical Course:    No meals documented, NPO x1 day, then diet adv to regular on 10/29. Meals ordered but no intakes recorded, unable to detail from pt. Made NPO again on 10/30 due to oxygenation requirements. Diet readvanced to Regular on 11/1.    11/4: Avg 70% x5 meals documented from 11/1-11/4. At time of visit, pt eating lunch. Has been ordering [employer] meals over the past 3 days. Pt reports he is fatigued & extremely hungry after dialysis & says his girlfriend has been bringing in food/snacks in the evening.          EN Evaluation    EN Average Volume Delivered (mL/day) -     TF Changes -     TF Residual -     TF Tolerance -               Problem/Interventions:  Problem 1     Row Name           Nutrition Diagnoses Problem 1    Problem 1 Nutrition appropriate for condition     Etiology (related to)       Signs/Symptoms (evidenced by)                Intervention Goal     Row Name           Intervention Goal    PO PO intakes >70% & improving and pt having some food brought in in addition to meals.  Encouraged po with pt and educated on importance of good nutrition.     Weight  Maintain weight         Nutrition Intervention     Row Name           Nutrition Intervention    RD/Tech Action Agree with regular diet. Electrolytes  WNL.         Nutrition Prescription     Row Name           Nutrition Prescription PO    PO Prescription Regular         Education/Evaluation     Row Name           Monitor/Evaluation    Monitor Intakes, wt for changes, skin integrity, BMs, at next encounter.           Electronically signed by:  Ruth Stovall RD  11/04/19 8:56 AM

## 2019-11-04 NOTE — THERAPY TREATMENT NOTE
"Acute Care - Speech Language Pathology   Swallow Treatment Note  [facility]     Patient Name: Vicente Cunha  : 1995  MRN: [id number]  Today's Date: 2019               Admit Date: 10/28/2019    Visit Dx:      ICD-10-CM ICD-9-CM   1. Altered mental status, unspecified altered mental status type R41.82 780.97   2. Acute respiratory failure with hypoxia (CMS/HCC) J96.01 518.81   3. Acute kidney injury (CMS/[facility]) N17.9 584.9   4. Elevated LFTs R94.5 790.6   5. Leukocytosis, unspecified type D72.829 288.60   6. Hypothermia, initial encounter T68.XXXA 991.6     Patient Active Problem List   Diagnosis   (none) - all problems resolved or deleted       Therapy Treatment  Rehabilitation Treatment Summary     Row Name 19 1400             Treatment Time/Intention    Discipline  speech language pathologist  -VR      Document Type  therapy note (daily note)  -VR      Subjective Information  complains of;fatigue Pt just completed dialysis; pt reported anxiety  -VR      Mode of Treatment  individual therapy  -VR      Care Plan Review  evaluation/treatment results reviewed  -VR      Patient Effort  good  -VR      Patient Response to Treatment  Pt was seen for a f/u meal assessment during lunch meal.  Mastication and A-P transit were WFL. After the first bite of noodles, pt exhibited  cough. Pt stated \"I need to slow down\". \"I'm hungry so I ate too fast.\"   Pt  took small bites thereafter. and no problems exhibited. Pt was educated re safe swallow precautions and Nsg was made aware of results of today's assessment and Nsg was asked to notify SLP if problems swallowing occur in the future. Rec cont. regular and thin liq. diet consistencies. Will see pt again for a meal assessment.   -VR      Recorded by [VR] Viviana Gagnon SLP 19 [id number]      Row Name 19 1400             Outcome Summary/Treatment Plan (SLP)    Daily Summary of Progress (SLP)  progress toward functional goals as expected  -VR      Plan for " Continued Treatment (SLP)  See for another f/u meal assessment.  -VR      Anticipated Dischage Disposition  other (see comments) poss. drug treatment program  -VR      Recorded by [VR] Viviana Gagnon SLP 11/04/19 1443        User Key  (r) = Recorded By, (t) = Taken By, (c) = Cosigned By    Initials Name Effective Dates Discipline    VR Viviana Gagnon SLP 06/17/19 -  SLP          Outcome Summary  Outcome Summary/Treatment Plan (SLP)  Daily Summary of Progress (SLP): progress toward functional goals as expected (11/04/19 1400 : Viviana Gagnon SLP)  Plan for Continued Treatment (SLP): See for another f/u meal assessment. (11/04/19 1400 : Viviana Gagnon SLP)  Anticipated Dischage Disposition: other (see comments)(poss. drug treatment program) (11/04/19 1400 : Viviana Gagnon SLP)      SLP GOALS     Row Name 11/04/19 1400 11/01/19 1600          Oral Nutrition/Hydration Goal 1 (SLP)    Oral Nutrition/Hydration Goal 1, SLP  The patient will improve swallow function to be able to maintain least restrictive po diet, exhibiting no complications of aspiration, adequate po intake and use of safe swallow compensations as utilized by trained staff/caregivers.   -VR  The patient will improve swallow function to be able to maintain least restrictive po diet, exhibiting no complications of aspiration, adequate po intake and use of safe swallow compensations as utilized by trained staff/caregivers.   -SM     Time Frame (Oral Nutrition/Hydration Goal 1, SLP)  by discharge  -VR  by discharge  -SM     Barriers (Oral Nutrition/Hydration Goal 1, SLP)  --  Pt has had a decline decline in respiratory status past 2 days requiring increased need for supplemental support including BiPAP and now pt on precision flow, 30 L, 80% via NC. Pt was NPO for 2 days prior however now resumed previously recommended diet of regular consistency, thin liquids. PT reported he only ate jello for breakfast this am due to poor appetite.   -SM      Progress/Outcomes (Oral Nutrition/Hydration Goal 1, SLP)  goal ongoing  -VR  goal ongoing  -SM        Oral Nutrition/Hydration Goal 2 (SLP)    Oral Nutrition/Hydration Goal 2, SLP  The patient will participate in a full meal assessment to assess ability to consume recommended diet with no complications of aspiration and/or recommendations for possible diet change as indicated  -VR  The patient will participate in a full meal assessment to assess ability to consume recommended diet with no complications of aspiration and/or recommendations for possible diet change as indicated  -SM     Time Frame (Oral Nutrition/Hydration Goal 2, SLP)  1 day;2 days  -VR  1 day;2 days  -SM     Barriers (Oral Nutrition/Hydration Goal 2, SLP)  Pt assessed during noon meal today.  Pt exhibited cough after first bite of noodles when he said he took a lg bite at a fast rate. No other difficulties exhibited when pt took smaller bites.   -VR  Pt assessed today during established noon meal. Pt ordered water and vegetable soup for lunch only. (Pt had Gatorade in room and drank this during janene). Pt able to sit up in bed at 90 degree angle. Pt self feed. Pt appropriately taking small bites, small sips, eating at slow rate of intake and clearing oral cavity before next bite. No overt s/s of difficulty noted. Pt denied difficulty during breakfast. Nurse confirms good tolerance of meals and no difficulties with medications.   -SM     Progress/Outcomes (Oral Nutrition/Hydration Goal 2, SLP)  goal ongoing  -VR  --        Oral Nutrition/Hydration Goal (SLP)    Oral Nutrition/Hydration Goal, SLP  Pt/caregiver teaching with further goals as indicated  -VR  Pt/caregiver teaching with further goals as indicated  -SM     Time Frame (Oral Nutrition/Hydration Goal, SLP)  by discharge  -VR  by discharge  -SM     Barriers (Oral Nutrition/Hydration Goal, SLP)  No caregivers in room; teaching completed w/pt only.  -VR  No caregivers in room today. Teaching  conducted with pt. He expressed understanding and in agreement w/plan  -SM     Progress/Outcomes (Oral Nutrition/Hydration Goal, SLP)  goal partially met  -VR  --       User Key  (r) = Recorded By, (t) = Taken By, (c) = Cosigned By    Initials Name Provider Type    Margi Molina, SLP Speech and Language Pathologist    Viviana Díaz SLP Speech and Language Pathologist          EDUCATION  The patient has been educated in the following areas:   Dysphagia (Swallowing Impairment).    SLP Recommendation and Plan  Daily Summary of Progress (SLP): progress toward functional goals as expected     Plan for Continued Treatment (SLP): See for another f/u meal assessment.  Anticipated Dischage Disposition: other (see comments)(poss. drug treatment program)                    Time Calculation:       Therapy Charges for Today     Code Description Service Date Service Provider Modifiers Qty    [id number] HC ST TREATMENT SWALLOW 4 11/4/2019 Viviana Gagnon, RIO GN 1                 RIO Alvarez  11/4/2019

## 2019-11-04 NOTE — PAYOR COMM NOTE
"RE:  [id number]    UTILIZATION REVIEW  RETURN CONTACT:  DANISH PEÑA RN [facility]  PH:   FAX: [phone]    [facility]  NPI# [id number]  TAX ID # [id number]    CONTINUED STAY REVIEW -  NO MD NOTE YET FOR TODAY   REMAINS IN ICU   DX: RESP FAILURE, ACUTE RENAL FAILURE - REQUIRING DIALYSIS   VS 11/4: 98.7 110-91 135/78 95%  RA- WEANED FROM 6 L 11/3/2019  11/4- LABS: WBC 12.7 H, BUN 30 H,  CREATININE 4.98 H, EGFR 14 L,  H  11/4- MEDS: UNASYN IV, NEBS Q 4,    DC PLAN 11/1: pt has been accepted at [facility] upon discharge though pt may not be medically stable and may need rehab first . Pt is in with polysubstance abuse , aspirate PNA and rhabdo . Pt currently is on avaps and is getting dialysis    Vicente Cunha (24 y.o. Male)     Date of Birth Social Security Number Address Home Phone MRN    [id number]  [id number] [facility] [place] [place] [address] [phone] [id number]    Gnosticism Marital Status          Non-Catholic Single       Admission Date Admission Type Admitting Provider Attending Provider Department, Room/Bed    10/28/19 Emergency Olu Fuentes MD Yasin, Muhammad, MD [facility] INTENSIVE CARE UNIT, 2311/1    Discharge Date Discharge Disposition Discharge Destination                       Attending Provider:  Olu Fuentes MD    Allergies:  Penicillins    Isolation:  Droplet   Infection:  Rhinovirus  (10/28/19)   Code Status:  CPR    Ht:  172.7 cm (68\")   Wt:  63 kg (138 lb 14.2 oz)    Admission Cmt:  None   Principal Problem:  None                Active Insurance as of 10/28/2019     Primary Coverage     Payor Plan Insurance Group Employer/Plan Group    ANTH MEDICAID [facility] [id number]     Payor Plan Address Payor Plan Phone Number Payor Plan Fax Number Effective Dates    MAIL STOP:   8/1/2019 - None Entered    [address] [address]       [facility] [id number]       Subscriber Name Subscriber Birth Date Member ID       VICENTE CUNHA " 1995 [id number]                 Emergency Contacts      (Rel.) Home Phone Work Phone Mobile Phone    CONTACT, [phone]               Physician Progress Notes (last 24 hours) (Notes from 11/03/19 0744 through 11/04/19 0744)      Ashia Childress, STEPHANE at 11/03/19 5684     Attestation signed by Valarie Castro MD at 11/03/19 2006    I have reviewed the documentation above and agree.                    Infectious Diseases Progress Note      LOS: 6 days   Patient Care Team:  Gary Llanos MD as PCP - General    Chief Complaint: Feeling much better today     Subjective     The patient had no fever during the last 24 hours.  The patient remained hemodynamically stable but is currently receiving dialysis.  States that he is not having any abdominal pain and that his diarrhea is much less today.  He denies any significant shortness of breath or any other major complaints.  He is currently on 3 L of oxygen by high flow nasal cannula.          Review of Systems:   Review of Systems   Constitutional: Negative.    HENT: Negative.    Eyes: Negative.    Respiratory: Negative.    Cardiovascular: Negative.    Gastrointestinal: Positive for diarrhea.   Genitourinary: Negative.    Musculoskeletal: Negative.    Skin: Negative.    Neurological: Negative.    Hematological: Negative.    Psychiatric/Behavioral: Negative.         Objective     Vital Signs  Temp:  [97.7 °F (36.5 °C)-98.7 °F (37.1 °C)] 97.7 °F (36.5 °C)  Heart Rate:  [] 100  Resp:  [16-20] 16  BP: (125-155)/() 154/100    Physical Exam:  Physical Exam   Constitutional: He is oriented to person, place, and time. He appears well-developed and well-nourished.   HENT:   Head: Normocephalic and atraumatic.   Eyes: EOM are normal. Pupils are equal, round, and reactive to light.   Neck: Normal range of motion. Neck supple.   Cardiovascular: Normal rate, regular rhythm and normal heart sounds.   Pulmonary/Chest: Effort normal. No  respiratory distress. He has no wheezes.   Diminished in lower lobes   Abdominal: Soft. Bowel sounds are normal. He exhibits no distension and no mass. There is tenderness. There is no rebound and no guarding.   Right upper quadrant tenderness   Musculoskeletal: Normal range of motion. He exhibits no edema or deformity.   Neurological: He is alert and oriented to person, place, and time. No cranial nerve deficit.   Skin: Skin is warm. No rash noted. No erythema.   Psychiatric: He has a normal mood and affect.   Nursing note and vitals reviewed.       Results Review:    I have reviewed all clinical data, test, lab, and imaging results.     Radiology  No Radiology Exams Resulted Within Past 24 Hours    Cardiology    Laboratory  Results from last 7 days   Lab Units 11/03/19  0535   WBC 10*3/mm3 13.50*   HEMOGLOBIN g/dL 11.0*   HEMATOCRIT % 31.2*   PLATELETS 10*3/mm3 146     Results from last 7 days   Lab Units 11/03/19  0535   SODIUM mmol/L 138   POTASSIUM mmol/L 3.6   CHLORIDE mmol/L 102   CO2 mmol/L 23.0   BUN mg/dL 44*   CREATININE mg/dL 5.46*   GLUCOSE mg/dL 88   CALCIUM mg/dL 8.3*     Results from last 7 days   Lab Units 11/03/19  0535   SODIUM mmol/L 138   POTASSIUM mmol/L 3.6   CHLORIDE mmol/L 102   CO2 mmol/L 23.0   BUN mg/dL 44*   CREATININE mg/dL 5.46*   GLUCOSE mg/dL 88   CALCIUM mg/dL 8.3*     Results from last 7 days   Lab Units 11/03/19  0535   CK TOTAL U/L 357*             Microbiology   Microbiology Results (last 10 days)     Procedure Component Value - Date/Time    Clostridium Difficile EIA - Stool, Per Rectum [[id number]]  (Normal) Collected:  11/02/19 1348    Lab Status:  Final result Specimen:  Stool from Per Rectum Updated:  11/03/19 0714     C Diff GDH / Toxin Negative    Blood Culture - Blood, Blood, Central Line [[id number]] Collected:  11/01/19 1342    Lab Status:  Preliminary result Specimen:  Blood, Central Line Updated:  11/03/19 1345     Blood Culture No growth at 2 days    Blood Culture -  Blood, Arm, Right [[id number]] Collected:  11/01/19 1020    Lab Status:  Preliminary result Specimen:  Blood from Arm, Right Updated:  11/03/19 0945     Blood Culture No growth at 2 days    AFB Culture - Body Fluid, Pleural Cavity [[id number]] Collected:  11/01/19 0202    Lab Status:  Preliminary result Specimen:  Body Fluid from Pleural Cavity Updated:  11/01/19 1036     AFB Stain No acid fast bacilli seen    Body Fluid Culture - Body Fluid, Pleural Cavity [[id number]] Collected:  11/01/19 0202    Lab Status:  Preliminary result Specimen:  Body Fluid from Pleural Cavity Updated:  11/03/19 0812     Body Fluid Culture No growth at 2 days     Gram Stain Rare (1+) WBCs per low power field      No organisms seen    Fungus Smear - Body Fluid, Pleural Cavity [[id number]] Collected:  11/01/19 0202    Lab Status:  Final result Specimen:  Body Fluid from Pleural Cavity Updated:  11/01/19 1122     Fungal Stain No fungal elements seen    Respiratory Culture - Sputum, ET Suction [[id number]] Collected:  10/28/19 2215    Lab Status:  Final result Specimen:  Sputum from ET Suction Updated:  10/31/19 0937     Respiratory Culture Scant growth (1+) Normal Respiratory Aby     Gram Stain Many (4+) WBCs per low power field      Many (4+) Mixed bacterial morphotypes seen on Gram Stain    MRSA Screen Culture - Swab, Nares [[id number]]  (Normal) Collected:  10/28/19 1515    Lab Status:  Final result Specimen:  Swab from Nares Updated:  10/29/19 1813     MRSA SCREEN CX No Methicillin Resistant Staphylococcus aureus isolated    Respiratory Panel, PCR - Swab, Nasopharynx [[id number]]  (Abnormal) Collected:  10/28/19 1515    Lab Status:  Final result Specimen:  Swab from Nasopharynx Updated:  10/28/19 1741     ADENOVIRUS, PCR Not Detected     Coronavirus 229E Not Detected     Coronavirus HKU1 Not Detected     Coronavirus NL63 Not Detected     Coronavirus OC43 Not Detected     Human Metapneumovirus Not Detected     Human Rhinovirus/Enterovirus  Detected     Influenza B PCR Not Detected     Parainfluenza Virus 1 Not Detected     Parainfluenza Virus 2 Not Detected     Parainfluenza Virus 3 Not Detected     Parainfluenza Virus 4 Not Detected     Bordetella pertussis pcr Not Detected     Influenza A H1 2009 PCR Not Detected     Chlamydophila pneumoniae PCR Not Detected     Mycoplasma pneumo by PCR Not Detected     Influenza A PCR Not Detected     Influenza A H3 Not Detected     Influenza A H1 Not Detected     RSV, PCR Not Detected    Urine Culture - Urine, Urine, Catheter [[id number]]  (Normal) Collected:  10/28/19 1212    Lab Status:  Final result Specimen:  Urine, Catheter Updated:  10/29/19 1210     Urine Culture No growth    Blood Culture - Blood, Arm, Left [[id number]]  (Abnormal) Collected:  10/28/19 1155    Lab Status:  Final result Specimen:  Blood from Arm, Left Updated:  10/31/19 1310     Blood Culture Scant growth (1+) Actinomyces species     Isolated from Anaerobic Bottle     Gram Stain Anaerobic Bottle Gram positive bacilli      Aerobic Bottle Gram positive bacilli    Narrative:       Blood culture does not meet the specified criteria for PCR identification.  If pregnant, immunocompromised, or clinical concern for meningitis, call lab to run BCID for Listeria monocytogenes.          Medication Review:       Schedule Meds    ampicillin-sulbactam 3 g Intravenous Q24H   ipratropium-albuterol 3 mL Nebulization Q4H - RT   pantoprazole 40 mg Oral Q AM   sodium chloride 10 mL Intravenous Q12H   sodium chloride 10 mL Intravenous Q12H   sodium chloride 10 mL Intravenous Q12H   sodium chloride 10 mL Intravenous Q12H       Infusion Meds    hold 1 each       PRN Meds  •  acetaminophen  •  albumin human  •  heparin (porcine)  •  hold  •  hydrOXYzine  •  influenza vaccine  •  ondansetron  •  ondansetron  •  racemic epinephrine **AND** [DISCONTINUED] sodium chloride  •  sodium chloride  •  sodium chloride  •  sodium chloride  •  sodium  chloride        Assessment/Plan       Antimicrobial Therapy   1.  IV Unasyn     Day 3  2.      Day  3.      Day  4.      Day  5.      Day      Assessment     Positive blood culture for actinomyces species on admission.  Unfortunately ER performed only one set of blood culture.  This is usually not sufficient to diagnose bacteremia.  Actinomyces is a very rare cause of true bacteremia and usually associated with oral maxillary disease or intra-abdominal abscesses or occasionally pulmonary disease.  The patient has pulmonary infiltrates but not consistent with actinomycosis.  -Repeat blood cultures are negative so far     IV drug use.  Patient was using IV heroin and IV methamphetamine     Hepatitis C infection.  Treatment naïve     Bilateral pulmonary infiltrates associated with bilateral effusion.  Most likely secondary to aspiration pneumonia     Rhabdomyolysis     Acute kidney failure.  Most likely multifactorial secondary to rhabdomyolysis and sepsis.  The patient is currently on hemodialysis     Rhinovirus infection    Diarrhea.  C. difficile colitis screen was negative    Right upper quadrant pain.  CT scan of the abdomen and pelvis was done without contrast and showed thickening of the gallbladder.  -Doppler ultrasound did not show any definitive acute cholecystitis       Plan     Continue patient on Unasyn 3 grams IV daily  Supportive care  Check for hepatitis C RNA and reflex genotype.  Pending  Labs in a.m.  Droplet isolation for rhinovirus             STEPHANE Falcon  11/03/19  6:54 PM      Note is dictated utilizing voice recognition software/Dragon    Electronically signed by Valarie Castro MD at 11/03/19 2006     Tyrell Ty MD at 11/03/19 1332          General Surgery Progress Note     LOS: 6 days   Patient Care Team:  Gary Llanos MD as PCP - General    Subjective     Interval History:   Actually says his abdominal pain is much better than yesterday.  Tolerating food and he says  his bowel movements are more formed.  Right upper quadrant ultrasound yesterday showed gallbladder wall thickening which was expected given his overall volume status and underlying hepatitis and the gallbladder did contain some sludge.    Objective     Vital Signs  Temp:  [97.8 °F (36.6 °C)-99.2 °F (37.3 °C)] 97.8 °F (36.6 °C)  Heart Rate:  [] 95  Resp:  [16-20] 18  BP: (125-173)/() 145/97    Physical Exam   Constitutional: He appears well-developed and well-nourished.   HENT:   Head: Normocephalic and atraumatic.   Eyes: Conjunctivae are normal. No scleral icterus.   Cardiovascular: Normal rate.   Pulmonary/Chest: Effort normal.   Abdominal: Soft.   Minimal distention mild tenderness to palpation better than yesterday no peritoneal signs   Neurological: He is alert. No cranial nerve deficit.   Skin: Skin is warm and dry.   Psychiatric: He has a normal mood and affect. His behavior is normal.          Results Review:    Lab Results (last 24 hours)     Procedure Component Value Units Date/Time    Blood Culture - Blood, Arm, Right [[id number]] Collected:  11/01/19 1020    Specimen:  Blood from Arm, Right Updated:  11/03/19 0945     Blood Culture No growth at 2 days    CBC & Differential [[id number]] Collected:  11/03/19 0535    Specimen:  Blood Updated:  11/03/19 0837    Narrative:       The following orders were created for panel order CBC & Differential.  Procedure                               Abnormality         Status                     ---------                               -----------         ------                     CBC Auto Differential[[id number]]        Abnormal            Final result                 Please view results for these tests on the individual orders.    CBC Auto Differential [[id number]]  (Abnormal) Collected:  11/03/19 0535    Specimen:  Blood Updated:  11/03/19 0837     WBC 13.50 10*3/mm3      RBC 3.52 10*6/mm3      Hemoglobin 11.0 g/dL      Hematocrit 31.2 %      MCV 88.5 fL       MCH 31.2 pg      MCHC 35.3 g/dL      RDW 13.7 %      RDW-SD 42.4 fl      MPV 6.6 fL      Platelets 146 10*3/mm3     Scan Slide [[id number]] Collected:  11/03/19 0535    Specimen:  Blood Updated:  11/03/19 0837     Scan Slide --     Comment: See Manual Differential Results       Manual Differential [[id number]]  (Abnormal) Collected:  11/03/19 0535    Specimen:  Blood Updated:  11/03/19 0837     Neutrophil % 81.0 %      Lymphocyte % 7.0 %      Monocyte % 6.0 %      Eosinophil % 3.0 %      Bands %  3.0 %      Neutrophils Absolute 11.34 10*3/mm3      Lymphocytes Absolute 0.95 10*3/mm3      Monocytes Absolute 0.81 10*3/mm3      Eosinophils Absolute 0.41 10*3/mm3      RBC Morphology Normal     WBC Morphology Normal     Platelet Morphology Normal    Body Fluid Culture - Body Fluid, Pleural Cavity [[id number]] Collected:  11/01/19 0202    Specimen:  Body Fluid from Pleural Cavity Updated:  11/03/19 0812     Body Fluid Culture No growth at 2 days     Gram Stain Rare (1+) WBCs per low power field      No organisms seen    Clostridium Difficile EIA - Stool, Per Rectum [[id number]]  (Normal) Collected:  11/02/19 1348    Specimen:  Stool from Per Rectum Updated:  11/03/19 0714     C Diff GDH / Toxin Negative    CK [[id number]]  (Abnormal) Collected:  11/03/19 0535    Specimen:  Blood Updated:  11/03/19 0631     Creatine Kinase 357 U/L     Comprehensive Metabolic Panel [[id number]]  (Abnormal) Collected:  11/03/19 0535    Specimen:  Blood Updated:  11/03/19 0631     Glucose 88 mg/dL      BUN 44 mg/dL      Creatinine 5.46 mg/dL      Sodium 138 mmol/L      Potassium 3.6 mmol/L      Chloride 102 mmol/L      CO2 23.0 mmol/L      Calcium 8.3 mg/dL      Total Protein 5.7 g/dL      Albumin 3.10 g/dL      ALT (SGPT) 413 U/L      AST (SGOT) 67 U/L      Alkaline Phosphatase 57 U/L      Total Bilirubin 0.5 mg/dL      eGFR Non African Amer 13 mL/min/1.73      Comment: <15 Indicative of kidney failure.        eGFR   Amer --     Comment: <15  Indicative of kidney failure.        Globulin 2.6 gm/dL      A/G Ratio 1.2 g/dL      BUN/Creatinine Ratio 8.1     Anion Gap 13.0 mmol/L     Narrative:       GFR Normal >60  Chronic Kidney Disease <60  Kidney Failure <15    Magnesium [[id number]]  (Normal) Collected:  11/03/19 0535    Specimen:  Blood Updated:  11/03/19 0631     Magnesium 2.4 mg/dL     aPTT [[id number]]  (Normal) Collected:  11/03/19 0535    Specimen:  Blood Updated:  11/03/19 0611     PTT 25.2 seconds     CK [[id number]]  (Abnormal) Collected:  11/02/19 1703    Specimen:  Blood Updated:  11/02/19 1740     Creatine Kinase 595 U/L     Blood Culture - Blood, Blood, Central Line [[id number]] Collected:  11/01/19 1342    Specimen:  Blood, Central Line Updated:  11/02/19 1445     Blood Culture No growth at 24 hours        Imaging Results (Last 24 Hours)     Procedure Component Value Units Date/Time    US Gallbladder [[id number]] Collected:  11/02/19 1712     Updated:  11/02/19 1715    Narrative:       US GALLBLADDER-     Date of Exam: 11/2/2019 4:06 PM     Indication: Right upper quadrant tenderness and abdominal CT scan;  R41.82-Altered mental status, unspecified; J96.01-Acute respiratory  failure with hypoxia; N17.9-Acute kidney failure, unspecified;  R94.5-Abnormal results of liver function studies; D72.829-Elevated white  blood cell count, unspecified; T68.XXXA-Hypothermia, initial encounter.     Comparison: CT 11/01/2019     Technique: Transverse and sagittal ultrasound images of the right upper  quadrant were obtained. Doppler evaluation was also conducted.     FINDINGS:  Visualized portions of the head and body of the pancreas are normal.  Evaluation is limited due to surrounding bowel gas.     Liver has homogenous echogenicity and normal echotexture.  No focal  hepatic lesions.  Portal and hepatic veins are patent and have normal  flow direction and waveforms.      Gallbladder is normal in caliber. Pericholecystic fluid is present.  Sludge is present  within the gallbladder lumen. The sonographic Turner's  sign is negative. No definite stones identified. The biliary system is  nondilated.      The right kidney is normal in size with no evidence of hydronephrosis.  No suspicious focal lesions.        Impression:       A small amount of sludge is present within the gallbladder lumen. Mild  gallbladder wall thickening is present which is nonspecific. There is no  evidence of biliary ductal dilatation. The sonographic Turner's sign was  negative. Acute cholecystitis cannot be excluded.     Electronically Signed By-Joanne Granda On:11/2/2019 5:13 PM  This report was finalized on [id number] by  Joanne Granda, .         I reviewed the patient's new clinical results.    Medication Review:    Current Facility-Administered Medications:   •  acetaminophen (TYLENOL) tablet 650 mg, 650 mg, Oral, Q6H PRN, George Williamson APRN, 650 mg at 11/03/19 0533  •  albumin human 25 % IV SOLN 12.5 g, 12.5 g, Intravenous, PRN, Jn Ortega MD  •  ampicillin-sulbactam (UNASYN) 3 g in sodium chloride 0.9 % 100 mL IVPB-MBP, 3 g, Intravenous, Q24H, Valarie Castro MD, 3 g at 11/02/19 1556  •  heparin (porcine) injection 3,000 Units, 3,000 Units, Intracatheter, Once per day on Mon Wed Fri, Jn Ortega MD, 3,000 Units at 10/31/19 1415  •  Hold medication, 1 each, Does not apply, Continuous PRN, George Williamson APRN  •  hydrOXYzine (ATARAX) tablet 50 mg, 50 mg, Oral, Q6H PRN, Olu Fuentes MD, 50 mg at 11/03/19 0533  •  influenza vac split quad (FLUZONE,FLUARIX,AFLURIA) injection 0.5 mL, 0.5 mL, Intramuscular, During Hospitalization, Olu Fuentes MD  •  ipratropium-albuterol (DUO-NEB) nebulizer solution 3 mL, 3 mL, Nebulization, Q4H - RT, Day, Elodia, APRN, 3 mL at 11/03/19 1034  •  ondansetron (ZOFRAN) injection 4 mg, 4 mg, Intravenous, Q6H PRN, Brandee Batista, APRN, 4 mg at 11/01/19 2023  •  ondansetron (ZOFRAN) injection 4 mg, 4 mg, Intravenous, Q4H PRN, Elodia Dejesus APRN, 4 mg at  11/02/19 0802  •  pantoprazole (PROTONIX) EC tablet 40 mg, 40 mg, Oral, Q AM, Olu Fuentes MD, 40 mg at 11/03/19 0533  •  racemic epinephrine (RACEPINEPHRINE) nebulizer solution 0.5 mL, 0.5 mL, Nebulization, Q2H PRN, 0.5 mL at 10/29/19 0942 **AND** [DISCONTINUED] sodium chloride 0.9 % nebulizer solution 3 mL, 3 mL, Nebulization, TID - RT, Olu Fuentes MD  •  sodium chloride 0.9 % flush 10 mL, 10 mL, Intravenous, PRN, Antonio Lacne MD  •  sodium chloride 0.9 % flush 10 mL, 10 mL, Intravenous, Q12H, Day, Elodia, APRN, 10 mL at 11/03/19 0859  •  sodium chloride 0.9 % flush 10 mL, 10 mL, Intravenous, PRN, Day, Elodia, APRN  •  sodium chloride 0.9 % flush 10 mL, 10 mL, Intravenous, Q12H, Day, Elodia, APRN, 10 mL at 11/03/19 0859  •  sodium chloride 0.9 % flush 10 mL, 10 mL, Intravenous, Q12H, Day, Elodia, APRN, 10 mL at 11/03/19 0859  •  sodium chloride 0.9 % flush 10 mL, 10 mL, Intravenous, Q12H, Day, Elodia, APRN, 10 mL at 11/03/19 0859  •  sodium chloride 0.9 % flush 10 mL, 10 mL, Intravenous, PRN, Day, Elodia, APRN  •  sodium chloride 0.9 % flush 20 mL, 20 mL, Intravenous, PRN, Day, Elodia, APRN    Assessment/Plan     Active Problems:    Altered mental status  Rule out cholecystitis rule out bowel obstruction    Patient symptoms are improving.  Recommend diet as tolerated and bowel regimen as needed.  With regards the right upper quadrant abdominal pain is likely related to underlying edema and his hepatitis.  I do not recommend cholecystectomy or percutaneous cholecystostomy tube at this time.  Abdomen is flat with mild tenderness to palpation he is having bowel function I do not think this represents a bowel obstruction.    Please call if clinical change for reevaluation      Tyrell Ty MD  11/03/19  1:32 PM    Electronically signed by Tyrell Ty MD at 11/03/19 1334     Nadine Chavez MD at 11/03/19 1222          Pulmonary/ Critical Care/ sleep medicine progress Note        Patient Name:  Vicente Cunha     :  1995    Medical Record:  [id number]    Primary Care Physician     Gary Llanos MD    JOE Cunha is a 24 y.o. male who presented to the emergency department via EMS for evaluation of altered mental status.  EMS reported that the patient was found down in a bar and this morning where he had apparently been overnight.  By report the patient is a polysubstance abuser and was offered shelter in the barn as he is homeless.  The family stated that they saw the patient last at 9 PM last night and then found him in the morning this morning at 11 AM.  He may have received bystander CPR however this is unclear and there are no family members to verify this account.  EMS stated that when they arrived the patient had altered mental status with groaning and agitation but no purposeful responses.  EMS administered 8 mg of Narcan without improvement.  In the emergency department the patient continued to be hypoxic and minimally responsive and was intubated.  No other information is obtainable as there is no family at the bedside.  On review of documentation the patient has had other emergency department evaluations elated to heroin use    In the emergency department the patient was found to have a temperature of 90.4 °F, systolic blood pressure in the 60s.  The patient received 2 L warm IV fluid resuscitation and a bear hugger was placed.  The patient continued to be hypotensive and hypothermic and 2 more liters of warmed IV fluid were ordered.  Blood cultures and urinalysis were obtained and the patient received vancomycin and cefepime empirically.  WBCs were elevated at 30,000.  Creatinine 3.24.  Elevated LFTs.  CK elevated.  Urine drug screen was positive for methamphetamines and THC    Patient will be admitted for further evaluation and treatment of sepsis, respiratory failure, hypothermia    Subjective: offprecision flow, now on 6 L HF; undergoing HD    Review of Systems    As above    Medical  History    Past Medical History:   Diagnosis Date   • Anxiety    • Depression       Polysubstance abuse    Surgical History    History reviewed. No pertinent surgical history.     Family History    History reviewed. No pertinent family history.    Social History    Social History     Tobacco Use   • Smoking status: Unknown If Ever Smoked   Substance Use Topics   • Alcohol use: Not on file   Polysubstance abuse    Allergies    Allergies   Allergen Reactions   • Penicillins Anxiety       Medications    Scheduled Meds:    ampicillin-sulbactam 3 g Intravenous Q24H   heparin (porcine) 2,000 Units Intravenous Once   heparin (porcine) 3,000 Units Intracatheter Once   ipratropium-albuterol 3 mL Nebulization Q4H - RT   pantoprazole 40 mg Oral Q AM   sodium chloride 10 mL Intravenous Q12H   sodium chloride 10 mL Intravenous Q12H   sodium chloride 10 mL Intravenous Q12H   sodium chloride 10 mL Intravenous Q12H     Continuous Infusions:    hold 1 each     PRN Meds:.•  acetaminophen  •  albumin human  •  heparin (porcine)  •  hold  •  hydrOXYzine  •  ondansetron  •  ondansetron  •  racemic epinephrine **AND** [DISCONTINUED] sodium chloride  •  sodium chloride  •  sodium chloride  •  sodium chloride  •  sodium chloride      Physical Exam    tMax 24 hrs:  Temp (24hrs), Av.6 °F (37 °C), Min:97.8 °F (36.6 °C), Max:99.2 °F (37.3 °C)    Vitals Ranges:  Temp:  [97.8 °F (36.6 °C)-99.2 °F (37.3 °C)] 97.8 °F (36.6 °C)  Heart Rate:  [] 96  Resp:  [16-20] 18  BP: (125-173)/() 132/91  Intake and Output Last 3 Shifts:  I/O last 3 completed shifts:  In:  [P.O.:480; I.V.:1468; IV Piggyback:100]  Out: 286 [Urine:315; Emesis/NG output:50; Other:2500]    Constitutional:  NAD  Eyes:  PERRL, conjunctiva normal   HENT:  Atraumatic, external ears normal, nose normal, oral ET tube  Respiratory: coarse but clear no rales, no wheezing   Cardiovascular: Sinus tachycardia, no murmurs, no gallops, no rubs   GI:  Soft, nondistended,  normal bowel sounds, Right upper quadrant tender  : Cannon catheter present  Musculoskeletal:  No edema, no cyanosis or clubbing  Integument:  Well hydrated, no rash   Neurologic: awake and alert   Psychiatric: calm    labs    Lab Results (last 24 hours)     Procedure Component Value Units Date/Time    Blood Culture - Blood, Arm, Right [[id number]] Collected:  11/01/19 1020    Specimen:  Blood from Arm, Right Updated:  11/03/19 0945     Blood Culture No growth at 2 days    CBC & Differential [[id number]] Collected:  11/03/19 0535    Specimen:  Blood Updated:  11/03/19 0837    Narrative:       The following orders were created for panel order CBC & Differential.  Procedure                               Abnormality         Status                     ---------                               -----------         ------                     CBC Auto Differential[[id number]]        Abnormal            Final result                 Please view results for these tests on the individual orders.    CBC Auto Differential [[id number]]  (Abnormal) Collected:  11/03/19 0535    Specimen:  Blood Updated:  11/03/19 0837     WBC 13.50 10*3/mm3      RBC 3.52 10*6/mm3      Hemoglobin 11.0 g/dL      Hematocrit 31.2 %      MCV 88.5 fL      MCH 31.2 pg      MCHC 35.3 g/dL      RDW 13.7 %      RDW-SD 42.4 fl      MPV 6.6 fL      Platelets 146 10*3/mm3     Scan Slide [[id number]] Collected:  11/03/19 0535    Specimen:  Blood Updated:  11/03/19 0837     Scan Slide --     Comment: See Manual Differential Results       Manual Differential [[id number]]  (Abnormal) Collected:  11/03/19 0535    Specimen:  Blood Updated:  11/03/19 0837     Neutrophil % 81.0 %      Lymphocyte % 7.0 %      Monocyte % 6.0 %      Eosinophil % 3.0 %      Bands %  3.0 %      Neutrophils Absolute 11.34 10*3/mm3      Lymphocytes Absolute 0.95 10*3/mm3      Monocytes Absolute 0.81 10*3/mm3      Eosinophils Absolute 0.41 10*3/mm3      RBC Morphology Normal     WBC Morphology Normal      Platelet Morphology Normal    Body Fluid Culture - Body Fluid, Pleural Cavity [[id number]] Collected:  11/01/19 0202    Specimen:  Body Fluid from Pleural Cavity Updated:  11/03/19 0812     Body Fluid Culture No growth at 2 days     Gram Stain Rare (1+) WBCs per low power field      No organisms seen    Clostridium Difficile EIA - Stool, Per Rectum [[id number]]  (Normal) Collected:  11/02/19 1348    Specimen:  Stool from Per Rectum Updated:  11/03/19 0714     C Diff GDH / Toxin Negative    CK [[id number]]  (Abnormal) Collected:  11/03/19 0535    Specimen:  Blood Updated:  11/03/19 0631     Creatine Kinase 357 U/L     Comprehensive Metabolic Panel [[id number]]  (Abnormal) Collected:  11/03/19 0535    Specimen:  Blood Updated:  11/03/19 0631     Glucose 88 mg/dL      BUN 44 mg/dL      Creatinine 5.46 mg/dL      Sodium 138 mmol/L      Potassium 3.6 mmol/L      Chloride 102 mmol/L      CO2 23.0 mmol/L      Calcium 8.3 mg/dL      Total Protein 5.7 g/dL      Albumin 3.10 g/dL      ALT (SGPT) 413 U/L      AST (SGOT) 67 U/L      Alkaline Phosphatase 57 U/L      Total Bilirubin 0.5 mg/dL      eGFR Non African Amer 13 mL/min/1.73      Comment: <15 Indicative of kidney failure.        eGFR   Amer --     Comment: <15 Indicative of kidney failure.        Globulin 2.6 gm/dL      A/G Ratio 1.2 g/dL      BUN/Creatinine Ratio 8.1     Anion Gap 13.0 mmol/L     Narrative:       GFR Normal >60  Chronic Kidney Disease <60  Kidney Failure <15    Magnesium [[id number]]  (Normal) Collected:  11/03/19 0535    Specimen:  Blood Updated:  11/03/19 0631     Magnesium 2.4 mg/dL     aPTT [[id number]]  (Normal) Collected:  11/03/19 0535    Specimen:  Blood Updated:  11/03/19 0611     PTT 25.2 seconds     CK [[id number]]  (Abnormal) Collected:  11/02/19 1703    Specimen:  Blood Updated:  11/02/19 1740     Creatine Kinase 595 U/L     Blood Culture - Blood, Blood, Central Line [[id number]] Collected:  11/01/19 1342    Specimen:  Blood, Central  Line Updated:  11/02/19 1445     Blood Culture No growth at 24 hours          Imaging & Other Studies    Imaging Results (last 72 hours)     Procedure Component Value Units Date/Time    XR Chest 1 View [[id number]] Collected:  10/28/19 1258     Updated:  10/28/19 1302    Narrative:       DATE OF EXAM:  10/28/2019 12:56 PM     PROCEDURE:  XR CHEST 1 VW-     INDICATIONS:  Severe Sepsis Protocol     COMPARISON:  Study of December 23, 2015     TECHNIQUE:   Single radiographic AP view of the chest was obtained.     FINDINGS:  Today's study was obtained October 28, 2019 at 12:50 PM.     The ET tube is in good position above the martha. The heart size is  normal. The peripheral lung zones are clear. The heart borders and  hemidiaphragms appear sharp except, for mild vague density in the left  lung base which may represent early atelectasis or pneumonia medially.  The upper abdomen is unremarkable. The bony structures are intact.       Impression:          1. Satisfactory placement of ET tube with tip in good position above  martha.  2. Minimal opacity left lung base medially  3. The lungs are otherwise clear  4. Mild change from study of December 23, 2015     Electronically Signed By-Everett Verdin Jr. On:10/28/2019 1:00 PM  This report was finalized on [id number] by  Everett Verdin Jr., .          Assessment    Acute hypoxic respiratory failure secondary to polysubstance abuse, now volume overlaoded  Altered mental status  Septic shock-resolved  Acute kidney injury-worsening with oliguria  Liver injury  Rhabdomyolysis, creatinine kinase 3669 on admission  Polysubstance abuse, UDS positive for methamphetamine and THC  Hypothermia due to environmental exposure  Hyperphosphatemia  ?SBO  ? cholecystitis    Plan      Self extubated 10/29/19, required BiPAP post extubation and Precision flow, now on 6L NC  S/p thoracentesis 11/1/19.    On unasyn per ID for pneumonia, cxr reviewed and dense consolidation noted, s/p thora with 1000  cc evacuated, not empyema but exudative   Cultures blood pos for actinomycetes now, and on Unasyn per ID  Now on HD and improving oxygenation,  I suspect his hypoxia is due to likely pneumonia, HD for fluid removal  CK > 18,053  Off LEVO  Mannitol given per renal 10/30  heparin gtt. 2D echocardiogram reviewed.  Enlarged RA and RV.  LE doppler negative   Worsening renal function, nephrology following  Creat 5 worsening, on HD  Daryl placed 10/30  Elevated LFTs- hep C ab pos   CT abdomen reviewed with ? SBO and GB thickening, patient with severe pain RUQ- surgery followed by surgery, having BM, soft, per surgery no intervention. RUQ pain felt to be due to hepatitis    Will recommend behavioral health evaluation and possible outpatient treatment for polysubstance abuse upon discharge    PUD prophylaxis with Protonix  DVT prophylaxis with hepatin gtt     PICC line. Will DC  Daryl     Remains critically ill.  Critical care time 32 minutes excluding any time spent for the procedures      Electronically signed by Nadine Chavez MD at 19 1243     Jn Ortega MD at 19 0843                                                                                                                                                Nephrology  Progress Note                                        [facility]    Patient Identification    Name: Vicente Cunha  Age: 24 y.o.  Sex: male  :  1995  MRN: [id number]      DATE OF SERVICE:  11/3/2019        Subective    better     Objective   Scheduled Meds:    ampicillin-sulbactam 3 g Intravenous Q24H   ipratropium-albuterol 3 mL Nebulization Q4H - RT   pantoprazole 40 mg Oral Q AM   sodium chloride 10 mL Intravenous Q12H   sodium chloride 10 mL Intravenous Q12H   sodium chloride 10 mL Intravenous Q12H   sodium chloride 10 mL Intravenous Q12H         Continuous Infusions:    hold 1 each       PRN Meds:•  acetaminophen  •  albumin human  •  heparin (porcine)  •   "hold  •  hydrOXYzine  •  ondansetron  •  ondansetron  •  racemic epinephrine **AND** [DISCONTINUED] sodium chloride  •  sodium chloride  •  sodium chloride  •  sodium chloride  •  sodium chloride     Exam:  /91 (BP Location: Left arm, Patient Position: Sitting)   Pulse 93   Temp 98.7 °F (37.1 °C) (Oral)   Resp 18   Ht 172.7 cm (68\")   Wt 68 kg (149 lb 14.6 oz)   SpO2 99%   BMI 22.79 kg/m²      Intake/Output last 3 shifts:  I/O last 3 completed shifts:  In: 2048 [P.O.:480; I.V.:1468; IV Piggyback:100]  Out: 2865 [Urine:315; Emesis/NG output:50; Other:2500]    Intake/Output this shift:  No intake/output data recorded.    Physical exam:  Awake    \PERTL  No JVD  Chest: decreased BS occasional ronchi  CVS Regular rate and rhythm, S1 and S2 normal  Abdomen:  Soft, non-tender, bowel sounds active   LE: 2 edema  Skin:  No rashes or lesions       Data Review:  All labs (24hrs):   Recent Results (from the past 24 hour(s))   CK    Collection Time: 11/02/19 10:57 AM   Result Value Ref Range    Creatine Kinase 601 (H) 20 - 200 U/L   Clostridium Difficile EIA - Stool, Per Rectum    Collection Time: 11/02/19  1:48 PM   Result Value Ref Range    C Diff GDH / Toxin Negative Negative   CK    Collection Time: 11/02/19  5:03 PM   Result Value Ref Range    Creatine Kinase 595 (H) 20 - 200 U/L   Magnesium    Collection Time: 11/03/19  5:35 AM   Result Value Ref Range    Magnesium 2.4 1.6 - 2.6 mg/dL   aPTT    Collection Time: 11/03/19  5:35 AM   Result Value Ref Range    PTT 25.2 24.0 - 31.0 seconds   CK    Collection Time: 11/03/19  5:35 AM   Result Value Ref Range    Creatine Kinase 357 (H) 20 - 200 U/L   Comprehensive Metabolic Panel    Collection Time: 11/03/19  5:35 AM   Result Value Ref Range    Glucose 88 65 - 99 mg/dL    BUN 44 (H) 6 - 20 mg/dL    Creatinine 5.46 (H) 0.76 - 1.27 mg/dL    Sodium 138 136 - 145 mmol/L    Potassium 3.6 3.5 - 5.2 mmol/L    Chloride 102 98 - 107 mmol/L    CO2 23.0 22.0 - 29.0 mmol/L    " Calcium 8.3 (L) 8.6 - 10.5 mg/dL    Total Protein 5.7 (L) 6.0 - 8.5 g/dL    Albumin 3.10 (L) 3.50 - 5.20 g/dL    ALT (SGPT) 413 (H) 1 - 41 U/L    AST (SGOT) 67 (H) 1 - 40 U/L    Alkaline Phosphatase 57 39 - 117 U/L    Total Bilirubin 0.5 0.2 - 1.2 mg/dL    eGFR Non African Amer 13 (L) >60 mL/min/1.73    eGFR  African Amer      Globulin 2.6 gm/dL    A/G Ratio 1.2 g/dL    BUN/Creatinine Ratio 8.1 7.0 - 25.0    Anion Gap 13.0 5.0 - 15.0 mmol/L   CBC Auto Differential    Collection Time: 11/03/19  5:35 AM   Result Value Ref Range    WBC 13.50 (H) 3.40 - 10.80 10*3/mm3    RBC 3.52 (L) 4.14 - 5.80 10*6/mm3    Hemoglobin 11.0 (L) 13.0 - 17.7 g/dL    Hematocrit 31.2 (L) 37.5 - 51.0 %    MCV 88.5 79.0 - 97.0 fL    MCH 31.2 26.6 - 33.0 pg    MCHC 35.3 31.5 - 35.7 g/dL    RDW 13.7 12.3 - 15.4 %    RDW-SD 42.4 37.0 - 54.0 fl    MPV 6.6 6.0 - 12.0 fL    Platelets 146 140 - 450 10*3/mm3   Scan Slide    Collection Time: 11/03/19  5:35 AM   Result Value Ref Range    Scan Slide     Manual Differential    Collection Time: 11/03/19  5:35 AM   Result Value Ref Range    Neutrophil % 81.0 (H) 42.7 - 76.0 %    Lymphocyte % 7.0 (L) 19.6 - 45.3 %    Monocyte % 6.0 5.0 - 12.0 %    Eosinophil % 3.0 0.3 - 6.2 %    Bands %  3.0 0.0 - 5.0 %    Neutrophils Absolute 11.34 (H) 1.70 - 7.00 10*3/mm3    Lymphocytes Absolute 0.95 0.70 - 3.10 10*3/mm3    Monocytes Absolute 0.81 0.10 - 0.90 10*3/mm3    Eosinophils Absolute 0.41 (H) 0.00 - 0.40 10*3/mm3    RBC Morphology Normal Normal    WBC Morphology Normal Normal    Platelet Morphology Normal Normal          Imaging:  [unfilled]    Assessment/Plan:     Altered mental status       Acute kidney injury nonoliguric worsening kidney function due to rhabdomyolysis and ATN              -Dialysis again today and tomorrow   -cont Lasix  Rhabdomyolysis              -CPK trending down   -Mannitol and dialysis              -Alkalinization of the urine  Respiratory failure  Metabolic acidosis  Altered mental  status  Hypothermia  Hyperphosphatemia  Liver injury  Hypomagnesemia    Dialysis today possible dialysis tomorrow again   Cussed with the nurse and the patient      Electronically signed by Jn Ortega MD at 11/03/19 1917       Consult Notes (last 24 hours) (Notes from 11/03/19 0744 through 11/04/19 0744)     No notes of this type exist for this encounter.

## 2019-11-04 NOTE — PROGRESS NOTES
Infectious Diseases Progress Note      LOS: 7 days   Patient Care Team:  Gary Llanos MD as PCP - General    Chief Complaint: Feeling much better today     Subjective     The patient had no fever during the last 24 hours.  The patient remained hemodynamically stable but is currently receiving dialysis.  The patient denied having abdominal pain today.  The patient has no more diarrhea          Review of Systems:   Review of Systems   Constitutional: Negative.    HENT: Negative.    Eyes: Negative.    Respiratory: Negative.    Cardiovascular: Negative.    Genitourinary: Negative.    Musculoskeletal: Negative.    Skin: Negative.    Neurological: Negative.    Hematological: Negative.    Psychiatric/Behavioral: Negative.         Objective     Vital Signs  Temp:  [97.3 °F (36.3 °C)-99.3 °F (37.4 °C)] 97.3 °F (36.3 °C)  Heart Rate:  [] 85  Resp:  [16-18] 18  BP: (110-164)/() 135/78    Physical Exam:  Physical Exam   Constitutional: He is oriented to person, place, and time. He appears well-developed and well-nourished.   HENT:   Head: Normocephalic and atraumatic.   Eyes: EOM are normal. Pupils are equal, round, and reactive to light.   Neck: Normal range of motion. Neck supple.   Cardiovascular: Normal rate, regular rhythm and normal heart sounds.   Pulmonary/Chest: Effort normal. No respiratory distress. He has no wheezes.   Diminished in lower lobes   Abdominal: Soft. Bowel sounds are normal. He exhibits no distension and no mass. There is tenderness. There is no rebound and no guarding.   Right upper quadrant tenderness   Musculoskeletal: Normal range of motion. He exhibits no edema or deformity.   Neurological: He is alert and oriented to person, place, and time. No cranial nerve deficit.   Skin: Skin is warm. No rash noted. No erythema.   Psychiatric: He has a normal mood and affect.   Nursing note and vitals reviewed.       Results Review:    I have reviewed all clinical data, test, lab, and  imaging results.     Radiology  No Radiology Exams Resulted Within Past 24 Hours    Cardiology    Laboratory  Results from last 7 days   Lab Units 11/04/19  0434   WBC 10*3/mm3 12.80*   HEMOGLOBIN g/dL 11.2*   HEMATOCRIT % 31.2*   PLATELETS 10*3/mm3 167     Results from last 7 days   Lab Units 11/04/19  0434   SODIUM mmol/L 138   POTASSIUM mmol/L 3.7   CHLORIDE mmol/L 103   CO2 mmol/L 23.0   BUN mg/dL 30*   CREATININE mg/dL 4.98*   GLUCOSE mg/dL 116*   CALCIUM mg/dL 8.7     Results from last 7 days   Lab Units 11/04/19  0434   SODIUM mmol/L 138   POTASSIUM mmol/L 3.7   CHLORIDE mmol/L 103   CO2 mmol/L 23.0   BUN mg/dL 30*   CREATININE mg/dL 4.98*   GLUCOSE mg/dL 116*   CALCIUM mg/dL 8.7     Results from last 7 days   Lab Units 11/04/19  0434   CK TOTAL U/L 239*             Microbiology   Microbiology Results (last 10 days)     Procedure Component Value - Date/Time    Clostridium Difficile EIA - Stool, Per Rectum [[id number]]  (Normal) Collected:  11/02/19 1348    Lab Status:  Final result Specimen:  Stool from Per Rectum Updated:  11/03/19 0714     C Diff GDH / Toxin Negative    Blood Culture - Blood, Blood, Central Line [[id number]] Collected:  11/01/19 1342    Lab Status:  Preliminary result Specimen:  Blood, Central Line Updated:  11/03/19 1345     Blood Culture No growth at 2 days    Blood Culture - Blood, Arm, Right [[id number]] Collected:  11/01/19 1020    Lab Status:  Preliminary result Specimen:  Blood from Arm, Right Updated:  11/04/19 0945     Blood Culture No growth at 3 days    AFB Culture - Body Fluid, Pleural Cavity [[id number]] Collected:  11/01/19 0202    Lab Status:  Preliminary result Specimen:  Body Fluid from Pleural Cavity Updated:  11/01/19 1036     AFB Stain No acid fast bacilli seen    Body Fluid Culture - Body Fluid, Pleural Cavity [[id number]] Collected:  11/01/19 0202    Lab Status:  Final result Specimen:  Body Fluid from Pleural Cavity Updated:  11/04/19 0706     Body Fluid Culture No growth      Gram Stain Rare (1+) WBCs per low power field      No organisms seen    Fungus Smear - Body Fluid, Pleural Cavity [[id number]] Collected:  11/01/19 0202    Lab Status:  Final result Specimen:  Body Fluid from Pleural Cavity Updated:  11/01/19 1122     Fungal Stain No fungal elements seen    Respiratory Culture - Sputum, ET Suction [[id number]] Collected:  10/28/19 2215    Lab Status:  Final result Specimen:  Sputum from ET Suction Updated:  10/31/19 0937     Respiratory Culture Scant growth (1+) Normal Respiratory Aby     Gram Stain Many (4+) WBCs per low power field      Many (4+) Mixed bacterial morphotypes seen on Gram Stain    MRSA Screen Culture - Swab, Nares [[id number]]  (Normal) Collected:  10/28/19 1515    Lab Status:  Final result Specimen:  Swab from Nares Updated:  10/29/19 1813     MRSA SCREEN CX No Methicillin Resistant Staphylococcus aureus isolated    Respiratory Panel, PCR - Swab, Nasopharynx [[id number]]  (Abnormal) Collected:  10/28/19 1515    Lab Status:  Final result Specimen:  Swab from Nasopharynx Updated:  10/28/19 1741     ADENOVIRUS, PCR Not Detected     Coronavirus 229E Not Detected     Coronavirus HKU1 Not Detected     Coronavirus NL63 Not Detected     Coronavirus OC43 Not Detected     Human Metapneumovirus Not Detected     Human Rhinovirus/Enterovirus Detected     Influenza B PCR Not Detected     Parainfluenza Virus 1 Not Detected     Parainfluenza Virus 2 Not Detected     Parainfluenza Virus 3 Not Detected     Parainfluenza Virus 4 Not Detected     Bordetella pertussis pcr Not Detected     Influenza A H1 2009 PCR Not Detected     Chlamydophila pneumoniae PCR Not Detected     Mycoplasma pneumo by PCR Not Detected     Influenza A PCR Not Detected     Influenza A H3 Not Detected     Influenza A H1 Not Detected     RSV, PCR Not Detected    Urine Culture - Urine, Urine, Catheter [[id number]]  (Normal) Collected:  10/28/19 1212    Lab Status:  Final result Specimen:  Urine, Catheter Updated:   10/29/19 1210     Urine Culture No growth    Blood Culture - Blood, Arm, Left [[id number]]  (Abnormal) Collected:  10/28/19 1155    Lab Status:  Final result Specimen:  Blood from Arm, Left Updated:  10/31/19 1310     Blood Culture Scant growth (1+) Actinomyces species     Isolated from Anaerobic Bottle     Gram Stain Anaerobic Bottle Gram positive bacilli      Aerobic Bottle Gram positive bacilli    Narrative:       Blood culture does not meet the specified criteria for PCR identification.  If pregnant, immunocompromised, or clinical concern for meningitis, call lab to run BCID for Listeria monocytogenes.          Medication Review:       Schedule Meds    ampicillin-sulbactam 3 g Intravenous Q24H   heparin (porcine) 5,000 Units Subcutaneous Q8H   ipratropium-albuterol 3 mL Nebulization Q4H - RT   sodium chloride 10 mL Intravenous Q12H   sodium chloride 10 mL Intravenous Q12H   sodium chloride 10 mL Intravenous Q12H   sodium chloride 10 mL Intravenous Q12H       Infusion Meds       PRN Meds  •  acetaminophen  •  heparin (porcine)  •  hydrALAZINE  •  hydrOXYzine  •  influenza vaccine  •  ondansetron  •  sodium chloride  •  sodium chloride  •  sodium chloride        Assessment/Plan       Antimicrobial Therapy   1.  IV Unasyn     Day 3  2.      Day  3.      Day  4.      Day  5.      Day      Assessment     Positive blood culture for actinomyces species on admission.  Unfortunately ER performed only one set of blood culture.  This is usually not sufficient to diagnose bacteremia.  Actinomyces is a very rare cause of true bacteremia and usually associated with oral maxillary disease or intra-abdominal abscesses or occasionally pulmonary disease.  The patient has pulmonary infiltrates but not consistent with actinomycosis.  -Repeat blood cultures are negative so far     IV drug use.  Patient was using IV heroin and IV methamphetamine     Hepatitis C infection.  Treatment naïve     Bilateral pulmonary infiltrates  associated with bilateral effusion.  Most likely secondary to aspiration pneumonia     Rhabdomyolysis     Acute kidney failure.  Most likely multifactorial secondary to rhabdomyolysis and sepsis.  The patient is currently on hemodialysis     Rhinovirus infection    Diarrhea.  C. difficile colitis screen was negative    Right upper quadrant pain.  CT scan of the abdomen and pelvis was done without contrast and showed thickening of the gallbladder.  The patient is currently asymptomatic.       Plan     Continue patient on Unasyn 3 grams IV daily for a total of 2 weeks  Supportive care  Check for hepatitis C RNA and reflex genotype.  Pending  Labs in a.m.  Droplet isolation for rhinovirus.  Can transfer to [facility]           Valarie Castro MD  11/04/19  12:23 PM      Note is dictated utilizing voice recognition software/Dragon

## 2019-11-04 NOTE — PROGRESS NOTES
"                                                                                                                                      Nephrology  Progress Note                                        [facility]    Patient Identification    Name: Vicente Cunha  Age: 24 y.o.  Sex: male  :  1995  MRN: [id number]      DATE OF SERVICE:  2019        Subective    Seen on dialysis     Objective   Scheduled Meds:    ampicillin-sulbactam 3 g Intravenous Q24H   ipratropium-albuterol 3 mL Nebulization Q4H - RT   pantoprazole 40 mg Oral Q AM   sodium chloride 10 mL Intravenous Q12H   sodium chloride 10 mL Intravenous Q12H   sodium chloride 10 mL Intravenous Q12H   sodium chloride 10 mL Intravenous Q12H         Continuous Infusions:    hold 1 each       PRN Meds:•  acetaminophen  •  heparin (porcine)  •  hold  •  hydrOXYzine  •  influenza vaccine  •  ondansetron  •  ondansetron  •  racemic epinephrine **AND** [DISCONTINUED] sodium chloride  •  sodium chloride  •  sodium chloride  •  sodium chloride  •  sodium chloride     Exam:  /90   Pulse 89   Temp 98.7 °F (37.1 °C) (Oral)   Resp 16   Ht 172.7 cm (68\")   Wt 63 kg (138 lb 14.2 oz)   SpO2 92%   BMI 21.12 kg/m²     Intake/Output last 3 shifts:  I/O last 3 completed shifts:  In:  [P.O.:960; I.V.:453; IV Piggyback:100]  Out:  [Urine:95; Other:2000; Stool:1]    Intake/Output this shift:  No intake/output data recorded.    Physical exam:  Awake and  Alert  PERTL  No JVD  Chest: decreased BS occasional ronchi  CVS Regular rate and rhythm, S1 and S2 normal  Abdomen:  Soft, non-tender, bowel sounds active   LE: trace edema  Skin:  No rashes or lesions       Data Review:  All labs (24hrs):   Recent Results (from the past 24 hour(s))   CBC Auto Differential    Collection Time: 19  4:34 AM   Result Value Ref Range    WBC 12.80 (H) 3.40 - 10.80 10*3/mm3    RBC 3.52 (L) 4.14 - 5.80 10*6/mm3    Hemoglobin 11.2 (L) 13.0 - 17.7 g/dL    " Hematocrit 31.2 (L) 37.5 - 51.0 %    MCV 88.5 79.0 - 97.0 fL    MCH 31.8 26.6 - 33.0 pg    MCHC 35.9 (H) 31.5 - 35.7 g/dL    RDW 13.5 12.3 - 15.4 %    RDW-SD 42.0 37.0 - 54.0 fl    MPV 6.6 6.0 - 12.0 fL    Platelets 167 140 - 450 10*3/mm3    Neutrophil % 69.4 42.7 - 76.0 %    Lymphocyte % 13.8 (L) 19.6 - 45.3 %    Monocyte % 12.4 (H) 5.0 - 12.0 %    Eosinophil % 3.9 0.3 - 6.2 %    Basophil % 0.5 0.0 - 1.5 %    Neutrophils, Absolute 8.90 (H) 1.70 - 7.00 10*3/mm3    Lymphocytes, Absolute 1.80 0.70 - 3.10 10*3/mm3    Monocytes, Absolute 1.60 (H) 0.10 - 0.90 10*3/mm3    Eosinophils, Absolute 0.50 (H) 0.00 - 0.40 10*3/mm3    Basophils, Absolute 0.10 0.00 - 0.20 10*3/mm3    nRBC 0.1 0.0 - 0.2 /100 WBC   Basic Metabolic Panel    Collection Time: 11/04/19  4:34 AM   Result Value Ref Range    Glucose 116 (H) 65 - 99 mg/dL    BUN 30 (H) 6 - 20 mg/dL    Creatinine 4.98 (H) 0.76 - 1.27 mg/dL    Sodium 138 136 - 145 mmol/L    Potassium 3.7 3.5 - 5.2 mmol/L    Chloride 103 98 - 107 mmol/L    CO2 23.0 22.0 - 29.0 mmol/L    Calcium 8.7 8.6 - 10.5 mg/dL    eGFR  African Amer      eGFR Non African Amer 14 (L) >60 mL/min/1.73    BUN/Creatinine Ratio 6.0 (L) 7.0 - 25.0    Anion Gap 12.0 5.0 - 15.0 mmol/L   Magnesium    Collection Time: 11/04/19  4:34 AM   Result Value Ref Range    Magnesium 2.2 1.6 - 2.6 mg/dL   aPTT    Collection Time: 11/04/19  4:34 AM   Result Value Ref Range    PTT 24.3 24.0 - 31.0 seconds   CK    Collection Time: 11/04/19  4:34 AM   Result Value Ref Range    Creatine Kinase 239 (H) 20 - 200 U/L          Imaging:  [unfilled]    Assessment/Plan:     Altered mental status       Acute kidney injury nonoliguric worsening kidney function due to rhabdomyolysis and ATN              -Dialysis again today and may be tomorrow   -cont Lasix  Rhabdomyolysis              -CPK trending down   -Mannitol and dialysis              -Alkalinization of the urine  Respiratory failure  Metabolic acidosis  Altered mental  status  Hypothermia  Hyperphosphatemia  Liver injury  Hypomagnesemia    Dialysis today   Cussed with the nurse and the patient

## 2019-11-04 NOTE — PROGRESS NOTES
Pulmonary/ Critical Care/ sleep medicine progress Note        Vicente Cunha is a 24 y.o. male who presented to the emergency department via EMS for evaluation of altered mental status.  EMS reported that the patient was found down in a bar and this morning where he had apparently been overnight.  By report the patient is a polysubstance abuser and was offered shelter in the barn as he is homeless.  The family stated that they saw the patient last at 9 PM last night and then found him in the morning this morning at 11 AM.  He may have received bystander CPR however this is unclear and there are no family members to verify this account.  EMS stated that when they arrived the patient had altered mental status with groaning and agitation but no purposeful responses.  EMS administered 8 mg of Narcan without improvement.  In the emergency department the patient continued to be hypoxic and minimally responsive and was intubated.  No other information is obtainable as there is no family at the bedside.  On review of documentation the patient has had other emergency department evaluations elated to heroin use    In the emergency department the patient was found to have a temperature of 90.4 °F, systolic blood pressure in the 60s.  The patient received 2 L warm IV fluid resuscitation and a bear hugger was placed.  The patient continued to be hypotensive and hypothermic and 2 more liters of warmed IV fluid were ordered.  Blood cultures and urinalysis were obtained and the patient received vancomycin and cefepime empirically.  WBCs were elevated at 30,000.  Creatinine 3.24.  Elevated LFTs.  CK elevated.  Urine drug screen was positive for methamphetamines and THC    Patient will be admitted for further evaluation and treatment of sepsis, respiratory failure, hypothermia    Subjective: 11/3: offprecision flow, now on 6 L HF; undergoing HD  11/4: Currently on RA, No C/O CP, Receiving HD today. No N/V    Review of Systems    As  above    Allergies    Allergies   Allergen Reactions   • Penicillins Anxiety       Medications    Scheduled Meds:    ampicillin-sulbactam 3 g Intravenous Q24H   ipratropium-albuterol 3 mL Nebulization Q4H - RT   pantoprazole 40 mg Oral Q AM   sodium chloride 10 mL Intravenous Q12H   sodium chloride 10 mL Intravenous Q12H   sodium chloride 10 mL Intravenous Q12H   sodium chloride 10 mL Intravenous Q12H     Continuous Infusions:    hold 1 each     PRN Meds:.•  acetaminophen  •  heparin (porcine)  •  hold  •  hydrOXYzine  •  influenza vaccine  •  ondansetron  •  ondansetron  •  racemic epinephrine **AND** [DISCONTINUED] sodium chloride  •  sodium chloride  •  sodium chloride  •  sodium chloride  •  sodium chloride      Physical Exam    tMax 24 hrs:  Temp (24hrs), Av.5 °F (36.9 °C), Min:97.7 °F (36.5 °C), Max:99.3 °F (37.4 °C)    Vitals Ranges:  Temp:  [97.7 °F (36.5 °C)-99.3 °F (37.4 °C)] 98.7 °F (37.1 °C)  Heart Rate:  [] 89  Resp:  [16-18] 16  BP: (110-163)/() 136/90  Intake and Output Last 3 Shifts:  I/O last 3 completed shifts:  In: 1513 [P.O.:960; I.V.:453; IV Piggyback:100]  Out:  [Urine:95; Other:2000; Stool:1]    Constitutional:  Awake, No resp distress  Eyes: conjunctiva normal   HENT:  Atraumatic, external ears normal, nose normal  Respiratory: Good air entry B/L no crackles or wheezes  Cardiovascular: S1 S2 regular no murmurs, no gallops, no rubs   GI:  Soft, nondistended, normal bowel sounds  Musculoskeletal:  No edema, no cyanosis or clubbing  Integument:  Well hydrated, no rash   Neurologic: awake and alert   Psychiatric: calm    labs    Lab Results (last 24 hours)     Procedure Component Value Units Date/Time    Body Fluid Culture - Body Fluid, Pleural Cavity [[id number]] Collected:  19    Specimen:  Body Fluid from Pleural Cavity Updated:  19     Body Fluid Culture No growth     Gram Stain Rare (1+) WBCs per low power field      No organisms seen    Basic  Metabolic Panel [[id number]]  (Abnormal) Collected:  11/04/19 0434    Specimen:  Blood Updated:  11/04/19 0510     Glucose 116 mg/dL      BUN 30 mg/dL      Creatinine 4.98 mg/dL      Sodium 138 mmol/L      Potassium 3.7 mmol/L      Chloride 103 mmol/L      CO2 23.0 mmol/L      Calcium 8.7 mg/dL      eGFR   Amer --     Comment: <15 Indicative of kidney failure.        eGFR Non African Amer 14 mL/min/1.73      Comment: <15 Indicative of kidney failure.        BUN/Creatinine Ratio 6.0     Anion Gap 12.0 mmol/L     Narrative:       GFR Normal >60  Chronic Kidney Disease <60  Kidney Failure <15    Magnesium [[id number]]  (Normal) Collected:  11/04/19 0434    Specimen:  Blood Updated:  11/04/19 0510     Magnesium 2.2 mg/dL     CK [[id number]]  (Abnormal) Collected:  11/04/19 0434    Specimen:  Blood Updated:  11/04/19 0510     Creatine Kinase 239 U/L     aPTT [[id number]]  (Normal) Collected:  11/04/19 0434    Specimen:  Blood Updated:  11/04/19 0458     PTT 24.3 seconds     CBC & Differential [[id number]] Collected:  11/04/19 0434    Specimen:  Blood Updated:  11/04/19 0449    Narrative:       The following orders were created for panel order CBC & Differential.  Procedure                               Abnormality         Status                     ---------                               -----------         ------                     CBC Auto Differential[[id number]]        Abnormal            Final result                 Please view results for these tests on the individual orders.    CBC Auto Differential [[id number]]  (Abnormal) Collected:  11/04/19 0434    Specimen:  Blood Updated:  11/04/19 0449     WBC 12.80 10*3/mm3      RBC 3.52 10*6/mm3      Hemoglobin 11.2 g/dL      Hematocrit 31.2 %      MCV 88.5 fL      MCH 31.8 pg      MCHC 35.9 g/dL      RDW 13.5 %      RDW-SD 42.0 fl      MPV 6.6 fL      Platelets 167 10*3/mm3      Neutrophil % 69.4 %      Lymphocyte % 13.8 %      Monocyte % 12.4 %      Eosinophil % 3.9 %       Basophil % 0.5 %      Neutrophils, Absolute 8.90 10*3/mm3      Lymphocytes, Absolute 1.80 10*3/mm3      Monocytes, Absolute 1.60 10*3/mm3      Eosinophils, Absolute 0.50 10*3/mm3      Basophils, Absolute 0.10 10*3/mm3      nRBC 0.1 /100 WBC     POC Glucose Once [[id number]]  (Normal) Collected:  11/02/19 1051    Specimen:  Blood Updated:  11/03/19 1430     Glucose 92 mg/dL      Comment: Serial Number: [id number]:  [id number]       Blood Culture - Blood, Blood, Central Line [[id number]] Collected:  11/01/19 1342    Specimen:  Blood, Central Line Updated:  11/03/19 1345     Blood Culture No growth at 2 days    Blood Culture - Blood, Arm, Right [[id number]] Collected:  11/01/19 1020    Specimen:  Blood from Arm, Right Updated:  11/03/19 0945     Blood Culture No growth at 2 days    CBC & Differential [[id number]] Collected:  11/03/19 0535    Specimen:  Blood Updated:  11/03/19 0837    Narrative:       The following orders were created for panel order CBC & Differential.  Procedure                               Abnormality         Status                     ---------                               -----------         ------                     CBC Auto Differential[[id number]]        Abnormal            Final result                 Please view results for these tests on the individual orders.    CBC Auto Differential [[id number]]  (Abnormal) Collected:  11/03/19 0535    Specimen:  Blood Updated:  11/03/19 0837     WBC 13.50 10*3/mm3      RBC 3.52 10*6/mm3      Hemoglobin 11.0 g/dL      Hematocrit 31.2 %      MCV 88.5 fL      MCH 31.2 pg      MCHC 35.3 g/dL      RDW 13.7 %      RDW-SD 42.4 fl      MPV 6.6 fL      Platelets 146 10*3/mm3     Scan Slide [[id number]] Collected:  11/03/19 0535    Specimen:  Blood Updated:  11/03/19 0837     Scan Slide --     Comment: See Manual Differential Results       Manual Differential [[id number]]  (Abnormal) Collected:  11/03/19 0535    Specimen:  Blood Updated:  11/03/19 0837      Neutrophil % 81.0 %      Lymphocyte % 7.0 %      Monocyte % 6.0 %      Eosinophil % 3.0 %      Bands %  3.0 %      Neutrophils Absolute 11.34 10*3/mm3      Lymphocytes Absolute 0.95 10*3/mm3      Monocytes Absolute 0.81 10*3/mm3      Eosinophils Absolute 0.41 10*3/mm3      RBC Morphology Normal     WBC Morphology Normal     Platelet Morphology Normal          Imaging & Other Studies    Imaging Results (Last 72 Hours)     Procedure Component Value Units Date/Time    US Gallbladder [[id number]] Collected:  11/02/19 1712     Updated:  11/02/19 1715    Narrative:       US GALLBLADDER-     Date of Exam: 11/2/2019 4:06 PM     Indication: Right upper quadrant tenderness and abdominal CT scan;  R41.82-Altered mental status, unspecified; J96.01-Acute respiratory  failure with hypoxia; N17.9-Acute kidney failure, unspecified;  R94.5-Abnormal results of liver function studies; D72.829-Elevated white  blood cell count, unspecified; T68.XXXA-Hypothermia, initial encounter.     Comparison: CT 11/01/2019     Technique: Transverse and sagittal ultrasound images of the right upper  quadrant were obtained. Doppler evaluation was also conducted.     FINDINGS:  Visualized portions of the head and body of the pancreas are normal.  Evaluation is limited due to surrounding bowel gas.     Liver has homogenous echogenicity and normal echotexture.  No focal  hepatic lesions.  Portal and hepatic veins are patent and have normal  flow direction and waveforms.      Gallbladder is normal in caliber. Pericholecystic fluid is present.  Sludge is present within the gallbladder lumen. The sonographic Turner's  sign is negative. No definite stones identified. The biliary system is  nondilated.      The right kidney is normal in size with no evidence of hydronephrosis.  No suspicious focal lesions.        Impression:       A small amount of sludge is present within the gallbladder lumen. Mild  gallbladder wall thickening is present which is  nonspecific. There is no  evidence of biliary ductal dilatation. The sonographic Turner's sign was  negative. Acute cholecystitis cannot be excluded.     Electronically Signed By-Joanne Granda On:11/2/2019 5:13 PM  This report was finalized on [id number] by  Joanne Granda, .    CT Abdomen Pelvis Without Contrast [[id number]] Collected:  11/01/19 2213     Updated:  11/01/19 2244    Narrative:          DATE OF EXAM:  11/1/2019 10:10 PM     PROCEDURE:  CT ABDOMEN PELVIS WO CONTRAST-     INDICATIONS:  Abdominal pain and bacteremia; R41.82-Altered mental status,  unspecified; J96.01-Acute respiratory failure with hypoxia; N17.9-Acute  kidney failure, unspecified; R94.5-Abnormal results of liver function  studies; D72.829-Elevated white blood cell count, unspecified;  T68.XXXA-Hypothermia, initial encounter     COMPARISON:  No Comparisons Available     TECHNIQUE:  Routine transaxial slices were obtained through the abdomen and pelvis  without the administration of intravenous contrast. Reconstructed  coronal and sagittal images were also obtained. Automated exposure  control and iterative construction methods were used.     FINDINGS:  Moderate size bilateral pleural effusions. Bilateral basilar areas of  airspace disease with air bronchograms consistent with pneumonia.     There is fluid around the gallbladder versus gallbladder wall  thickening. There is slight fat stranding around the right kidney of  uncertain significance or etiology. The unenhanced kidneys, adrenal  glands, pancreas, liver and spleen are otherwise normal.     There are several dilated loops of small bowel in the left upper  quadrant of the abdomen transition point of the small bowel appears to  be in the lower abdomen near the pelvic inlet. The distal small bowel  loops in the colon are decompressed. There is some fluid in the deep  pelvis. There is a Cannon catheter in bladder.        Impression:          1. Bilateral pleural effusions are moderate in  "size with areas of  airspace disease which may be pneumonia.  2. Gallbladder wall thickening versus pericholecystic fluid.  3. Dilated small bowel loops throughout the left upper quadrant of the  abdomen with decompression of the distal small bowel. The findings are  likely secondary to at least incomplete small bowel obstruction. Exact  etiology and location is difficult to determine without oral or IV  contrast suspected transition point is in the left lower abdomen.     Electronically Signed By-Arturo Thompson On:11/1/2019 10:21 PM  This report was finalized on [id number] by  Arturo Thompson, .            Assessment    Acute hypoxic respiratory failure secondary to polysubstance abuse  Altered mental status - related to drug overdose. Resolved  Septic shock-resolved  Acute kidney injury-worsening with oliguria  Liver injury  Rhabdomyolysis, creatinine kinase 3669 on admission - improved  Polysubstance abuse, UDS positive for methamphetamine and THC  Hypothermia due to environmental exposure  Hyperphosphatemia    Plan    Self extubated 10/29/19. Now on RA  S/p thoracentesis 11/1/19. 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   HD per nephrology  CK improved  Off LEVO  Mannitol given per renal 10/30  2D echocardiogram reviewed.  Enlarged RA and RV.  LE doppler negative Shiley placed 10/30  Elevated LFTs- hep C ab pos   CT abdomen reviewed with ? SBO and GB thickening. Evaluated by surgery no intervention. RUQ pain felt to be due to hepatitis  Has been evaluated by behavioral health. Plan for drug rehab \"[facility]\" at D/C  D/C Protonix  DVT prophylaxis with SQ heparin    RUE PICC - Will D/C  RIJ Dialysis catheter   D/C FC  Appears stable to transfer out of ICU  "

## 2019-11-05 LAB
ALBUMIN SERPL-MCNC: 3.3 G/DL (ref 3.5–5.2)
ALBUMIN/GLOB SERPL: 1.2 G/DL
ALP SERPL-CCNC: 63 U/L (ref 39–117)
ALT SERPL W P-5'-P-CCNC: 233 U/L (ref 1–41)
ANION GAP SERPL CALCULATED.3IONS-SCNC: 11 MMOL/L (ref 5–15)
ANION GAP SERPL CALCULATED.3IONS-SCNC: 13 MMOL/L (ref 5–15)
AST SERPL-CCNC: 44 U/L (ref 1–40)
BASOPHILS # BLD AUTO: 0.1 10*3/MM3 (ref 0–0.2)
BASOPHILS NFR BLD AUTO: 0.8 % (ref 0–1.5)
BILIRUB SERPL-MCNC: 0.4 MG/DL (ref 0.2–1.2)
BUN BLD-MCNC: 24 MG/DL (ref 6–20)
BUN BLD-MCNC: 33 MG/DL (ref 6–20)
BUN/CREAT SERPL: 4.7 (ref 7–25)
BUN/CREAT SERPL: 5.2 (ref 7–25)
CALCIUM SPEC-SCNC: 8.5 MG/DL (ref 8.6–10.5)
CALCIUM SPEC-SCNC: 8.9 MG/DL (ref 8.6–10.5)
CHLORIDE SERPL-SCNC: 102 MMOL/L (ref 98–107)
CHLORIDE SERPL-SCNC: 99 MMOL/L (ref 98–107)
CO2 SERPL-SCNC: 23 MMOL/L (ref 22–29)
CO2 SERPL-SCNC: 24 MMOL/L (ref 22–29)
CREAT BLD-MCNC: 5.08 MG/DL (ref 0.76–1.27)
CREAT BLD-MCNC: 6.35 MG/DL (ref 0.76–1.27)
DEPRECATED RDW RBC AUTO: 40.3 FL (ref 37–54)
EOSINOPHIL # BLD AUTO: 0.8 10*3/MM3 (ref 0–0.4)
EOSINOPHIL NFR BLD AUTO: 6.2 % (ref 0.3–6.2)
ERYTHROCYTE [DISTWIDTH] IN BLOOD BY AUTOMATED COUNT: 13.1 % (ref 12.3–15.4)
GFR SERPL CREATININE-BSD FRML MDRD: 11 ML/MIN/1.73
GFR SERPL CREATININE-BSD FRML MDRD: 14 ML/MIN/1.73
GFR SERPL CREATININE-BSD FRML MDRD: ABNORMAL ML/MIN/{1.73_M2}
GFR SERPL CREATININE-BSD FRML MDRD: ABNORMAL ML/MIN/{1.73_M2}
GLOBULIN UR ELPH-MCNC: 2.7 GM/DL
GLUCOSE BLD-MCNC: 87 MG/DL (ref 65–99)
GLUCOSE BLD-MCNC: 96 MG/DL (ref 65–99)
GLUCOSE BLDC GLUCOMTR-MCNC: 109 MG/DL (ref 70–105)
HCT VFR BLD AUTO: 30.2 % (ref 37.5–51)
HCV RNA SERPL NAA+PROBE-ACNC: NORMAL IU/ML
HCV RNA SERPL NAA+PROBE-LOG IU: 5.55 LOG10 IU/ML
HGB BLD-MCNC: 11 G/DL (ref 13–17.7)
LYMPHOCYTES # BLD AUTO: 2.2 10*3/MM3 (ref 0.7–3.1)
LYMPHOCYTES NFR BLD AUTO: 18.1 % (ref 19.6–45.3)
MAGNESIUM SERPL-MCNC: 2 MG/DL (ref 1.6–2.6)
MCH RBC QN AUTO: 32 PG (ref 26.6–33)
MCHC RBC AUTO-ENTMCNC: 36.3 G/DL (ref 31.5–35.7)
MCV RBC AUTO: 88.2 FL (ref 79–97)
MONOCYTES # BLD AUTO: 1.2 10*3/MM3 (ref 0.1–0.9)
MONOCYTES NFR BLD AUTO: 10 % (ref 5–12)
NEUTROPHILS # BLD AUTO: 8 10*3/MM3 (ref 1.7–7)
NEUTROPHILS NFR BLD AUTO: 64.9 % (ref 42.7–76)
NRBC BLD AUTO-RTO: 0.1 /100 WBC (ref 0–0.2)
PLATELET # BLD AUTO: 202 10*3/MM3 (ref 140–450)
PMV BLD AUTO: 6.6 FL (ref 6–12)
POTASSIUM BLD-SCNC: 3.8 MMOL/L (ref 3.5–5.2)
POTASSIUM BLD-SCNC: 4 MMOL/L (ref 3.5–5.2)
PROT SERPL-MCNC: 6 G/DL (ref 6–8.5)
RBC # BLD AUTO: 3.43 10*6/MM3 (ref 4.14–5.8)
SODIUM BLD-SCNC: 135 MMOL/L (ref 136–145)
SODIUM BLD-SCNC: 137 MMOL/L (ref 136–145)
TEST INFORMATION: NORMAL
WBC NRBC COR # BLD: 12.3 10*3/MM3 (ref 3.4–10.8)

## 2019-11-05 PROCEDURE — 83735 ASSAY OF MAGNESIUM: CPT | Performed by: NURSE PRACTITIONER

## 2019-11-05 PROCEDURE — 25010000003 AMPICILLIN-SULBACTAM PER 1.5 G: Performed by: INTERNAL MEDICINE

## 2019-11-05 PROCEDURE — 25010000002 HEPARIN (PORCINE) PER 1000 UNITS: Performed by: INTERNAL MEDICINE

## 2019-11-05 PROCEDURE — 80053 COMPREHEN METABOLIC PANEL: CPT | Performed by: INTERNAL MEDICINE

## 2019-11-05 PROCEDURE — 94799 UNLISTED PULMONARY SVC/PX: CPT

## 2019-11-05 PROCEDURE — 99233 SBSQ HOSP IP/OBS HIGH 50: CPT | Performed by: INTERNAL MEDICINE

## 2019-11-05 PROCEDURE — 82962 GLUCOSE BLOOD TEST: CPT

## 2019-11-05 PROCEDURE — 85025 COMPLETE CBC W/AUTO DIFF WBC: CPT | Performed by: NURSE PRACTITIONER

## 2019-11-05 PROCEDURE — 25010000002 HYDRALAZINE PER 20 MG: Performed by: INTERNAL MEDICINE

## 2019-11-05 PROCEDURE — 25010000002 ONDANSETRON PER 1 MG: Performed by: NURSE PRACTITIONER

## 2019-11-05 RX ORDER — CITALOPRAM 20 MG/1
20 TABLET ORAL DAILY
Status: DISCONTINUED | OUTPATIENT
Start: 2019-11-05 | End: 2019-11-09 | Stop reason: HOSPADM

## 2019-11-05 RX ORDER — HYDROXYZINE HYDROCHLORIDE 25 MG/1
50 TABLET, FILM COATED ORAL EVERY 6 HOURS PRN
Status: DISCONTINUED | OUTPATIENT
Start: 2019-11-05 | End: 2019-11-05 | Stop reason: SDUPTHER

## 2019-11-05 RX ADMIN — ONDANSETRON 4 MG: 2 INJECTION INTRAMUSCULAR; INTRAVENOUS at 20:31

## 2019-11-05 RX ADMIN — IPRATROPIUM BROMIDE AND ALBUTEROL SULFATE 3 ML: .5; 3 SOLUTION RESPIRATORY (INHALATION) at 15:05

## 2019-11-05 RX ADMIN — Medication 10 ML: at 20:31

## 2019-11-05 RX ADMIN — HEPARIN SODIUM 5000 UNITS: 5000 INJECTION INTRAVENOUS; SUBCUTANEOUS at 05:01

## 2019-11-05 RX ADMIN — HYDROXYZINE HYDROCHLORIDE 50 MG: 25 TABLET, FILM COATED ORAL at 10:18

## 2019-11-05 RX ADMIN — Medication 10 ML: at 14:01

## 2019-11-05 RX ADMIN — HYDRALAZINE HYDROCHLORIDE 10 MG: 20 INJECTION INTRAMUSCULAR; INTRAVENOUS at 17:20

## 2019-11-05 RX ADMIN — SODIUM CHLORIDE 3 G: 900 INJECTION INTRAVENOUS at 14:00

## 2019-11-05 RX ADMIN — HEPARIN SODIUM 5000 UNITS: 5000 INJECTION INTRAVENOUS; SUBCUTANEOUS at 20:31

## 2019-11-05 RX ADMIN — ACETAMINOPHEN 650 MG: 325 TABLET ORAL at 17:19

## 2019-11-05 RX ADMIN — CITALOPRAM HYDROBROMIDE 20 MG: 20 TABLET ORAL at 13:58

## 2019-11-05 RX ADMIN — IPRATROPIUM BROMIDE AND ALBUTEROL SULFATE 3 ML: .5; 3 SOLUTION RESPIRATORY (INHALATION) at 07:37

## 2019-11-05 RX ADMIN — IPRATROPIUM BROMIDE AND ALBUTEROL SULFATE 3 ML: .5; 3 SOLUTION RESPIRATORY (INHALATION) at 03:44

## 2019-11-05 RX ADMIN — Medication 10 ML: at 13:59

## 2019-11-05 RX ADMIN — IPRATROPIUM BROMIDE AND ALBUTEROL SULFATE 3 ML: .5; 3 SOLUTION RESPIRATORY (INHALATION) at 19:42

## 2019-11-05 RX ADMIN — HEPARIN SODIUM 5000 UNITS: 5000 INJECTION INTRAVENOUS; SUBCUTANEOUS at 13:59

## 2019-11-05 RX ADMIN — Medication 10 ML: at 10:19

## 2019-11-05 NOTE — PROGRESS NOTES
Pulmonary/ Critical Care/ sleep medicine progress Note        Vicente Cunha is a 24 y.o. male who presented to the emergency department via EMS for evaluation of altered mental status.  EMS reported that the patient was found down in a bar and this morning where he had apparently been overnight.  By report the patient is a polysubstance abuser and was offered shelter in the barn as he is homeless.  The family stated that they saw the patient last at 9 PM last night and then found him in the morning this morning at 11 AM.  He may have received bystander CPR however this is unclear and there are no family members to verify this account.  EMS stated that when they arrived the patient had altered mental status with groaning and agitation but no purposeful responses.  EMS administered 8 mg of Narcan without improvement.  In the emergency department the patient continued to be hypoxic and minimally responsive and was intubated.  No other information is obtainable as there is no family at the bedside.  On review of documentation the patient has had other emergency department evaluations elated to heroin use    In the emergency department the patient was found to have a temperature of 90.4 °F, systolic blood pressure in the 60s.  The patient received 2 L warm IV fluid resuscitation and a bear hugger was placed.  The patient continued to be hypotensive and hypothermic and 2 more liters of warmed IV fluid were ordered.  Blood cultures and urinalysis were obtained and the patient received vancomycin and cefepime empirically.  WBCs were elevated at 30,000.  Creatinine 3.24.  Elevated LFTs.  CK elevated.  Urine drug screen was positive for methamphetamines and THC    Patient will be admitted for further evaluation and treatment of sepsis, respiratory failure, hypothermia    Subjective: 11/3: offprecision flow, now on 6 L HF; undergoing HD  11/4: Currently on RA, No C/O CP, Receiving HD today. No N/V  11/5: No complaints of  increased shortness of breath or chest pain.  No nausea or vomiting.  Currently on room air.  No fever or chills overnight    Review of Systems    As above    Allergies    Allergies   Allergen Reactions   • Penicillins Anxiety       Medications    Scheduled Meds:    ampicillin-sulbactam 3 g Intravenous Q24H   heparin (porcine) 5,000 Units Subcutaneous Q8H   ipratropium-albuterol 3 mL Nebulization Q4H - RT   sodium chloride 10 mL Intravenous Q12H   sodium chloride 10 mL Intravenous Q12H   sodium chloride 10 mL Intravenous Q12H   sodium chloride 10 mL Intravenous Q12H     Continuous Infusions:     PRN Meds:.•  acetaminophen  •  heparin (porcine)  •  hydrALAZINE  •  hydrOXYzine  •  influenza vaccine  •  ondansetron  •  sodium chloride  •  sodium chloride  •  sodium chloride      Physical Exam    tMax 24 hrs:  Temp (24hrs), Av.2 °F (36.8 °C), Min:97.3 °F (36.3 °C), Max:98.9 °F (37.2 °C)    Vitals Ranges:  Temp:  [97.3 °F (36.3 °C)-98.9 °F (37.2 °C)] 98.2 °F (36.8 °C)  Heart Rate:  [] 86  Resp:  [16-20] 20  BP: (122-176)/() 135/80  Intake and Output Last 3 Shifts:  I/O last 3 completed shifts:  In: 817.9 [P.O.:480; I.V.:337.9]  Out: 2500 [Other:2500]    Constitutional:  Awake, No resp distress  Eyes: conjunctiva normal   HENT:  Atraumatic, external ears normal, nose normal  Respiratory: Good air entry B/L no crackles or wheezes  Cardiovascular: S1 S2 regular no murmurs, no gallops, no rubs   GI:  Soft, nondistended, normal bowel sounds  Musculoskeletal:  No edema, no cyanosis or clubbing  Integument:  Well hydrated, no rash   Neurologic: awake and alert   Psychiatric: calm    labs    Lab Results (last 24 hours)     Procedure Component Value Units Date/Time    POC Glucose Once [[id number]]  (Abnormal) Collected:  19    Specimen:  Blood Updated:  19     Glucose 109 mg/dL      Comment: Serial Number: [id number]:  [id number]       Comprehensive Metabolic Panel [[id number]]   (Abnormal) Collected:  11/05/19 0457    Specimen:  Blood Updated:  11/05/19 0545     Glucose 96 mg/dL      BUN 24 mg/dL      Creatinine 5.08 mg/dL      Sodium 137 mmol/L      Potassium 3.8 mmol/L      Chloride 102 mmol/L      CO2 24.0 mmol/L      Calcium 8.5 mg/dL      Total Protein 6.0 g/dL      Albumin 3.30 g/dL      ALT (SGPT) 233 U/L      AST (SGOT) 44 U/L      Alkaline Phosphatase 63 U/L      Total Bilirubin 0.4 mg/dL      eGFR Non African Amer 14 mL/min/1.73      Comment: <15 Indicative of kidney failure.        eGFR   Amer --     Comment: <15 Indicative of kidney failure.        Globulin 2.7 gm/dL      A/G Ratio 1.2 g/dL      BUN/Creatinine Ratio 4.7     Anion Gap 11.0 mmol/L     Narrative:       GFR Normal >60  Chronic Kidney Disease <60  Kidney Failure <15    Magnesium [[id number]]  (Normal) Collected:  11/05/19 0457    Specimen:  Blood Updated:  11/05/19 0545     Magnesium 2.0 mg/dL     CBC & Differential [[id number]] Collected:  11/05/19 0457    Specimen:  Blood Updated:  11/05/19 0514    Narrative:       The following orders were created for panel order CBC & Differential.  Procedure                               Abnormality         Status                     ---------                               -----------         ------                     CBC Auto Differential[[id number]]        Abnormal            Final result                 Please view results for these tests on the individual orders.    CBC Auto Differential [[id number]]  (Abnormal) Collected:  11/05/19 0457    Specimen:  Blood Updated:  11/05/19 0514     WBC 12.30 10*3/mm3      RBC 3.43 10*6/mm3      Hemoglobin 11.0 g/dL      Hematocrit 30.2 %      MCV 88.2 fL      MCH 32.0 pg      MCHC 36.3 g/dL      RDW 13.1 %      RDW-SD 40.3 fl      MPV 6.6 fL      Platelets 202 10*3/mm3      Neutrophil % 64.9 %      Lymphocyte % 18.1 %      Monocyte % 10.0 %      Eosinophil % 6.2 %      Basophil % 0.8 %      Neutrophils, Absolute 8.00 10*3/mm3       "Lymphocytes, Absolute 2.20 10*3/mm3      Monocytes, Absolute 1.20 10*3/mm3      Eosinophils, Absolute 0.80 10*3/mm3      Basophils, Absolute 0.10 10*3/mm3      nRBC 0.1 /100 WBC     Hepatitis C Genotype [[id number]] Collected:  11/01/19 1342    Specimen:  Blood Updated:  11/04/19 6286     Please note Comment     Comment: This test was developed and its performance characteristics determined  by [facility].  It has not been cleared or approved by the U.[place]. Food and  Drug Administration.  The FDA has determined that such clearance or approval is not  necessary. This test is used for clinical purposes.  It should not be  regarded as investigational or for research.        Hepatitis C Genotype 1a    Narrative:       Performed at:  [address]  [id number]  : Nikole Aguillon MD, Phone:  [phone]    Non-gynecologic Cytology [[id number]] Collected:  11/01/19 0202    Specimen:  Body Fluid from Pleural Cavity Updated:  11/04/19 1535     Case Report --     Medical Cytology Report                           Case: [id number]                                  Authorizing Provider:  George Williamson APRN      Collected:           11/01/2019 02:02 AM          Ordering Location:     [facility]       Received:            11/01/2019 03:12 AM                                 [facility]                                                          Pathologist:           Zacarias Malone MD                                                             Specimen:    Pleural Cavity                                                                              Final Diagnosis --     Pleural fluid, smears and cytospin preparation:    Benign mesothelial cells, histiocytes and acute and chronic inflammatory cells    Negative for malignant cells    JPR/sms        Gross Description --     [facility] \"Pleural [facility]\" are 6 mL of cloudy, green colored fluid. Particulate " matter is present. This specimen is processed as per protocol.         Flow Cytometry Summary --     Flow Cytometry was performed at [facility]. Please see their attached report for details.        Blood Culture - Blood, Blood, Central Line [[id number]] Collected:  11/01/19 1342    Specimen:  Blood, Central Line Updated:  11/04/19 1345     Blood Culture No growth at 3 days    Anaerobic Culture - Pleural Fluid, Pleural Cavity [[id number]] Collected:  11/01/19 0201    Specimen:  Pleural Fluid from Pleural Cavity Updated:  11/04/19 1236     Anaerobic Culture No anaerobes isolated at 3 days    Blood Culture - Blood, Arm, Right [[id number]] Collected:  11/01/19 1020    Specimen:  Blood from Arm, Right Updated:  11/04/19 0945     Blood Culture No growth at 3 days    Flow Cytometry [[id number]] Collected:  11/01/19 0202    Specimen:  Body Fluid from Pleural Cavity Updated:  11/04/19 0902     Reference Lab Report See attached flow cytometry report from [facility]          Imaging & Other Studies    Imaging Results (Last 72 Hours)     Procedure Component Value Units Date/Time    US Gallbladder [[id number]] Collected:  11/02/19 1712     Updated:  11/02/19 1715    Narrative:       US GALLBLADDER-     Date of Exam: 11/2/2019 4:06 PM     Indication: Right upper quadrant tenderness and abdominal CT scan;  R41.82-Altered mental status, unspecified; J96.01-Acute respiratory  failure with hypoxia; N17.9-Acute kidney failure, unspecified;  R94.5-Abnormal results of liver function studies; D72.829-Elevated white  blood cell count, unspecified; T68.XXXA-Hypothermia, initial encounter.     Comparison: CT 11/01/2019     Technique: Transverse and sagittal ultrasound images of the right upper  quadrant were obtained. Doppler evaluation was also conducted.     FINDINGS:  Visualized portions of the head and body of the pancreas are normal.  Evaluation is limited due to surrounding bowel gas.     Liver has homogenous echogenicity  and normal echotexture.  No focal  hepatic lesions.  Portal and hepatic veins are patent and have normal  flow direction and waveforms.      Gallbladder is normal in caliber. Pericholecystic fluid is present.  Sludge is present within the gallbladder lumen. The sonographic Turner's  sign is negative. No definite stones identified. The biliary system is  nondilated.      The right kidney is normal in size with no evidence of hydronephrosis.  No suspicious focal lesions.        Impression:       A small amount of sludge is present within the gallbladder lumen. Mild  gallbladder wall thickening is present which is nonspecific. There is no  evidence of biliary ductal dilatation. The sonographic Turner's sign was  negative. Acute cholecystitis cannot be excluded.     Electronically Signed By-Joanne Granda On:11/2/2019 5:13 PM  This report was finalized on [id number] by  Joanne Granda, .            Assessment    Acute hypoxic respiratory failure secondary to polysubstance abuse  Altered mental status - related to drug overdose. Resolved  Septic shock-resolved  Acute kidney injury-worsening with oliguria  Liver injury  Rhabdomyolysis, creatinine kinase 3669 on admission - improved  Polysubstance abuse, UDS positive for methamphetamine and THC  Hypothermia due to environmental exposure  Hyperphosphatemia    Plan    Self extubated 10/29/19. Now on RA  S/p thoracentesis 11/1/19 with 1 L fluid evacuated. Cx negative.  Pleural fluid is exudative.  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  Remains on antibiotic therapy with Unasyn per infectious disease  HD per nephrology  CK improved  Off LEVO  Mannitol given per renal 10/30  2D echocardiogram reviewed.  Enlarged RA and RV.  LE doppler negative Shiley placed 10/30  Elevated LFTs- hep C ab pos   CT abdomen reviewed with ? SBO and GB thickening. Evaluated by surgery no intervention. RUQ pain felt to be due to hepatitis  Has been evaluated  "by behavioral health. Plan for drug rehab \"[facility]\" at D/C  DVT prophylaxis with SQ heparin  RIJ Dialysis catheter   We will continue with current pulmonary plan of care.  Discussed with patient's family at bedside  "

## 2019-11-05 NOTE — PROGRESS NOTES
"      [facility] Medicine Services Daily Progress Note      Hospitalist Team  LOS 8 days      Patient Care Team:  Gary Llanos MD as PCP - General        Chief Complaint / Subjective  Chief Complaint   Patient presents with   • Ingestion     heroin and meth use last seen 830 last night pt was found in barn.     Reports he is feeling better. Breathing is stable. Making some urine. Feeling stronger    Brief Synopsis of Hospital Course/HPI  23 yo male found down d/t overdose and was intubated in manged in the ICU. Now downgraded on IV abx and intermittent HD.           Review of Systems   Constitution: Negative for fever.   Respiratory: Negative for cough.                Objective      Vital Signs  Temp:  [97.8 °F (36.6 °C)-98.5 °F (36.9 °C)] 98.4 °F (36.9 °C)  Heart Rate:  [] 89  Resp:  [14-20] 16  BP: (122-175)/() 165/111  Oxygen Therapy  SpO2: 97 %  Pulse Oximetry Type: Intermittent  Device (Oxygen Therapy): room air  $ High Flow Nasal Cannula Set-Up: yes  Flow (L/min): 2  Oxygen Concentration (%): 40  Oximetry Probe Site Changed: No  Flowsheet Rows      First Filed Value   Admission Height  172.7 cm (68\") Documented at 10/28/2019 1138   Admission Weight  59 kg (130 lb) Documented at 10/28/2019 1138        Intake & Output (last 3 days)       11/02 0701 - 11/03 0700 11/03 0701 - 11/04 0700 11/04 0701 - 11/05 0700 11/05 0701 - 11/06 0700    P.O. 480 960 480 240    I.V. (mL/kg) 499 (7.3) 197 (3.1) 337.9 (5.6)     IV Piggyback 100 100      Total Intake(mL/kg) 1079 (15.9) 1257 (20) 817.9 (13.5) 240 (4)    Urine (mL/kg/hr) 215 (0.1) 30 (0)      Emesis/NG output 50       Other 2500 2000 2500     Stool 0 1 0     Total Output 2765 2031 2500     Net -1686 -774 -1682.1 +240            Urine Unmeasured Occurrence   2 x     Stool Unmeasured Occurrence 7 x 1 x 3 x         Lines, Drains & Airways    Active LDAs     Name:   Placement date:   Placement time:   Site:   Days:    Hemodialysis Cath " Double with Pigtail   10/30/19    1300    Internal Jugular   6                  Physical Exam:    Physical Exam   Constitutional: No distress.   HENT:   Head: Normocephalic.   Eyes: Pupils are equal, round, and reactive to light.   Cardiovascular: Normal rate.   Pulmonary/Chest: Effort normal. No respiratory distress.   Abdominal: Soft. He exhibits no distension.   Neurological: He is alert.   Skin: Skin is warm.   Psychiatric: He has a normal mood and affect.           Results Review:     I reviewed the patient's new clinical results.      Results from last 7 days   Lab Units 11/05/19 0457 11/04/19 0434 11/03/19  0535 11/02/19  0547 11/01/19  0500   WBC 10*3/mm3 12.30* 12.80* 13.50* 14.60* 17.50*   HEMOGLOBIN g/dL 11.0* 11.2* 11.0* 10.3* 11.0*   HEMATOCRIT % 30.2* 31.2* 31.2* 29.2* 32.2*   PLATELETS 10*3/mm3 202 167 146 122* 111*   MONOCYTES % %  --   --  6.0 5.0 3.0*     Results from last 7 days   Lab Units 11/05/19 0457 11/04/19 0434 11/03/19  0535 11/02/19  0547   SODIUM mmol/L 137 138 138 135*   POTASSIUM mmol/L 3.8 3.7 3.6 3.7   CHLORIDE mmol/L 102 103 102 100   CO2 mmol/L 24.0 23.0 23.0 21.0*   BUN mg/dL 24* 30* 44* 61*   CREATININE mg/dL 5.08* 4.98* 5.46* 5.27*   CALCIUM mg/dL 8.5* 8.7 8.3* 8.4*   BILIRUBIN mg/dL 0.4  --  0.5 0.5   ALK PHOS U/L 63  --  57 57   ALT (SGPT) U/L 233*  --  413* 627*   AST (SGOT) U/L 44*  --  67* 124*   GLUCOSE mg/dL 96 116* 88 91     Results from last 7 days   Lab Units 11/05/19 0457 11/04/19 0434 11/03/19  0535   MAGNESIUM mg/dL 2.0 2.2 2.4     Lab Results   Component Value Date    CALCIUM 8.5 (L) 11/05/2019    PHOS 7.4 (H) 10/28/2019     No results found for: HGBA1C        Results from last 7 days   Lab Units 10/30/19  1106   PH, ARTERIAL pH units 7.362   PO2 ART mm Hg 111.7*   PCO2, ARTERIAL mm Hg 36.8   HCO3 ART mmol/L 20.9*         Microbiology Results (last 10 days)     Procedure Component Value - Date/Time    Clostridium Difficile EIA - Stool, Per Rectum [[id number]]   (Normal) Collected:  11/02/19 1348    Lab Status:  Final result Specimen:  Stool from Per Rectum Updated:  11/03/19 0714     C Diff GDH / Toxin Negative    Blood Culture - Blood, Blood, Central Line [[id number]] Collected:  11/01/19 1342    Lab Status:  Preliminary result Specimen:  Blood, Central Line Updated:  11/05/19 1345     Blood Culture No growth at 4 days    Blood Culture - Blood, Arm, Right [[id number]] Collected:  11/01/19 1020    Lab Status:  Preliminary result Specimen:  Blood from Arm, Right Updated:  11/05/19 0945     Blood Culture No growth at 4 days    AFB Culture - Body Fluid, Pleural Cavity [[id number]] Collected:  11/01/19 0202    Lab Status:  Preliminary result Specimen:  Body Fluid from Pleural Cavity Updated:  11/01/19 1036     AFB Stain No acid fast bacilli seen    Body Fluid Culture - Body Fluid, Pleural Cavity [[id number]] Collected:  11/01/19 0202    Lab Status:  Final result Specimen:  Body Fluid from Pleural Cavity Updated:  11/04/19 0706     Body Fluid Culture No growth     Gram Stain Rare (1+) WBCs per low power field      No organisms seen    Fungus Smear - Body Fluid, Pleural Cavity [[id number]] Collected:  11/01/19 0202    Lab Status:  Final result Specimen:  Body Fluid from Pleural Cavity Updated:  11/01/19 1122     Fungal Stain No fungal elements seen    Anaerobic Culture - Pleural Fluid, Pleural Cavity [[id number]] Collected:  11/01/19 0201    Lab Status:  Preliminary result Specimen:  Pleural Fluid from Pleural Cavity Updated:  11/04/19 1236     Anaerobic Culture No anaerobes isolated at 3 days    Respiratory Culture - Sputum, ET Suction [[id number]] Collected:  10/28/19 2215    Lab Status:  Final result Specimen:  Sputum from ET Suction Updated:  10/31/19 0937     Respiratory Culture Scant growth (1+) Normal Respiratory Aby     Gram Stain Many (4+) WBCs per low power field      Many (4+) Mixed bacterial morphotypes seen on Gram Stain    MRSA Screen Culture - Swab, Nares [[id number]]   (Normal) Collected:  10/28/19 1515    Lab Status:  Final result Specimen:  Swab from Nares Updated:  10/29/19 1813     MRSA SCREEN CX No Methicillin Resistant Staphylococcus aureus isolated    Respiratory Panel, PCR - Swab, Nasopharynx [[id number]]  (Abnormal) Collected:  10/28/19 1515    Lab Status:  Final result Specimen:  Swab from Nasopharynx Updated:  10/28/19 1741     ADENOVIRUS, PCR Not Detected     Coronavirus 229E Not Detected     Coronavirus HKU1 Not Detected     Coronavirus NL63 Not Detected     Coronavirus OC43 Not Detected     Human Metapneumovirus Not Detected     Human Rhinovirus/Enterovirus Detected     Influenza B PCR Not Detected     Parainfluenza Virus 1 Not Detected     Parainfluenza Virus 2 Not Detected     Parainfluenza Virus 3 Not Detected     Parainfluenza Virus 4 Not Detected     Bordetella pertussis pcr Not Detected     Influenza A H1 2009 PCR Not Detected     Chlamydophila pneumoniae PCR Not Detected     Mycoplasma pneumo by PCR Not Detected     Influenza A PCR Not Detected     Influenza A H3 Not Detected     Influenza A H1 Not Detected     RSV, PCR Not Detected    Urine Culture - Urine, Urine, Catheter [[id number]]  (Normal) Collected:  10/28/19 1212    Lab Status:  Final result Specimen:  Urine, Catheter Updated:  10/29/19 1210     Urine Culture No growth    Blood Culture - Blood, Arm, Left [[id number]]  (Abnormal) Collected:  10/28/19 1155    Lab Status:  Final result Specimen:  Blood from Arm, Left Updated:  10/31/19 1310     Blood Culture Scant growth (1+) Actinomyces species     Isolated from Anaerobic Bottle     Gram Stain Anaerobic Bottle Gram positive bacilli      Aerobic Bottle Gram positive bacilli    Narrative:       Blood culture does not meet the specified criteria for PCR identification.  If pregnant, immunocompromised, or clinical concern for meningitis, call lab to run BCID for Listeria monocytogenes.          ECG/EMG Results (most recent)     Procedure Component Value Units  Date/Time    Adult Transthoracic Echo Complete W/ Cont if Necessary Per Protocol [[id number]] Collected:  10/28/19 1814     Updated:  10/28/19 2055     BSA 1.7 m^2       CV ECHO ROCÍO - RVDD 3.2 cm      IVSd 1.1 cm      LVIDd 4.0 cm      LVIDs 2.8 cm      LVPWd 1.0 cm      IVS/LVPW 1.0     FS 29.8 %      EDV(Teich) 69.9 ml      ESV(Teich) 29.7 ml      EF(Teich) 57.5 %      EDV(cubed) 63.9 ml      ESV(cubed) 22.1 ml      EF(cubed) 65.4 %      LV mass(C)d 134.4 grams      LV mass(C)dI 79.0 grams/m^2      SV(Teich) 40.2 ml      SI(Teich) 23.6 ml/m^2      SV(cubed) 41.8 ml      SI(cubed) 24.6 ml/m^2      Ao root diam 3.1 cm      Ao root area 7.7 cm^2      ACS 2.3 cm      LVOT diam 1.8 cm      LVOT area 2.6 cm^2      RVOT diam 1.5 cm      RVOT area 1.8 cm^2      EDV(MOD-sp4) 82.4 ml      ESV(MOD-sp4) 38.5 ml      EF(MOD-sp4) 53.2 %      SV(MOD-sp4) 43.9 ml      SI(MOD-sp4) 25.8 ml/m^2      Ao root area (BSA corrected) 1.8     LV [place] Vol (BSA corrected) 48.4 ml/m^2      LV Sys Vol (BSA corrected) 22.6 ml/m^2      MV E max chloe 93.9 cm/sec      MV A max chloe 50.2 cm/sec      MV E/A 1.9     MV V2 max 108.9 cm/sec      MV max PG 4.7 mmHg      MV V2 mean 67.4 cm/sec      MV mean PG 2.0 mmHg      MV V2 VTI 27.5 cm      MVA(VTI) 1.5 cm^2      MV dec slope 816.8 cm/sec^2      MV dec time 0.11 sec      Ao pk chloe 134.4 cm/sec      Ao max PG 7.2 mmHg      Ao max PG (full) 2.3 mmHg      Ao V2 mean 96.0 cm/sec      Ao mean PG 4.0 mmHg      Ao mean PG (full) 1.5 mmHg      Ao V2 VTI 19.9 cm      KARI(I,A) 2.0 cm^2      KARI(I,D) 2.0 cm^2      KARI(V,A) 2.2 cm^2      KARI(V,D) 2.2 cm^2      LV V1 max PG 4.9 mmHg      LV V1 mean PG 2.5 mmHg      LV V1 max 111.2 cm/sec      LV V1 mean 73.5 cm/sec      LV V1 VTI 15.5 cm      SV(Ao) 153.0 ml      SI(Ao) 89.9 ml/m^2      SV(LVOT) 40.4 ml      SV(RVOT) 18.6 ml      SI(LVOT) 23.7 ml/m^2      PA V2 max 105.4 cm/sec      PA max PG 4.4 mmHg      PA max PG (full) 2.7 mmHg      BH CV ECHO ROCÍO -  PVA(V,A) 1.1 cm^2       CV ECHO ROCÍO - PVA(V,D) 1.1 cm^2      RV V1 max PG 1.7 mmHg      RV V1 mean PG 0.84 mmHg      RV V1 max 65.7 cm/sec      RV V1 mean 43.2 cm/sec      RV V1 VTI 10.2 cm      TR max chloe 226.7 cm/sec      Qp/Qs 0.46      CV ECHO ROCÍO - BZI_BMI 19.8 kilograms/m^2       CV ECHO ROCÍO - BSA(HAYCOCK) 1.7 m^2       CV ECHO ROCÍO - BZI_METRIC_WEIGHT 59.0 kg       CV ECHO ROCÍO - BZI_METRIC_HEIGHT 172.7 cm      EF(MOD-bp) 53.0 %      LA dimension(2D) 3.4 cm      Echo EF Estimated 60 %     Narrative:         · Estimated EF = 60%.  · Left ventricular systolic function is normal.     Indications  Respiratory failure      Technically satisfactory study.  Mitral valve is structurally normal.  Tricuspid valve is structurally normal.  Aortic valve is structurally normal.  Pulmonic valve could not be well visualized.  No evidence for mitral tricuspid or aortic regurgitation is seen by   Doppler study.  Left atrium is normal in size.  Right atrium is significantly enlarged  Left ventricle is normal in size and contractility with ejection fraction   of 60%.  Right ventricle is definitely enlarged  Atrial septum is intact.  Aorta is normal.  No pericardial effusion or intracardiac thrombus is seen.    Impression  Significant right atrium right ventricle enlargement.  No evidence for mitral tricuspid or aortic regurgitation is seen by   Doppler study.  No pericardial effusion or intracardiac thrombus is present.  Left ventricle is normal in size and contractility with ejection fraction   of 60%.  [place] no pericardial effusion or intracardiac thrombus is seen.        ECG 12 Lead [[id number]] Collected:  10/28/19 1214     Updated:  10/29/19 0808    Narrative:       HEART RATE= 119  bpm  RR Interval= 504  ms  MS Interval= 117  ms  P Horizontal Axis= 4  deg  P Front Axis= 86  deg  QRSD Interval= 128  ms  QT Interval= 369  ms  QRS Axis= 112  deg  T Wave Axis= 39  deg  - ABNORMAL ECG -  Sinus  tachycardia  Ventricular premature complex  Nonspecific intraventricular conduction delay  ST elev, probable normal early repol pattern  No previous ECG available for comparison  Electronically Signed By: Antonio Lance (Thomas) 29-Oct-2019 08:08:34  Date and Time of Study: 2019-10-28 12:14:30          Results for orders placed during the hospital encounter of 10/28/19   Duplex Venous Lower Extremity - Bilateral CAR    Narrative · Normal bilateral lower extremity venous duplex scan.          Results for orders placed during the hospital encounter of 10/28/19   Adult Transthoracic Echo Complete W/ Cont if Necessary Per Protocol    Narrative · Estimated EF = 60%.  · Left ventricular systolic function is normal.     Indications  Respiratory failure      Technically satisfactory study.  Mitral valve is structurally normal.  Tricuspid valve is structurally normal.  Aortic valve is structurally normal.  Pulmonic valve could not be well visualized.  No evidence for mitral tricuspid or aortic regurgitation is seen by   Doppler study.  Left atrium is normal in size.  Right atrium is significantly enlarged  Left ventricle is normal in size and contractility with ejection fraction   of 60%.  Right ventricle is definitely enlarged  Atrial septum is intact.  Aorta is normal.  No pericardial effusion or intracardiac thrombus is seen.    Impression  Significant right atrium right ventricle enlargement.  No evidence for mitral tricuspid or aortic regurgitation is seen by   Doppler study.  No pericardial effusion or intracardiac thrombus is present.  Left ventricle is normal in size and contractility with ejection fraction   of 60%.  [place] no pericardial effusion or intracardiac thrombus is seen.           Ct Abdomen Pelvis Without Contrast    Result Date: 11/1/2019   1. Bilateral pleural effusions are moderate in size with areas of airspace disease which may be pneumonia. 2. Gallbladder wall thickening versus pericholecystic fluid. 3.  Dilated small bowel loops throughout the left upper quadrant of the abdomen with decompression of the distal small bowel. The findings are likely secondary to at least incomplete small bowel obstruction. Exact etiology and location is difficult to determine without oral or IV contrast suspected transition point is in the left lower abdomen.  Electronically Signed ByMandeep Thompson On:11/1/2019 10:21 PM This report was finalized on [id number] by  Arturo Thompson .    Ct Head Without Contrast    Result Date: 10/28/2019  Normal  Electronically Signed ByMandeep Thompson On:10/28/2019 9:54 PM This report was finalized on [id number] by  Arturo Thompson .    Ct Chest Without Contrast    Result Date: 10/31/2019  Interval increase in the size of the pleural effusions and interval worsening of bilateral lower lobe and upper lobe areas of infiltrate and air bronchograms. The findings are consistent with worsening of pneumonia.  Electronically Signed ByMandeep Thompson On:10/31/2019 9:45 PM This report was finalized on [id number] by  Arturo Thompson, .    Ct Chest Without Contrast    Result Date: 10/28/2019  Bilateral lower lobe air bronchograms and dense consolidations consistent with pneumonia. Patchy areas in the posterior upper lobes are also most consistent with pneumonia  Electronically Signed ByMandeep Thompson On:10/28/2019 10:03 PM This report was finalized on [id number] by  Arturo Thompson .    Us Gallbladder    Result Date: 11/2/2019  A small amount of sludge is present within the gallbladder lumen. Mild gallbladder wall thickening is present which is nonspecific. There is no evidence of biliary ductal dilatation. The sonographic Turner's sign was negative. Acute cholecystitis cannot be excluded.  Electronically Signed ByKary Granda On:11/2/2019 5:13 PM This report was finalized on [id number] by  Joanne Granda, .    Xr Chest 1 View    Result Date: 11/1/2019  1.Decreased right pleural effusion and decreased right lung  opacities postthoracentesis. No definite pneumothorax is seen. 2.Persistent left pleural effusion with left mid and lower lung airspace opacities and consolidation. 3.Right arm PICC and right IJ catheter appear in expected positions.  Electronically Signed By-DR. Omari Glover MD On:11/1/2019 6:54 AM This report was finalized on [id number] by DR. Omari Glover MD.    Xr Chest 1 View    Result Date: 10/31/2019  1. Support lines and tubes in expected position. 2. Bilateral interstitial and alveolar opacities concerning for edema or infection. 3. Bilateral layering pleural effusions, right greater than left. The right-sided pleural effusion is mildly decreased from the prior exam.  Electronically Signed By-Baldemar Dailey On:10/31/2019 10:36 AM This report was finalized on [id number] by  Baldemar Dailey, .    Xr Chest 1 View    Result Date: 10/30/2019   1. Right IJ central line placement with the tip terminating at the cavoatrial junction. No visible pneumothorax.   2. Marked interval development of mid to lower lung zone dense airspace disease could reflect changes of pneumonia or sequelae of aspiration. Development of small to moderate right pleural effusion. 3. ET tube is been removed.  Electronically Signed By-Dr. Usha Guallpa MD On:10/30/2019 2:52 PM This report was finalized on [id number] by Dr. Usha Guallpa MD.    Xr Chest 1 View    Result Date: 10/29/2019  1.Endotracheal tube in good position. 2.Bibasilar consolidations, which could represent aspiration or pneumonia.  Electronically Signed By-DR. Bam Rosales MD On:10/29/2019 7:39 AM This report was finalized on [id number] by DR. Bam Rosales MD.    Us Renal Bilateral    Result Date: 10/29/2019  Increased renal cortical echogenicity consistent with medical renal disease. No hydronephrosis. Mild right perinephric fluid with incidental left pleural effusion.  Electronically Signed By-Arturo Thompson On:10/29/2019 6:47 PM This report was  "finalized on [id number] by  Arturo Thompson, .      Xrays, labs reviewed personally by physician.    Medication Review:   I have reviewed the patient's current medication list      Scheduled Meds    ampicillin-sulbactam 3 g Intravenous Q24H   citalopram 20 mg Oral Daily   heparin (porcine) 5,000 Units Subcutaneous Q8H   ipratropium-albuterol 3 mL Nebulization Q4H - RT   sodium chloride 10 mL Intravenous Q12H   sodium chloride 10 mL Intravenous Q12H   sodium chloride 10 mL Intravenous Q12H   sodium chloride 10 mL Intravenous Q12H       Meds Infusions       Meds PRN  •  acetaminophen  •  heparin (porcine)  •  hydrALAZINE  •  hydrOXYzine  •  influenza vaccine  •  ondansetron  •  sodium chloride  •  sodium chloride  •  sodium chloride    I personally reviewed patient's x-ray films     I personally reviewed patient's EKG      Assessment / Plan    Active Hospital Problems    Diagnosis POA   • Acute renal failure (ARF) (CMS/HCC) [N17.9] Yes   • Rhabdomyolysis [M62.82] Yes   • ATN (acute tubular necrosis) (CMS/HCC) [N17.0] Yes   • Hepatitis C [B19.20] Yes   • Polysubstance abuse (CMS/HCC) [F19.10] Yes   • Anxiety [F41.9] Yes     Acute hypoxic respiratory failure   -initially required mechanical ventilation  -self-extubated on 10/29/2019  -s/p thoracentesis with 1 L fluid removed  -now on room air  -continue duoneb q 4 hours   -encourage incentive spirometry      Acute Encephalopathy  -likely multifactorial  -improving     Septic shock  --resolved      Hypothermia due to environmental exposure   -resolved     Possible Bacteremia / Aspiration pneumonia  -Positive blood culture for actinomyces species on admission  -ID consulted; per ID:  \"ER performed only one set of blood culture.  This is usually not sufficient to diagnose bacteremia.  Actinomyces is a very rare cause of true bacteremia and usually associated with oral maxillary disease or intra-abdominal abscesses or occasionally pulmonary disease.  The patient has " "pulmonary infiltrates but not consistent with actinomycosis.\"  -repeat blood cultures are negative so far  -continue Unasyn  2 weeks total per ID, stop date 11/14/19     KENA secondary to Rhabdomyolysis and ATN  -nephrology following  -on hemodialysis, Lasix and mannitol per nephrology   -monitor BMP     Hepatitis C   -new onset, treatment naive   -needs outpatient follow up with GI     Acute RUQ pain, likely secondary to hepatitis----resolved  -general surgery signed off     Anxiety   -continue hydroxyzine      Polysubstance abuse   -UDS positive for methamphetamine and THC on admission  -encouraged cessation  -DC plan for inpatient rehab program      VTE Prophylaxis - heparin          Discharge Planning    Has been accepted to [facility] but will need IV abx and also need to determine HD statu,  following    Chirag Shane DO  11/05/19  4:26 PM      "

## 2019-11-05 NOTE — PLAN OF CARE
Problem: Fall Risk (Adult)  Goal: Absence of Fall  Outcome: Ongoing (interventions implemented as appropriate)      Problem: Patient Care Overview  Goal: Plan of Care Review  Outcome: Ongoing (interventions implemented as appropriate)   11/05/19 0567   Plan of Care Review   Progress no change   OTHER   Outcome Summary No events overnight. Patient remains on room air. Waiting on available bed for downgrade.       Problem: Skin Injury Risk (Adult)  Goal: Skin Health and Integrity  Outcome: Ongoing (interventions implemented as appropriate)

## 2019-11-05 NOTE — PLAN OF CARE
Problem: Fall Risk (Adult)  Goal: Absence of Fall  Outcome: Ongoing (interventions implemented as appropriate)   11/05/19 1612   Fall Risk (Adult)   Absence of Fall making progress toward outcome       Problem: Patient Care Overview  Goal: Plan of Care Review  Outcome: Ongoing (interventions implemented as appropriate)    Goal: Individualization and Mutuality  Outcome: Ongoing (interventions implemented as appropriate)    Goal: Discharge Needs Assessment  Outcome: Ongoing (interventions implemented as appropriate)    Goal: Interprofessional Rounds/Family Conf  Outcome: Ongoing (interventions implemented as appropriate)   11/05/19 1612   Interdisciplinary Rounds/Family Conf   Participants ;nursing;pharmacy;social work/services       Problem: Skin Injury Risk (Adult)  Goal: Skin Health and Integrity  Outcome: Ongoing (interventions implemented as appropriate)   11/05/19 1612   Skin Injury Risk (Adult)   Skin Health and Integrity making progress toward outcome

## 2019-11-05 NOTE — PROGRESS NOTES
"                                                                                                                                      Nephrology  Progress Note                                        [facility]    Patient Identification    Name: Vicente Cunha  Age: 24 y.o.  Sex: male  :  1995  MRN: [id number]      DATE OF SERVICE:  2019        Subective    better     Objective   Scheduled Meds:    ampicillin-sulbactam 3 g Intravenous Q24H   heparin (porcine) 5,000 Units Subcutaneous Q8H   ipratropium-albuterol 3 mL Nebulization Q4H - RT   sodium chloride 10 mL Intravenous Q12H   sodium chloride 10 mL Intravenous Q12H   sodium chloride 10 mL Intravenous Q12H   sodium chloride 10 mL Intravenous Q12H         Continuous Infusions:       PRN Meds:•  acetaminophen  •  heparin (porcine)  •  hydrALAZINE  •  hydrOXYzine  •  influenza vaccine  •  ondansetron  •  sodium chloride  •  sodium chloride  •  sodium chloride     Exam:  /80   Pulse 102   Temp 98.2 °F (36.8 °C) (Oral)   Resp 18   Ht 172.7 cm (68\")   Wt 60.4 kg (133 lb 2.5 oz)   SpO2 98%   BMI 20.25 kg/m²     Intake/Output last 3 shifts:  I/O last 3 completed shifts:  In: 817.9 [P.O.:480; I.V.:337.9]  Out: 2500 [Other:2500]    Intake/Output this shift:  No intake/output data recorded.    Physical exam:  Awake and  Alert  PERTL  No JVD  Chest: decreased BS occasional ronchi  CVS Regular rate and rhythm, S1 and S2 normal  Abdomen:  Soft, non-tender, bowel sounds active   LE: trace edema  Skin:  No rashes or lesions       Data Review:  All labs (24hrs):   Recent Results (from the past 24 hour(s))   Magnesium    Collection Time: 19  4:57 AM   Result Value Ref Range    Magnesium 2.0 1.6 - 2.6 mg/dL   Comprehensive Metabolic Panel    Collection Time: 19  4:57 AM   Result Value Ref Range    Glucose 96 65 - 99 mg/dL    BUN 24 (H) 6 - 20 mg/dL    Creatinine 5.08 (H) 0.76 - 1.27 mg/dL    Sodium 137 136 - 145 mmol/L    " Potassium 3.8 3.5 - 5.2 mmol/L    Chloride 102 98 - 107 mmol/L    CO2 24.0 22.0 - 29.0 mmol/L    Calcium 8.5 (L) 8.6 - 10.5 mg/dL    Total Protein 6.0 6.0 - 8.5 g/dL    Albumin 3.30 (L) 3.50 - 5.20 g/dL    ALT (SGPT) 233 (H) 1 - 41 U/L    AST (SGOT) 44 (H) 1 - 40 U/L    Alkaline Phosphatase 63 39 - 117 U/L    Total Bilirubin 0.4 0.2 - 1.2 mg/dL    eGFR Non African Amer 14 (L) >60 mL/min/1.73    eGFR  African Amer      Globulin 2.7 gm/dL    A/G Ratio 1.2 g/dL    BUN/Creatinine Ratio 4.7 (L) 7.0 - 25.0    Anion Gap 11.0 5.0 - 15.0 mmol/L   CBC Auto Differential    Collection Time: 11/05/19  4:57 AM   Result Value Ref Range    WBC 12.30 (H) 3.40 - 10.80 10*3/mm3    RBC 3.43 (L) 4.14 - 5.80 10*6/mm3    Hemoglobin 11.0 (L) 13.0 - 17.7 g/dL    Hematocrit 30.2 (L) 37.5 - 51.0 %    MCV 88.2 79.0 - 97.0 fL    MCH 32.0 26.6 - 33.0 pg    MCHC 36.3 (H) 31.5 - 35.7 g/dL    RDW 13.1 12.3 - 15.4 %    RDW-SD 40.3 37.0 - 54.0 fl    MPV 6.6 6.0 - 12.0 fL    Platelets 202 140 - 450 10*3/mm3    Neutrophil % 64.9 42.7 - 76.0 %    Lymphocyte % 18.1 (L) 19.6 - 45.3 %    Monocyte % 10.0 5.0 - 12.0 %    Eosinophil % 6.2 0.3 - 6.2 %    Basophil % 0.8 0.0 - 1.5 %    Neutrophils, Absolute 8.00 (H) 1.70 - 7.00 10*3/mm3    Lymphocytes, Absolute 2.20 0.70 - 3.10 10*3/mm3    Monocytes, Absolute 1.20 (H) 0.10 - 0.90 10*3/mm3    Eosinophils, Absolute 0.80 (H) 0.00 - 0.40 10*3/mm3    Basophils, Absolute 0.10 0.00 - 0.20 10*3/mm3    nRBC 0.1 0.0 - 0.2 /100 WBC          Imaging:  [unfilled]    Assessment/Plan:     Acute renal failure (ARF) (CMS/HCC)    Rhabdomyolysis    ATN (acute tubular necrosis) (CMS/HCC)    Hepatitis C    Polysubstance abuse (CMS/HCC)    Anxiety       Acute kidney injury nonoliguric worsening kidney function due to rhabdomyolysis and ATN              -uop better   - no dialysis today   - BMP Pm   -off lasix  Rhabdomyolysis              -resolved  Respiratory failure  Metabolic acidosis  Altered mental  status  Hypothermia  Hyperphosphatemia  Liver injury  Hypomagnesemia     D/w the patient

## 2019-11-05 NOTE — PROGRESS NOTES
Continued Stay Note  MILENA Cardenas     Patient Name: Vicente Cunha  MRN: [id number]  Today's Date: 11/5/2019    Admit Date: 10/28/2019      D/C Plan  - Patient has been accepted to [facility]. Need for OP hemodialysis pending. Will have need for IV ABX.  Will  Continue to follow .    Esthela Tobin RN, CM  Office Phone [phone]  Cell [phone]

## 2019-11-05 NOTE — PROGRESS NOTES
Infectious Diseases Progress Note      LOS: 8 days   Patient Care Team:  Gary Llanos MD as PCP - General    Chief Complaint: Feeling much better -wants to go to drug rehab after discharge     Subjective         The patient has been afebrile for the last 24 hours.  The patient is on room air, hemodynamically stable, and is tolerating antimicrobial therapy.          Review of Systems:   Review of Systems   Constitutional: Negative.    HENT: Negative.    Eyes: Negative.    Respiratory: Negative.    Cardiovascular: Negative.    Genitourinary: Negative.    Musculoskeletal: Negative.    Skin: Negative.    Neurological: Negative.    Hematological: Negative.    Psychiatric/Behavioral: Negative.         Objective     Vital Signs  Temp:  [97.7 °F (36.5 °C)-98.9 °F (37.2 °C)] 98.3 °F (36.8 °C)  Heart Rate:  [] 102  Resp:  [14-20] 14  BP: (122-167)/() 130/76    Physical Exam:  Physical Exam   Constitutional: He is oriented to person, place, and time. He appears well-developed and well-nourished.   HENT:   Head: Normocephalic and atraumatic.   Eyes: EOM are normal. Pupils are equal, round, and reactive to light.   Neck: Normal range of motion. Neck supple.   Cardiovascular: Normal rate, regular rhythm and normal heart sounds.   Pulmonary/Chest: Effort normal and breath sounds normal. No respiratory distress. He has no wheezes.   Abdominal: Soft. Bowel sounds are normal. He exhibits no distension and no mass. There is no tenderness. There is no rebound and no guarding.   Musculoskeletal: Normal range of motion. He exhibits no edema or deformity.   Neurological: He is alert and oriented to person, place, and time. No cranial nerve deficit.   Skin: Skin is warm. No rash noted. No erythema.   Psychiatric: He has a normal mood and affect.   Nursing note and vitals reviewed.       Results Review:    I have reviewed all clinical data, test, lab, and imaging results.     Radiology  No Radiology Exams Resulted Within  Past 24 Hours    Cardiology    Laboratory  Results from last 7 days   Lab Units 11/05/19  0457   WBC 10*3/mm3 12.30*   HEMOGLOBIN g/dL 11.0*   HEMATOCRIT % 30.2*   PLATELETS 10*3/mm3 202     Results from last 7 days   Lab Units 11/05/19  0457   SODIUM mmol/L 137   POTASSIUM mmol/L 3.8   CHLORIDE mmol/L 102   CO2 mmol/L 24.0   BUN mg/dL 24*   CREATININE mg/dL 5.08*   GLUCOSE mg/dL 96   CALCIUM mg/dL 8.5*     Results from last 7 days   Lab Units 11/05/19  0457   SODIUM mmol/L 137   POTASSIUM mmol/L 3.8   CHLORIDE mmol/L 102   CO2 mmol/L 24.0   BUN mg/dL 24*   CREATININE mg/dL 5.08*   GLUCOSE mg/dL 96   CALCIUM mg/dL 8.5*     Results from last 7 days   Lab Units 11/04/19  0434   CK TOTAL U/L 239*             Microbiology   Microbiology Results (last 10 days)     Procedure Component Value - Date/Time    Clostridium Difficile EIA - Stool, Per Rectum [[id number]]  (Normal) Collected:  11/02/19 1348    Lab Status:  Final result Specimen:  Stool from Per Rectum Updated:  11/03/19 0714     C Diff GDH / Toxin Negative    Blood Culture - Blood, Blood, Central Line [[id number]] Collected:  11/01/19 1342    Lab Status:  Preliminary result Specimen:  Blood, Central Line Updated:  11/04/19 1345     Blood Culture No growth at 3 days    Blood Culture - Blood, Arm, Right [[id number]] Collected:  11/01/19 1020    Lab Status:  Preliminary result Specimen:  Blood from Arm, Right Updated:  11/05/19 0945     Blood Culture No growth at 4 days    AFB Culture - Body Fluid, Pleural Cavity [[id number]] Collected:  11/01/19 0202    Lab Status:  Preliminary result Specimen:  Body Fluid from Pleural Cavity Updated:  11/01/19 1036     AFB Stain No acid fast bacilli seen    Body Fluid Culture - Body Fluid, Pleural Cavity [[id number]] Collected:  11/01/19 0202    Lab Status:  Final result Specimen:  Body Fluid from Pleural Cavity Updated:  11/04/19 0706     Body Fluid Culture No growth     Gram Stain Rare (1+) WBCs per low power field      No organisms  seen    Fungus Smear - Body Fluid, Pleural Cavity [[id number]] Collected:  11/01/19 0202    Lab Status:  Final result Specimen:  Body Fluid from Pleural Cavity Updated:  11/01/19 1122     Fungal Stain No fungal elements seen    Anaerobic Culture - Pleural Fluid, Pleural Cavity [[id number]] Collected:  11/01/19 0201    Lab Status:  Preliminary result Specimen:  Pleural Fluid from Pleural Cavity Updated:  11/04/19 1236     Anaerobic Culture No anaerobes isolated at 3 days    Respiratory Culture - Sputum, ET Suction [[id number]] Collected:  10/28/19 2215    Lab Status:  Final result Specimen:  Sputum from ET Suction Updated:  10/31/19 0937     Respiratory Culture Scant growth (1+) Normal Respiratory Aby     Gram Stain Many (4+) WBCs per low power field      Many (4+) Mixed bacterial morphotypes seen on Gram Stain    MRSA Screen Culture - Swab, Nares [[id number]]  (Normal) Collected:  10/28/19 1515    Lab Status:  Final result Specimen:  Swab from Nares Updated:  10/29/19 1813     MRSA SCREEN CX No Methicillin Resistant Staphylococcus aureus isolated    Respiratory Panel, PCR - Swab, Nasopharynx [[id number]]  (Abnormal) Collected:  10/28/19 1515    Lab Status:  Final result Specimen:  Swab from Nasopharynx Updated:  10/28/19 1741     ADENOVIRUS, PCR Not Detected     Coronavirus 229E Not Detected     Coronavirus HKU1 Not Detected     Coronavirus NL63 Not Detected     Coronavirus OC43 Not Detected     Human Metapneumovirus Not Detected     Human Rhinovirus/Enterovirus Detected     Influenza B PCR Not Detected     Parainfluenza Virus 1 Not Detected     Parainfluenza Virus 2 Not Detected     Parainfluenza Virus 3 Not Detected     Parainfluenza Virus 4 Not Detected     Bordetella pertussis pcr Not Detected     Influenza A H1 2009 PCR Not Detected     Chlamydophila pneumoniae PCR Not Detected     Mycoplasma pneumo by PCR Not Detected     Influenza A PCR Not Detected     Influenza A H3 Not Detected     Influenza A H1 Not Detected      RSV, PCR Not Detected    Urine Culture - Urine, Urine, Catheter [[id number]]  (Normal) Collected:  10/28/19 1212    Lab Status:  Final result Specimen:  Urine, Catheter Updated:  10/29/19 1210     Urine Culture No growth    Blood Culture - Blood, Arm, Left [[id number]]  (Abnormal) Collected:  10/28/19 1155    Lab Status:  Final result Specimen:  Blood from Arm, Left Updated:  10/31/19 1310     Blood Culture Scant growth (1+) Actinomyces species     Isolated from Anaerobic Bottle     Gram Stain Anaerobic Bottle Gram positive bacilli      Aerobic Bottle Gram positive bacilli    Narrative:       Blood culture does not meet the specified criteria for PCR identification.  If pregnant, immunocompromised, or clinical concern for meningitis, call lab to run BCID for Listeria monocytogenes.          Medication Review:       Schedule Meds    ampicillin-sulbactam 3 g Intravenous Q24H   citalopram 20 mg Oral Daily   heparin (porcine) 5,000 Units Subcutaneous Q8H   ipratropium-albuterol 3 mL Nebulization Q4H - RT   sodium chloride 10 mL Intravenous Q12H   sodium chloride 10 mL Intravenous Q12H   sodium chloride 10 mL Intravenous Q12H   sodium chloride 10 mL Intravenous Q12H       Infusion Meds       PRN Meds  •  acetaminophen  •  heparin (porcine)  •  hydrALAZINE  •  hydrOXYzine  •  influenza vaccine  •  ondansetron  •  sodium chloride  •  sodium chloride  •  sodium chloride        Assessment/Plan       Antimicrobial Therapy   1.  IV Unasyn     Day 5  2.      Day  3.      Day  4.      Day  5.      Day      Assessment     Positive blood culture for actinomyces species on admission.  Unfortunately ER performed only one set of blood culture.  This is usually not sufficient to diagnose bacteremia.  Actinomyces is a very rare cause of true bacteremia and usually associated with oral maxillary disease or intra-abdominal abscesses or occasionally pulmonary disease.  The patient has pulmonary infiltrates but not consistent with  actinomycosis.  -Repeat blood cultures are negative so far     IV drug use.  Patient was using IV heroin and IV methamphetamine     Hepatitis C infection.  Treatment naïve  Hep Quant- 353,000 IU/ml  HCV log10 5.548 log10 IU/ml  Genotype 1A     Bilateral pulmonary infiltrates associated with bilateral effusion.  Most likely secondary to aspiration pneumonia     Rhabdomyolysis     Acute kidney failure.  Most likely multifactorial secondary to rhabdomyolysis and sepsis.  The patient is currently on hemodialysis     Rhinovirus infection    Diarrhea.  C. difficile colitis screen was negative    Right upper quadrant pain.  CT scan of the abdomen and pelvis was done without contrast and showed thickening of the gallbladder.  The patient is currently asymptomatic.       Plan     Continue patient on Unasyn 3 grams IV daily for a total of 2 weeks- last day on 11/14/19  Patient will need a midline before discharge if he is going to a medical facility- otherwise he will need daily iv access if he is coming to ambulatory care  Weekly labs CBC/creatinine  Continue supportive care  A.m. labs  Droplet isolation for rhinovirus.  Patient will need to follow with GI as an outpatient for hepatitis C treatment  Illicit drug abuse cessation-patient states that he is interested in drug rehab           STEPHANE Falcon  11/05/19  11:02 AM      Note is dictated utilizing voice recognition software/Dragon

## 2019-11-06 LAB
ANION GAP SERPL CALCULATED.3IONS-SCNC: 16 MMOL/L (ref 5–15)
BACTERIA SPEC AEROBE CULT: NORMAL
BACTERIA SPEC AEROBE CULT: NORMAL
BACTERIA SPEC ANAEROBE CULT: NORMAL
BASOPHILS # BLD AUTO: 0.1 10*3/MM3 (ref 0–0.2)
BASOPHILS NFR BLD AUTO: 0.7 % (ref 0–1.5)
BUN BLD-MCNC: 40 MG/DL (ref 6–20)
BUN/CREAT SERPL: 5.1 (ref 7–25)
CALCIUM SPEC-SCNC: 8.8 MG/DL (ref 8.6–10.5)
CHLORIDE SERPL-SCNC: 99 MMOL/L (ref 98–107)
CHOLEST FLD-MCNC: 47 MG/DL
CO2 SERPL-SCNC: 21 MMOL/L (ref 22–29)
CREAT BLD-MCNC: 7.8 MG/DL (ref 0.76–1.27)
DEPRECATED RDW RBC AUTO: 41.1 FL (ref 37–54)
EOSINOPHIL # BLD AUTO: 0.6 10*3/MM3 (ref 0–0.4)
EOSINOPHIL NFR BLD AUTO: 4.6 % (ref 0.3–6.2)
ERYTHROCYTE [DISTWIDTH] IN BLOOD BY AUTOMATED COUNT: 13.2 % (ref 12.3–15.4)
GFR SERPL CREATININE-BSD FRML MDRD: 8 ML/MIN/1.73
GFR SERPL CREATININE-BSD FRML MDRD: ABNORMAL ML/MIN/{1.73_M2}
GLUCOSE BLD-MCNC: 111 MG/DL (ref 65–99)
HCT VFR BLD AUTO: 29.8 % (ref 37.5–51)
HGB BLD-MCNC: 10.6 G/DL (ref 13–17.7)
LYMPHOCYTES # BLD AUTO: 1.9 10*3/MM3 (ref 0.7–3.1)
LYMPHOCYTES NFR BLD AUTO: 14.8 % (ref 19.6–45.3)
MAGNESIUM SERPL-MCNC: 2 MG/DL (ref 1.6–2.6)
MCH RBC QN AUTO: 31.2 PG (ref 26.6–33)
MCHC RBC AUTO-ENTMCNC: 35.6 G/DL (ref 31.5–35.7)
MCV RBC AUTO: 87.6 FL (ref 79–97)
MONOCYTES # BLD AUTO: 1.2 10*3/MM3 (ref 0.1–0.9)
MONOCYTES NFR BLD AUTO: 9 % (ref 5–12)
NEUTROPHILS # BLD AUTO: 9.3 10*3/MM3 (ref 1.7–7)
NEUTROPHILS NFR BLD AUTO: 70.9 % (ref 42.7–76)
NRBC BLD AUTO-RTO: 0 /100 WBC (ref 0–0.2)
PLATELET # BLD AUTO: 233 10*3/MM3 (ref 140–450)
PMV BLD AUTO: 6.6 FL (ref 6–12)
POTASSIUM BLD-SCNC: 4.1 MMOL/L (ref 3.5–5.2)
RBC # BLD AUTO: 3.4 10*6/MM3 (ref 4.14–5.8)
SODIUM BLD-SCNC: 136 MMOL/L (ref 136–145)
WBC NRBC COR # BLD: 13.1 10*3/MM3 (ref 3.4–10.8)

## 2019-11-06 PROCEDURE — 94799 UNLISTED PULMONARY SVC/PX: CPT

## 2019-11-06 PROCEDURE — 25010000002 HEPARIN (PORCINE) PER 1000 UNITS: Performed by: INTERNAL MEDICINE

## 2019-11-06 PROCEDURE — 25010000003 AMPICILLIN-SULBACTAM PER 1.5 G: Performed by: INTERNAL MEDICINE

## 2019-11-06 PROCEDURE — 83735 ASSAY OF MAGNESIUM: CPT | Performed by: NURSE PRACTITIONER

## 2019-11-06 PROCEDURE — 80048 BASIC METABOLIC PNL TOTAL CA: CPT | Performed by: NURSE PRACTITIONER

## 2019-11-06 PROCEDURE — 25010000002 ONDANSETRON PER 1 MG: Performed by: NURSE PRACTITIONER

## 2019-11-06 PROCEDURE — 97165 OT EVAL LOW COMPLEX 30 MIN: CPT

## 2019-11-06 PROCEDURE — 99232 SBSQ HOSP IP/OBS MODERATE 35: CPT | Performed by: INTERNAL MEDICINE

## 2019-11-06 PROCEDURE — 5A1D70Z PERFORMANCE OF URINARY FILTRATION, INTERMITTENT, LESS THAN 6 HOURS PER DAY: ICD-10-PCS | Performed by: INTERNAL MEDICINE

## 2019-11-06 PROCEDURE — 85025 COMPLETE CBC W/AUTO DIFF WBC: CPT | Performed by: NURSE PRACTITIONER

## 2019-11-06 PROCEDURE — 94760 N-INVAS EAR/PLS OXIMETRY 1: CPT

## 2019-11-06 PROCEDURE — 97161 PT EVAL LOW COMPLEX 20 MIN: CPT

## 2019-11-06 RX ORDER — ALBUMIN (HUMAN) 12.5 G/50ML
12.5 SOLUTION INTRAVENOUS AS NEEDED
Status: ACTIVE | OUTPATIENT
Start: 2019-11-06 | End: 2019-11-07

## 2019-11-06 RX ADMIN — CITALOPRAM HYDROBROMIDE 20 MG: 20 TABLET ORAL at 08:18

## 2019-11-06 RX ADMIN — HYDROXYZINE HYDROCHLORIDE 50 MG: 25 TABLET, FILM COATED ORAL at 22:10

## 2019-11-06 RX ADMIN — ONDANSETRON 4 MG: 2 INJECTION INTRAMUSCULAR; INTRAVENOUS at 01:30

## 2019-11-06 RX ADMIN — IPRATROPIUM BROMIDE AND ALBUTEROL SULFATE 3 ML: .5; 3 SOLUTION RESPIRATORY (INHALATION) at 23:40

## 2019-11-06 RX ADMIN — IPRATROPIUM BROMIDE AND ALBUTEROL SULFATE 3 ML: .5; 3 SOLUTION RESPIRATORY (INHALATION) at 11:56

## 2019-11-06 RX ADMIN — SODIUM CHLORIDE 3 G: 900 INJECTION INTRAVENOUS at 17:44

## 2019-11-06 RX ADMIN — HEPARIN SODIUM 5000 UNITS: 5000 INJECTION INTRAVENOUS; SUBCUTANEOUS at 05:21

## 2019-11-06 RX ADMIN — Medication 10 ML: at 08:19

## 2019-11-06 RX ADMIN — Medication 10 ML: at 22:10

## 2019-11-06 RX ADMIN — HYDROXYZINE HYDROCHLORIDE 50 MG: 25 TABLET, FILM COATED ORAL at 16:10

## 2019-11-06 RX ADMIN — HEPARIN SODIUM 5000 UNITS: 5000 INJECTION INTRAVENOUS; SUBCUTANEOUS at 22:10

## 2019-11-06 RX ADMIN — IPRATROPIUM BROMIDE AND ALBUTEROL SULFATE 3 ML: .5; 3 SOLUTION RESPIRATORY (INHALATION) at 20:06

## 2019-11-06 NOTE — PROGRESS NOTES
"      [facility] Medicine Services Daily Progress Note      Hospitalist Team  LOS 9 days      Patient Care Team:  Gary Llanos MD as PCP - General        Chief Complaint / Subjective  Chief Complaint   Patient presents with   • Ingestion     heroin and meth use last seen 830 last night pt was found in barn.     Continues to improve breathing is stable is on room air no other new issues.    Brief Synopsis of Hospital Course/HPI  25 yo male found down d/t overdose and was intubated in manged in the ICU. Now downgraded on IV abx and intermittent HD.           Review of Systems   Constitution: Negative for fever.   Respiratory: Negative for cough.                Objective      Vital Signs  Temp:  [98 °F (36.7 °C)-98.6 °F (37 °C)] 98.3 °F (36.8 °C)  Heart Rate:  [] 91  Resp:  [18-23] 18  BP: (136-175)/() 175/116  Oxygen Therapy  SpO2: 97 %  Pulse Oximetry Type: Intermittent  Device (Oxygen Therapy): room air  $ High Flow Nasal Cannula Set-Up: yes  Flow (L/min): 2  Oxygen Concentration (%): 40  Oximetry Probe Site Changed: No  Flowsheet Rows      First Filed Value   Admission Height  172.7 cm (68\") Documented at 10/28/2019 1138   Admission Weight  59 kg (130 lb) Documented at 10/28/2019 1138        Intake & Output (last 3 days)       11/03 0701 - 11/04 0700 11/04 0701 - 11/05 0700 11/05 0701 - 11/06 0700 11/06 0701 - 11/07 0700    P.O. 960 480 240     I.V. (mL/kg) 197 (3.1) 337.9 (5.6)      IV Piggyback 100   100    Total Intake(mL/kg) 1257 (20) 817.9 (13.5) 240 (3.9) 100 (1.6)    Urine (mL/kg/hr) 30 (0)       Emesis/NG output        Other 2000 2500  135    Stool 1 0      Total Output 2031 2500  135    Net -774 -1682.1 +240 -35            Urine Unmeasured Occurrence  2 x      Stool Unmeasured Occurrence 1 x 3 x          Lines, Drains & Airways    Active LDAs     Name:   Placement date:   Placement time:   Site:   Days:    Hemodialysis Cath Double with Pigtail   10/30/19    1300    " Internal Jugular   6                  Physical Exam:    Physical Exam   Constitutional: No distress.   HENT:   Head: Normocephalic.   Eyes: Pupils are equal, round, and reactive to light.   Neck:   Dialysis catheter noted to the right neck   Cardiovascular: Normal rate.   Pulmonary/Chest: Effort normal. No respiratory distress.   Abdominal: Soft. He exhibits no distension.   Neurological: He is alert.   Skin: Skin is warm.   Psychiatric: He has a normal mood and affect.           Results Review:     I reviewed the patient's new clinical results.      Results from last 7 days   Lab Units 11/06/19  0135 11/05/19  0457 11/04/19  0434 11/03/19  0535 11/02/19  0547 11/01/19  0500   WBC 10*3/mm3 13.10* 12.30* 12.80* 13.50* 14.60* 17.50*   HEMOGLOBIN g/dL 10.6* 11.0* 11.2* 11.0* 10.3* 11.0*   HEMATOCRIT % 29.8* 30.2* 31.2* 31.2* 29.2* 32.2*   PLATELETS 10*3/mm3 233 202 167 146 122* 111*   MONOCYTES % %  --   --   --  6.0 5.0 3.0*     Results from last 7 days   Lab Units 11/06/19 0135 11/05/19  1723 11/05/19 0457  11/03/19  0535 11/02/19  0547   SODIUM mmol/L 136 135* 137   < > 138 135*   POTASSIUM mmol/L 4.1 4.0 3.8   < > 3.6 3.7   CHLORIDE mmol/L 99 99 102   < > 102 100   CO2 mmol/L 21.0* 23.0 24.0   < > 23.0 21.0*   BUN mg/dL 40* 33* 24*   < > 44* 61*   CREATININE mg/dL 7.80* 6.35* 5.08*   < > 5.46* 5.27*   CALCIUM mg/dL 8.8 8.9 8.5*   < > 8.3* 8.4*   BILIRUBIN mg/dL  --   --  0.4  --  0.5 0.5   ALK PHOS U/L  --   --  63  --  57 57   ALT (SGPT) U/L  --   --  233*  --  413* 627*   AST (SGOT) U/L  --   --  44*  --  67* 124*   GLUCOSE mg/dL 111* 87 96   < > 88 91    < > = values in this interval not displayed.     Results from last 7 days   Lab Units 11/06/19  0135 11/05/19  0457 11/04/19  0434   MAGNESIUM mg/dL 2.0 2.0 2.2     Lab Results   Component Value Date    CALCIUM 8.8 11/06/2019    PHOS 7.4 (H) 10/28/2019     No results found for: HGBA1C                Microbiology Results (last 10 days)     Procedure Component  Value - Date/Time    Clostridium Difficile EIA - Stool, Per Rectum [[id number]]  (Normal) Collected:  11/02/19 1348    Lab Status:  Final result Specimen:  Stool from Per Rectum Updated:  11/03/19 0714     C Diff GDH / Toxin Negative    Blood Culture - Blood, Blood, Central Line [[id number]] Collected:  11/01/19 1342    Lab Status:  Final result Specimen:  Blood, Central Line Updated:  11/06/19 1345     Blood Culture No growth at 5 days    Blood Culture - Blood, Arm, Right [[id number]] Collected:  11/01/19 1020    Lab Status:  Final result Specimen:  Blood from Arm, Right Updated:  11/06/19 0945     Blood Culture No growth at 5 days    AFB Culture - Body Fluid, Pleural Cavity [[id number]] Collected:  11/01/19 0202    Lab Status:  Preliminary result Specimen:  Body Fluid from Pleural Cavity Updated:  11/06/19 0239     AFB Culture No AFB isolated at less than 1 week     AFB Stain No acid fast bacilli seen    Body Fluid Culture - Body Fluid, Pleural Cavity [[id number]] Collected:  11/01/19 0202    Lab Status:  Final result Specimen:  Body Fluid from Pleural Cavity Updated:  11/04/19 0706     Body Fluid Culture No growth     Gram Stain Rare (1+) WBCs per low power field      No organisms seen    Fungus Culture - Body Fluid, Pleural Cavity [[id number]] Collected:  11/01/19 0202    Lab Status:  Preliminary result Specimen:  Body Fluid from Pleural Cavity Updated:  11/06/19 0239     Fungus Culture No fungus isolated at less than 1 week    Fungus Smear - Body Fluid, Pleural Cavity [[id number]] Collected:  11/01/19 0202    Lab Status:  Final result Specimen:  Body Fluid from Pleural Cavity Updated:  11/01/19 1122     Fungal Stain No fungal elements seen    Anaerobic Culture - Pleural Fluid, Pleural Cavity [[id number]] Collected:  11/01/19 0201    Lab Status:  Final result Specimen:  Pleural Fluid from Pleural Cavity Updated:  11/06/19 0946     Anaerobic Culture No anaerobes isolated at 5 days    Respiratory Culture - Sputum, ET  Suction [[id number]] Collected:  10/28/19 2215    Lab Status:  Final result Specimen:  Sputum from ET Suction Updated:  10/31/19 0937     Respiratory Culture Scant growth (1+) Normal Respiratory Aby     Gram Stain Many (4+) WBCs per low power field      Many (4+) Mixed bacterial morphotypes seen on Gram Stain    MRSA Screen Culture - Swab, Nares [[id number]]  (Normal) Collected:  10/28/19 1515    Lab Status:  Final result Specimen:  Swab from Nares Updated:  10/29/19 1813     MRSA SCREEN CX No Methicillin Resistant Staphylococcus aureus isolated    Respiratory Panel, PCR - Swab, Nasopharynx [[id number]]  (Abnormal) Collected:  10/28/19 1515    Lab Status:  Final result Specimen:  Swab from Nasopharynx Updated:  10/28/19 1741     ADENOVIRUS, PCR Not Detected     Coronavirus 229E Not Detected     Coronavirus HKU1 Not Detected     Coronavirus NL63 Not Detected     Coronavirus OC43 Not Detected     Human Metapneumovirus Not Detected     Human Rhinovirus/Enterovirus Detected     Influenza B PCR Not Detected     Parainfluenza Virus 1 Not Detected     Parainfluenza Virus 2 Not Detected     Parainfluenza Virus 3 Not Detected     Parainfluenza Virus 4 Not Detected     Bordetella pertussis pcr Not Detected     Influenza A H1 2009 PCR Not Detected     Chlamydophila pneumoniae PCR Not Detected     Mycoplasma pneumo by PCR Not Detected     Influenza A PCR Not Detected     Influenza A H3 Not Detected     Influenza A H1 Not Detected     RSV, PCR Not Detected    Urine Culture - Urine, Urine, Catheter [[id number]]  (Normal) Collected:  10/28/19 1212    Lab Status:  Final result Specimen:  Urine, Catheter Updated:  10/29/19 1210     Urine Culture No growth    Blood Culture - Blood, Arm, Left [[id number]]  (Abnormal) Collected:  10/28/19 1155    Lab Status:  Final result Specimen:  Blood from Arm, Left Updated:  10/31/19 1310     Blood Culture Scant growth (1+) Actinomyces species     Isolated from Anaerobic Bottle     Gram Stain  Anaerobic Bottle Gram positive bacilli      Aerobic Bottle Gram positive bacilli    Narrative:       Blood culture does not meet the specified criteria for PCR identification.  If pregnant, immunocompromised, or clinical concern for meningitis, call lab to run BCID for Listeria monocytogenes.          ECG/EMG Results (most recent)     Procedure Component Value Units Date/Time    Adult Transthoracic Echo Complete W/ Cont if Necessary Per Protocol [[id number]] Collected:  10/28/19 1814     Updated:  10/28/19 2055     BSA 1.7 m^2       CV ECHO ROCÍO - RVDD 3.2 cm      IVSd 1.1 cm      LVIDd 4.0 cm      LVIDs 2.8 cm      LVPWd 1.0 cm      IVS/LVPW 1.0     FS 29.8 %      EDV(Teich) 69.9 ml      ESV(Teich) 29.7 ml      EF(Teich) 57.5 %      EDV(cubed) 63.9 ml      ESV(cubed) 22.1 ml      EF(cubed) 65.4 %      LV mass(C)d 134.4 grams      LV mass(C)dI 79.0 grams/m^2      SV(Teich) 40.2 ml      SI(Teich) 23.6 ml/m^2      SV(cubed) 41.8 ml      SI(cubed) 24.6 ml/m^2      Ao root diam 3.1 cm      Ao root area 7.7 cm^2      ACS 2.3 cm      LVOT diam 1.8 cm      LVOT area 2.6 cm^2      RVOT diam 1.5 cm      RVOT area 1.8 cm^2      EDV(MOD-sp4) 82.4 ml      ESV(MOD-sp4) 38.5 ml      EF(MOD-sp4) 53.2 %      SV(MOD-sp4) 43.9 ml      SI(MOD-sp4) 25.8 ml/m^2      Ao root area (BSA corrected) 1.8     LV [place] Vol (BSA corrected) 48.4 ml/m^2      LV Sys Vol (BSA corrected) 22.6 ml/m^2      MV E max chloe 93.9 cm/sec      MV A max chloe 50.2 cm/sec      MV E/A 1.9     MV V2 max 108.9 cm/sec      MV max PG 4.7 mmHg      MV V2 mean 67.4 cm/sec      MV mean PG 2.0 mmHg      MV V2 VTI 27.5 cm      MVA(VTI) 1.5 cm^2      MV dec slope 816.8 cm/sec^2      MV dec time 0.11 sec      Ao pk chloe 134.4 cm/sec      Ao max PG 7.2 mmHg      Ao max PG (full) 2.3 mmHg      Ao V2 mean 96.0 cm/sec      Ao mean PG 4.0 mmHg      Ao mean PG (full) 1.5 mmHg      Ao V2 VTI 19.9 cm      KARI(I,A) 2.0 cm^2      KARI(I,D) 2.0 cm^2      KARI(V,A) 2.2 cm^2      KAIR(V,D)  2.2 cm^2      LV V1 max PG 4.9 mmHg      LV V1 mean PG 2.5 mmHg      LV V1 max 111.2 cm/sec      LV V1 mean 73.5 cm/sec      LV V1 VTI 15.5 cm      SV(Ao) 153.0 ml      SI(Ao) 89.9 ml/m^2      SV(LVOT) 40.4 ml      SV(RVOT) 18.6 ml      SI(LVOT) 23.7 ml/m^2      PA V2 max 105.4 cm/sec      PA max PG 4.4 mmHg      PA max PG (full) 2.7 mmHg       CV ECHO ROCÍO - PVA(V,A) 1.1 cm^2       CV ECHO ROCÍO - PVA(V,D) 1.1 cm^2      RV V1 max PG 1.7 mmHg      RV V1 mean PG 0.84 mmHg      RV V1 max 65.7 cm/sec      RV V1 mean 43.2 cm/sec      RV V1 VTI 10.2 cm      TR max chloe 226.7 cm/sec      Qp/Qs 0.46      CV ECHO ROCÍO - BZI_BMI 19.8 kilograms/m^2       CV ECHO ROCÍO - BSA(HAYCOCK) 1.7 m^2       CV ECHO ROCÍO - BZI_METRIC_WEIGHT 59.0 kg       CV ECHO ROCÍO - BZI_METRIC_HEIGHT 172.7 cm      EF(MOD-bp) 53.0 %      LA dimension(2D) 3.4 cm      Echo EF Estimated 60 %     Narrative:         · Estimated EF = 60%.  · Left ventricular systolic function is normal.     Indications  Respiratory failure      Technically satisfactory study.  Mitral valve is structurally normal.  Tricuspid valve is structurally normal.  Aortic valve is structurally normal.  Pulmonic valve could not be well visualized.  No evidence for mitral tricuspid or aortic regurgitation is seen by   Doppler study.  Left atrium is normal in size.  Right atrium is significantly enlarged  Left ventricle is normal in size and contractility with ejection fraction   of 60%.  Right ventricle is definitely enlarged  Atrial septum is intact.  Aorta is normal.  No pericardial effusion or intracardiac thrombus is seen.    Impression  Significant right atrium right ventricle enlargement.  No evidence for mitral tricuspid or aortic regurgitation is seen by   Doppler study.  No pericardial effusion or intracardiac thrombus is present.  Left ventricle is normal in size and contractility with ejection fraction   of 60%.  [place] no pericardial effusion or intracardiac thrombus  is seen.        ECG 12 Lead [[id number]] Collected:  10/28/19 1214     Updated:  10/29/19 0808    Narrative:       HEART RATE= 119  bpm  RR Interval= 504  ms  AK Interval= 117  ms  P Horizontal Axis= 4  deg  P Front Axis= 86  deg  QRSD Interval= 128  ms  QT Interval= 369  ms  QRS Axis= 112  deg  T Wave Axis= 39  deg  - ABNORMAL ECG -  Sinus tachycardia  Ventricular premature complex  Nonspecific intraventricular conduction delay  ST elev, probable normal early repol pattern  No previous ECG available for comparison  Electronically Signed By: Antonio Lance (Thomas) 29-Oct-2019 08:08:34  Date and Time of Study: 2019-10-28 12:14:30          Results for orders placed during the hospital encounter of 10/28/19   Duplex Venous Lower Extremity - Bilateral CAR    Narrative · Normal bilateral lower extremity venous duplex scan.          Results for orders placed during the hospital encounter of 10/28/19   Adult Transthoracic Echo Complete W/ Cont if Necessary Per Protocol    Narrative · Estimated EF = 60%.  · Left ventricular systolic function is normal.     Indications  Respiratory failure      Technically satisfactory study.  Mitral valve is structurally normal.  Tricuspid valve is structurally normal.  Aortic valve is structurally normal.  Pulmonic valve could not be well visualized.  No evidence for mitral tricuspid or aortic regurgitation is seen by   Doppler study.  Left atrium is normal in size.  Right atrium is significantly enlarged  Left ventricle is normal in size and contractility with ejection fraction   of 60%.  Right ventricle is definitely enlarged  Atrial septum is intact.  Aorta is normal.  No pericardial effusion or intracardiac thrombus is seen.    Impression  Significant right atrium right ventricle enlargement.  No evidence for mitral tricuspid or aortic regurgitation is seen by   Doppler study.  No pericardial effusion or intracardiac thrombus is present.  Left ventricle is normal in size and contractility  with ejection fraction   of 60%.  [place] no pericardial effusion or intracardiac thrombus is seen.           Ct Abdomen Pelvis Without Contrast    Result Date: 11/1/2019   1. Bilateral pleural effusions are moderate in size with areas of airspace disease which may be pneumonia. 2. Gallbladder wall thickening versus pericholecystic fluid. 3. Dilated small bowel loops throughout the left upper quadrant of the abdomen with decompression of the distal small bowel. The findings are likely secondary to at least incomplete small bowel obstruction. Exact etiology and location is difficult to determine without oral or IV contrast suspected transition point is in the left lower abdomen.  Electronically Signed ByMandeep Thompson On:11/1/2019 10:21 PM This report was finalized on [id number] by  Arturo Thompson, .    Ct Chest Without Contrast    Result Date: 10/31/2019  Interval increase in the size of the pleural effusions and interval worsening of bilateral lower lobe and upper lobe areas of infiltrate and air bronchograms. The findings are consistent with worsening of pneumonia.  Electronically Signed ByMandeep Thompson On:10/31/2019 9:45 PM This report was finalized on [id number] by  Arturo Thompson, .    Us Gallbladder    Result Date: 11/2/2019  A small amount of sludge is present within the gallbladder lumen. Mild gallbladder wall thickening is present which is nonspecific. There is no evidence of biliary ductal dilatation. The sonographic Turner's sign was negative. Acute cholecystitis cannot be excluded.  Electronically Signed ByKary Granda On:11/2/2019 5:13 PM This report was finalized on [id number] by  Joanne Granda, .    Xr Chest 1 View    Result Date: 11/1/2019  1.Decreased right pleural effusion and decreased right lung opacities postthoracentesis. No definite pneumothorax is seen. 2.Persistent left pleural effusion with left mid and lower lung airspace opacities and consolidation. 3.Right arm PICC and right IJ catheter  appear in expected positions.  Electronically Signed By-DR. Omari Glover MD On:11/1/2019 6:54 AM This report was finalized on [id number] by DR. Omari Glover MD.    Xr Chest 1 View    Result Date: 10/31/2019  1. Support lines and tubes in expected position. 2. Bilateral interstitial and alveolar opacities concerning for edema or infection. 3. Bilateral layering pleural effusions, right greater than left. The right-sided pleural effusion is mildly decreased from the prior exam.  Electronically Signed By-Baldemar Dailey On:10/31/2019 10:36 AM This report was finalized on [id number] by  Baldemar Dailey, .    Xr Chest 1 View    Result Date: 10/30/2019   1. Right IJ central line placement with the tip terminating at the cavoatrial junction. No visible pneumothorax.   2. Marked interval development of mid to lower lung zone dense airspace disease could reflect changes of pneumonia or sequelae of aspiration. Development of small to moderate right pleural effusion. 3. ET tube is been removed.  Electronically Signed By-Dr. Usha Guallpa MD On:10/30/2019 2:52 PM This report was finalized on [id number] by Dr. Usha Guallpa MD.    Us Renal Bilateral    Result Date: 10/29/2019  Increased renal cortical echogenicity consistent with medical renal disease. No hydronephrosis. Mild right perinephric fluid with incidental left pleural effusion.  Electronically Signed By-Arturo Thompson On:10/29/2019 6:47 PM This report was finalized on [id number] by  Arturo Thompson, .      Xrays, labs reviewed personally by physician.    Medication Review:   I have reviewed the patient's current medication list      Scheduled Meds    ampicillin-sulbactam 3 g Intravenous Q24H   citalopram 20 mg Oral Daily   heparin (porcine) 5,000 Units Subcutaneous Q8H   ipratropium-albuterol 3 mL Nebulization Q4H - RT   sodium chloride 10 mL Intravenous Q12H   sodium chloride 10 mL Intravenous Q12H   sodium chloride 10 mL Intravenous Q12H   sodium  "chloride 10 mL Intravenous Q12H       Meds Infusions       Meds PRN  •  acetaminophen  •  albumin human  •  heparin (porcine)  •  hydrALAZINE  •  hydrOXYzine  •  influenza vaccine  •  ondansetron  •  sodium chloride  •  sodium chloride  •  sodium chloride    I personally reviewed patient's x-ray films     I personally reviewed patient's EKG      Assessment / Plan    Active Hospital Problems    Diagnosis POA   • Acute renal failure (ARF) (CMS/HCC) [N17.9] Yes   • Rhabdomyolysis [M62.82] Yes   • ATN (acute tubular necrosis) (CMS/HCC) [N17.0] Yes   • Hepatitis C [B19.20] Yes   • Polysubstance abuse (CMS/HCC) [F19.10] Yes   • Anxiety [F41.9] Yes     Acute hypoxic respiratory failure   -initially required mechanical ventilation  -self-extubated on 10/29/2019  -s/p thoracentesis with 1 L fluid removed  -now on room air  -continue duoneb q 4 hours   -encourage incentive spirometry      Acute toxic metabolic Encephalopathy  -likely multifactorial  -resolved      Septic shock  --resolved      Hypothermia due to environmental exposure   -resolved     Possible Bacteremia / Aspiration pneumonia  -Positive blood culture for actinomyces species on admission  -ID consulted; per ID:  \"ER performed only one set of blood culture.  This is usually not sufficient to diagnose bacteremia.  Actinomyces is a very rare cause of true bacteremia and usually associated with oral maxillary disease or intra-abdominal abscesses or occasionally pulmonary disease.  The patient has pulmonary infiltrates but not consistent with actinomycosis.\"  -repeat blood cultures are negative so far  -continue Unasyn daily, ID reports 2 weeks total treatment but po abx at d/c      KENA secondary to Rhabdomyolysis and ATN  -nephrology following  -on hemodialysis, Lasix and mannitol per nephrology   -nephrology to decide if will need HD outpt, then would need tunneled catheter     Hepatitis C   -new onset, treatment naive   -needs outpatient follow up with " GI     Acute RUQ pain, likely secondary to hepatitis----resolved  -general surgery signed off     Anxiety   -continue hydroxyzine      Polysubstance abuse   -UDS positive for methamphetamine and THC on admission  -encouraged cessation  -DC plan for inpatient rehab program      VTE Prophylaxis - heparin          Discharge Planning    Has been accepted to [facility] in[facility] rehab but they will not take if he needs outpt HD, but can go there once finishes outpt dialysis    Chirag Shane, DO  11/06/19  5:49 PM

## 2019-11-06 NOTE — PLAN OF CARE
Problem: Patient Care Overview  Goal: Plan of Care Review   11/06/19 1204   Coping/Psychosocial   Plan of Care Reviewed With patient   OTHER   Outcome Summary 23 yo male adm for AMS after drug OD. Pt found in barn, hypothermic, treated for septic shock, acute respiratory failure, ARK, elevated LFTs (multifactoral related/ polypharmcy). Pt homeless, living in barn behind family. Pt plans to return to girlfriend's apartment at d/c from rehab. (+) Rhablomyolysis, Rhino virus. Intubated upon ED arrival, self extubated 10/29/2019. Currently on IV abx and intermittent hemodyalsis for elevated LFTs. Pt is independent in all mobility and ambulated 200 ft without AD w/ stable vitals and no complaints. BP consistently high (systolic 150-160s) for age. Pt presents some impuslivity and judgement safety issues at this time; however is independently mobile and will not require inpatient PT services and would benefit from skilled psychiatric services in rehab for drug abuse.

## 2019-11-06 NOTE — PLAN OF CARE
Problem: Patient Care Overview  Goal: Plan of Care Review  Outcome: Ongoing (interventions implemented as appropriate)   11/06/19 0543   Coping/Psychosocial   Plan of Care Reviewed With patient   Plan of Care Review   Progress improving   OTHER   Outcome Summary Pt has been nauseus throughout the night. Vitals stable. No new complaints or concerns from pt at this time.

## 2019-11-06 NOTE — THERAPY EVALUATION
Acute Care - Physical Therapy Initial Evaluation  MILENA Cardenas     Patient Name: Vicente Cunha  : 1995  MRN: [id number]  Today's Date: 2019                Admit Date: 10/28/2019    Visit Dx:     ICD-10-CM ICD-9-CM   1. Altered mental status, unspecified altered mental status type R41.82 780.97   2. Acute respiratory failure with hypoxia (CMS/HCC) J96.01 518.81   3. Acute kidney injury (CMS/HCC) N17.9 584.9   4. Elevated LFTs R94.5 790.6   5. Leukocytosis, unspecified type D72.829 288.60   6. Hypothermia, initial encounter T68.XXXA 991.6     Patient Active Problem List   Diagnosis   • Acute renal failure (ARF) (CMS/HCC)   • Rhabdomyolysis   • ATN (acute tubular necrosis) (CMS/HCC)   • Hepatitis C   • Polysubstance abuse (CMS/HCC)   • Anxiety     Past Medical History:   Diagnosis Date   • Anxiety    • Depression      History reviewed. No pertinent surgical history.     PT ASSESSMENT (last 12 hours)      Physical Therapy Evaluation     Row Name 19 1000          PT Evaluation Time/Intention    Subjective Information  no complaints  (Pended)   -EW     Document Type  evaluation  (Pended)   -EW     Mode of Treatment  individual therapy;physical therapy  (Pended)   -EW     Patient Effort  good  (Pended)   -EW     Symptoms Noted During/After Treatment  none  (Pended)   -EW     Row Name 19 1000          General Information    Patient Profile Reviewed?  yes  (Pended)   -EW     Patient Observations  alert;cooperative;agree to therapy  (Pended)   -EW     General Observations of Patient  PT sitting up EOB; agile   (Pended)   -EW     Prior Level of Function  independent:  (Pended)   -EW     Equipment Currently Used at Home  none  (Pended)   -EW     Pertinent History of Current Functional Problem  23 yo male adm for AMS after drug OD. Pt found in barn, hypothermic, treated for septic shock, acute respiratory failure, elevated LFTs.  (+) Rhablomyolysis, Rhino virus. Intubated upon ED arrival, self extubated  10/29/2019. Currently on IV abx and intermittent hemodyalsis.  (Pended)   -EW     Existing Precautions/Restrictions  no known precautions/restrictions  (Pended)   -EW     Row Name 11/06/19 1000          Relationship/Environment    Primary Source of Support/Comfort  significant other  (Pended)   -EW     Lives With  significant other;other (see comments)  (Pended)  lives in barn behind family; plans to live w/ girlfriend   -EW     Row Name 11/06/19 1000          Resource/Environmental Concerns    Current Living Arrangements  home/apartment/condo  (Pended)   -EW     Row Name 11/06/19 1000          Living Environment    Living Arrangements  apartment  (Pended)   -EW     Living Environment Comment  first floor no stairs  (Pended)   -EW     Row Name 11/06/19 1000          Cognitive Assessment/Intervention- PT/OT    Orientation Status (Cognition)  oriented x 4  (Pended)   -EW     Row Name 11/06/19 1000          Safety Issues, Functional Mobility    Safety Issues Affecting Function (Mobility)  impulsivity;judgment  (Pended)   -EW     Row Name 11/06/19 1000          Bed Mobility Assessment/Treatment    Bed Mobility Assessment/Treatment  bed mobility (all) activities  (Pended)   -EW     [place] Level (Bed Mobility)  independent  (Pended)   -EW     Row Name 11/06/19 1000          Transfer Assessment/Treatment    Transfer Assessment/Treatment  stand-sit transfer;sit-stand transfer  (Pended)   -EW     Sit-Stand [place] (Transfers)  independent  (Pended)   -EW     Stand-Sit [place] (Transfers)  independent  (Pended)   -EW     Row Name 11/06/19 1000          Gait/Stairs Assessment/Training    Gait/Stairs Assessment/Training  gait/ambulation independence  (Pended)   -EW     [place] Level (Gait)  independent  (Pended)   -EW     Distance in Feet (Gait)  200  (Pended)   -EW     Pattern (Gait)  other (see comments)  (Pended)  impulsive gait and pattern; frequent change of direction  -EW     Deviations/Abnormal  Patterns (Gait)  base of support, narrow  (Pended)   -EW     Row Name 11/06/19 1000          General ROM    GENERAL ROM COMMENTS  BUE/LE WFL  (Pended)   -EW     Row Name 11/06/19 1000          MMT (Manual Muscle Testing)    General MMT Comments  BUE/LE WFL 4+/5  (Pended)   -EW     Row Name 11/06/19 1000          Motor Assessment/Intervention    Additional Documentation  Balance (Group)  (Pended)   -EW     Row Name 11/06/19 1000          Balance    Balance  static sitting balance;static standing balance;dynamic sitting balance;dynamic standing balance  (Pended)   -EW     Row Name 11/06/19 1000          Static Sitting Balance    Level of [place] (Unsupported Sitting, Static Balance)  independent  (Pended)   -EW     Sitting Position (Unsupported Sitting, Static Balance)  sitting on edge of bed  (Pended)   -EW     Time Able to Maintain Position (Unsupported Sitting, Static Balance)  more than 5 minutes  (Pended)   -EW     Row Name 11/06/19 1000          Dynamic Sitting Balance    Level of [place], Reaches Outside Midline (Sitting, Dynamic Balance)  independent  (Pended)   -EW     Sitting Position, Reaches Outside Midline (Sitting, Dynamic Balance)  sitting on edge of bed  (Pended)   -EW     Row Name 11/06/19 1000          Static Standing Balance    Level of [place] (Supported Standing, Static Balance)  independent  (Pended)   -EW     Time Able to Maintain Position (Supported Standing, Static Balance)  3 to 4 minutes  (Pended)   -EW     Row Name 11/06/19 1000          Dynamic Standing Balance    Level of [place], Reaches Outside Midline (Standing, Dynamic Balance)  independent  (Pended)   -EW     Time Able to Maintain Position, Reaches Outside Midline (Standing, Dynamic Balance)  2 to 3 minutes  (Pended)   -EW     Row Name 11/06/19 1000          Sensory Assessment/Intervention    Sensory General Assessment  no sensation deficits identified  (Pended)   -EW     Row Name 11/06/19 1000          Pain  Scale: Numbers Pre/Post-Treatment    Pain Scale: Numbers, Pretreatment  0/10 - no pain  (Pended)   -EW     Pain Scale: Numbers, Post-Treatment  0/10 - no pain  (Pended)   -EW     Row Name 11/06/19 1000          Physical Therapy Clinical Impression    Patient/Family Goals Statement (PT Clinical Impression)  Pt would like to go to rehab for drug abuse at d/c.  (Pended)   -EW     Criteria for Skilled Interventions Met (PT Clinical Impression)  no;current level of function same as previous level of function;other (see comments)  (Pended)  does not require PT interventions; psychiatric intervention   -EW     Disability: Inability to Perform Actions/Activities of Required Roles (describe specific disability)  --  (Pended)   -EW     Rehab Potential (PT Clinical Summary)  --  (Pended)   -EW     Predicted Duration of Therapy (PT)  --  (Pended)   -EW     Row Name 11/06/19 1000          Vital Signs    Pre Systolic BP Rehab  162  (Pended)   -EW     Pre Treatment Diastolic BP  110  (Pended)  169/117 sitting EOB  -EW     Intra Systolic BP Rehab  142  (Pended)   -EW     Intra Treatment Diastolic BP  99  (Pended)  standing  -EW     Post Systolic BP Rehab  148  (Pended)   -EW     Post Treatment Diastolic BP  98  (Pended)   -EW     Pretreatment Heart Rate (beats/min)  90  (Pended)   -EW     Intratreatment Heart Rate (beats/min)  90  (Pended)   -EW     Pre SpO2 (%)  98  (Pended)   -EW     O2 Delivery Pre Treatment  room air  (Pended)   -EW     Pre Patient Position  Sitting  (Pended)   -EW     Intra Patient Position  Standing  (Pended)   -EW     Post Patient Position  Sitting  (Pended)   -EW     Row Name 11/06/19 1000          Positioning and Restraints    Pre-Treatment Position  in bed  (Pended)   -EW     Post Treatment Position  bed  (Pended)  sitting EOB  -EW     In Bed  notified nsg;sitting;call light within reach;encouraged to call for assist  (Pended)   -EW       User Key  (r) = Recorded By, (t) = Taken By, (c) = Cosigned By     Initials Name Provider Type    EW Rigo, Brie, PT Student PT Student          PT Recommendation and Plan  Anticipated Discharge Disposition (PT): (P) home, other (see comments)(rehab for drug abuse )  Therapy Frequency (PT Clinical Impression): (P) evaluation only  Outcome Summary/Treatment Plan (PT)  Anticipated Discharge Disposition (PT): (P) home, other (see comments)(rehab for drug abuse )  Plan of Care Reviewed With: (P) patient  Outcome Summary: (P) 23 yo male adm for AMS after drug OD. Pt found in barn, hypothermic, treated for septic shock, acute respiratory failure, elevated LFTs.  (+) Rhablomyolysis, Rhino virus. Intubated upon ED arrival, self extubated 10/29/2019. Currently on IV abx and intermittent hemodyalsis.  Outcome Measures     Row Name 11/06/19 1200             How much help from another person do you currently need...    Turning from your back to your side while in flat bed without using bedrails?  4  (Pended)   -EW      Moving from lying on back to sitting on the side of a flat bed without bedrails?  4  (Pended)   -EW      Moving to and from a bed to a chair (including a wheelchair)?  4  (Pended)   -EW      Standing up from a chair using your arms (e.g., wheelchair, bedside chair)?  4  (Pended)   -EW      Climbing 3-5 steps with a railing?  4  (Pended)   -EW      To walk in hospital room?  4  (Pended)   -EW      AM-PAC 6 Clicks Score (PT)  24  -MR (r) EW (t)         Functional Assessment    Outcome Measure Options  AM-PAC 6 Clicks Basic Mobility (PT)  (Pended)   -EW        User Key  (r) = Recorded By, (t) = Taken By, (c) = Cosigned By    Initials Name Provider Type     JennycelioAngelica RN Registered Nurse    EW Sierra, Brie, PT Student PT Student         Time Calculation:   PT Charges     Row Name 11/06/19 1208             Time Calculation    Start Time  1018  (Pended)   -EW      Stop Time  1036  (Pended)   -EW      Time Calculation (min)  18 min  (Pended)   -EW      PT Received On   11/06/19  (Pended)   -EW         Time Calculation- PT    Total Timed Code Minutes- PT  8 minute(s)  (Pended)   -EW        User Key  (r) = Recorded By, (t) = Taken By, (c) = Cosigned By    Initials Name Provider Type    Brie Sahu, PT Student PT Student        Therapy Charges for Today     Code Description Service Date Service Provider Modifiers Qty    90165590788 HC PT EVAL LOW COMPLEXITY 4 11/6/2019 Brie Sierra, PT Student GP 1          PT G-Codes  Outcome Measure Options: (P) AM-PAC 6 Clicks Basic Mobility (PT)  AM-PAC 6 Clicks Score (PT): 24      Brie Sierra, PT Student  11/6/2019

## 2019-11-06 NOTE — PROGRESS NOTES
Continued Stay Note  MILENA Cardenas     Patient Name: Vicente Cunha  MRN: [id number]  Today's Date: 11/6/2019    Admit Date: 10/28/2019    Discharge Plan     Row Name 11/06/19 1147       Plan    Patient/Family in Agreement with Plan  yes    Plan Comments  VADIM spoke with Angelica at [facility] and she reports that they will not be able to take pt as long as he needs outpt dialysis.  Angelica reports that they will still take him when diialysis is complete, he would just need to call her at [facility] (Angelica, [phone]).       YESENIA Townsend, LSW    Office Phone:  [phone]  Office Fax:  [phone]  Email:  [email or web address]

## 2019-11-06 NOTE — PROGRESS NOTES
Infectious Diseases Progress Note      LOS: 9 days   Patient Care Team:  Gary Llanos MD as PCP - General    Chief Complaint: Feeling much better     Subjective         The patient has been afebrile for the last 24 hours.  The patient is on room air, hemodynamically stable, and is tolerating antimicrobial therapy.          Review of Systems:   Review of Systems   Constitutional: Negative.    HENT: Negative.    Eyes: Negative.    Respiratory: Negative.    Cardiovascular: Negative.    Genitourinary: Negative.    Musculoskeletal: Negative.    Skin: Negative.    Neurological: Negative.    Hematological: Negative.    Psychiatric/Behavioral: Negative.         Objective     Vital Signs  Temp:  [98 °F (36.7 °C)-98.6 °F (37 °C)] 98.6 °F (37 °C)  Heart Rate:  [] 90  Resp:  [14-23] 19  BP: (130-175)/() 153/105    Physical Exam:  Physical Exam   Constitutional: He is oriented to person, place, and time. He appears well-developed and well-nourished.   HENT:   Head: Normocephalic and atraumatic.   Eyes: EOM are normal. Pupils are equal, round, and reactive to light.   Neck: Normal range of motion. Neck supple.   Cardiovascular: Normal rate, regular rhythm and normal heart sounds.   Pulmonary/Chest: Effort normal and breath sounds normal. No respiratory distress. He has no wheezes.   Abdominal: Soft. Bowel sounds are normal. He exhibits no distension and no mass. There is no tenderness. There is no rebound and no guarding.   Musculoskeletal: Normal range of motion. He exhibits no edema or deformity.   Neurological: He is alert and oriented to person, place, and time. No cranial nerve deficit.   Skin: Skin is warm. No rash noted. No erythema.   Psychiatric: He has a normal mood and affect.   Nursing note and vitals reviewed.    (ROS and physical exam performed by Dr. Castro)     Results Review:    I have reviewed all clinical data, test, lab, and imaging results.     Radiology  No Radiology Exams Resulted Within  Past 24 Hours    Cardiology    Laboratory  Results from last 7 days   Lab Units 11/06/19  0135   WBC 10*3/mm3 13.10*   HEMOGLOBIN g/dL 10.6*   HEMATOCRIT % 29.8*   PLATELETS 10*3/mm3 233     Results from last 7 days   Lab Units 11/06/19  0135   SODIUM mmol/L 136   POTASSIUM mmol/L 4.1   CHLORIDE mmol/L 99   CO2 mmol/L 21.0*   BUN mg/dL 40*   CREATININE mg/dL 7.80*   GLUCOSE mg/dL 111*   CALCIUM mg/dL 8.8     Results from last 7 days   Lab Units 11/06/19  0135   SODIUM mmol/L 136   POTASSIUM mmol/L 4.1   CHLORIDE mmol/L 99   CO2 mmol/L 21.0*   BUN mg/dL 40*   CREATININE mg/dL 7.80*   GLUCOSE mg/dL 111*   CALCIUM mg/dL 8.8     Results from last 7 days   Lab Units 11/04/19  0434   CK TOTAL U/L 239*             Microbiology   Microbiology Results (last 10 days)     Procedure Component Value - Date/Time    Clostridium Difficile EIA - Stool, Per Rectum [[id number]]  (Normal) Collected:  11/02/19 1348    Lab Status:  Final result Specimen:  Stool from Per Rectum Updated:  11/03/19 0714     C Diff GDH / Toxin Negative    Blood Culture - Blood, Blood, Central Line [[id number]] Collected:  11/01/19 1342    Lab Status:  Preliminary result Specimen:  Blood, Central Line Updated:  11/05/19 1345     Blood Culture No growth at 4 days    Blood Culture - Blood, Arm, Right [[id number]] Collected:  11/01/19 1020    Lab Status:  Final result Specimen:  Blood from Arm, Right Updated:  11/06/19 0945     Blood Culture No growth at 5 days    AFB Culture - Body Fluid, Pleural Cavity [[id number]] Collected:  11/01/19 0202    Lab Status:  Preliminary result Specimen:  Body Fluid from Pleural Cavity Updated:  11/06/19 0239     AFB Culture No AFB isolated at less than 1 week     AFB Stain No acid fast bacilli seen    Body Fluid Culture - Body Fluid, Pleural Cavity [[id number]] Collected:  11/01/19 0202    Lab Status:  Final result Specimen:  Body Fluid from Pleural Cavity Updated:  11/04/19 0706     Body Fluid Culture No growth     Gram Stain Rare  (1+) WBCs per low power field      No organisms seen    Fungus Culture - Body Fluid, Pleural Cavity [[id number]] Collected:  11/01/19 0202    Lab Status:  Preliminary result Specimen:  Body Fluid from Pleural Cavity Updated:  11/06/19 0239     Fungus Culture No fungus isolated at less than 1 week    Fungus Smear - Body Fluid, Pleural Cavity [[id number]] Collected:  11/01/19 0202    Lab Status:  Final result Specimen:  Body Fluid from Pleural Cavity Updated:  11/01/19 1122     Fungal Stain No fungal elements seen    Anaerobic Culture - Pleural Fluid, Pleural Cavity [[id number]] Collected:  11/01/19 0201    Lab Status:  Final result Specimen:  Pleural Fluid from Pleural Cavity Updated:  11/06/19 0946     Anaerobic Culture No anaerobes isolated at 5 days    Respiratory Culture - Sputum, ET Suction [[id number]] Collected:  10/28/19 2215    Lab Status:  Final result Specimen:  Sputum from ET Suction Updated:  10/31/19 0937     Respiratory Culture Scant growth (1+) Normal Respiratory Aby     Gram Stain Many (4+) WBCs per low power field      Many (4+) Mixed bacterial morphotypes seen on Gram Stain    MRSA Screen Culture - Swab, Nares [[id number]]  (Normal) Collected:  10/28/19 1515    Lab Status:  Final result Specimen:  Swab from Nares Updated:  10/29/19 1813     MRSA SCREEN CX No Methicillin Resistant Staphylococcus aureus isolated    Respiratory Panel, PCR - Swab, Nasopharynx [[id number]]  (Abnormal) Collected:  10/28/19 1515    Lab Status:  Final result Specimen:  Swab from Nasopharynx Updated:  10/28/19 1741     ADENOVIRUS, PCR Not Detected     Coronavirus 229E Not Detected     Coronavirus HKU1 Not Detected     Coronavirus NL63 Not Detected     Coronavirus OC43 Not Detected     Human Metapneumovirus Not Detected     Human Rhinovirus/Enterovirus Detected     Influenza B PCR Not Detected     Parainfluenza Virus 1 Not Detected     Parainfluenza Virus 2 Not Detected     Parainfluenza Virus 3 Not Detected     Parainfluenza  Virus 4 Not Detected     Bordetella pertussis pcr Not Detected     Influenza A H1 2009 PCR Not Detected     Chlamydophila pneumoniae PCR Not Detected     Mycoplasma pneumo by PCR Not Detected     Influenza A PCR Not Detected     Influenza A H3 Not Detected     Influenza A H1 Not Detected     RSV, PCR Not Detected    Urine Culture - Urine, Urine, Catheter [[id number]]  (Normal) Collected:  10/28/19 1212    Lab Status:  Final result Specimen:  Urine, Catheter Updated:  10/29/19 1210     Urine Culture No growth    Blood Culture - Blood, Arm, Left [[id number]]  (Abnormal) Collected:  10/28/19 1155    Lab Status:  Final result Specimen:  Blood from Arm, Left Updated:  10/31/19 1310     Blood Culture Scant growth (1+) Actinomyces species     Isolated from Anaerobic Bottle     Gram Stain Anaerobic Bottle Gram positive bacilli      Aerobic Bottle Gram positive bacilli    Narrative:       Blood culture does not meet the specified criteria for PCR identification.  If pregnant, immunocompromised, or clinical concern for meningitis, call lab to run BCID for Listeria monocytogenes.          Medication Review:       Schedule Meds    ampicillin-sulbactam 3 g Intravenous Q24H   citalopram 20 mg Oral Daily   heparin (porcine) 5,000 Units Subcutaneous Q8H   ipratropium-albuterol 3 mL Nebulization Q4H - RT   sodium chloride 10 mL Intravenous Q12H   sodium chloride 10 mL Intravenous Q12H   sodium chloride 10 mL Intravenous Q12H   sodium chloride 10 mL Intravenous Q12H       Infusion Meds       PRN Meds  •  acetaminophen  •  albumin human  •  heparin (porcine)  •  hydrALAZINE  •  hydrOXYzine  •  influenza vaccine  •  ondansetron  •  sodium chloride  •  sodium chloride  •  sodium chloride        Assessment/Plan       Antimicrobial Therapy   1.  IV Unasyn     Day 6  2.      Day  3.      Day  4.      Day  5.      Day      Assessment     Positive blood culture for actinomyces species on admission.  Unfortunately ER performed only one set of  blood culture.  This is usually not sufficient to diagnose bacteremia.  Actinomyces is a very rare cause of true bacteremia and usually associated with oral maxillary disease or intra-abdominal abscesses or occasionally pulmonary disease.  The patient has pulmonary infiltrates but not consistent with actinomycosis.  -Repeat blood cultures are negative so far     IV drug use.  Patient was using IV heroin and IV methamphetamine     Hepatitis C infection.  Treatment naïve  Hep Quant- 353,000 IU/ml  HCV log10 5.548 log10 IU/ml  Genotype 1A     Bilateral pulmonary infiltrates associated with bilateral effusion.  Most likely secondary to aspiration pneumonia     Rhabdomyolysis     Acute kidney failure.  Most likely multifactorial secondary to rhabdomyolysis and sepsis.  The patient is currently on hemodialysis     Rhinovirus infection    Diarrhea.  C. difficile colitis screen was negative    Right upper quadrant pain.  CT scan of the abdomen and pelvis was done without contrast and showed thickening of the gallbladder.  The patient is currently asymptomatic.       Plan     Continue patient on Unasyn 3 grams IV daily -patient will need 2 weeks total treatment but will be able to switch patient to p.o. antibiotics at discharge  Okay to place permanent dialysis catheter anytime  Continue supportive care  A.m. labs  Droplet isolation for rhinovirus during this admission   Patient will need to follow with GI as an outpatient for hepatitis C treatment  Illicit drug abuse cessation-patient states that he is interested in drug rehab           Ashia Childress, STEPHANE  11/06/19  10:31 AM      Note is dictated utilizing voice recognition software/Dragon

## 2019-11-06 NOTE — PLAN OF CARE
Problem: Patient Care Overview  Goal: Plan of Care Review   11/06/19 1502   Coping/Psychosocial   Plan of Care Reviewed With patient   OTHER   Outcome Summary 23 yo male adm for AMS after drug OD treated for septic shock, acute respiratory failure, elevated LFTs. (+) Rhablomyolysis, Rhino virus. Pt is independent in all mobility and ambulation and performing at baseline. Pt does not require IP PT and is safe to return home. Recommending inpatient rehab for substance abuse at d/c.

## 2019-11-06 NOTE — PROGRESS NOTES
Continued Stay Note  MILENA Cardenas     Patient Name: Vicente Cunha  MRN: [id number]  Today's Date: 11/6/2019    Admit Date: 10/28/2019    Margarita amado [facility] called to confirm receiving patient information for OP Dialysis    Esthela Tobin RN, CM  Office Phone [phone]  Cell [phone]

## 2019-11-06 NOTE — PLAN OF CARE
Problem: Patient Care Overview  Goal: Plan of Care Review   11/06/19 1024   Coping/Psychosocial   Plan of Care Reviewed With patient   Plan of Care Review   Progress improving   OTHER   Outcome Summary Pt is 23 yo male admitted to [facility] for AMS after drug overdose. Pt was found in barn and was found to be hypothermic, now pt is being treated for septic shock shock, acute respiratory failure, and acute renal failure. Pt was also found to have elevated troponin, however it is believed that this is caused by pts drug abuse. Pt is currently homeless and reports living in the barn behind his grandparents house, however his brother and grandmother that live there do not want him in the house and his grandfather is the only one that lets him in the house. Pt reports planning to move in with his girlfriend and finding a job after staying at an inpatient recovery center. Both pt and parent/family have a history of substance abuse. Pt completes functional transfer and ADLs with independence and does not required continued skilled OT at this time.Pt score on the MOCA is 22/26 indicating cognitive impairment. Deficits were noted in visuospatial/executive functioning and delayed recall.  OT is recommending substance abuse rehab upon discharge and a psych consult while pt is at [facility].   Patient may benefit from OT services for roles, habits, and safety awareness issues during recovery, but is not appropriate for OT in this setting.

## 2019-11-06 NOTE — SIGNIFICANT NOTE
11/06/19 1215   Coping/Psychosocial   Verbalized Emotional State anxiety;fear;hopefulness   Plan of Care Reviewed With patient   Additional Documentation Pastoral/Spiritual Care (Group)   Psychosocial Support   Trust Relationship/Rapport emotional support provided;thoughts/feelings acknowledged;empathic listening provided   Involvement in Care   Family/Support System, Persons friend   Involvement in Care not present at bedside   Pastoral/Spiritual Care   Pastoral Care Visit Type follow-up   Pastoral Care Source patient request (describe)  (PT came to  office)   Receptivity to Spiritual Care visit welcomed   Pastoral Care Request spiritual/moral support;prayer support;decision-making support   Pastoral Care Interventions theological discussion provided;supportive conversation provided;referral provided;prayer support provided   Pastoral Care Response thanks expressed;receptive of support;engaged in conversation   Use of Spiritual Resources spirituality for coping, indicated strong use of;prayer   Pastoral Care Follow-Up follow-up planned regularly for general support   Coping/Psychosocial Interventions   Supportive Measures active listening utilized;self-reflection promoted;goal setting facilitated     PT was concerned with his discharged from the hospital and living arrangements. PT reports that he has transportation, but is concerned with the stability of his housing situation due to possibly not having house due to funds. Was asking for possible resources. Asked PT if he had spoke to SW he said no. Will referral SW. Inquired with PT about Jain support. PT does attend a Jain in [facility], PT reports they have been supportive. PT is fearful/anxious of his recovery with substances. Desires to change and believes that his belief in the Lord will help him with this journey. PT reflected on family dynamics. Reports he has spoken with his sponsor and is also supported mainly by a friend. Prayed with PT and  discussed various teachings about gagan.

## 2019-11-06 NOTE — PLAN OF CARE
Problem: Fall Risk (Adult)  Goal: Absence of Fall  Outcome: Ongoing (interventions implemented as appropriate)   11/06/19 1105   Fall Risk (Adult)   Absence of Fall making progress toward outcome       Problem: Patient Care Overview  Goal: Plan of Care Review  Outcome: Ongoing (interventions implemented as appropriate)   11/06/19 1105   Coping/Psychosocial   Plan of Care Reviewed With patient   Plan of Care Review   Progress improving     Goal: Individualization and Mutuality  Outcome: Ongoing (interventions implemented as appropriate)   11/06/19 1105   Individualization   Patient Specific Preferences .     Goal: Discharge Needs Assessment  Outcome: Ongoing (interventions implemented as appropriate)   11/06/19 1105   Discharge Needs Assessment   Readmission Within the Last 30 Days no previous admission in last 30 days   Concerns to be Addressed discharge planning;substance/tobacco abuse/use   Patient/Family Anticipates Transition to other (see comments)  (Rehab facility)   Patient/Family Anticipated Services at Transition other (see comments)  (Rehab facility)   Transportation Concerns car, none   Anticipated Changes Related to Illness none   Equipment Needed After Discharge none   Disability   Equipment Currently Used at Home none     Goal: Interprofessional Rounds/Family Conf  Outcome: Ongoing (interventions implemented as appropriate)   11/06/19 1105   Interdisciplinary Rounds/Family Conf   Summary Discussed plan of care and discharge plan   Participants ;nursing;pharmacy;social work/services;speech language pathology       Problem: Skin Injury Risk (Adult)  Goal: Skin Health and Integrity  Outcome: Ongoing (interventions implemented as appropriate)   11/06/19 1105   Skin Injury Risk (Adult)   Skin Health and Integrity making progress toward outcome

## 2019-11-06 NOTE — THERAPY EVALUATION
Acute Care - Occupational Therapy Initial Evaluation   Ronald     Patient Name: Vicente Cunha  : 1995  MRN: [id number]  Today's Date: 2019             Admit Date: 10/28/2019       ICD-10-CM ICD-9-CM   1. Altered mental status, unspecified altered mental status type R41.82 780.97   2. Acute respiratory failure with hypoxia (CMS/HCC) J96.01 518.81   3. Acute kidney injury (CMS/HCC) N17.9 584.9   4. Elevated LFTs R94.5 790.6   5. Leukocytosis, unspecified type D72.829 288.60   6. Hypothermia, initial encounter T68.XXXA 991.6     Patient Active Problem List   Diagnosis   • Acute renal failure (ARF) (CMS/HCC)   • Rhabdomyolysis   • ATN (acute tubular necrosis) (CMS/HCC)   • Hepatitis C   • Polysubstance abuse (CMS/HCC)   • Anxiety     Past Medical History:   Diagnosis Date   • Anxiety    • Depression      History reviewed. No pertinent surgical history.       OT ASSESSMENT FLOWSHEET (last 12 hours)      Occupational Therapy Evaluation     Row Name 19 0800                   OT Evaluation Time/Intention    Subjective Information  no complaints  -ES (r) KB (t) ES (c)        Document Type  evaluation  -ES (r) KB (t) ES (c)        Patient Effort  good  -ES (r) KB (t) ES (c)           General Information    Patient/Family Observations  Pt up walking in room upon OT arrival   -ES (r) KB (t) ES (c)        Prior Level of Function  independent:;ADL's  -ES (r) KB (t) ES (c)        Equipment Currently Used at Home  none  -ES (r) KB (t) ES (c)        Pertinent History of Current Functional Problem  Pt is 25 yo male admitted to [facility] for AMS after drug overdose. Pt was found in barn and was found to be hypothermic, now pt is being treated for septic shock shock, acute respiratory failure, and acute renal failure. Pt was also found to have elevated troponin, however it is believed that this is casused by pts drug abuse. Pt is currently homeless and reports living in the barn behind his grandparents house, however his  brother and grandmother that live there do not want him in the house and his grandfather is the only one that lets him in the house. Pt reports planning to move in with his girlfriend and finding a job after staying at an inpatient recovery center.    -ES (r) KB (t) ES (c)           Relationship/Environment    Primary Source of Support/Comfort  significant other;grandparent  -ES (r) KB (t) ES (c)        Lives With  other (see comments) Lives in barn behind grandparents house   -ES (r) KB (t) ES (c)        Primary Roles/Responsibilities  other (see comments) Uses food stamps, pt reports planning to find job after d/c  -ES (r) KB (t) ES (c)           Resource/Environmental Concerns    Current Living Arrangements  homeless  -ES (r) KB (t) ES (c)           Cognitive Assessment/Intervention- PT/OT    Orientation Status (Cognition)  oriented x 4  -ES (r) KB (t) ES (c)        Cognitive Assessment/Intervention Comment  Pt score on the MOCA is 22/26 indicating cognitive impairment. Deficits were noted in visuospatial/executive functioning and delayed recall.   -ES (r) KB (t) ES (c)           Safety Issues, Functional Mobility    Safety Issues Affecting Function (Mobility)  judgment  -ES (r) KB (t) ES (c)           Bed Mobility Assessment/Treatment    Bed Mobility Assessment/Treatment  supine-sit  -ES (r) KB (t) ES (c)        Supine-Sit [place] (Bed Mobility)  independent  -ES (r) KB (t) ES (c)        Assistive Device (Bed Mobility)  head of bed elevated  -ES (r) KB (t) ES (c)           Transfer Assessment/Treatment    Transfer Assessment/Treatment  sit-stand transfer;stand-sit transfer  -ES (r) KB (t) ES (c)           Sit-Stand Transfer    Sit-Stand [place] (Transfers)  independent  -ES (r) KB (t) ES (c)           Stand-Sit Transfer    Stand-Sit [place] (Transfers)  independent  -ES (r) KB (t) ES (c)           ADL Assessment/Intervention    BADL Assessment/Intervention  lower body dressing  -ES (r) KB (t) ES  (c)           Lower Body Dressing Assessment/Training    Lower Body Dressing [place] Level  don;socks;independent  -ES (r) KB (t) ES (c)        Lower Body Dressing Position  edge of bed sitting  -ES (r) KB (t) ES (c)           General ROM    GENERAL ROM COMMENTS  BUE WFL  -ES (r) KB (t) ES (c)           MMT (Manual Muscle Testing)    General MMT Comments  BUE 4/5  -ES (r) KB (t) ES (c)           Motor Assessment/Interventions    Additional Documentation  Balance (Group)  -ES (r) KB (t) ES (c)           Balance    Balance  static sitting balance;static standing balance;dynamic sitting balance;dynamic standing balance  -ES (r) KB (t) ES (c)           Static Sitting Balance    Level of [place] (Unsupported Sitting, Static Balance)  independent  -ES (r) KB (t) ES (c)        Sitting Position (Unsupported Sitting, Static Balance)  sitting on edge of bed  -ES (r) KB (t) ES (c)        Time Able to Maintain Position (Unsupported Sitting, Static Balance)  more than 5 minutes  -ES (r) KB (t) ES (c)           Dynamic Sitting Balance    Level of [place], Reaches Outside Midline (Sitting, Dynamic Balance)  independent  -ES (r) KB (t) ES (c)        Sitting Position, Reaches Outside Midline (Sitting, Dynamic Balance)  sitting on edge of bed  -ES (r) KB (t) ES (c)           Static Standing Balance    Level of [place] (Supported Standing, Static Balance)  independent  -ES (r) KB (t) ES (c)        Time Able to Maintain Position (Supported Standing, Static Balance)  1 to 2 minutes  -ES (r) KB (t) ES (c)           Dynamic Standing Balance    Level of [place], Reaches Outside Midline (Standing, Dynamic Balance)  independent  -ES (r) KB (t) ES (c)        Time Able to Maintain Position, Reaches Outside Midline (Standing, Dynamic Balance)  1 to 2 minutes  -ES (r) KB (t) ES (c)           Positioning and Restraints    Pre-Treatment Position  other (comment) Walking around in room  -ES (r) KB (t) ES (c)        Post  Treatment Position  bed  -ES (r) KB (t) ES (c)        In Bed  sitting;call light within reach;encouraged to call for assist  -ES (r) KB (t) ES (c)           Pain Assessment    Additional Documentation  Pain Scale: Numbers Pre/Post-Treatment (Group)  -ES (r) KB (t) ES (c)           Pain Scale: Numbers Pre/Post-Treatment    Pain Scale: Numbers, Pretreatment  0/10 - no pain  -ES (r) KB (t) ES (c)        Pain Scale: Numbers, Post-Treatment  0/10 - no pain  -ES (r) KB (t) ES (c)           Health Promotion    Additional Documentation  Coping (Group)  -ES (r) KB (t) ES (c)           Clinical Impression (OT)    Therapy Frequency (OT Eval)  evaluation only  -ES (r) KB (t) ES (c)        Anticipated Discharge Disposition (OT)  other (see comments) Inpatient recovery center   -ES (r) KB (t) ES (c)           Vital Signs    Pre Systolic BP Rehab  162  -ES (r) KB (t) ES (c)        Pre Treatment Diastolic BP  107  -ES (r) KB (t) ES (c)        Pretreatment Heart Rate (beats/min)  99  -ES (r) KB (t) ES (c)          User Key  (r) = Recorded By, (t) = Taken By, (c) = Cosigned By    Initials Name Effective Dates    ES Laury Sotomayor OT 03/01/19 -     Irma Sahu, OT Student 07/08/19 -          Occupational Therapy Education     Title: PT OT SLP Therapies (In Progress)     Topic: Occupational Therapy (In Progress)     Point: Body mechanics (Done)     Description: Instruct learner(s) on proper positioning and spine alignment during self-care, functional mobility activities and/or exercises.    Learning Progress Summary           Patient Acceptance, E,TB, VU by ROBIN at 11/6/2019 10:28 AM    Comment:  Pt educated on proper hand and foot placement during functional transfers.                               User Key     Initials Effective Dates Name Provider Type Discipline    ROBIN 07/08/19 -  Irma Balderas, OT Student OT Student OT                  OT Recommendation and Plan  Outcome Summary/Treatment Plan (OT)  Anticipated  Discharge Disposition (OT): other (see comments)(Inpatient recovery center )  Therapy Frequency (OT Eval): evaluation only  Plan of Care Review  Plan of Care Reviewed With: patient  Plan of Care Reviewed With: patient  Outcome Summary: Pt is 23 yo male admitted to [facility] for AMS after drug overdose. Pt was found in barn and was found to be hypothermic, now pt is being treated for septic shock shock, acute respiratory failure, and acute renal failure. Pt was also found to have elevated troponin, however it is believed that this is casused by pts drug abuse. Pt is currently homeless and reports living in the barn behind his grandparents house, however his brother and grandmother that live there do not want him in the house and his grandfather is the only one that lets him in the house. Pt reports planning to move in with his girlfriend and finding a job after staying at an inpatient recovery center. Both pt and parent/family have a history of substance abuse. Pt completes functional transfer and ADLs with independence and does not required continued skilled OT at this time. OT is recommending  recovery center for substance abuse upon discharge and a psych consult while pt is at [facility].         Time Calculation:   Time Calculation- OT     Row Name 11/06/19 1030             Time Calculation- OT    OT Start Time  0857  -ES (r) KB (t) ES (c)      OT Stop Time  0911  -ES (r) KB (t) ES (c)      OT Time Calculation (min)  14 min  -ES (r) KB (t)      Total Timed Code Minutes- OT  0 minute(s)  -ES (r) KB (t) ES (c)      OT Received On  11/06/19  -ES (r) KB (t) ES (c)        User Key  (r) = Recorded By, (t) = Taken By, (c) = Cosigned By    Initials Name Provider Type    ES Laury Sotomayor, OT Occupational Therapist    Irma Sahu OT Student OT Student        Therapy Charges for Today     Code Description Service Date Service Provider Modifiers Qty    [id number]  OT EVAL LOW COMPLEXITY 4 11/6/2019 Irma Balderas  COURT, OT Student GO 1               Irma Balderas, OT Student  11/6/2019

## 2019-11-06 NOTE — PLAN OF CARE
Problem: Skin Injury Risk (Adult)  Goal: Skin Health and Integrity  Outcome: Ongoing (interventions implemented as appropriate)   11/06/19 0543   Skin Injury Risk (Adult)   Skin Health and Integrity achieves outcome

## 2019-11-06 NOTE — PROGRESS NOTES
Pulmonary/ Critical Care/ sleep medicine progress Note        Vicente Cunha is a 24 y.o. male who presented to the emergency department via EMS for evaluation of altered mental status.  EMS reported that the patient was found down in a bar and this morning where he had apparently been overnight.  By report the patient is a polysubstance abuser and was offered shelter in the barn as he is homeless.  The family stated that they saw the patient last at 9 PM last night and then found him in the morning this morning at 11 AM.  He may have received bystander CPR however this is unclear and there are no family members to verify this account.  EMS stated that when they arrived the patient had altered mental status with groaning and agitation but no purposeful responses.  EMS administered 8 mg of Narcan without improvement.  In the emergency department the patient continued to be hypoxic and minimally responsive and was intubated.  No other information is obtainable as there is no family at the bedside.  On review of documentation the patient has had other emergency department evaluations elated to heroin use    In the emergency department the patient was found to have a temperature of 90.4 °F, systolic blood pressure in the 60s.  The patient received 2 L warm IV fluid resuscitation and a bear hugger was placed.  The patient continued to be hypotensive and hypothermic and 2 more liters of warmed IV fluid were ordered.  Blood cultures and urinalysis were obtained and the patient received vancomycin and cefepime empirically.  WBCs were elevated at 30,000.  Creatinine 3.24.  Elevated LFTs.  CK elevated.  Urine drug screen was positive for methamphetamines and THC    Patient will be admitted for further evaluation and treatment of sepsis, respiratory failure, hypothermia    Subjective: 11/3: offprecision flow, now on 6 L HF; undergoing HD  11/4: Currently on RA, No C/O CP, Receiving HD today. No N/V  11/5: No complaints of  increased shortness of breath or chest pain.  No nausea or vomiting.  Currently on room air.  No fever or chills overnight  : Currently on RA, No cogh or CP, No N/V  Review of Systems    As above    Allergies    Allergies   Allergen Reactions   • Penicillins Anxiety       Medications    Scheduled Meds:    ampicillin-sulbactam 3 g Intravenous Q24H   citalopram 20 mg Oral Daily   heparin (porcine) 5,000 Units Subcutaneous Q8H   ipratropium-albuterol 3 mL Nebulization Q4H - RT   sodium chloride 10 mL Intravenous Q12H   sodium chloride 10 mL Intravenous Q12H   sodium chloride 10 mL Intravenous Q12H   sodium chloride 10 mL Intravenous Q12H     Continuous Infusions:     PRN Meds:.•  acetaminophen  •  heparin (porcine)  •  hydrALAZINE  •  hydrOXYzine  •  influenza vaccine  •  ondansetron  •  sodium chloride  •  sodium chloride  •  sodium chloride      Physical Exam    tMax 24 hrs:  Temp (24hrs), Av.3 °F (36.8 °C), Min:97.8 °F (36.6 °C), Max:98.6 °F (37 °C)    Vitals Ranges:  Temp:  [97.8 °F (36.6 °C)-98.6 °F (37 °C)] 98.6 °F (37 °C)  Heart Rate:  [] 93  Resp:  [14-23] 19  BP: (130-175)/() 137/92  Intake and Output Last 3 Shifts:  I/O last 3 completed shifts:  In: 720 [P.O.:720]  Out: -     Constitutional:  Awake, No resp distress  Eyes: conjunctiva normal   HENT:  Atraumatic, external ears normal, nose normal  Respiratory: Good air entry B/L no crackles or wheezes  Cardiovascular: S1 S2 regular no murmurs, no gallops, no rubs   GI:  Soft, nondistended, normal bowel sounds  Musculoskeletal:  No edema, no cyanosis or clubbing  Integument:  Well hydrated, no rash   Neurologic: awake and alert   Psychiatric: calm    labs    Lab Results (last 24 hours)     Procedure Component Value Units Date/Time    AFB Culture - Body Fluid, Pleural Cavity [[id number]] Collected:  19    Specimen:  Body Fluid from Pleural Cavity Updated:  19     AFB Culture No AFB isolated at less than 1 week     AFB  Stain No acid fast bacilli seen    Fungus Culture - Body Fluid, Pleural Cavity [[id number]] Collected:  11/01/19 0202    Specimen:  Body Fluid from Pleural Cavity Updated:  11/06/19 0239     Fungus Culture No fungus isolated at less than 1 week    Magnesium [[id number]]  (Normal) Collected:  11/06/19 0135    Specimen:  Blood Updated:  11/06/19 0211     Magnesium 2.0 mg/dL     Basic Metabolic Panel [[id number]]  (Abnormal) Collected:  11/06/19 0135    Specimen:  Blood Updated:  11/06/19 0204     Glucose 111 mg/dL      BUN 40 mg/dL      Creatinine 7.80 mg/dL      Sodium 136 mmol/L      Potassium 4.1 mmol/L      Chloride 99 mmol/L      CO2 21.0 mmol/L      Calcium 8.8 mg/dL      eGFR   Amer --     Comment: <15 Indicative of kidney failure.        eGFR Non African Amer 8 mL/min/1.73      Comment: <15 Indicative of kidney failure.        BUN/Creatinine Ratio 5.1     Anion Gap 16.0 mmol/L     Narrative:       GFR Normal >60  Chronic Kidney Disease <60  Kidney Failure <15    CBC & Differential [[id number]] Collected:  11/06/19 0135    Specimen:  Blood Updated:  11/06/19 0146    Narrative:       The following orders were created for panel order CBC & Differential.  Procedure                               Abnormality         Status                     ---------                               -----------         ------                     CBC Auto Differential[[id number]]        Abnormal            Final result                 Please view results for these tests on the individual orders.    CBC Auto Differential [[id number]]  (Abnormal) Collected:  11/06/19 0135    Specimen:  Blood Updated:  11/06/19 0146     WBC 13.10 10*3/mm3      RBC 3.40 10*6/mm3      Hemoglobin 10.6 g/dL      Hematocrit 29.8 %      MCV 87.6 fL      MCH 31.2 pg      MCHC 35.6 g/dL      RDW 13.2 %      RDW-SD 41.1 fl      MPV 6.6 fL      Platelets 233 10*3/mm3      Neutrophil % 70.9 %      Lymphocyte % 14.8 %      Monocyte % 9.0 %      Eosinophil % 4.6 %       Basophil % 0.7 %      Neutrophils, Absolute 9.30 10*3/mm3      Lymphocytes, Absolute 1.90 10*3/mm3      Monocytes, Absolute 1.20 10*3/mm3      Eosinophils, Absolute 0.60 10*3/mm3      Basophils, Absolute 0.10 10*3/mm3      nRBC 0.0 /100 WBC     Cholesterol, Body Fluid - Pleural Fluid, Pleural Cavity [[id number]] Collected:  11/01/19 0201    Specimen:  Pleural Fluid from Pleural Cavity Updated:  11/06/19 0006     Cholesterol, Fluid 47 mg/dL      Comment: INTERPRETIVE INFORMATION: Cholesterol, Body Fluid  For information on body fluid reference ranges and/or interpretive  guidance visit [email or web address]  Test developed and characteristics determined by [facility] See Compliance Statement B: [facility]CS       Narrative:       Performed at:  01 - [facility]  [address] [place] [address]  [id number]  : Luis Palm MD, Phone:  [phone]    Basic Metabolic Panel [[id number]]  (Abnormal) Collected:  11/05/19 1723    Specimen:  Blood Updated:  11/05/19 1813     Glucose 87 mg/dL      BUN 33 mg/dL      Creatinine 6.35 mg/dL      Sodium 135 mmol/L      Potassium 4.0 mmol/L      Chloride 99 mmol/L      CO2 23.0 mmol/L      Calcium 8.9 mg/dL      eGFR   Amer --     Comment: <15 Indicative of kidney failure.        eGFR Non African Amer 11 mL/min/1.73      Comment: <15 Indicative of kidney failure.        BUN/Creatinine Ratio 5.2     Anion Gap 13.0 mmol/L     Narrative:       GFR Normal >60  Chronic Kidney Disease <60  Kidney Failure <15    Blood Culture - Blood, Blood, Central Line [[id number]] Collected:  11/01/19 1342    Specimen:  Blood, Central Line Updated:  11/05/19 1345     Blood Culture No growth at 4 days    Hepatitis C RNA, Quantitative, PCR (graph) [[id number]] Collected:  11/01/19 1834    Specimen:  Blood Updated:  11/05/19 1110     Hepatitis C Quantitation [id number] IU/mL      HCV log10 5.548 log10 IU/mL      Test Information Comment     Comment: The  "quantitative range of this assay is 15 IU/mL to 100 million IU/mL.       Narrative:       Performed at:  01 - [facility], [place], [place]  [id number]  : Nikole Aguillon MD, Phone:  [phone]    Blood Culture - Blood, Arm, Right [[id number]] Collected:  11/01/19 1020    Specimen:  Blood from Arm, Right Updated:  11/05/19 0945     Blood Culture No growth at 4 days    POC Glucose Once [[id number]]  (Abnormal) Collected:  11/05/19 0721    Specimen:  Blood Updated:  11/05/19 0723     Glucose 109 mg/dL      Comment: Serial Number: [id number]:  [id number]             Imaging & Other Studies    Imaging Results (Last 72 Hours)     ** No results found for the last 72 hours. **            Assessment    Acute hypoxic respiratory failure secondary to polysubstance abuse  Altered mental status - related to drug overdose. Resolved  Septic shock-resolved  Acute kidney injury-worsening with oliguria  Liver injury  Rhabdomyolysis, creatinine kinase 3669 on admission - improved  Polysubstance abuse, UDS positive for methamphetamine and THC  Hypothermia due to environmental exposure  Hyperphosphatemia    Plan    Self extubated 10/29/19. Now on RA  S/p thoracentesis 11/1/19 with 1 L fluid evacuated. Cx negative.  Pleural fluid is exudative.  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  Remains on antibiotic therapy with Unasyn per infectious disease  HD per nephrology  CK improved  Off LEVO  Mannitol given per renal 10/30  2D echocardiogram reviewed.  Enlarged RA and RV.  LE doppler negative Shiley placed 10/30  Elevated LFTs- hep C ab pos   CT abdomen reviewed with ? SBO and GB thickening. Evaluated by surgery no intervention. RUQ pain felt to be due to hepatitis  Has been evaluated by behavioral health. Plan for drug rehab \"[facility]\" at D/C  DVT prophylaxis with SQ heparin  RIJ Dialysis catheter   Pulmonary status is stable.  Will sign off and be " available

## 2019-11-06 NOTE — PROGRESS NOTES
"                                                                                                                                      Nephrology  Progress Note                                        [facility]    Patient Identification    Name: Vicente Cunha  Age: 24 y.o.  Sex: male  :  1995  MRN: [id number]      DATE OF SERVICE:  2019        Subective    better     Objective   Scheduled Meds:    ampicillin-sulbactam 3 g Intravenous Q24H   citalopram 20 mg Oral Daily   heparin (porcine) 5,000 Units Subcutaneous Q8H   ipratropium-albuterol 3 mL Nebulization Q4H - RT   sodium chloride 10 mL Intravenous Q12H   sodium chloride 10 mL Intravenous Q12H   sodium chloride 10 mL Intravenous Q12H   sodium chloride 10 mL Intravenous Q12H         Continuous Infusions:       PRN Meds:•  acetaminophen  •  heparin (porcine)  •  hydrALAZINE  •  hydrOXYzine  •  influenza vaccine  •  ondansetron  •  sodium chloride  •  sodium chloride  •  sodium chloride     Exam:  /92 (BP Location: Left arm, Patient Position: Lying)   Pulse 93   Temp 98.6 °F (37 °C) (Oral)   Resp 19   Ht 172.7 cm (68\")   Wt 62.1 kg (136 lb 14.5 oz)   SpO2 98%   BMI 20.82 kg/m²     Intake/Output last 3 shifts:  I/O last 3 completed shifts:  In: 720 [P.O.:720]  Out: -     Intake/Output this shift:  No intake/output data recorded.    Physical exam:  Awake and  Alert  PERTL  No JVD  Chest: decreased BS occasional ronchi  CVS Regular rate and rhythm, S1 and S2 normal  Abdomen:  Soft, non-tender, bowel sounds active   LE: trace edema  Skin:  No rashes or lesions       Data Review:  All labs (24hrs):   Recent Results (from the past 24 hour(s))   Basic Metabolic Panel    Collection Time: 19  5:23 PM   Result Value Ref Range    Glucose 87 65 - 99 mg/dL    BUN 33 (H) 6 - 20 mg/dL    Creatinine 6.35 (H) 0.76 - 1.27 mg/dL    Sodium 135 (L) 136 - 145 mmol/L    Potassium 4.0 3.5 - 5.2 mmol/L    Chloride 99 98 - 107 mmol/L    CO2 " 23.0 22.0 - 29.0 mmol/L    Calcium 8.9 8.6 - 10.5 mg/dL    eGFR  African Amer      eGFR Non African Amer 11 (L) >60 mL/min/1.73    BUN/Creatinine Ratio 5.2 (L) 7.0 - 25.0    Anion Gap 13.0 5.0 - 15.0 mmol/L   Basic Metabolic Panel    Collection Time: 11/06/19  1:35 AM   Result Value Ref Range    Glucose 111 (H) 65 - 99 mg/dL    BUN 40 (H) 6 - 20 mg/dL    Creatinine 7.80 (H) 0.76 - 1.27 mg/dL    Sodium 136 136 - 145 mmol/L    Potassium 4.1 3.5 - 5.2 mmol/L    Chloride 99 98 - 107 mmol/L    CO2 21.0 (L) 22.0 - 29.0 mmol/L    Calcium 8.8 8.6 - 10.5 mg/dL    eGFR  African Amer      eGFR Non African Amer 8 (L) >60 mL/min/1.73    BUN/Creatinine Ratio 5.1 (L) 7.0 - 25.0    Anion Gap 16.0 (H) 5.0 - 15.0 mmol/L   Magnesium    Collection Time: 11/06/19  1:35 AM   Result Value Ref Range    Magnesium 2.0 1.6 - 2.6 mg/dL   CBC Auto Differential    Collection Time: 11/06/19  1:35 AM   Result Value Ref Range    WBC 13.10 (H) 3.40 - 10.80 10*3/mm3    RBC 3.40 (L) 4.14 - 5.80 10*6/mm3    Hemoglobin 10.6 (L) 13.0 - 17.7 g/dL    Hematocrit 29.8 (L) 37.5 - 51.0 %    MCV 87.6 79.0 - 97.0 fL    MCH 31.2 26.6 - 33.0 pg    MCHC 35.6 31.5 - 35.7 g/dL    RDW 13.2 12.3 - 15.4 %    RDW-SD 41.1 37.0 - 54.0 fl    MPV 6.6 6.0 - 12.0 fL    Platelets 233 140 - 450 10*3/mm3    Neutrophil % 70.9 42.7 - 76.0 %    Lymphocyte % 14.8 (L) 19.6 - 45.3 %    Monocyte % 9.0 5.0 - 12.0 %    Eosinophil % 4.6 0.3 - 6.2 %    Basophil % 0.7 0.0 - 1.5 %    Neutrophils, Absolute 9.30 (H) 1.70 - 7.00 10*3/mm3    Lymphocytes, Absolute 1.90 0.70 - 3.10 10*3/mm3    Monocytes, Absolute 1.20 (H) 0.10 - 0.90 10*3/mm3    Eosinophils, Absolute 0.60 (H) 0.00 - 0.40 10*3/mm3    Basophils, Absolute 0.10 0.00 - 0.20 10*3/mm3    nRBC 0.0 0.0 - 0.2 /100 WBC          Imaging:  [unfilled]    Assessment/Plan:     Acute renal failure (ARF) (CMS/HCC)    Rhabdomyolysis    ATN (acute tubular necrosis) (CMS/HCC)    Hepatitis C    Polysubstance abuse (CMS/HCC)    Anxiety       Acute kidney  injury nonoliguric worsening kidney function due to rhabdomyolysis and ATN              -uop better   -  dialysis today   - BMP Pm   -off lasix  Rhabdomyolysis              -resolved  Respiratory failure  Metabolic acidosis  Altered mental status  Hypothermia  Hyperphosphatemia  Liver injury  Hypomagnesemia     D/w the patient

## 2019-11-06 NOTE — PROGRESS NOTES
Continued Stay Note  [facility]     Patient Name: Vicente Cunha  MRN: [id number]  Today's Date: 11/6/2019    Admit Date: 10/28/2019      Spoke to Dr. Ortega and he states need for OP Dialysis. Patient will be going to girlfriend's house in [place] and wants to go to [facility] in [place]. States she will transport him.  Dr. Ortega approved. Spoke to Ashia BENTLEY for ID and she states that IV ABX will be changed to PO at D/C. Admission form for [facility], notes, dialysis records. Labs and face sheet faxed to [facility] at [phone] with confirmation.      Esthela Tobin RN, CM  Office Phone [phone]  Cell [phone]

## 2019-11-07 ENCOUNTER — APPOINTMENT (OUTPATIENT)
Dept: INTERVENTIONAL RADIOLOGY/VASCULAR | Facility: HOSPITAL | Age: 24
End: 2019-11-07

## 2019-11-07 LAB
ANION GAP SERPL CALCULATED.3IONS-SCNC: 14 MMOL/L (ref 5–15)
APTT PPP: 27.6 SECONDS (ref 61–76.5)
BASOPHILS # BLD AUTO: 0.1 10*3/MM3 (ref 0–0.2)
BASOPHILS NFR BLD AUTO: 0.6 % (ref 0–1.5)
BUN BLD-MCNC: 33 MG/DL (ref 6–20)
BUN/CREAT SERPL: 4.2 (ref 7–25)
CALCIUM SPEC-SCNC: 9.1 MG/DL (ref 8.6–10.5)
CHLORIDE SERPL-SCNC: 102 MMOL/L (ref 98–107)
CO2 SERPL-SCNC: 22 MMOL/L (ref 22–29)
CREAT BLD-MCNC: 7.8 MG/DL (ref 0.76–1.27)
DEPRECATED RDW RBC AUTO: 41.1 FL (ref 37–54)
EOSINOPHIL # BLD AUTO: 0.5 10*3/MM3 (ref 0–0.4)
EOSINOPHIL NFR BLD AUTO: 4.1 % (ref 0.3–6.2)
ERYTHROCYTE [DISTWIDTH] IN BLOOD BY AUTOMATED COUNT: 13.2 % (ref 12.3–15.4)
GFR SERPL CREATININE-BSD FRML MDRD: 8 ML/MIN/1.73
GFR SERPL CREATININE-BSD FRML MDRD: ABNORMAL ML/MIN/{1.73_M2}
GLUCOSE BLD-MCNC: 107 MG/DL (ref 65–99)
HCT VFR BLD AUTO: 27.7 % (ref 37.5–51)
HGB BLD-MCNC: 10.1 G/DL (ref 13–17.7)
INR PPP: 1.06 (ref 0.9–1.1)
LYMPHOCYTES # BLD AUTO: 1.7 10*3/MM3 (ref 0.7–3.1)
LYMPHOCYTES NFR BLD AUTO: 15.5 % (ref 19.6–45.3)
MAGNESIUM SERPL-MCNC: 2.2 MG/DL (ref 1.6–2.6)
MCH RBC QN AUTO: 31.8 PG (ref 26.6–33)
MCHC RBC AUTO-ENTMCNC: 36.4 G/DL (ref 31.5–35.7)
MCV RBC AUTO: 87.3 FL (ref 79–97)
MONOCYTES # BLD AUTO: 1.2 10*3/MM3 (ref 0.1–0.9)
MONOCYTES NFR BLD AUTO: 10.7 % (ref 5–12)
NEUTROPHILS # BLD AUTO: 7.6 10*3/MM3 (ref 1.7–7)
NEUTROPHILS NFR BLD AUTO: 69.1 % (ref 42.7–76)
NRBC BLD AUTO-RTO: 0 /100 WBC (ref 0–0.2)
PLATELET # BLD AUTO: 291 10*3/MM3 (ref 140–450)
PMV BLD AUTO: 6.6 FL (ref 6–12)
POTASSIUM BLD-SCNC: 4.3 MMOL/L (ref 3.5–5.2)
PROTHROMBIN TIME: 11 SECONDS (ref 9.6–11.7)
RBC # BLD AUTO: 3.18 10*6/MM3 (ref 4.14–5.8)
SODIUM BLD-SCNC: 138 MMOL/L (ref 136–145)
WBC NRBC COR # BLD: 11 10*3/MM3 (ref 3.4–10.8)

## 2019-11-07 PROCEDURE — 25010000002 ONDANSETRON PER 1 MG: Performed by: RADIOLOGY

## 2019-11-07 PROCEDURE — 0JH63XZ INSERTION OF TUNNELED VASCULAR ACCESS DEVICE INTO CHEST SUBCUTANEOUS TISSUE AND FASCIA, PERCUTANEOUS APPROACH: ICD-10-PCS | Performed by: INTERNAL MEDICINE

## 2019-11-07 PROCEDURE — 25010000002 HYDRALAZINE PER 20 MG: Performed by: INTERNAL MEDICINE

## 2019-11-07 PROCEDURE — 85610 PROTHROMBIN TIME: CPT | Performed by: RADIOLOGY

## 2019-11-07 PROCEDURE — 25010000003 AMPICILLIN-SULBACTAM PER 1.5 G: Performed by: INTERNAL MEDICINE

## 2019-11-07 PROCEDURE — 25010000002 HEPARIN (PORCINE) PER 1000 UNITS: Performed by: INTERNAL MEDICINE

## 2019-11-07 PROCEDURE — 25010000002 FENTANYL CITRATE (PF) 100 MCG/2ML SOLUTION: Performed by: RADIOLOGY

## 2019-11-07 PROCEDURE — 99152 MOD SED SAME PHYS/QHP 5/>YRS: CPT

## 2019-11-07 PROCEDURE — C1892 INTRO/SHEATH,FIXED,PEEL-AWAY: HCPCS

## 2019-11-07 PROCEDURE — 76937 US GUIDE VASCULAR ACCESS: CPT

## 2019-11-07 PROCEDURE — 99232 SBSQ HOSP IP/OBS MODERATE 35: CPT | Performed by: INTERNAL MEDICINE

## 2019-11-07 PROCEDURE — 83735 ASSAY OF MAGNESIUM: CPT | Performed by: NURSE PRACTITIONER

## 2019-11-07 PROCEDURE — B518ZZA FLUOROSCOPY OF SUPERIOR VENA CAVA, GUIDANCE: ICD-10-PCS | Performed by: INTERNAL MEDICINE

## 2019-11-07 PROCEDURE — 36558 INSERT TUNNELED CV CATH: CPT

## 2019-11-07 PROCEDURE — 99153 MOD SED SAME PHYS/QHP EA: CPT

## 2019-11-07 PROCEDURE — 25010000002 MIDAZOLAM PER 1 MG: Performed by: RADIOLOGY

## 2019-11-07 PROCEDURE — 85025 COMPLETE CBC W/AUTO DIFF WBC: CPT | Performed by: NURSE PRACTITIONER

## 2019-11-07 PROCEDURE — 02H633Z INSERTION OF INFUSION DEVICE INTO RIGHT ATRIUM, PERCUTANEOUS APPROACH: ICD-10-PCS | Performed by: INTERNAL MEDICINE

## 2019-11-07 PROCEDURE — C1769 GUIDE WIRE: HCPCS

## 2019-11-07 PROCEDURE — 94799 UNLISTED PULMONARY SVC/PX: CPT

## 2019-11-07 PROCEDURE — 85730 THROMBOPLASTIN TIME PARTIAL: CPT | Performed by: RADIOLOGY

## 2019-11-07 PROCEDURE — C1894 INTRO/SHEATH, NON-LASER: HCPCS

## 2019-11-07 PROCEDURE — 77001 FLUOROGUIDE FOR VEIN DEVICE: CPT

## 2019-11-07 PROCEDURE — 80048 BASIC METABOLIC PNL TOTAL CA: CPT | Performed by: NURSE PRACTITIONER

## 2019-11-07 RX ORDER — FENTANYL CITRATE 50 UG/ML
INJECTION, SOLUTION INTRAMUSCULAR; INTRAVENOUS
Status: COMPLETED | OUTPATIENT
Start: 2019-11-07 | End: 2019-11-07

## 2019-11-07 RX ORDER — IPRATROPIUM BROMIDE AND ALBUTEROL SULFATE 2.5; .5 MG/3ML; MG/3ML
3 SOLUTION RESPIRATORY (INHALATION)
Status: DISCONTINUED | OUTPATIENT
Start: 2019-11-07 | End: 2019-11-09 | Stop reason: HOSPADM

## 2019-11-07 RX ORDER — ONDANSETRON 2 MG/ML
INJECTION INTRAMUSCULAR; INTRAVENOUS
Status: COMPLETED | OUTPATIENT
Start: 2019-11-07 | End: 2019-11-07

## 2019-11-07 RX ORDER — MIDAZOLAM HYDROCHLORIDE 1 MG/ML
INJECTION INTRAMUSCULAR; INTRAVENOUS
Status: COMPLETED | OUTPATIENT
Start: 2019-11-07 | End: 2019-11-07

## 2019-11-07 RX ORDER — CLINDAMYCIN PHOSPHATE 600 MG/50ML
INJECTION, SOLUTION INTRAVENOUS
Status: COMPLETED | OUTPATIENT
Start: 2019-11-07 | End: 2019-11-07

## 2019-11-07 RX ADMIN — HEPARIN SODIUM 5000 UNITS: 5000 INJECTION INTRAVENOUS; SUBCUTANEOUS at 21:00

## 2019-11-07 RX ADMIN — CITALOPRAM HYDROBROMIDE 20 MG: 20 TABLET ORAL at 09:04

## 2019-11-07 RX ADMIN — FENTANYL CITRATE 100 MCG: 50 INJECTION, SOLUTION INTRAMUSCULAR; INTRAVENOUS at 15:44

## 2019-11-07 RX ADMIN — SODIUM CHLORIDE 3 G: 900 INJECTION INTRAVENOUS at 12:48

## 2019-11-07 RX ADMIN — CLINDAMYCIN PHOSPHATE 600 MG: 600 INJECTION, SOLUTION INTRAVENOUS at 15:25

## 2019-11-07 RX ADMIN — HEPARIN SODIUM 5000 UNITS: 5000 INJECTION INTRAVENOUS; SUBCUTANEOUS at 06:12

## 2019-11-07 RX ADMIN — Medication 10 ML: at 21:03

## 2019-11-07 RX ADMIN — IPRATROPIUM BROMIDE AND ALBUTEROL SULFATE 3 ML: .5; 3 SOLUTION RESPIRATORY (INHALATION) at 10:53

## 2019-11-07 RX ADMIN — IPRATROPIUM BROMIDE AND ALBUTEROL SULFATE 3 ML: .5; 3 SOLUTION RESPIRATORY (INHALATION) at 19:55

## 2019-11-07 RX ADMIN — IPRATROPIUM BROMIDE AND ALBUTEROL SULFATE 3 ML: .5; 3 SOLUTION RESPIRATORY (INHALATION) at 14:43

## 2019-11-07 RX ADMIN — HYDRALAZINE HYDROCHLORIDE 10 MG: 20 INJECTION INTRAMUSCULAR; INTRAVENOUS at 23:22

## 2019-11-07 RX ADMIN — IPRATROPIUM BROMIDE AND ALBUTEROL SULFATE 3 ML: .5; 3 SOLUTION RESPIRATORY (INHALATION) at 06:40

## 2019-11-07 RX ADMIN — ONDANSETRON 4 MG: 2 INJECTION INTRAMUSCULAR; INTRAVENOUS at 15:38

## 2019-11-07 RX ADMIN — Medication 10 ML: at 20:58

## 2019-11-07 RX ADMIN — HYDRALAZINE HYDROCHLORIDE 10 MG: 20 INJECTION INTRAMUSCULAR; INTRAVENOUS at 17:36

## 2019-11-07 RX ADMIN — Medication 10 ML: at 09:05

## 2019-11-07 RX ADMIN — Medication 10 ML: at 12:49

## 2019-11-07 RX ADMIN — HYDROXYZINE HYDROCHLORIDE 50 MG: 25 TABLET, FILM COATED ORAL at 17:41

## 2019-11-07 RX ADMIN — Medication 10 ML: at 14:55

## 2019-11-07 RX ADMIN — HYDROXYZINE HYDROCHLORIDE 50 MG: 25 TABLET, FILM COATED ORAL at 09:04

## 2019-11-07 RX ADMIN — MIDAZOLAM 1 MG: 1 INJECTION INTRAMUSCULAR; INTRAVENOUS at 15:44

## 2019-11-07 NOTE — PROGRESS NOTES
Case Management/Social Work    Patient Name:  Vicente Cunha  YOB: 1995  MRN: [id number]  Admit Date:  10/28/2019        Sw met with pt at bedside  to discuss dc plan.pt reports he and his SO will stay with her sister after dc till their apt is ready next wk.Pt reports he has transportation to HD.Pt is unable to admit to [facility] for inpt substance abuse tx while receiving HD.pt reports he plans to contact them when he is able to admit to their program.Pt reports he has contact info for them and does not need sw to provide.pt denies other needs at this time.MD informed of dc plan.      Electronically signed by:  Shirley Sierra  11/07/19 4:00 PM   [phone]

## 2019-11-07 NOTE — PROGRESS NOTES
Continued Stay Note   [facility]     Patient Name: Vicente Cunha  MRN: [id number]  Today's Date: 11/7/2019    Admit Date: 10/28/2019    Discharge Plan     Row Name 11/07/19 1555       Plan    Plan  [facility]ticipatae home with new dialysis at [facility] - awaiting chair time.     Patient/Family in Agreement with Plan  yes    Plan Comments  Spoke with Dr. Shane, notified him [facility] has not yet assigned a chair time, will need to be notified when chair time is assigned. Barrier: awaiting tunnel cath placement.           Breanne Reynoso

## 2019-11-07 NOTE — PROGRESS NOTES
"                                                                                                                                      Nephrology  Progress Note                                        [facility]    Patient Identification    Name: Vicente Cunha  Age: 24 y.o.  Sex: male  :  1995  MRN: [id number]      DATE OF SERVICE:  2019        Subective    better     Objective   Scheduled Meds:    ampicillin-sulbactam 3 g Intravenous Q24H   citalopram 20 mg Oral Daily   heparin (porcine) 5,000 Units Subcutaneous Q8H   ipratropium-albuterol 3 mL Nebulization Q4H - RT   sodium chloride 10 mL Intravenous Q12H   sodium chloride 10 mL Intravenous Q12H   sodium chloride 10 mL Intravenous Q12H   sodium chloride 10 mL Intravenous Q12H         Continuous Infusions:       PRN Meds:•  acetaminophen  •  albumin human  •  heparin (porcine)  •  hydrALAZINE  •  hydrOXYzine  •  influenza vaccine  •  ondansetron  •  sodium chloride  •  sodium chloride  •  sodium chloride     Exam:  /89 (BP Location: Right arm, Patient Position: Lying)   Pulse 99   Temp 98.1 °F (36.7 °C) (Oral)   Resp 16   Ht 172.7 cm (68\")   Wt 62.1 kg (136 lb 14.5 oz)   SpO2 96%   BMI 20.82 kg/m²     Intake/Output last 3 shifts:  I/O last 3 completed shifts:  In: 340 [P.O.:240; IV Piggyback:100]  Out: 135 [Other:135]    Intake/Output this shift:  No intake/output data recorded.    Physical exam:  Awake and  Alert  PERTL  No JVD  Chest: decreased BS occasional ronchi  CVS Regular rate and rhythm, S1 and S2 normal  Abdomen:  Soft, non-tender, bowel sounds active   LE: trace edema  Skin:  No rashes or lesions       Data Review:  All labs (24hrs):   Recent Results (from the past 24 hour(s))   CBC Auto Differential    Collection Time: 19  6:13 AM   Result Value Ref Range    WBC 11.00 (H) 3.40 - 10.80 10*3/mm3    RBC 3.18 (L) 4.14 - 5.80 10*6/mm3    Hemoglobin 10.1 (L) 13.0 - 17.7 g/dL    Hematocrit 27.7 (L) 37.5 - 51.0 % "    MCV 87.3 79.0 - 97.0 fL    MCH 31.8 26.6 - 33.0 pg    MCHC 36.4 (H) 31.5 - 35.7 g/dL    RDW 13.2 12.3 - 15.4 %    RDW-SD 41.1 37.0 - 54.0 fl    MPV 6.6 6.0 - 12.0 fL    Platelets 291 140 - 450 10*3/mm3    Neutrophil % 69.1 42.7 - 76.0 %    Lymphocyte % 15.5 (L) 19.6 - 45.3 %    Monocyte % 10.7 5.0 - 12.0 %    Eosinophil % 4.1 0.3 - 6.2 %    Basophil % 0.6 0.0 - 1.5 %    Neutrophils, Absolute 7.60 (H) 1.70 - 7.00 10*3/mm3    Lymphocytes, Absolute 1.70 0.70 - 3.10 10*3/mm3    Monocytes, Absolute 1.20 (H) 0.10 - 0.90 10*3/mm3    Eosinophils, Absolute 0.50 (H) 0.00 - 0.40 10*3/mm3    Basophils, Absolute 0.10 0.00 - 0.20 10*3/mm3    nRBC 0.0 0.0 - 0.2 /100 WBC          Imaging:  [unfilled]    Assessment/Plan:     Acute renal failure (ARF) (CMS/HCC)    Rhabdomyolysis    ATN (acute tubular necrosis) (CMS/HCC)    Hepatitis C    Polysubstance abuse (CMS/HCC)    Anxiety       Acute kidney injury nonoliguric worsening kidney function due to rhabdomyolysis and ATN              -uop better   -  dialysis again tomorrow   - BMP Pm   -off lasix  Rhabdomyolysis              -resolved  Respiratory failure  Metabolic acidosis  Altered mental status  Hypothermia  Hyperphosphatemia  Liver injury  Hypomagnesemia     D/w the patient  Is tunneled dialysis catheter  Needs outpatient dialysis catheter  His dialysis yesterday only lasted 2 hours because the patient took himself off dialysis

## 2019-11-07 NOTE — NURSING NOTE
"Pt very anxious and talkative \"I'm a addict\" \"I  when they brought me in\", \"My father  here at [place]\".   "

## 2019-11-07 NOTE — PROGRESS NOTES
"      [facility] Medicine Services Daily Progress Note      Hospitalist Team  LOS 10 days      Patient Care Team:  Gary Llanos MD as PCP - General        Chief Complaint / Subjective  Chief Complaint   Patient presents with   • Ingestion     heroin and meth use last seen 830 last night pt was found in barn.     Feeling more anxious today. Reports no other new issues and breathing is stable.     Brief Synopsis of Hospital Course/HPI  25 yo male found down d/t overdose and was intubated in manged in the ICU. Now downgraded on IV abx and intermittent HD.           Review of Systems   Constitution: Negative for fever.   Respiratory: Negative for cough.                Objective      Vital Signs  Temp:  [98.1 °F (36.7 °C)-98.5 °F (36.9 °C)] 98.3 °F (36.8 °C)  Heart Rate:  [] 98  Resp:  [16-24] 19  BP: (131-184)/() 176/122  Oxygen Therapy  SpO2: 99 %(2L NC)  Pulse Oximetry Type: Intermittent  Device (Oxygen Therapy): room air  $ High Flow Nasal Cannula Set-Up: yes  Flow (L/min): 2  Oxygen Concentration (%): 40  Oximetry Probe Site Changed: No  Flowsheet Rows      First Filed Value   Admission Height  172.7 cm (68\") Documented at 10/28/2019 1138   Admission Weight  59 kg (130 lb) Documented at 10/28/2019 1138        Intake & Output (last 3 days)       11/04 0701 - 11/05 0700 11/05 0701 - 11/06 0700 11/06 0701 - 11/07 0700 11/07 0701 - 11/08 0700    P.O. 480 240 240     I.V. (mL/kg) 337.9 (5.6)       IV Piggyback   100     Total Intake(mL/kg) 817.9 (13.5) 240 (3.9) 340 (5.5)     Urine (mL/kg/hr)        Other 2500  135     Stool 0       Total Output 2500  135     Net -1682.1 +240 +205             Urine Unmeasured Occurrence 2 x       Stool Unmeasured Occurrence 3 x           Lines, Drains & Airways    Active LDAs     Name:   Placement date:   Placement time:   Site:   Days:    Hemodialysis Cath Double with Pigtail   10/30/19    1300    Internal Jugular   6                  Physical " Exam:    Physical Exam   Constitutional: No distress.   HENT:   Head: Normocephalic.   Eyes: Pupils are equal, round, and reactive to light.   Neck:   Dialysis catheter noted to the right neck   Cardiovascular: Normal rate.   Pulmonary/Chest: Effort normal. No respiratory distress.   Abdominal: Soft. He exhibits no distension.   Neurological: He is alert.   Skin: Skin is warm.   Psychiatric: He has a normal mood and affect.           Results Review:     I reviewed the patient's new clinical results.      Results from last 7 days   Lab Units 11/07/19  0613 11/06/19  0135 11/05/19  0457  11/03/19  0535 11/02/19  0547 11/01/19  0500   WBC 10*3/mm3 11.00* 13.10* 12.30*   < > 13.50* 14.60* 17.50*   HEMOGLOBIN g/dL 10.1* 10.6* 11.0*   < > 11.0* 10.3* 11.0*   HEMATOCRIT % 27.7* 29.8* 30.2*   < > 31.2* 29.2* 32.2*   PLATELETS 10*3/mm3 291 233 202   < > 146 122* 111*   MONOCYTES % %  --   --   --   --  6.0 5.0 3.0*    < > = values in this interval not displayed.     Results from last 7 days   Lab Units 11/07/19  0752 11/06/19  0135 11/05/19  1723 11/05/19  0457  11/03/19  0535 11/02/19  0547   SODIUM mmol/L 138 136 135* 137   < > 138 135*   POTASSIUM mmol/L 4.3 4.1 4.0 3.8   < > 3.6 3.7   CHLORIDE mmol/L 102 99 99 102   < > 102 100   CO2 mmol/L 22.0 21.0* 23.0 24.0   < > 23.0 21.0*   BUN mg/dL 33* 40* 33* 24*   < > 44* 61*   CREATININE mg/dL 7.80* 7.80* 6.35* 5.08*   < > 5.46* 5.27*   CALCIUM mg/dL 9.1 8.8 8.9 8.5*   < > 8.3* 8.4*   BILIRUBIN mg/dL  --   --   --  0.4  --  0.5 0.5   ALK PHOS U/L  --   --   --  63  --  57 57   ALT (SGPT) U/L  --   --   --  233*  --  413* 627*   AST (SGOT) U/L  --   --   --  44*  --  67* 124*   GLUCOSE mg/dL 107* 111* 87 96   < > 88 91    < > = values in this interval not displayed.     Results from last 7 days   Lab Units 11/07/19  0752 11/06/19  0135 11/05/19  0457   MAGNESIUM mg/dL 2.2 2.0 2.0     Lab Results   Component Value Date    CALCIUM 9.1 11/07/2019    PHOS 7.4 (H) 10/28/2019     No  results found for: HGBA1C  Results from last 7 days   Lab Units 11/07/19  0752   INR  1.06               Microbiology Results (last 10 days)     Procedure Component Value - Date/Time    Clostridium Difficile EIA - Stool, Per Rectum [[id number]]  (Normal) Collected:  11/02/19 1348    Lab Status:  Final result Specimen:  Stool from Per Rectum Updated:  11/03/19 0714     C Diff GDH / Toxin Negative    Blood Culture - Blood, Blood, Central Line [[id number]] Collected:  11/01/19 1342    Lab Status:  Final result Specimen:  Blood, Central Line Updated:  11/06/19 1345     Blood Culture No growth at 5 days    Blood Culture - Blood, Arm, Right [[id number]] Collected:  11/01/19 1020    Lab Status:  Final result Specimen:  Blood from Arm, Right Updated:  11/06/19 0945     Blood Culture No growth at 5 days    AFB Culture - Body Fluid, Pleural Cavity [[id number]] Collected:  11/01/19 0202    Lab Status:  Preliminary result Specimen:  Body Fluid from Pleural Cavity Updated:  11/06/19 0239     AFB Culture No AFB isolated at less than 1 week     AFB Stain No acid fast bacilli seen    Body Fluid Culture - Body Fluid, Pleural Cavity [[id number]] Collected:  11/01/19 0202    Lab Status:  Final result Specimen:  Body Fluid from Pleural Cavity Updated:  11/04/19 0706     Body Fluid Culture No growth     Gram Stain Rare (1+) WBCs per low power field      No organisms seen    Fungus Culture - Body Fluid, Pleural Cavity [[id number]] Collected:  11/01/19 0202    Lab Status:  Preliminary result Specimen:  Body Fluid from Pleural Cavity Updated:  11/06/19 0239     Fungus Culture No fungus isolated at less than 1 week    Fungus Smear - Body Fluid, Pleural Cavity [[id number]] Collected:  11/01/19 0202    Lab Status:  Final result Specimen:  Body Fluid from Pleural Cavity Updated:  11/01/19 1122     Fungal Stain No fungal elements seen    Anaerobic Culture - Pleural Fluid, Pleural Cavity [[id number]] Collected:  11/01/19 0201    Lab Status:  Final  result Specimen:  Pleural Fluid from Pleural Cavity Updated:  11/06/19 0946     Anaerobic Culture No anaerobes isolated at 5 days    Respiratory Culture - Sputum, ET Suction [[id number]] Collected:  10/28/19 2215    Lab Status:  Final result Specimen:  Sputum from ET Suction Updated:  10/31/19 0937     Respiratory Culture Scant growth (1+) Normal Respiratory Aby     Gram Stain Many (4+) WBCs per low power field      Many (4+) Mixed bacterial morphotypes seen on Gram Stain          ECG/EMG Results (most recent)     Procedure Component Value Units Date/Time    Adult Transthoracic Echo Complete W/ Cont if Necessary Per Protocol [[id number]] Collected:  10/28/19 1814     Updated:  10/28/19 2055     BSA 1.7 m^2      BH CV ECHO ROCÍO - RVDD 3.2 cm      IVSd 1.1 cm      LVIDd 4.0 cm      LVIDs 2.8 cm      LVPWd 1.0 cm      IVS/LVPW 1.0     FS 29.8 %      EDV(Teich) 69.9 ml      ESV(Teich) 29.7 ml      EF(Teich) 57.5 %      EDV(cubed) 63.9 ml      ESV(cubed) 22.1 ml      EF(cubed) 65.4 %      LV mass(C)d 134.4 grams      LV mass(C)dI 79.0 grams/m^2      SV(Teich) 40.2 ml      SI(Teich) 23.6 ml/m^2      SV(cubed) 41.8 ml      SI(cubed) 24.6 ml/m^2      Ao root diam 3.1 cm      Ao root area 7.7 cm^2      ACS 2.3 cm      LVOT diam 1.8 cm      LVOT area 2.6 cm^2      RVOT diam 1.5 cm      RVOT area 1.8 cm^2      EDV(MOD-sp4) 82.4 ml      ESV(MOD-sp4) 38.5 ml      EF(MOD-sp4) 53.2 %      SV(MOD-sp4) 43.9 ml      SI(MOD-sp4) 25.8 ml/m^2      Ao root area (BSA corrected) 1.8     LV [place] Vol (BSA corrected) 48.4 ml/m^2      LV Sys Vol (BSA corrected) 22.6 ml/m^2      MV E max chloe 93.9 cm/sec      MV A max chloe 50.2 cm/sec      MV E/A 1.9     MV V2 max 108.9 cm/sec      MV max PG 4.7 mmHg      MV V2 mean 67.4 cm/sec      MV mean PG 2.0 mmHg      MV V2 VTI 27.5 cm      MVA(VTI) 1.5 cm^2      MV dec slope 816.8 cm/sec^2      MV dec time 0.11 sec      Ao pk chloe 134.4 cm/sec      Ao max PG 7.2 mmHg      Ao max PG (full) 2.3 mmHg      Ao  V2 mean 96.0 cm/sec      Ao mean PG 4.0 mmHg      Ao mean PG (full) 1.5 mmHg      Ao V2 VTI 19.9 cm      KARI(I,A) 2.0 cm^2      KARI(I,D) 2.0 cm^2      KARI(V,A) 2.2 cm^2      KARI(V,D) 2.2 cm^2      LV V1 max PG 4.9 mmHg      LV V1 mean PG 2.5 mmHg      LV V1 max 111.2 cm/sec      LV V1 mean 73.5 cm/sec      LV V1 VTI 15.5 cm      SV(Ao) 153.0 ml      SI(Ao) 89.9 ml/m^2      SV(LVOT) 40.4 ml      SV(RVOT) 18.6 ml      SI(LVOT) 23.7 ml/m^2      PA V2 max 105.4 cm/sec      PA max PG 4.4 mmHg      PA max PG (full) 2.7 mmHg       CV ECHO ROCÍO - PVA(V,A) 1.1 cm^2       CV ECHO ROCÍO - PVA(V,D) 1.1 cm^2      RV V1 max PG 1.7 mmHg      RV V1 mean PG 0.84 mmHg      RV V1 max 65.7 cm/sec      RV V1 mean 43.2 cm/sec      RV V1 VTI 10.2 cm      TR max chloe 226.7 cm/sec      Qp/Qs 0.46      CV ECHO ROCÍO - BZI_BMI 19.8 kilograms/m^2       CV ECHO ROCÍO - BSA([facility]) 1.7 m^2       CV ECHO ROCÍO - BZI_METRIC_WEIGHT 59.0 kg       CV ECHO ROCÍO - BZI_METRIC_HEIGHT 172.7 cm      EF(MOD-bp) 53.0 %      LA dimension(2D) 3.4 cm      Echo EF Estimated 60 %     Narrative:         · Estimated EF = 60%.  · Left ventricular systolic function is normal.     Indications  Respiratory failure      Technically satisfactory study.  Mitral valve is structurally normal.  Tricuspid valve is structurally normal.  Aortic valve is structurally normal.  Pulmonic valve could not be well visualized.  No evidence for mitral tricuspid or aortic regurgitation is seen by   Doppler study.  Left atrium is normal in size.  Right atrium is significantly enlarged  Left ventricle is normal in size and contractility with ejection fraction   of 60%.  Right ventricle is definitely enlarged  Atrial septum is intact.  Aorta is normal.  No pericardial effusion or intracardiac thrombus is seen.    Impression  Significant right atrium right ventricle enlargement.  No evidence for mitral tricuspid or aortic regurgitation is seen by   Doppler study.  No pericardial  effusion or intracardiac thrombus is present.  Left ventricle is normal in size and contractility with ejection fraction   of 60%.  Boley no pericardial effusion or intracardiac thrombus is seen.        ECG 12 Lead [[id number]] Collected:  10/28/19 1214     Updated:  10/29/19 0808    Narrative:       HEART RATE= 119  bpm  RR Interval= 504  ms  WI Interval= 117  ms  P Horizontal Axis= 4  deg  P Front Axis= 86  deg  QRSD Interval= 128  ms  QT Interval= 369  ms  QRS Axis= 112  deg  T Wave Axis= 39  deg  - ABNORMAL ECG -  Sinus tachycardia  Ventricular premature complex  Nonspecific intraventricular conduction delay  ST elev, probable normal early repol pattern  No previous ECG available for comparison  Electronically Signed By: Antonio Lance (Thomas) 29-Oct-2019 08:08:34  Date and Time of Study: 2019-10-28 12:14:30          Results for orders placed during the hospital encounter of 10/28/19   Duplex Venous Lower Extremity - Bilateral CAR    Narrative · Normal bilateral lower extremity venous duplex scan.          Results for orders placed during the hospital encounter of 10/28/19   Adult Transthoracic Echo Complete W/ Cont if Necessary Per Protocol    Narrative · Estimated EF = 60%.  · Left ventricular systolic function is normal.     Indications  Respiratory failure      Technically satisfactory study.  Mitral valve is structurally normal.  Tricuspid valve is structurally normal.  Aortic valve is structurally normal.  Pulmonic valve could not be well visualized.  No evidence for mitral tricuspid or aortic regurgitation is seen by   Doppler study.  Left atrium is normal in size.  Right atrium is significantly enlarged  Left ventricle is normal in size and contractility with ejection fraction   of 60%.  Right ventricle is definitely enlarged  Atrial septum is intact.  Aorta is normal.  No pericardial effusion or intracardiac thrombus is seen.    Impression  Significant right atrium right ventricle enlargement.  No evidence  for mitral tricuspid or aortic regurgitation is seen by   Doppler study.  No pericardial effusion or intracardiac thrombus is present.  Left ventricle is normal in size and contractility with ejection fraction   of 60%.  Bude no pericardial effusion or intracardiac thrombus is seen.           Ct Abdomen Pelvis Without Contrast    Result Date: 11/1/2019   1. Bilateral pleural effusions are moderate in size with areas of airspace disease which may be pneumonia. 2. Gallbladder wall thickening versus pericholecystic fluid. 3. Dilated small bowel loops throughout the left upper quadrant of the abdomen with decompression of the distal small bowel. The findings are likely secondary to at least incomplete small bowel obstruction. Exact etiology and location is difficult to determine without oral or IV contrast suspected transition point is in the left lower abdomen.  Electronically Signed ByMandeep Thompson On:11/1/2019 10:21 PM This report was finalized on [id number] by  Arturo Thompson, .    Ct Chest Without Contrast    Result Date: 10/31/2019  Interval increase in the size of the pleural effusions and interval worsening of bilateral lower lobe and upper lobe areas of infiltrate and air bronchograms. The findings are consistent with worsening of pneumonia.  Electronically Signed ByMandeep Thompson On:10/31/2019 9:45 PM This report was finalized on [id number] by  Arturo Thompson, .    Us Gallbladder    Result Date: 11/2/2019  A small amount of sludge is present within the gallbladder lumen. Mild gallbladder wall thickening is present which is nonspecific. There is no evidence of biliary ductal dilatation. The sonographic Turner's sign was negative. Acute cholecystitis cannot be excluded.  Electronically Signed ByKary Granda On:11/2/2019 5:13 PM This report was finalized on [id number] by  Joanne Granda, .    Xr Chest 1 View    Result Date: 11/1/2019  1.Decreased right pleural effusion and decreased right lung opacities  postthoracentesis. No definite pneumothorax is seen. 2.Persistent left pleural effusion with left mid and lower lung airspace opacities and consolidation. 3.Right arm PICC and right IJ catheter appear in expected positions.  Electronically Signed By-DR. Omari Glover MD On:11/1/2019 6:54 AM This report was finalized on [id number] by DR. Omari Glover MD.    Xr Chest 1 View    Result Date: 10/31/2019  1. Support lines and tubes in expected position. 2. Bilateral interstitial and alveolar opacities concerning for edema or infection. 3. Bilateral layering pleural effusions, right greater than left. The right-sided pleural effusion is mildly decreased from the prior exam.  Electronically Signed By-Baldemar Dailey On:10/31/2019 10:36 AM This report was finalized on [id number] by  Baldemar Dailey, .    Ir Insert Tunneled Cv Catheter Without Port 5 Plus    Result Date: 11/7/2019  Placement of tunneled hemodialysis catheter via right IJ approach using ultrasound and fluoroscopic guidance.  Electronically Signed By-Arturo Fischer On:11/7/2019 4:21 PM This report was finalized on [id number] by  Arturo Fischer, .      Xrays, labs reviewed personally by physician.    Medication Review:   I have reviewed the patient's current medication list      Scheduled Meds    ampicillin-sulbactam 3 g Intravenous Q24H   citalopram 20 mg Oral Daily   heparin (porcine) 5,000 Units Subcutaneous Q8H   ipratropium-albuterol 3 mL Nebulization Q4H - RT   sodium chloride 10 mL Intravenous Q12H   sodium chloride 10 mL Intravenous Q12H   sodium chloride 10 mL Intravenous Q12H   sodium chloride 10 mL Intravenous Q12H       Meds Infusions       Meds PRN  •  acetaminophen  •  albumin human  •  heparin (porcine)  •  hydrALAZINE  •  hydrOXYzine  •  influenza vaccine  •  ondansetron  •  sodium chloride  •  sodium chloride  •  sodium chloride    I personally reviewed patient's x-ray films     I personally reviewed patient's EKG      Assessment /  "Plan    Active Hospital Problems    Diagnosis POA   • Acute renal failure (ARF) (CMS/HCC) [N17.9] Yes   • Rhabdomyolysis [M62.82] Yes   • ATN (acute tubular necrosis) (CMS/HCC) [N17.0] Yes   • Hepatitis C [B19.20] Yes   • Polysubstance abuse (CMS/HCC) [F19.10] Yes   • Anxiety [F41.9] Yes     KENA secondary to Rhabdomyolysis and ATN  -nephrology following  -on hemodialysis and will need as outpt  -nephrology following  -tunnled catheter placed 11/7/19  -HD again tomorrow    Acute hypoxic respiratory failure   -initially required mechanical ventilation  -self-extubated on 10/29/2019  -s/p thoracentesis with 1 L fluid removed  -now on room air  -continue duoneb q 4 hours   -encourage incentive spirometry      Acute toxic metabolic Encephalopathy  -likely multifactorial  -resolved      Septic shock  --resolved      Hypothermia due to environmental exposure   -resolved     Possible Bacteremia / Aspiration pneumonia  -Positive blood culture for actinomyces species on admission  -ID consulted; per ID:  \"ER performed only one set of blood culture.  This is usually not sufficient to diagnose bacteremia.  Actinomyces is a very rare cause of true bacteremia and usually associated with oral maxillary disease or intra-abdominal abscesses or occasionally pulmonary disease.  The patient has pulmonary infiltrates but not consistent with actinomycosis.\"  -repeat blood cultures are negative so far  -continue Unasyn daily, ID reports 2 weeks total treatment but po abx at d/c        Hepatitis C   -new onset, treatment naive   -needs outpatient follow up with GI     Acute RUQ pain, likely secondary to hepatitis----resolved  -general surgery signed off     Anxiety   -continue hydroxyzine      Polysubstance abuse   -UDS positive for methamphetamine and THC on admission  -encouraged cessation  -DC plan for inpatient rehab program      VTE Prophylaxis - heparin      Discharge Planning    inpt drug rehab will not accept as needs " dialysis  Plan for outpt dialysis once chair time confirmed and d/c home with po drake Shane DO  11/07/19  4:36 PM

## 2019-11-07 NOTE — NURSING NOTE
Pt encouraged to breath and not hold breath during procedure.  RN at table side providing support.

## 2019-11-07 NOTE — PROGRESS NOTES
Infectious Diseases Progress Note      LOS: 10 days   Patient Care Team:  Gary Llanos MD as PCP - General    Chief Complaint: No new complaints    Subjective         The patient has been afebrile for the last 24 hours.  The patient is on room air, hemodynamically stable, and is tolerating antimicrobial therapy.          Review of Systems:   Review of Systems   Constitutional: Negative.    HENT: Negative.    Eyes: Negative.    Respiratory: Negative.    Cardiovascular: Negative.    Genitourinary: Negative.    Musculoskeletal: Negative.    Skin: Negative.    Neurological: Negative.    Hematological: Negative.    Psychiatric/Behavioral: Negative.         Objective     Vital Signs  Temp:  [98.1 °F (36.7 °C)-98.5 °F (36.9 °C)] 98.2 °F (36.8 °C)  Heart Rate:  [] 89  Resp:  [16-24] 20  BP: (131-175)/() 169/115    Physical Exam:  Physical Exam   Constitutional: He is oriented to person, place, and time. He appears well-developed and well-nourished.   HENT:   Head: Normocephalic and atraumatic.   Eyes: EOM are normal. Pupils are equal, round, and reactive to light.   Neck: Normal range of motion. Neck supple.   Cardiovascular: Normal rate, regular rhythm and normal heart sounds.   Pulmonary/Chest: Effort normal and breath sounds normal. No respiratory distress. He has no wheezes.   Abdominal: Soft. Bowel sounds are normal. He exhibits no distension and no mass. There is no tenderness. There is no rebound and no guarding.   Musculoskeletal: Normal range of motion. He exhibits no edema or deformity.   Neurological: He is alert and oriented to person, place, and time. No cranial nerve deficit.   Skin: Skin is warm. No rash noted. No erythema.   Psychiatric: He has a normal mood and affect.   Nursing note and vitals reviewed.       Results Review:    I have reviewed all clinical data, test, lab, and imaging results.     Radiology  No Radiology Exams Resulted Within Past 24  Hours    Cardiology    Laboratory  Results from last 7 days   Lab Units 11/07/19  0613   WBC 10*3/mm3 11.00*   HEMOGLOBIN g/dL 10.1*   HEMATOCRIT % 27.7*   PLATELETS 10*3/mm3 291     Results from last 7 days   Lab Units 11/07/19  0752   SODIUM mmol/L 138   POTASSIUM mmol/L 4.3   CHLORIDE mmol/L 102   CO2 mmol/L 22.0   BUN mg/dL 33*   CREATININE mg/dL 7.80*   GLUCOSE mg/dL 107*   CALCIUM mg/dL 9.1     Results from last 7 days   Lab Units 11/07/19  0752   SODIUM mmol/L 138   POTASSIUM mmol/L 4.3   CHLORIDE mmol/L 102   CO2 mmol/L 22.0   BUN mg/dL 33*   CREATININE mg/dL 7.80*   GLUCOSE mg/dL 107*   CALCIUM mg/dL 9.1     Results from last 7 days   Lab Units 11/04/19  0434   CK TOTAL U/L 239*             Microbiology   Microbiology Results (last 10 days)     Procedure Component Value - Date/Time    Clostridium Difficile EIA - Stool, Per Rectum [[id number]]  (Normal) Collected:  11/02/19 1348    Lab Status:  Final result Specimen:  Stool from Per Rectum Updated:  11/03/19 0714     C Diff GDH / Toxin Negative    Blood Culture - Blood, Blood, Central Line [[id number]] Collected:  11/01/19 1342    Lab Status:  Final result Specimen:  Blood, Central Line Updated:  11/06/19 1345     Blood Culture No growth at 5 days    Blood Culture - Blood, Arm, Right [[id number]] Collected:  11/01/19 1020    Lab Status:  Final result Specimen:  Blood from Arm, Right Updated:  11/06/19 0945     Blood Culture No growth at 5 days    AFB Culture - Body Fluid, Pleural Cavity [[id number]] Collected:  11/01/19 0202    Lab Status:  Preliminary result Specimen:  Body Fluid from Pleural Cavity Updated:  11/06/19 0239     AFB Culture No AFB isolated at less than 1 week     AFB Stain No acid fast bacilli seen    Body Fluid Culture - Body Fluid, Pleural Cavity [[id number]] Collected:  11/01/19 0202    Lab Status:  Final result Specimen:  Body Fluid from Pleural Cavity Updated:  11/04/19 0706     Body Fluid Culture No growth     Gram Stain Rare (1+) WBCs per  low power field      No organisms seen    Fungus Culture - Body Fluid, Pleural Cavity [[id number]] Collected:  11/01/19 0202    Lab Status:  Preliminary result Specimen:  Body Fluid from Pleural Cavity Updated:  11/06/19 0239     Fungus Culture No fungus isolated at less than 1 week    Fungus Smear - Body Fluid, Pleural Cavity [[id number]] Collected:  11/01/19 0202    Lab Status:  Final result Specimen:  Body Fluid from Pleural Cavity Updated:  11/01/19 1122     Fungal Stain No fungal elements seen    Anaerobic Culture - Pleural Fluid, Pleural Cavity [[id number]] Collected:  11/01/19 0201    Lab Status:  Final result Specimen:  Pleural Fluid from Pleural Cavity Updated:  11/06/19 0946     Anaerobic Culture No anaerobes isolated at 5 days    Respiratory Culture - Sputum, ET Suction [[id number]] Collected:  10/28/19 2215    Lab Status:  Final result Specimen:  Sputum from ET Suction Updated:  10/31/19 0937     Respiratory Culture Scant growth (1+) Normal Respiratory Aby     Gram Stain Many (4+) WBCs per low power field      Many (4+) Mixed bacterial morphotypes seen on Gram Stain    MRSA Screen Culture - Swab, Nares [[id number]]  (Normal) Collected:  10/28/19 1515    Lab Status:  Final result Specimen:  Swab from Nares Updated:  10/29/19 1813     MRSA SCREEN CX No Methicillin Resistant Staphylococcus aureus isolated    Respiratory Panel, PCR - Swab, Nasopharynx [[id number]]  (Abnormal) Collected:  10/28/19 1515    Lab Status:  Final result Specimen:  Swab from Nasopharynx Updated:  10/28/19 1741     ADENOVIRUS, PCR Not Detected     Coronavirus 229E Not Detected     Coronavirus HKU1 Not Detected     Coronavirus NL63 Not Detected     Coronavirus OC43 Not Detected     Human Metapneumovirus Not Detected     Human Rhinovirus/Enterovirus Detected     Influenza B PCR Not Detected     Parainfluenza Virus 1 Not Detected     Parainfluenza Virus 2 Not Detected     Parainfluenza Virus 3 Not Detected     Parainfluenza Virus 4 Not  Detected     Bordetella pertussis pcr Not Detected     Influenza A H1 2009 PCR Not Detected     Chlamydophila pneumoniae PCR Not Detected     Mycoplasma pneumo by PCR Not Detected     Influenza A PCR Not Detected     Influenza A H3 Not Detected     Influenza A H1 Not Detected     RSV, PCR Not Detected          Medication Review:       Schedule Meds    ampicillin-sulbactam 3 g Intravenous Q24H   citalopram 20 mg Oral Daily   heparin (porcine) 5,000 Units Subcutaneous Q8H   ipratropium-albuterol 3 mL Nebulization Q4H - RT   sodium chloride 10 mL Intravenous Q12H   sodium chloride 10 mL Intravenous Q12H   sodium chloride 10 mL Intravenous Q12H   sodium chloride 10 mL Intravenous Q12H       Infusion Meds       PRN Meds  •  acetaminophen  •  albumin human  •  heparin (porcine)  •  hydrALAZINE  •  hydrOXYzine  •  influenza vaccine  •  ondansetron  •  sodium chloride  •  sodium chloride  •  sodium chloride        Assessment/Plan       Antimicrobial Therapy   1.  IV Unasyn     Day 7  2.      Day  3.      Day  4.      Day  5.      Day      Assessment     Positive blood culture for actinomyces species on admission.  Unfortunately ER performed only one set of blood culture.  This is usually not sufficient to diagnose bacteremia.  Actinomyces is a very rare cause of true bacteremia and usually associated with oral maxillary disease or intra-abdominal abscesses or occasionally pulmonary disease.  The patient has pulmonary infiltrates but not consistent with actinomycosis.  -Repeat blood cultures are negative so far     IV drug use.  Patient was using IV heroin and IV methamphetamine     Hepatitis C infection.  Treatment naïve  Hep Quant- 353,000 IU/ml  HCV log10 5.548 log10 IU/ml  Genotype 1A     Bilateral pulmonary infiltrates associated with bilateral effusion.  Most likely secondary to aspiration pneumonia     Rhabdomyolysis     Acute kidney failure.  Most likely multifactorial secondary to rhabdomyolysis and sepsis.  The  patient is currently on hemodialysis     Rhinovirus infection    Diarrhea.  C. difficile colitis screen was negative    Right upper quadrant pain.  CT scan of the abdomen and pelvis was done without contrast and showed thickening of the gallbladder.  The patient is currently asymptomatic.       Plan     Continue patient on Unasyn 3 grams IV daily -patient will need 2 weeks total treatment but will be able to switch patient to p.o. antibiotics at discharge  Okay to place permanent dialysis catheter anytime  Continue supportive care  A.m. labs  Droplet isolation for rhinovirus during this admission   Patient will need to follow with GI as an outpatient for hepatitis C treatment  Illicit drug abuse cessation-patient states that he is interested in drug rehab           Ashia Childress, STEPHANE  11/07/19  11:34 AM      Note is dictated utilizing voice recognition software/Dragon

## 2019-11-07 NOTE — PLAN OF CARE
Problem: Fall Risk (Adult)  Goal: Absence of Fall  Outcome: Ongoing (interventions implemented as appropriate)      Problem: Patient Care Overview  Goal: Plan of Care Review  Outcome: Ongoing (interventions implemented as appropriate)   11/07/19 0309   Coping/Psychosocial   Plan of Care Reviewed With patient   OTHER   Outcome Summary pt.amb. ad garrison on unit this shift. Reports he could not finish dialysis today as it made his neck hurt. questions if he will be sent home with hemodialysis cath in place in neck. enc. pt. to speak to MD about this. pt. girlfriend in and voices concern that pt.is going to be living with her on discharge, however she has nowhere to live until Friday when she gets an apartment so if pt. is dishcharged prior to this he will not have anywhere to go. Will alert case management of girlfriends concerns.       Problem: Skin Injury Risk (Adult)  Goal: Skin Health and Integrity  Outcome: Ongoing (interventions implemented as appropriate)

## 2019-11-07 NOTE — POST-PROCEDURE NOTE
IR POST OP NOTE    Procedure:Tunneled HD catheter placement      Pre Op DX:Rhabdomyolysis, ARF with need for OP HD.      Post Op DX:same      Anesthesia: Local, CS      Findings:See dictation      Complications:No immediate.       Provider Signature: Dr. Arturo Fischer

## 2019-11-08 LAB
ANION GAP SERPL CALCULATED.3IONS-SCNC: 18 MMOL/L (ref 5–15)
BASOPHILS # BLD AUTO: 0 10*3/MM3 (ref 0–0.2)
BASOPHILS NFR BLD AUTO: 0.3 % (ref 0–1.5)
BUN BLD-MCNC: 40 MG/DL (ref 6–20)
BUN/CREAT SERPL: 4.2 (ref 7–25)
CALCIUM SPEC-SCNC: 8.7 MG/DL (ref 8.6–10.5)
CHLORIDE SERPL-SCNC: 102 MMOL/L (ref 98–107)
CO2 SERPL-SCNC: 21 MMOL/L (ref 22–29)
CREAT BLD-MCNC: 9.56 MG/DL (ref 0.76–1.27)
DEPRECATED RDW RBC AUTO: 42.9 FL (ref 37–54)
EOSINOPHIL # BLD AUTO: 0.3 10*3/MM3 (ref 0–0.4)
EOSINOPHIL NFR BLD AUTO: 1.9 % (ref 0.3–6.2)
ERYTHROCYTE [DISTWIDTH] IN BLOOD BY AUTOMATED COUNT: 13.7 % (ref 12.3–15.4)
GFR SERPL CREATININE-BSD FRML MDRD: 7 ML/MIN/1.73
GFR SERPL CREATININE-BSD FRML MDRD: ABNORMAL ML/MIN/{1.73_M2}
GLUCOSE BLD-MCNC: 97 MG/DL (ref 65–99)
HCT VFR BLD AUTO: 27.7 % (ref 37.5–51)
HGB BLD-MCNC: 9.7 G/DL (ref 13–17.7)
IRON 24H UR-MRATE: 93 MCG/DL (ref 59–158)
IRON SATN MFR SERPL: 29 % (ref 20–50)
LYMPHOCYTES # BLD AUTO: 1.2 10*3/MM3 (ref 0.7–3.1)
LYMPHOCYTES NFR BLD AUTO: 8.9 % (ref 19.6–45.3)
MAGNESIUM SERPL-MCNC: 2.2 MG/DL (ref 1.6–2.6)
MCH RBC QN AUTO: 30.7 PG (ref 26.6–33)
MCHC RBC AUTO-ENTMCNC: 34.8 G/DL (ref 31.5–35.7)
MCV RBC AUTO: 88 FL (ref 79–97)
MONOCYTES # BLD AUTO: 1.1 10*3/MM3 (ref 0.1–0.9)
MONOCYTES NFR BLD AUTO: 7.7 % (ref 5–12)
NEUTROPHILS # BLD AUTO: 11.3 10*3/MM3 (ref 1.7–7)
NEUTROPHILS NFR BLD AUTO: 81.2 % (ref 42.7–76)
NRBC BLD AUTO-RTO: 0 /100 WBC (ref 0–0.2)
PLATELET # BLD AUTO: 314 10*3/MM3 (ref 140–450)
PMV BLD AUTO: 6.5 FL (ref 6–12)
POTASSIUM BLD-SCNC: 4.5 MMOL/L (ref 3.5–5.2)
RBC # BLD AUTO: 3.15 10*6/MM3 (ref 4.14–5.8)
SODIUM BLD-SCNC: 141 MMOL/L (ref 136–145)
TIBC SERPL-MCNC: 323 MCG/DL (ref 298–536)
TRANSFERRIN SERPL-MCNC: 217 MG/DL (ref 200–360)
WBC NRBC COR # BLD: 13.9 10*3/MM3 (ref 3.4–10.8)

## 2019-11-08 PROCEDURE — 83735 ASSAY OF MAGNESIUM: CPT | Performed by: NURSE PRACTITIONER

## 2019-11-08 PROCEDURE — 25010000003 AMPICILLIN-SULBACTAM PER 1.5 G: Performed by: INTERNAL MEDICINE

## 2019-11-08 PROCEDURE — 84466 ASSAY OF TRANSFERRIN: CPT | Performed by: INTERNAL MEDICINE

## 2019-11-08 PROCEDURE — 25010000002 ONDANSETRON PER 1 MG: Performed by: NURSE PRACTITIONER

## 2019-11-08 PROCEDURE — 25010000002 HYDRALAZINE PER 20 MG: Performed by: INTERNAL MEDICINE

## 2019-11-08 PROCEDURE — 94799 UNLISTED PULMONARY SVC/PX: CPT

## 2019-11-08 PROCEDURE — 5A1D70Z PERFORMANCE OF URINARY FILTRATION, INTERMITTENT, LESS THAN 6 HOURS PER DAY: ICD-10-PCS | Performed by: INTERNAL MEDICINE

## 2019-11-08 PROCEDURE — 80048 BASIC METABOLIC PNL TOTAL CA: CPT | Performed by: NURSE PRACTITIONER

## 2019-11-08 PROCEDURE — 25010000002 HEPARIN (PORCINE) PER 1000 UNITS: Performed by: INTERNAL MEDICINE

## 2019-11-08 PROCEDURE — 83540 ASSAY OF IRON: CPT | Performed by: INTERNAL MEDICINE

## 2019-11-08 PROCEDURE — 94760 N-INVAS EAR/PLS OXIMETRY 1: CPT

## 2019-11-08 PROCEDURE — 99232 SBSQ HOSP IP/OBS MODERATE 35: CPT | Performed by: INTERNAL MEDICINE

## 2019-11-08 PROCEDURE — 85025 COMPLETE CBC W/AUTO DIFF WBC: CPT | Performed by: NURSE PRACTITIONER

## 2019-11-08 RX ORDER — ALBUMIN (HUMAN) 12.5 G/50ML
12.5 SOLUTION INTRAVENOUS AS NEEDED
Status: DISCONTINUED | OUTPATIENT
Start: 2019-11-08 | End: 2019-11-09 | Stop reason: HOSPADM

## 2019-11-08 RX ORDER — HEPARIN SODIUM 1000 [USP'U]/ML
5000 INJECTION, SOLUTION INTRAVENOUS; SUBCUTANEOUS
Status: DISCONTINUED | OUTPATIENT
Start: 2019-11-08 | End: 2019-11-09 | Stop reason: HOSPADM

## 2019-11-08 RX ORDER — ALBUMIN (HUMAN) 12.5 G/50ML
12.5 SOLUTION INTRAVENOUS AS NEEDED
Status: DISCONTINUED | OUTPATIENT
Start: 2019-11-08 | End: 2019-11-08

## 2019-11-08 RX ORDER — AMLODIPINE BESYLATE 5 MG/1
10 TABLET ORAL
Status: DISCONTINUED | OUTPATIENT
Start: 2019-11-08 | End: 2019-11-09 | Stop reason: HOSPADM

## 2019-11-08 RX ADMIN — ACETAMINOPHEN 650 MG: 325 TABLET ORAL at 19:29

## 2019-11-08 RX ADMIN — ACETAMINOPHEN 650 MG: 325 TABLET ORAL at 10:11

## 2019-11-08 RX ADMIN — HYDROXYZINE HYDROCHLORIDE 50 MG: 25 TABLET, FILM COATED ORAL at 12:52

## 2019-11-08 RX ADMIN — HEPARIN SODIUM 5000 UNITS: 5000 INJECTION INTRAVENOUS; SUBCUTANEOUS at 21:35

## 2019-11-08 RX ADMIN — HYDRALAZINE HYDROCHLORIDE 10 MG: 20 INJECTION INTRAMUSCULAR; INTRAVENOUS at 10:11

## 2019-11-08 RX ADMIN — HYDROXYZINE HYDROCHLORIDE 50 MG: 25 TABLET, FILM COATED ORAL at 19:29

## 2019-11-08 RX ADMIN — HEPARIN SODIUM 5000 UNITS: 5000 INJECTION INTRAVENOUS; SUBCUTANEOUS at 05:56

## 2019-11-08 RX ADMIN — IPRATROPIUM BROMIDE AND ALBUTEROL SULFATE 3 ML: .5; 3 SOLUTION RESPIRATORY (INHALATION) at 20:23

## 2019-11-08 RX ADMIN — CITALOPRAM HYDROBROMIDE 20 MG: 20 TABLET ORAL at 12:52

## 2019-11-08 RX ADMIN — Medication 10 ML: at 12:53

## 2019-11-08 RX ADMIN — AMLODIPINE BESYLATE 10 MG: 5 TABLET ORAL at 14:54

## 2019-11-08 RX ADMIN — IPRATROPIUM BROMIDE AND ALBUTEROL SULFATE 3 ML: .5; 3 SOLUTION RESPIRATORY (INHALATION) at 15:32

## 2019-11-08 RX ADMIN — ONDANSETRON 4 MG: 2 INJECTION INTRAMUSCULAR; INTRAVENOUS at 19:29

## 2019-11-08 RX ADMIN — HEPARIN SODIUM 5000 UNITS: 5000 INJECTION INTRAVENOUS; SUBCUTANEOUS at 14:05

## 2019-11-08 RX ADMIN — ONDANSETRON 4 MG: 2 INJECTION INTRAMUSCULAR; INTRAVENOUS at 00:58

## 2019-11-08 RX ADMIN — SODIUM CHLORIDE 3 G: 900 INJECTION INTRAVENOUS at 12:52

## 2019-11-08 RX ADMIN — HEPARIN SODIUM 5000 UNITS: 1000 INJECTION INTRAVENOUS; SUBCUTANEOUS at 10:12

## 2019-11-08 RX ADMIN — Medication 10 ML: at 22:33

## 2019-11-08 NOTE — PROGRESS NOTES
Continued Stay Note  [facility]     Patient Name: Vicente Cunha  MRN: [id number]  Today's Date: 11/8/2019    Admit Date: 10/28/2019       Received a call from Hillary from [facility] stating that patient has been accepted at [facility] in [place] with a chair time of T, Th, Sat at 0630. Start date Tuesday 11/12/19 with arrival time at 0615. MD informed and will speak to nephrology to determine D/C Date.       Esthela Tobin RN, CM  Office Phone [phone]  Cell [phone]

## 2019-11-08 NOTE — PROGRESS NOTES
Nutrition Services    Patient Name:  Vicente Cunha  YOB: 1995  MRN: [id number]  Admit Date:  10/28/2019    Patient has not had intakes ordered for multiple days. Patient has been refusing trays per Room Service. Ordering boost plus TID to promote kcal/protein intakes.     Electronically signed by:  Ilan Sheldon RD  11/08/19 3:12 PM

## 2019-11-08 NOTE — PAYOR COMM NOTE
"  [facility]  NPI # [id number]  TID # [id number]      RETURN CONTACT  [facility]   UCOSME /    PH: [phone]  FX: [phone]  ===========================    REFERENCE # [id number]  ===========================      REQUEST FOR ADDITIONAL DAYS AUTHORIZATION    UR REVIEW- 11/8    Continued review-  Day#11    MILLILMAN- > Nephrology >Renal Failure, Acute ([id number])  New need for dialysis (eg, not previously arranged and prepared)(21)   · Patient requiring inpatient dialysis may require longer acute hospital care.   · Expect brief stay extension  ==============================    DX-   • Acute renal failure (ARF) (CMS/HCC) [N17.9]    • Rhabdomyolysis [M62.82]    • ATN (acute tubular necrosis) (CMS/HCC) [N17.0]    • Hepatitis C [B19.20]    • Polysubstance abuse (CMS/HCC) [F19.10]    • Anxiety [F41.9]        11/7- Procedure: Tunneled HD catheter placement  Creatinine - 11/8--  9.56-  WILL NEED HD TODAY   ===========================  DISCHARGE ISSUES   - New HD set up in progress- PENDING FINAL ARRANGEMENTS   -    Please respond to above contact. Thank you.     ===========================    Vicente Cunha (24 y.o. Male)     Date of Birth Social Security Number Address Home Phone MRN    [id number]  [id number] [facility] [place] [address] [phone] [id number]    Denominational Marital Status          Non-[employer]ist Single       Admission Date Admission Type Admitting Provider Attending Provider Department, Room/Bed    10/28/19 Emergency [place], DO Svitlana Ham Lee M, DO [facility] NEURO HEART, 257/1    Discharge Date Discharge Disposition Discharge Destination                       Attending Provider:  Chirag Shane DO    Allergies:  Penicillins    Isolation:  None   Infection:  None   Code Status:  CPR    Ht:  172.7 cm (68\")   Wt:  62.1 kg (136 lb 14.5 oz)    Admission Cmt:  None   Principal Problem:  Altered mental status [R41.82]     "             Active Insurance as of 10/28/2019     Primary Coverage     Payor Plan Insurance Group Employer/Plan Group    [employer] [id number]     Payor Plan Address Payor Plan Phone Number Payor Plan Fax Number Effective Dates    MAIL STOP:   8/1/2019 - None Entered    [address] [address]       [facility] [address]       Subscriber Name Subscriber Birth Date Member ID       REGINA WHEELER 1995 [id number]                 Emergency Contacts      (Rel.) Home Phone Work Phone Mobile Phone    CONTACT, [phone] --            Lab Results (last 48 hours)     Procedure Component Value Units Date/Time    Basic Metabolic Panel [[id number]]  (Abnormal) Collected:  11/08/19 0317    Specimen:  Blood Updated:  11/08/19 0421     Glucose 97 mg/dL      BUN 40 mg/dL      Creatinine 9.56 mg/dL      Sodium 141 mmol/L      Potassium 4.5 mmol/L      Chloride 102 mmol/L      CO2 21.0 mmol/L      Calcium 8.7 mg/dL      eGFR   Amer --     Comment: <15 Indicative of kidney failure.        eGFR Non African Amer 7 mL/min/1.73      Comment: <15 Indicative of kidney failure.        BUN/Creatinine Ratio 4.2     Anion Gap 18.0 mmol/L     Narrative:       GFR Normal >60  Chronic Kidney Disease <60  Kidney Failure <15    Magnesium [[id number]]  (Normal) Collected:  11/08/19 0317    Specimen:  Blood Updated:  11/08/19 0421     Magnesium 2.2 mg/dL     CBC & Differential [[id number]] Collected:  11/08/19 0317    Specimen:  Blood Updated:  11/08/19 0359    Narrative:       The following orders were created for panel order CBC & Differential.  Procedure                               Abnormality         Status                     ---------                               -----------         ------                     CBC Auto Differential[[id number]]        Abnormal            Final result                 Please view results for these tests on the individual orders.    CBC Auto Differential [[id number]]   (Abnormal) Collected:  11/08/19 0317    Specimen:  Blood Updated:  11/08/19 0359     WBC 13.90 10*3/mm3      RBC 3.15 10*6/mm3      Hemoglobin 9.7 g/dL      Hematocrit 27.7 %      MCV 88.0 fL      MCH 30.7 pg      MCHC 34.8 g/dL      RDW 13.7 %      RDW-SD 42.9 fl      MPV 6.5 fL      Platelets 314 10*3/mm3      Neutrophil % 81.2 %      Lymphocyte % 8.9 %      Monocyte % 7.7 %      Eosinophil % 1.9 %      Basophil % 0.3 %      Neutrophils, Absolute 11.30 10*3/mm3      Lymphocytes, Absolute 1.20 10*3/mm3      Monocytes, Absolute 1.10 10*3/mm3      Eosinophils, Absolute 0.30 10*3/mm3      Basophils, Absolute 0.00 10*3/mm3      nRBC 0.0 /100 WBC     AFB Culture - Body Fluid, Pleural Cavity [[id number]] Collected:  11/01/19 0202    Specimen:  Body Fluid from Pleural Cavity Updated:  11/08/19 0229     AFB Culture No AFB isolated at 1 week     AFB Stain No acid fast bacilli seen    Fungus Culture - Body Fluid, Pleural Cavity [[id number]] Collected:  11/01/19 0202    Specimen:  Body Fluid from Pleural Cavity Updated:  11/08/19 0229     Fungus Culture No fungus isolated at 1 week    Basic Metabolic Panel [[id number]]  (Abnormal) Collected:  11/07/19 0752    Specimen:  Blood Updated:  11/07/19 0920     Glucose 107 mg/dL      BUN 33 mg/dL      Creatinine 7.80 mg/dL      Sodium 138 mmol/L      Potassium 4.3 mmol/L      Chloride 102 mmol/L      CO2 22.0 mmol/L      Calcium 9.1 mg/dL      eGFR   Amer --     Comment: <15 Indicative of kidney failure.        eGFR Non African Amer 8 mL/min/1.73      Comment: <15 Indicative of kidney failure.        BUN/Creatinine Ratio 4.2     Anion Gap 14.0 mmol/L     Narrative:       GFR Normal >60  Chronic Kidney Disease <60  Kidney Failure <15    Magnesium [[id number]]  (Normal) Collected:  11/07/19 0752    Specimen:  Blood Updated:  11/07/19 0920     Magnesium 2.2 mg/dL     Protime-INR [[id number]]  (Normal) Collected:  11/07/19 0752    Specimen:  Blood Updated:  11/07/19 0907     Protime  11.0 Seconds      INR 1.06    aPTT [[id number]]  (Abnormal) Collected:  11/07/19 0752    Specimen:  Blood Updated:  11/07/19 0907     PTT 27.6 seconds     CBC & Differential [[id number]] Collected:  11/07/19 0613    Specimen:  Blood Updated:  11/07/19 0648    Narrative:       The following orders were created for panel order CBC & Differential.  Procedure                               Abnormality         Status                     ---------                               -----------         ------                     CBC Auto Differential[[id number]]        Abnormal            Final result                 Please view results for these tests on the individual orders.    CBC Auto Differential [[id number]]  (Abnormal) Collected:  11/07/19 0613    Specimen:  Blood Updated:  11/07/19 0648     WBC 11.00 10*3/mm3      RBC 3.18 10*6/mm3      Hemoglobin 10.1 g/dL      Hematocrit 27.7 %      MCV 87.3 fL      MCH 31.8 pg      MCHC 36.4 g/dL      RDW 13.2 %      RDW-SD 41.1 fl      MPV 6.6 fL      Platelets 291 10*3/mm3      Neutrophil % 69.1 %      Lymphocyte % 15.5 %      Monocyte % 10.7 %      Eosinophil % 4.1 %      Basophil % 0.6 %      Neutrophils, Absolute 7.60 10*3/mm3      Lymphocytes, Absolute 1.70 10*3/mm3      Monocytes, Absolute 1.20 10*3/mm3      Eosinophils, Absolute 0.50 10*3/mm3      Basophils, Absolute 0.10 10*3/mm3      nRBC 0.0 /100 WBC     Blood Culture - Blood, Blood, Central Line [[id number]] Collected:  11/01/19 1342    Specimen:  Blood, Central Line Updated:  11/06/19 1345     Blood Culture No growth at 5 days    Anaerobic Culture - Pleural Fluid, Pleural Cavity [[id number]] Collected:  11/01/19 0201    Specimen:  Pleural Fluid from Pleural Cavity Updated:  11/06/19 0946     Anaerobic Culture No anaerobes isolated at 5 days    Blood Culture - Blood, Arm, Right [[id number]] Collected:  11/01/19 1020    Specimen:  Blood from Arm, Right Updated:  11/06/19 0945     Blood Culture No growth at 5 days            "  Physician Progress Notes (last 48 hours) (Notes from 11/06/19 0922 through 11/08/19 0922)      Chirag Shane, DO at 11/07/19 1636                [facility] Medicine Services Daily Progress Note      Hospitalist Team  LOS 10 days      Patient Care Team:  Gary Llanos MD as PCP - General        Chief Complaint / Subjective  Chief Complaint   Patient presents with   • Ingestion     heroin and meth use last seen 830 last night pt was found in barn.     Feeling more anxious today. Reports no other new issues and breathing is stable.     Brief Synopsis of Hospital Course/HPI  23 yo male found down d/t overdose and was intubated in manged in the ICU. Now downgraded on IV abx and intermittent HD.           Review of Systems   Constitution: Negative for fever.   Respiratory: Negative for cough.                Objective      Vital Signs  Temp:  [98.1 °F (36.7 °C)-98.5 °F (36.9 °C)] 98.3 °F (36.8 °C)  Heart Rate:  [] 98  Resp:  [16-24] 19  BP: (131-184)/() 176/122  Oxygen Therapy  SpO2: 99 %(2L NC)  Pulse Oximetry Type: Intermittent  Device (Oxygen Therapy): room air  $ High Flow Nasal Cannula Set-Up: yes  Flow (L/min): 2  Oxygen Concentration (%): 40  Oximetry Probe Site Changed: No  Flowsheet Rows      First Filed Value   Admission Height  172.7 cm (68\") Documented at 10/28/2019 1138   Admission Weight  59 kg (130 lb) Documented at 10/28/2019 1138        Intake & Output (last 3 days)       11/04 0701 - 11/05 0700 11/05 0701 - 11/06 0700 11/06 0701 - 11/07 0700 11/07 0701 - 11/08 0700    P.O. 480 240 240     I.V. (mL/kg) 337.9 (5.6)       IV Piggyback   100     Total Intake(mL/kg) 817.9 (13.5) 240 (3.9) 340 (5.5)     Urine (mL/kg/hr)        Other 2500  135     Stool 0       Total Output 2500  135     Net -1682.1 +240 +205             Urine Unmeasured Occurrence 2 x       Stool Unmeasured Occurrence 3 x           Lines, Drains & Airways    Active LDAs     Name:   Placement date:   " Placement time:   Site:   Days:    Hemodialysis Cath Double with Pigtail   10/30/19    1300    Internal Jugular   6                  Physical Exam:    Physical Exam   Constitutional: No distress.   HENT:   Head: Normocephalic.   Eyes: Pupils are equal, round, and reactive to light.   Neck:   Dialysis catheter noted to the right neck   Cardiovascular: Normal rate.   Pulmonary/Chest: Effort normal. No respiratory distress.   Abdominal: Soft. He exhibits no distension.   Neurological: He is alert.   Skin: Skin is warm.   Psychiatric: He has a normal mood and affect.           Results Review:     I reviewed the patient's new clinical results.      Results from last 7 days   Lab Units 11/07/19  0613 11/06/19  0135 11/05/19  0457  11/03/19  0535 11/02/19  0547 11/01/19  0500   WBC 10*3/mm3 11.00* 13.10* 12.30*   < > 13.50* 14.60* 17.50*   HEMOGLOBIN g/dL 10.1* 10.6* 11.0*   < > 11.0* 10.3* 11.0*   HEMATOCRIT % 27.7* 29.8* 30.2*   < > 31.2* 29.2* 32.2*   PLATELETS 10*3/mm3 291 233 202   < > 146 122* 111*   MONOCYTES % %  --   --   --   --  6.0 5.0 3.0*    < > = values in this interval not displayed.     Results from last 7 days   Lab Units 11/07/19  0752 11/06/19  0135 11/05/19  1723 11/05/19  0457  11/03/19  0535 11/02/19  0547   SODIUM mmol/L 138 136 135* 137   < > 138 135*   POTASSIUM mmol/L 4.3 4.1 4.0 3.8   < > 3.6 3.7   CHLORIDE mmol/L 102 99 99 102   < > 102 100   CO2 mmol/L 22.0 21.0* 23.0 24.0   < > 23.0 21.0*   BUN mg/dL 33* 40* 33* 24*   < > 44* 61*   CREATININE mg/dL 7.80* 7.80* 6.35* 5.08*   < > 5.46* 5.27*   CALCIUM mg/dL 9.1 8.8 8.9 8.5*   < > 8.3* 8.4*   BILIRUBIN mg/dL  --   --   --  0.4  --  0.5 0.5   ALK PHOS U/L  --   --   --  63  --  57 57   ALT (SGPT) U/L  --   --   --  233*  --  413* 627*   AST (SGOT) U/L  --   --   --  44*  --  67* 124*   GLUCOSE mg/dL 107* 111* 87 96   < > 88 91    < > = values in this interval not displayed.     Results from last 7 days   Lab Units 11/07/19  0752 11/06/19  0135  11/05/19  0457   MAGNESIUM mg/dL 2.2 2.0 2.0     Lab Results   Component Value Date    CALCIUM 9.1 11/07/2019    PHOS 7.4 (H) 10/28/2019     No results found for: HGBA1C  Results from last 7 days   Lab Units 11/07/19  0752   INR  1.06               Microbiology Results (last 10 days)     Procedure Component Value - Date/Time    Clostridium Difficile EIA - Stool, Per Rectum [[id number]]  (Normal) Collected:  11/02/19 1348    Lab Status:  Final result Specimen:  Stool from Per Rectum Updated:  11/03/19 0714     C Diff GDH / Toxin Negative    Blood Culture - Blood, Blood, Central Line [[id number]] Collected:  11/01/19 1342    Lab Status:  Final result Specimen:  Blood, Central Line Updated:  11/06/19 1345     Blood Culture No growth at 5 days    Blood Culture - Blood, Arm, Right [[id number]] Collected:  11/01/19 1020    Lab Status:  Final result Specimen:  Blood from Arm, Right Updated:  11/06/19 0945     Blood Culture No growth at 5 days    AFB Culture - Body Fluid, Pleural Cavity [[id number]] Collected:  11/01/19 0202    Lab Status:  Preliminary result Specimen:  Body Fluid from Pleural Cavity Updated:  11/06/19 0239     AFB Culture No AFB isolated at less than 1 week     AFB Stain No acid fast bacilli seen    Body Fluid Culture - Body Fluid, Pleural Cavity [[id number]] Collected:  11/01/19 0202    Lab Status:  Final result Specimen:  Body Fluid from Pleural Cavity Updated:  11/04/19 0706     Body Fluid Culture No growth     Gram Stain Rare (1+) WBCs per low power field      No organisms seen    Fungus Culture - Body Fluid, Pleural Cavity [[id number]] Collected:  11/01/19 0202    Lab Status:  Preliminary result Specimen:  Body Fluid from Pleural Cavity Updated:  11/06/19 0239     Fungus Culture No fungus isolated at less than 1 week    Fungus Smear - Body Fluid, Pleural Cavity [[id number]] Collected:  11/01/19 0202    Lab Status:  Final result Specimen:  Body Fluid from Pleural Cavity Updated:  11/01/19 1122     Fungal  Stain No fungal elements seen    Anaerobic Culture - Pleural Fluid, Pleural Cavity [[id number]] Collected:  11/01/19 0201    Lab Status:  Final result Specimen:  Pleural Fluid from Pleural Cavity Updated:  11/06/19 0946     Anaerobic Culture No anaerobes isolated at 5 days    Respiratory Culture - Sputum, ET Suction [[id number]] Collected:  10/28/19 2215    Lab Status:  Final result Specimen:  Sputum from ET Suction Updated:  10/31/19 0937     Respiratory Culture Scant growth (1+) Normal Respiratory Aby     Gram Stain Many (4+) WBCs per low power field      Many (4+) Mixed bacterial morphotypes seen on Gram Stain          ECG/EMG Results (most recent)     Procedure Component Value Units Date/Time    Adult Transthoracic Echo Complete W/ Cont if Necessary Per Protocol [[id number]] Collected:  10/28/19 1814     Updated:  10/28/19 2055     BSA 1.7 m^2       CV ECHO ROCÍO - RVDD 3.2 cm      IVSd 1.1 cm      LVIDd 4.0 cm      LVIDs 2.8 cm      LVPWd 1.0 cm      IVS/LVPW 1.0     FS 29.8 %      EDV(Teich) 69.9 ml      ESV(Teich) 29.7 ml      EF(Teich) 57.5 %      EDV(cubed) 63.9 ml      ESV(cubed) 22.1 ml      EF(cubed) 65.4 %      LV mass(C)d 134.4 grams      LV mass(C)dI 79.0 grams/m^2      SV(Teich) 40.2 ml      SI(Teich) 23.6 ml/m^2      SV(cubed) 41.8 ml      SI(cubed) 24.6 ml/m^2      Ao root diam 3.1 cm      Ao root area 7.7 cm^2      ACS 2.3 cm      LVOT diam 1.8 cm      LVOT area 2.6 cm^2      RVOT diam 1.5 cm      RVOT area 1.8 cm^2      EDV(MOD-sp4) 82.4 ml      ESV(MOD-sp4) 38.5 ml      EF(MOD-sp4) 53.2 %      SV(MOD-sp4) 43.9 ml      SI(MOD-sp4) 25.8 ml/m^2      Ao root area (BSA corrected) 1.8     LV [place] Vol (BSA corrected) 48.4 ml/m^2      LV Sys Vol (BSA corrected) 22.6 ml/m^2      MV E max chloe 93.9 cm/sec      MV A max chloe 50.2 cm/sec      MV E/A 1.9     MV V2 max 108.9 cm/sec      MV max PG 4.7 mmHg      MV V2 mean 67.4 cm/sec      MV mean PG 2.0 mmHg      MV V2 VTI 27.5 cm      MVA(VTI) 1.5 cm^2      MV  dec slope 816.8 cm/sec^2      MV dec time 0.11 sec      Ao pk chloe 134.4 cm/sec      Ao max PG 7.2 mmHg      Ao max PG (full) 2.3 mmHg      Ao V2 mean 96.0 cm/sec      Ao mean PG 4.0 mmHg      Ao mean PG (full) 1.5 mmHg      Ao V2 VTI 19.9 cm      KARI(I,A) 2.0 cm^2      KARI(I,D) 2.0 cm^2      KARI(V,A) 2.2 cm^2      KARI(V,D) 2.2 cm^2      LV V1 max PG 4.9 mmHg      LV V1 mean PG 2.5 mmHg      LV V1 max 111.2 cm/sec      LV V1 mean 73.5 cm/sec      LV V1 VTI 15.5 cm      SV(Ao) 153.0 ml      SI(Ao) 89.9 ml/m^2      SV(LVOT) 40.4 ml      SV(RVOT) 18.6 ml      SI(LVOT) 23.7 ml/m^2      PA V2 max 105.4 cm/sec      PA max PG 4.4 mmHg      PA max PG (full) 2.7 mmHg       CV ECHO ROCÍO - PVA(V,A) 1.1 cm^2       CV ECHO ROCÍO - PVA(V,D) 1.1 cm^2      RV V1 max PG 1.7 mmHg      RV V1 mean PG 0.84 mmHg      RV V1 max 65.7 cm/sec      RV V1 mean 43.2 cm/sec      RV V1 VTI 10.2 cm      TR max chloe 226.7 cm/sec      Qp/Qs 0.46      CV ECHO ROCÍO - BZI_BMI 19.8 kilograms/m^2       CV ECHO ROCÍO - BSA(HAYCOCK) 1.7 m^2       CV ECHO ROCÍO - BZI_METRIC_WEIGHT 59.0 kg       CV ECHO ROCÍO - BZI_METRIC_HEIGHT 172.7 cm      EF(MOD-bp) 53.0 %      LA dimension(2D) 3.4 cm      Echo EF Estimated 60 %     Narrative:         · Estimated EF = 60%.  · Left ventricular systolic function is normal.     Indications  Respiratory failure      Technically satisfactory study.  Mitral valve is structurally normal.  Tricuspid valve is structurally normal.  Aortic valve is structurally normal.  Pulmonic valve could not be well visualized.  No evidence for mitral tricuspid or aortic regurgitation is seen by   Doppler study.  Left atrium is normal in size.  Right atrium is significantly enlarged  Left ventricle is normal in size and contractility with ejection fraction   of 60%.  Right ventricle is definitely enlarged  Atrial septum is intact.  Aorta is normal.  No pericardial effusion or intracardiac thrombus is seen.    Impression  Significant right  atrium right ventricle enlargement.  No evidence for mitral tricuspid or aortic regurgitation is seen by   Doppler study.  No pericardial effusion or intracardiac thrombus is present.  Left ventricle is normal in size and contractility with ejection fraction   of 60%.  [place] no pericardial effusion or intracardiac thrombus is seen.        ECG 12 Lead [[id number]] Collected:  10/28/19 1214     Updated:  10/29/19 0808    Narrative:       HEART RATE= 119  bpm  RR Interval= 504  ms  HI Interval= 117  ms  P Horizontal Axis= 4  deg  P Front Axis= 86  deg  QRSD Interval= 128  ms  QT Interval= 369  ms  QRS Axis= 112  deg  T Wave Axis= 39  deg  - ABNORMAL ECG -  Sinus tachycardia  Ventricular premature complex  Nonspecific intraventricular conduction delay  ST elev, probable normal early repol pattern  No previous ECG available for comparison  Electronically Signed By: Antonio Lance (Thomas) 29-Oct-2019 08:08:34  Date and Time of Study: 2019-10-28 12:14:30          Results for orders placed during the hospital encounter of 10/28/19   Duplex Venous Lower Extremity - Bilateral CAR    Narrative · Normal bilateral lower extremity venous duplex scan.          Results for orders placed during the hospital encounter of 10/28/19   Adult Transthoracic Echo Complete W/ Cont if Necessary Per Protocol    Narrative · Estimated EF = 60%.  · Left ventricular systolic function is normal.     Indications  Respiratory failure      Technically satisfactory study.  Mitral valve is structurally normal.  Tricuspid valve is structurally normal.  Aortic valve is structurally normal.  Pulmonic valve could not be well visualized.  No evidence for mitral tricuspid or aortic regurgitation is seen by   Doppler study.  Left atrium is normal in size.  Right atrium is significantly enlarged  Left ventricle is normal in size and contractility with ejection fraction   of 60%.  Right ventricle is definitely enlarged  Atrial septum is intact.  Aorta is normal.  No  pericardial effusion or intracardiac thrombus is seen.    Impression  Significant right atrium right ventricle enlargement.  No evidence for mitral tricuspid or aortic regurgitation is seen by   Doppler study.  No pericardial effusion or intracardiac thrombus is present.  Left ventricle is normal in size and contractility with ejection fraction   of 60%.  [place] no pericardial effusion or intracardiac thrombus is seen.           Ct Abdomen Pelvis Without Contrast    Result Date: 11/1/2019   1. Bilateral pleural effusions are moderate in size with areas of airspace disease which may be pneumonia. 2. Gallbladder wall thickening versus pericholecystic fluid. 3. Dilated small bowel loops throughout the left upper quadrant of the abdomen with decompression of the distal small bowel. The findings are likely secondary to at least incomplete small bowel obstruction. Exact etiology and location is difficult to determine without oral or IV contrast suspected transition point is in the left lower abdomen.  Electronically Signed ByMandeep Thompson On:11/1/2019 10:21 PM This report was finalized on [id number] by  Arturo Thompson, .    Ct Chest Without Contrast    Result Date: 10/31/2019  Interval increase in the size of the pleural effusions and interval worsening of bilateral lower lobe and upper lobe areas of infiltrate and air bronchograms. The findings are consistent with worsening of pneumonia.  Electronically Signed ByMandeep Thompson On:10/31/2019 9:45 PM This report was finalized on [id number] by  Arturo Thompson, .    Us Gallbladder    Result Date: 11/2/2019  A small amount of sludge is present within the gallbladder lumen. Mild gallbladder wall thickening is present which is nonspecific. There is no evidence of biliary ductal dilatation. The sonographic Turner's sign was negative. Acute cholecystitis cannot be excluded.  Electronically Signed ByKary Granda On:11/2/2019 5:13 PM This report was finalized on [id number] by   Joanne Granda, .    Xr Chest 1 View    Result Date: 11/1/2019  1.Decreased right pleural effusion and decreased right lung opacities postthoracentesis. No definite pneumothorax is seen. 2.Persistent left pleural effusion with left mid and lower lung airspace opacities and consolidation. 3.Right arm PICC and right IJ catheter appear in expected positions.  Electronically Signed By-DR. Omari Glover MD On:11/1/2019 6:54 AM This report was finalized on [id number] by DR. Omari Glover MD.    Xr Chest 1 View    Result Date: 10/31/2019  1. Support lines and tubes in expected position. 2. Bilateral interstitial and alveolar opacities concerning for edema or infection. 3. Bilateral layering pleural effusions, right greater than left. The right-sided pleural effusion is mildly decreased from the prior exam.  Electronically Signed By-Baldemar Dailey On:10/31/2019 10:36 AM This report was finalized on [id number] by  Baldemar Dailey, .    Ir Insert Tunneled Cv Catheter Without Port 5 Plus    Result Date: 11/7/2019  Placement of tunneled hemodialysis catheter via right IJ approach using ultrasound and fluoroscopic guidance.  Electronically Signed By-Arturo Fischer On:11/7/2019 4:21 PM This report was finalized on [id number] by  Arturo Fischer .      Xrays, labs reviewed personally by physician.    Medication Review:   I have reviewed the patient's current medication list      Scheduled Meds    ampicillin-sulbactam 3 g Intravenous Q24H   citalopram 20 mg Oral Daily   heparin (porcine) 5,000 Units Subcutaneous Q8H   ipratropium-albuterol 3 mL Nebulization Q4H - RT   sodium chloride 10 mL Intravenous Q12H   sodium chloride 10 mL Intravenous Q12H   sodium chloride 10 mL Intravenous Q12H   sodium chloride 10 mL Intravenous Q12H       Meds Infusions       Meds PRN  •  acetaminophen  •  albumin human  •  heparin (porcine)  •  hydrALAZINE  •  hydrOXYzine  •  influenza vaccine  •  ondansetron  •  sodium chloride  •  sodium  "chloride  •  sodium chloride    I personally reviewed patient's x-ray films     I personally reviewed patient's EKG      Assessment / Plan    Active Hospital Problems    Diagnosis POA   • Acute renal failure (ARF) (CMS/HCC) [N17.9] Yes   • Rhabdomyolysis [M62.82] Yes   • ATN (acute tubular necrosis) (CMS/HCC) [N17.0] Yes   • Hepatitis C [B19.20] Yes   • Polysubstance abuse (CMS/HCC) [F19.10] Yes   • Anxiety [F41.9] Yes     KENA secondary to Rhabdomyolysis and ATN  -nephrology following  -on hemodialysis and will need as outpt  -nephrology following  -tunnled catheter placed 11/7/19  -HD again tomorrow    Acute hypoxic respiratory failure   -initially required mechanical ventilation  -self-extubated on 10/29/2019  -s/p thoracentesis with 1 L fluid removed  -now on room air  -continue duoneb q 4 hours   -encourage incentive spirometry      Acute toxic metabolic Encephalopathy  -likely multifactorial  -resolved      Septic shock  --resolved      Hypothermia due to environmental exposure   -resolved     Possible Bacteremia / Aspiration pneumonia  -Positive blood culture for actinomyces species on admission  -ID consulted; per ID:  \"ER performed only one set of blood culture.  This is usually not sufficient to diagnose bacteremia.  Actinomyces is a very rare cause of true bacteremia and usually associated with oral maxillary disease or intra-abdominal abscesses or occasionally pulmonary disease.  The patient has pulmonary infiltrates but not consistent with actinomycosis.\"  -repeat blood cultures are negative so far  -continue Unasyn daily, ID reports 2 weeks total treatment but po abx at d/c        Hepatitis C   -new onset, treatment naive   -needs outpatient follow up with GI     Acute RUQ pain, likely secondary to hepatitis----resolved  -general surgery signed off     Anxiety   -continue hydroxyzine      Polysubstance abuse   -UDS positive for methamphetamine and THC on admission  -encouraged cessation  -DC plan for " inpatient rehab program      VTE Prophylaxis - heparin      Discharge Planning    inpt drug rehab will not accept as needs dialysis  Plan for outpt dialysis once chair time confirmed and d/c home with po abx    Chirag Shane DO  11/07/19  4:36 PM        Electronically signed by Chirag Shane DO at 11/07/19 0387     Ashia Childress, STEPHANE at 11/07/19 1134     Attestation signed by Valarie Castro MD at 11/07/19 2894    I have reviewed the documentation above and agree.                    Infectious Diseases Progress Note      LOS: 10 days   Patient Care Team:  Gary Llanos MD as PCP - General    Chief Complaint: No new complaints    Subjective         The patient has been afebrile for the last 24 hours.  The patient is on room air, hemodynamically stable, and is tolerating antimicrobial therapy.          Review of Systems:   Review of Systems   Constitutional: Negative.    HENT: Negative.    Eyes: Negative.    Respiratory: Negative.    Cardiovascular: Negative.    Genitourinary: Negative.    Musculoskeletal: Negative.    Skin: Negative.    Neurological: Negative.    Hematological: Negative.    Psychiatric/Behavioral: Negative.         Objective     Vital Signs  Temp:  [98.1 °F (36.7 °C)-98.5 °F (36.9 °C)] 98.2 °F (36.8 °C)  Heart Rate:  [] 89  Resp:  [16-24] 20  BP: (131-175)/() 169/115    Physical Exam:  Physical Exam   Constitutional: He is oriented to person, place, and time. He appears well-developed and well-nourished.   HENT:   Head: Normocephalic and atraumatic.   Eyes: EOM are normal. Pupils are equal, round, and reactive to light.   Neck: Normal range of motion. Neck supple.   Cardiovascular: Normal rate, regular rhythm and normal heart sounds.   Pulmonary/Chest: Effort normal and breath sounds normal. No respiratory distress. He has no wheezes.   Abdominal: Soft. Bowel sounds are normal. He exhibits no distension and no mass. There is no tenderness. There is no rebound and no  guarding.   Musculoskeletal: Normal range of motion. He exhibits no edema or deformity.   Neurological: He is alert and oriented to person, place, and time. No cranial nerve deficit.   Skin: Skin is warm. No rash noted. No erythema.   Psychiatric: He has a normal mood and affect.   Nursing note and vitals reviewed.       Results Review:    I have reviewed all clinical data, test, lab, and imaging results.     Radiology  No Radiology Exams Resulted Within Past 24 Hours    Cardiology    Laboratory  Results from last 7 days   Lab Units 11/07/19  0613   WBC 10*3/mm3 11.00*   HEMOGLOBIN g/dL 10.1*   HEMATOCRIT % 27.7*   PLATELETS 10*3/mm3 291     Results from last 7 days   Lab Units 11/07/19  0752   SODIUM mmol/L 138   POTASSIUM mmol/L 4.3   CHLORIDE mmol/L 102   CO2 mmol/L 22.0   BUN mg/dL 33*   CREATININE mg/dL 7.80*   GLUCOSE mg/dL 107*   CALCIUM mg/dL 9.1     Results from last 7 days   Lab Units 11/07/19  0752   SODIUM mmol/L 138   POTASSIUM mmol/L 4.3   CHLORIDE mmol/L 102   CO2 mmol/L 22.0   BUN mg/dL 33*   CREATININE mg/dL 7.80*   GLUCOSE mg/dL 107*   CALCIUM mg/dL 9.1     Results from last 7 days   Lab Units 11/04/19  0434   CK TOTAL U/L 239*             Microbiology   Microbiology Results (last 10 days)     Procedure Component Value - Date/Time    Clostridium Difficile EIA - Stool, Per Rectum [[id number]]  (Normal) Collected:  11/02/19 1348    Lab Status:  Final result Specimen:  Stool from Per Rectum Updated:  11/03/19 0714     C Diff GDH / Toxin Negative    Blood Culture - Blood, Blood, Central Line [[id number]] Collected:  11/01/19 1342    Lab Status:  Final result Specimen:  Blood, Central Line Updated:  11/06/19 1345     Blood Culture No growth at 5 days    Blood Culture - Blood, Arm, Right [[id number]] Collected:  11/01/19 1020    Lab Status:  Final result Specimen:  Blood from Arm, Right Updated:  11/06/19 0945     Blood Culture No growth at 5 days    AFB Culture - Body Fluid, Pleural Cavity [[id number]]  Collected:  11/01/19 0202    Lab Status:  Preliminary result Specimen:  Body Fluid from Pleural Cavity Updated:  11/06/19 0239     AFB Culture No AFB isolated at less than 1 week     AFB Stain No acid fast bacilli seen    Body Fluid Culture - Body Fluid, Pleural Cavity [[id number]] Collected:  11/01/19 0202    Lab Status:  Final result Specimen:  Body Fluid from Pleural Cavity Updated:  11/04/19 0706     Body Fluid Culture No growth     Gram Stain Rare (1+) WBCs per low power field      No organisms seen    Fungus Culture - Body Fluid, Pleural Cavity [[id number]] Collected:  11/01/19 0202    Lab Status:  Preliminary result Specimen:  Body Fluid from Pleural Cavity Updated:  11/06/19 0239     Fungus Culture No fungus isolated at less than 1 week    Fungus Smear - Body Fluid, Pleural Cavity [[id number]] Collected:  11/01/19 0202    Lab Status:  Final result Specimen:  Body Fluid from Pleural Cavity Updated:  11/01/19 1122     Fungal Stain No fungal elements seen    Anaerobic Culture - Pleural Fluid, Pleural Cavity [[id number]] Collected:  11/01/19 0201    Lab Status:  Final result Specimen:  Pleural Fluid from Pleural Cavity Updated:  11/06/19 0946     Anaerobic Culture No anaerobes isolated at 5 days    Respiratory Culture - Sputum, ET Suction [[id number]] Collected:  10/28/19 2215    Lab Status:  Final result Specimen:  Sputum from ET Suction Updated:  10/31/19 0937     Respiratory Culture Scant growth (1+) Normal Respiratory Aby     Gram Stain Many (4+) WBCs per low power field      Many (4+) Mixed bacterial morphotypes seen on Gram Stain    MRSA Screen Culture - Swab, Nares [[id number]]  (Normal) Collected:  10/28/19 1515    Lab Status:  Final result Specimen:  Swab from Nares Updated:  10/29/19 1813     MRSA SCREEN CX No Methicillin Resistant Staphylococcus aureus isolated    Respiratory Panel, PCR - Swab, Nasopharynx [[id number]]  (Abnormal) Collected:  10/28/19 1515    Lab Status:  Final result Specimen:  Swab from  Nasopharynx Updated:  10/28/19 1741     ADENOVIRUS, PCR Not Detected     Coronavirus 229E Not Detected     Coronavirus HKU1 Not Detected     Coronavirus NL63 Not Detected     Coronavirus OC43 Not Detected     Human Metapneumovirus Not Detected     Human Rhinovirus/Enterovirus Detected     Influenza B PCR Not Detected     Parainfluenza Virus 1 Not Detected     Parainfluenza Virus 2 Not Detected     Parainfluenza Virus 3 Not Detected     Parainfluenza Virus 4 Not Detected     Bordetella pertussis pcr Not Detected     Influenza A H1 2009 PCR Not Detected     Chlamydophila pneumoniae PCR Not Detected     Mycoplasma pneumo by PCR Not Detected     Influenza A PCR Not Detected     Influenza A H3 Not Detected     Influenza A H1 Not Detected     RSV, PCR Not Detected          Medication Review:       Schedule Meds    ampicillin-sulbactam 3 g Intravenous Q24H   citalopram 20 mg Oral Daily   heparin (porcine) 5,000 Units Subcutaneous Q8H   ipratropium-albuterol 3 mL Nebulization Q4H - RT   sodium chloride 10 mL Intravenous Q12H   sodium chloride 10 mL Intravenous Q12H   sodium chloride 10 mL Intravenous Q12H   sodium chloride 10 mL Intravenous Q12H       Infusion Meds       PRN Meds  •  acetaminophen  •  albumin human  •  heparin (porcine)  •  hydrALAZINE  •  hydrOXYzine  •  influenza vaccine  •  ondansetron  •  sodium chloride  •  sodium chloride  •  sodium chloride        Assessment/Plan       Antimicrobial Therapy   1.  IV Unasyn     Day 7  2.      Day  3.      Day  4.      Day  5.      Day      Assessment     Positive blood culture for actinomyces species on admission.  Unfortunately ER performed only one set of blood culture.  This is usually not sufficient to diagnose bacteremia.  Actinomyces is a very rare cause of true bacteremia and usually associated with oral maxillary disease or intra-abdominal abscesses or occasionally pulmonary disease.  The patient has pulmonary infiltrates but not consistent with  actinomycosis.  -Repeat blood cultures are negative so far     IV drug use.  Patient was using IV heroin and IV methamphetamine     Hepatitis C infection.  Treatment naïve  Hep Quant- 353,000 IU/ml  HCV log10 5.548 log10 IU/ml  Genotype 1A     Bilateral pulmonary infiltrates associated with bilateral effusion.  Most likely secondary to aspiration pneumonia     Rhabdomyolysis     Acute kidney failure.  Most likely multifactorial secondary to rhabdomyolysis and sepsis.  The patient is currently on hemodialysis     Rhinovirus infection    Diarrhea.  C. difficile colitis screen was negative    Right upper quadrant pain.  CT scan of the abdomen and pelvis was done without contrast and showed thickening of the gallbladder.  The patient is currently asymptomatic.       Plan     Continue patient on Unasyn 3 grams IV daily -patient will need 2 weeks total treatment but will be able to switch patient to p.o. antibiotics at discharge  Okay to place permanent dialysis catheter anytime  Continue supportive care  A.m. labs  Droplet isolation for rhinovirus during this admission   Patient will need to follow with GI as an outpatient for hepatitis C treatment  Illicit drug abuse cessation-patient states that he is interested in drug rehab           Ashia Childress, STEPHANE  19  11:34 AM      Note is dictated utilizing voice recognition software/Dragon    Electronically signed by Valarie Castro MD at 19 1818     Jn Ortega MD at 19 0718                                                                                                                                                Nephrology  Progress Note                                        [facility]    Patient Identification    Name: Vicente Cunha  Age: 24 y.o.  Sex: male  :  1995  MRN: [id number]      DATE OF SERVICE:  2019        Subective    better     Objective   Scheduled Meds:    ampicillin-sulbactam 3 g Intravenous Q24H  "  citalopram 20 mg Oral Daily   heparin (porcine) 5,000 Units Subcutaneous Q8H   ipratropium-albuterol 3 mL Nebulization Q4H - RT   sodium chloride 10 mL Intravenous Q12H   sodium chloride 10 mL Intravenous Q12H   sodium chloride 10 mL Intravenous Q12H   sodium chloride 10 mL Intravenous Q12H         Continuous Infusions:       PRN Meds:•  acetaminophen  •  albumin human  •  heparin (porcine)  •  hydrALAZINE  •  hydrOXYzine  •  influenza vaccine  •  ondansetron  •  sodium chloride  •  sodium chloride  •  sodium chloride     Exam:  /89 (BP Location: Right arm, Patient Position: Lying)   Pulse 99   Temp 98.1 °F (36.7 °C) (Oral)   Resp 16   Ht 172.7 cm (68\")   Wt 62.1 kg (136 lb 14.5 oz)   SpO2 96%   BMI 20.82 kg/m²      Intake/Output last 3 shifts:  I/O last 3 completed shifts:  In: 340 [P.O.:240; IV Piggyback:100]  Out: 135 [Other:135]    Intake/Output this shift:  No intake/output data recorded.    Physical exam:  Awake and  Alert  PERTL  No JVD  Chest: decreased BS occasional ronchi  CVS Regular rate and rhythm, S1 and S2 normal  Abdomen:  Soft, non-tender, bowel sounds active   LE: trace edema  Skin:  No rashes or lesions       Data Review:  All labs (24hrs):   Recent Results (from the past 24 hour(s))   CBC Auto Differential    Collection Time: 11/07/19  6:13 AM   Result Value Ref Range    WBC 11.00 (H) 3.40 - 10.80 10*3/mm3    RBC 3.18 (L) 4.14 - 5.80 10*6/mm3    Hemoglobin 10.1 (L) 13.0 - 17.7 g/dL    Hematocrit 27.7 (L) 37.5 - 51.0 %    MCV 87.3 79.0 - 97.0 fL    MCH 31.8 26.6 - 33.0 pg    MCHC 36.4 (H) 31.5 - 35.7 g/dL    RDW 13.2 12.3 - 15.4 %    RDW-SD 41.1 37.0 - 54.0 fl    MPV 6.6 6.0 - 12.0 fL    Platelets 291 140 - 450 10*3/mm3    Neutrophil % 69.1 42.7 - 76.0 %    Lymphocyte % 15.5 (L) 19.6 - 45.3 %    Monocyte % 10.7 5.0 - 12.0 %    Eosinophil % 4.1 0.3 - 6.2 %    Basophil % 0.6 0.0 - 1.5 %    Neutrophils, Absolute 7.60 (H) 1.70 - 7.00 10*3/mm3    Lymphocytes, Absolute 1.70 0.70 - 3.10 " 10*3/mm3    Monocytes, Absolute 1.20 (H) 0.10 - 0.90 10*3/mm3    Eosinophils, Absolute 0.50 (H) 0.00 - 0.40 10*3/mm3    Basophils, Absolute 0.10 0.00 - 0.20 10*3/mm3    nRBC 0.0 0.0 - 0.2 /100 WBC          Imaging:  [unfilled]    Assessment/Plan:     Acute renal failure (ARF) (CMS/HCC)    Rhabdomyolysis    ATN (acute tubular necrosis) (CMS/HCC)    Hepatitis C    Polysubstance abuse (CMS/HCC)    Anxiety       Acute kidney injury nonoliguric worsening kidney function due to rhabdomyolysis and ATN              -uop better   -  dialysis again tomorrow   - BMP Pm   -off lasix  Rhabdomyolysis              -resolved  Respiratory failure  Metabolic acidosis  Altered mental status  Hypothermia  Hyperphosphatemia  Liver injury  Hypomagnesemia     D/w the patient  Is tunneled dialysis catheter  Needs outpatient dialysis catheter  His dialysis yesterday only lasted 2 hours because the patient took himself off dialysis      Electronically signed by Jn Ortega MD at 11/07/19 1118     Chirag Shane DO at 11/06/19 1749                [facility] Daily Progress Note      Hospitalist Team  LOS 9 days      Patient Care Team:  Gary Llanos MD as PCP - General        Chief Complaint / Subjective  Chief Complaint   Patient presents with   • Ingestion     heroin and meth use last seen 830 last night pt was found in barn.     Continues to improve breathing is stable is on room air no other new issues.    Brief Synopsis of Hospital Course/HPI  23 yo male found down d/t overdose and was intubated in manged in the ICU. Now downgraded on IV abx and intermittent HD.           Review of Systems   Constitution: Negative for fever.   Respiratory: Negative for cough.                Objective      Vital Signs  Temp:  [98 °F (36.7 °C)-98.6 °F (37 °C)] 98.3 °F (36.8 °C)  Heart Rate:  [] 91  Resp:  [18-23] 18  BP: (136-175)/() 175/116  Oxygen Therapy  SpO2: 97 %  Pulse Oximetry Type:  "Intermittent  Device (Oxygen Therapy): room air  $ High Flow Nasal Cannula Set-Up: yes  Flow (L/min): 2  Oxygen Concentration (%): 40  Oximetry Probe Site Changed: No  Flowsheet Rows      First Filed Value   Admission Height  172.7 cm (68\") Documented at 10/28/2019 1138   Admission Weight  59 kg (130 lb) Documented at 10/28/2019 1138        Intake & Output (last 3 days)       11/03 0701 - 11/04 0700 11/04 0701 - 11/05 0700 11/05 0701 - 11/06 0700 11/06 0701 - 11/07 0700    P.O. 960 480 240     I.V. (mL/kg) 197 (3.1) 337.9 (5.6)      IV Piggyback 100   100    Total Intake(mL/kg) 1257 (20) 817.9 (13.5) 240 (3.9) 100 (1.6)    Urine (mL/kg/hr) 30 (0)       Emesis/NG output        Other 2000 2500  135    Stool 1 0      Total Output 2031 2500  135    Net -774 -1682.1 +240 -35            Urine Unmeasured Occurrence  2 x      Stool Unmeasured Occurrence 1 x 3 x          Lines, Drains & Airways    Active LDAs     Name:   Placement date:   Placement time:   Site:   Days:    Hemodialysis Cath Double with Pigtail   10/30/19    1300    Internal Jugular   6                  Physical Exam:    Physical Exam   Constitutional: No distress.   HENT:   Head: Normocephalic.   Eyes: Pupils are equal, round, and reactive to light.   Neck:   Dialysis catheter noted to the right neck   Cardiovascular: Normal rate.   Pulmonary/Chest: Effort normal. No respiratory distress.   Abdominal: Soft. He exhibits no distension.   Neurological: He is alert.   Skin: Skin is warm.   Psychiatric: He has a normal mood and affect.           Results Review:     I reviewed the patient's new clinical results.      Results from last 7 days   Lab Units 11/06/19  0135 11/05/19  0457 11/04/19  0434 11/03/19  0535 11/02/19  0547 11/01/19  0500   WBC 10*3/mm3 13.10* 12.30* 12.80* 13.50* 14.60* 17.50*   HEMOGLOBIN g/dL 10.6* 11.0* 11.2* 11.0* 10.3* 11.0*   HEMATOCRIT % 29.8* 30.2* 31.2* 31.2* 29.2* 32.2*   PLATELETS 10*3/mm3 233 202 167 146 122* 111*   MONOCYTES % %  " --   --   --  6.0 5.0 3.0*     Results from last 7 days   Lab Units 11/06/19  0135 11/05/19  1723 11/05/19  0457  11/03/19  0535 11/02/19  0547   SODIUM mmol/L 136 135* 137   < > 138 135*   POTASSIUM mmol/L 4.1 4.0 3.8   < > 3.6 3.7   CHLORIDE mmol/L 99 99 102   < > 102 100   CO2 mmol/L 21.0* 23.0 24.0   < > 23.0 21.0*   BUN mg/dL 40* 33* 24*   < > 44* 61*   CREATININE mg/dL 7.80* 6.35* 5.08*   < > 5.46* 5.27*   CALCIUM mg/dL 8.8 8.9 8.5*   < > 8.3* 8.4*   BILIRUBIN mg/dL  --   --  0.4  --  0.5 0.5   ALK PHOS U/L  --   --  63  --  57 57   ALT (SGPT) U/L  --   --  233*  --  413* 627*   AST (SGOT) U/L  --   --  44*  --  67* 124*   GLUCOSE mg/dL 111* 87 96   < > 88 91    < > = values in this interval not displayed.     Results from last 7 days   Lab Units 11/06/19  0135 11/05/19 0457 11/04/19  0434   MAGNESIUM mg/dL 2.0 2.0 2.2     Lab Results   Component Value Date    CALCIUM 8.8 11/06/2019    PHOS 7.4 (H) 10/28/2019     No results found for: HGBA1C                Microbiology Results (last 10 days)     Procedure Component Value - Date/Time    Clostridium Difficile EIA - Stool, Per Rectum [[id number]]  (Normal) Collected:  11/02/19 1348    Lab Status:  Final result Specimen:  Stool from Per Rectum Updated:  11/03/19 0714     C Diff GDH / Toxin Negative    Blood Culture - Blood, Blood, Central Line [[id number]] Collected:  11/01/19 1342    Lab Status:  Final result Specimen:  Blood, Central Line Updated:  11/06/19 1345     Blood Culture No growth at 5 days    Blood Culture - Blood, Arm, Right [[id number]] Collected:  11/01/19 1020    Lab Status:  Final result Specimen:  Blood from Arm, Right Updated:  11/06/19 0945     Blood Culture No growth at 5 days    AFB Culture - Body Fluid, Pleural Cavity [[id number]] Collected:  11/01/19 0202    Lab Status:  Preliminary result Specimen:  Body Fluid from Pleural Cavity Updated:  11/06/19 0239     AFB Culture No AFB isolated at less than 1 week     AFB Stain No acid fast bacilli  seen    Body Fluid Culture - Body Fluid, Pleural Cavity [[id number]] Collected:  11/01/19 0202    Lab Status:  Final result Specimen:  Body Fluid from Pleural Cavity Updated:  11/04/19 0706     Body Fluid Culture No growth     Gram Stain Rare (1+) WBCs per low power field      No organisms seen    Fungus Culture - Body Fluid, Pleural Cavity [[id number]] Collected:  11/01/19 0202    Lab Status:  Preliminary result Specimen:  Body Fluid from Pleural Cavity Updated:  11/06/19 0239     Fungus Culture No fungus isolated at less than 1 week    Fungus Smear - Body Fluid, Pleural Cavity [[id number]] Collected:  11/01/19 0202    Lab Status:  Final result Specimen:  Body Fluid from Pleural Cavity Updated:  11/01/19 1122     Fungal Stain No fungal elements seen    Anaerobic Culture - Pleural Fluid, Pleural Cavity [[id number]] Collected:  11/01/19 0201    Lab Status:  Final result Specimen:  Pleural Fluid from Pleural Cavity Updated:  11/06/19 0946     Anaerobic Culture No anaerobes isolated at 5 days    Respiratory Culture - Sputum, ET Suction [[id number]] Collected:  10/28/19 2215    Lab Status:  Final result Specimen:  Sputum from ET Suction Updated:  10/31/19 0937     Respiratory Culture Scant growth (1+) Normal Respiratory Aby     Gram Stain Many (4+) WBCs per low power field      Many (4+) Mixed bacterial morphotypes seen on Gram Stain    MRSA Screen Culture - Swab, Nares [[id number]]  (Normal) Collected:  10/28/19 1515    Lab Status:  Final result Specimen:  Swab from Nares Updated:  10/29/19 1813     MRSA SCREEN CX No Methicillin Resistant Staphylococcus aureus isolated    Respiratory Panel, PCR - Swab, Nasopharynx [[id number]]  (Abnormal) Collected:  10/28/19 1515    Lab Status:  Final result Specimen:  Swab from Nasopharynx Updated:  10/28/19 1741     ADENOVIRUS, PCR Not Detected     Coronavirus 229E Not Detected     Coronavirus HKU1 Not Detected     Coronavirus NL63 Not Detected     Coronavirus OC43 Not Detected     Human  Metapneumovirus Not Detected     Human Rhinovirus/Enterovirus Detected     Influenza B PCR Not Detected     Parainfluenza Virus 1 Not Detected     Parainfluenza Virus 2 Not Detected     Parainfluenza Virus 3 Not Detected     Parainfluenza Virus 4 Not Detected     Bordetella pertussis pcr Not Detected     Influenza A H1 2009 PCR Not Detected     Chlamydophila pneumoniae PCR Not Detected     Mycoplasma pneumo by PCR Not Detected     Influenza A PCR Not Detected     Influenza A H3 Not Detected     Influenza A H1 Not Detected     RSV, PCR Not Detected    Urine Culture - Urine, Urine, Catheter [[id number]]  (Normal) Collected:  10/28/19 1212    Lab Status:  Final result Specimen:  Urine, Catheter Updated:  10/29/19 1210     Urine Culture No growth    Blood Culture - Blood, Arm, Left [[id number]]  (Abnormal) Collected:  10/28/19 1155    Lab Status:  Final result Specimen:  Blood from Arm, Left Updated:  10/31/19 1310     Blood Culture Scant growth (1+) Actinomyces species     Isolated from Anaerobic Bottle     Gram Stain Anaerobic Bottle Gram positive bacilli      Aerobic Bottle Gram positive bacilli    Narrative:       Blood culture does not meet the specified criteria for PCR identification.  If pregnant, immunocompromised, or clinical concern for meningitis, call lab to run BCID for Listeria monocytogenes.          ECG/EMG Results (most recent)     Procedure Component Value Units Date/Time    Adult Transthoracic Echo Complete W/ Cont if Necessary Per Protocol [[id number]] Collected:  10/28/19 1814     Updated:  10/28/19 2055     BSA 1.7 m^2      BH CV ECHO ROCÍO - RVDD 3.2 cm      IVSd 1.1 cm      LVIDd 4.0 cm      LVIDs 2.8 cm      LVPWd 1.0 cm      IVS/LVPW 1.0     FS 29.8 %      EDV(Teich) 69.9 ml      ESV(Teich) 29.7 ml      EF(Teich) 57.5 %      EDV(cubed) 63.9 ml      ESV(cubed) 22.1 ml      EF(cubed) 65.4 %      LV mass(C)d 134.4 grams      LV mass(C)dI 79.0 grams/m^2      SV(Teich) 40.2 ml      SI(Teich) 23.6  ml/m^2      SV(cubed) 41.8 ml      SI(cubed) 24.6 ml/m^2      Ao root diam 3.1 cm      Ao root area 7.7 cm^2      ACS 2.3 cm      LVOT diam 1.8 cm      LVOT area 2.6 cm^2      RVOT diam 1.5 cm      RVOT area 1.8 cm^2      EDV(MOD-sp4) 82.4 ml      ESV(MOD-sp4) 38.5 ml      EF(MOD-sp4) 53.2 %      SV(MOD-sp4) 43.9 ml      SI(MOD-sp4) 25.8 ml/m^2      Ao root area (BSA corrected) 1.8     LV [place] Vol (BSA corrected) 48.4 ml/m^2      LV Sys Vol (BSA corrected) 22.6 ml/m^2      MV E max chloe 93.9 cm/sec      MV A max chloe 50.2 cm/sec      MV E/A 1.9     MV V2 max 108.9 cm/sec      MV max PG 4.7 mmHg      MV V2 mean 67.4 cm/sec      MV mean PG 2.0 mmHg      MV V2 VTI 27.5 cm      MVA(VTI) 1.5 cm^2      MV dec slope 816.8 cm/sec^2      MV dec time 0.11 sec      Ao pk chloe 134.4 cm/sec      Ao max PG 7.2 mmHg      Ao max PG (full) 2.3 mmHg      Ao V2 mean 96.0 cm/sec      Ao mean PG 4.0 mmHg      Ao mean PG (full) 1.5 mmHg      Ao V2 VTI 19.9 cm      KARI(I,A) 2.0 cm^2      KARI(I,D) 2.0 cm^2      KARI(V,A) 2.2 cm^2      KARI(V,D) 2.2 cm^2      LV V1 max PG 4.9 mmHg      LV V1 mean PG 2.5 mmHg      LV V1 max 111.2 cm/sec      LV V1 mean 73.5 cm/sec      LV V1 VTI 15.5 cm      SV(Ao) 153.0 ml      SI(Ao) 89.9 ml/m^2      SV(LVOT) 40.4 ml      SV(RVOT) 18.6 ml      SI(LVOT) 23.7 ml/m^2      PA V2 max 105.4 cm/sec      PA max PG 4.4 mmHg      PA max PG (full) 2.7 mmHg       CV ECHO ROCÍO - PVA(V,A) 1.1 cm^2       CV ECHO ROCÍO - PVA(V,D) 1.1 cm^2      RV V1 max PG 1.7 mmHg      RV V1 mean PG 0.84 mmHg      RV V1 max 65.7 cm/sec      RV V1 mean 43.2 cm/sec      RV V1 VTI 10.2 cm      TR max chloe 226.7 cm/sec      Qp/Qs 0.46      CV ECHO ROCÍO - BZI_BMI 19.8 kilograms/m^2       CV ECHO ROCÍO - BSA(HAYCOCK) 1.7 m^2       CV ECHO ROCÍO - BZI_METRIC_WEIGHT 59.0 kg       CV ECHO ROCÍO - BZI_METRIC_HEIGHT 172.7 cm      EF(MOD-bp) 53.0 %      LA dimension(2D) 3.4 cm      Echo EF Estimated 60 %     Narrative:         · Estimated EF =  60%.  · Left ventricular systolic function is normal.     Indications  Respiratory failure      Technically satisfactory study.  Mitral valve is structurally normal.  Tricuspid valve is structurally normal.  Aortic valve is structurally normal.  Pulmonic valve could not be well visualized.  No evidence for mitral tricuspid or aortic regurgitation is seen by   Doppler study.  Left atrium is normal in size.  Right atrium is significantly enlarged  Left ventricle is normal in size and contractility with ejection fraction   of 60%.  Right ventricle is definitely enlarged  Atrial septum is intact.  Aorta is normal.  No pericardial effusion or intracardiac thrombus is seen.    Impression  Significant right atrium right ventricle enlargement.  No evidence for mitral tricuspid or aortic regurgitation is seen by   Doppler study.  No pericardial effusion or intracardiac thrombus is present.  Left ventricle is normal in size and contractility with ejection fraction   of 60%.  [place] no pericardial effusion or intracardiac thrombus is seen.        ECG 12 Lead [[id number]] Collected:  10/28/19 1214     Updated:  10/29/19 0808    Narrative:       HEART RATE= 119  bpm  RR Interval= 504  ms  FL Interval= 117  ms  P Horizontal Axis= 4  deg  P Front Axis= 86  deg  QRSD Interval= 128  ms  QT Interval= 369  ms  QRS Axis= 112  deg  T Wave Axis= 39  deg  - ABNORMAL ECG -  Sinus tachycardia  Ventricular premature complex  Nonspecific intraventricular conduction delay  ST elev, probable normal early repol pattern  No previous ECG available for comparison  Electronically Signed By: Antonio Lance (Thomas) 29-Oct-2019 08:08:34  Date and Time of Study: 2019-10-28 12:14:30          Results for orders placed during the hospital encounter of 10/28/19   Duplex Venous Lower Extremity - Bilateral CAR    Narrative · Normal bilateral lower extremity venous duplex scan.          Results for orders placed during the hospital encounter of 10/28/19   Adult  Transthoracic Echo Complete W/ Cont if Necessary Per Protocol    Narrative · Estimated EF = 60%.  · Left ventricular systolic function is normal.     Indications  Respiratory failure      Technically satisfactory study.  Mitral valve is structurally normal.  Tricuspid valve is structurally normal.  Aortic valve is structurally normal.  Pulmonic valve could not be well visualized.  No evidence for mitral tricuspid or aortic regurgitation is seen by   Doppler study.  Left atrium is normal in size.  Right atrium is significantly enlarged  Left ventricle is normal in size and contractility with ejection fraction   of 60%.  Right ventricle is definitely enlarged  Atrial septum is intact.  Aorta is normal.  No pericardial effusion or intracardiac thrombus is seen.    Impression  Significant right atrium right ventricle enlargement.  No evidence for mitral tricuspid or aortic regurgitation is seen by   Doppler study.  No pericardial effusion or intracardiac thrombus is present.  Left ventricle is normal in size and contractility with ejection fraction   of 60%.  [place] no pericardial effusion or intracardiac thrombus is seen.           Ct Abdomen Pelvis Without Contrast    Result Date: 11/1/2019   1. Bilateral pleural effusions are moderate in size with areas of airspace disease which may be pneumonia. 2. Gallbladder wall thickening versus pericholecystic fluid. 3. Dilated small bowel loops throughout the left upper quadrant of the abdomen with decompression of the distal small bowel. The findings are likely secondary to at least incomplete small bowel obstruction. Exact etiology and location is difficult to determine without oral or IV contrast suspected transition point is in the left lower abdomen.  Electronically Signed By-Arturo Thompson On:11/1/2019 10:21 PM This report was finalized on [id number] by  Arturo Thompson, .    Ct Chest Without Contrast    Result Date: 10/31/2019  Interval increase in the size of the pleural  effusions and interval worsening of bilateral lower lobe and upper lobe areas of infiltrate and air bronchograms. The findings are consistent with worsening of pneumonia.  Electronically Signed By-Arturo Thompson On:10/31/2019 9:45 PM This report was finalized on [id number] by  Arturo Thompson, .    Us Gallbladder    Result Date: 11/2/2019  A small amount of sludge is present within the gallbladder lumen. Mild gallbladder wall thickening is present which is nonspecific. There is no evidence of biliary ductal dilatation. The sonographic Turner's sign was negative. Acute cholecystitis cannot be excluded.  Electronically Signed By-Joanne Granda On:11/2/2019 5:13 PM This report was finalized on [id number] by  Joanne Granda, .    Xr Chest 1 View    Result Date: 11/1/2019  1.Decreased right pleural effusion and decreased right lung opacities postthoracentesis. No definite pneumothorax is seen. 2.Persistent left pleural effusion with left mid and lower lung airspace opacities and consolidation. 3.Right arm PICC and right IJ catheter appear in expected positions.  Electronically Signed By-DR. Omari Glover MD On:11/1/2019 6:54 AM This report was finalized on [id number] by DR. Omari Glover MD.    Xr Chest 1 View    Result Date: 10/31/2019  1. Support lines and tubes in expected position. 2. Bilateral interstitial and alveolar opacities concerning for edema or infection. 3. Bilateral layering pleural effusions, right greater than left. The right-sided pleural effusion is mildly decreased from the prior exam.  Electronically Signed By-Baldemar Dailey On:10/31/2019 10:36 AM This report was finalized on [id number] by  Baldemar Dailey, .    Xr Chest 1 View    Result Date: 10/30/2019   1. Right IJ central line placement with the tip terminating at the cavoatrial junction. No visible pneumothorax.   2. Marked interval development of mid to lower lung zone dense airspace disease could reflect changes of pneumonia or sequelae of  aspiration. Development of small to moderate right pleural effusion. 3. ET tube is been removed.  Electronically Signed By-Dr. Usha Guallpa MD On:10/30/2019 2:52 PM This report was finalized on [id number] by Dr. Usha Guallpa MD.    Us Renal Bilateral    Result Date: 10/29/2019  Increased renal cortical echogenicity consistent with medical renal disease. No hydronephrosis. Mild right perinephric fluid with incidental left pleural effusion.  Electronically Signed By-Arturo Thompson On:10/29/2019 6:47 PM This report was finalized on [id number] by  Arturo Thompson, .      Xrays, labs reviewed personally by physician.    Medication Review:   I have reviewed the patient's current medication list      Scheduled Meds    ampicillin-sulbactam 3 g Intravenous Q24H   citalopram 20 mg Oral Daily   heparin (porcine) 5,000 Units Subcutaneous Q8H   ipratropium-albuterol 3 mL Nebulization Q4H - RT   sodium chloride 10 mL Intravenous Q12H   sodium chloride 10 mL Intravenous Q12H   sodium chloride 10 mL Intravenous Q12H   sodium chloride 10 mL Intravenous Q12H       Meds Infusions       Meds PRN  •  acetaminophen  •  albumin human  •  heparin (porcine)  •  hydrALAZINE  •  hydrOXYzine  •  influenza vaccine  •  ondansetron  •  sodium chloride  •  sodium chloride  •  sodium chloride    I personally reviewed patient's x-ray films     I personally reviewed patient's EKG      Assessment / Plan    Active Hospital Problems    Diagnosis POA   • Acute renal failure (ARF) (CMS/HCC) [N17.9] Yes   • Rhabdomyolysis [M62.82] Yes   • ATN (acute tubular necrosis) (CMS/HCC) [N17.0] Yes   • Hepatitis C [B19.20] Yes   • Polysubstance abuse (CMS/HCC) [F19.10] Yes   • Anxiety [F41.9] Yes     Acute hypoxic respiratory failure   -initially required mechanical ventilation  -self-extubated on 10/29/2019  -s/p thoracentesis with 1 L fluid removed  -now on room air  -continue duoneb q 4 hours   -encourage incentive spirometry      Acute toxic metabolic  "Encephalopathy  -likely multifactorial  -resolved      Septic shock  --resolved      Hypothermia due to environmental exposure   -resolved     Possible Bacteremia / Aspiration pneumonia  -Positive blood culture for actinomyces species on admission  -ID consulted; per ID:  \"ER performed only one set of blood culture.  This is usually not sufficient to diagnose bacteremia.  Actinomyces is a very rare cause of true bacteremia and usually associated with oral maxillary disease or intra-abdominal abscesses or occasionally pulmonary disease.  The patient has pulmonary infiltrates but not consistent with actinomycosis.\"  -repeat blood cultures are negative so far  -continue Unasyn daily, ID reports 2 weeks total treatment but po abx at d/c      KENA secondary to Rhabdomyolysis and ATN  -nephrology following  -on hemodialysis, Lasix and mannitol per nephrology   -nephrology to decide if will need HD outpt, then would need tunneled catheter     Hepatitis C   -new onset, treatment naive   -needs outpatient follow up with GI     Acute RUQ pain, likely secondary to hepatitis----resolved  -general surgery signed off     Anxiety   -continue hydroxyzine      Polysubstance abuse   -UDS positive for methamphetamine and THC on admission  -encouraged cessation  -DC plan for inpatient rehab program      VTE Prophylaxis - heparin          Discharge Planning    Has been accepted to [facility] inpt rehab but they will not take if he needs outpt HD, but can go there once finishes outpt dialysis    Chirag Shane DO  11/06/19  5:49 PM        Electronically signed by Chirag Shane DO at 11/06/19 6593     Valarie Castro MD at 11/06/19 1030          Infectious Diseases Progress Note      LOS: 9 days   Patient Care Team:  Gary Llanos MD as PCP - General    Chief Complaint: Feeling much better     Subjective         The patient has been afebrile for the last 24 hours.  The patient is on room air, hemodynamically stable, and is tolerating " antimicrobial therapy.          Review of Systems:   Review of Systems   Constitutional: Negative.    HENT: Negative.    Eyes: Negative.    Respiratory: Negative.    Cardiovascular: Negative.    Genitourinary: Negative.    Musculoskeletal: Negative.    Skin: Negative.    Neurological: Negative.    Hematological: Negative.    Psychiatric/Behavioral: Negative.         Objective     Vital Signs  Temp:  [98 °F (36.7 °C)-98.6 °F (37 °C)] 98.6 °F (37 °C)  Heart Rate:  [] 90  Resp:  [14-23] 19  BP: (130-175)/() 153/105    Physical Exam:  Physical Exam   Constitutional: He is oriented to person, place, and time. He appears well-developed and well-nourished.   HENT:   Head: Normocephalic and atraumatic.   Eyes: EOM are normal. Pupils are equal, round, and reactive to light.   Neck: Normal range of motion. Neck supple.   Cardiovascular: Normal rate, regular rhythm and normal heart sounds.   Pulmonary/Chest: Effort normal and breath sounds normal. No respiratory distress. He has no wheezes.   Abdominal: Soft. Bowel sounds are normal. He exhibits no distension and no mass. There is no tenderness. There is no rebound and no guarding.   Musculoskeletal: Normal range of motion. He exhibits no edema or deformity.   Neurological: He is alert and oriented to person, place, and time. No cranial nerve deficit.   Skin: Skin is warm. No rash noted. No erythema.   Psychiatric: He has a normal mood and affect.   Nursing note and vitals reviewed.    (ROS and physical exam performed by Dr. Castro)     Results Review:    I have reviewed all clinical data, test, lab, and imaging results.     Radiology  No Radiology Exams Resulted Within Past 24 Hours    Cardiology    Laboratory  Results from last 7 days   Lab Units 11/06/19  0135   WBC 10*3/mm3 13.10*   HEMOGLOBIN g/dL 10.6*   HEMATOCRIT % 29.8*   PLATELETS 10*3/mm3 233     Results from last 7 days   Lab Units 11/06/19  0135   SODIUM mmol/L 136   POTASSIUM mmol/L 4.1   CHLORIDE  mmol/L 99   CO2 mmol/L 21.0*   BUN mg/dL 40*   CREATININE mg/dL 7.80*   GLUCOSE mg/dL 111*   CALCIUM mg/dL 8.8     Results from last 7 days   Lab Units 11/06/19  0135   SODIUM mmol/L 136   POTASSIUM mmol/L 4.1   CHLORIDE mmol/L 99   CO2 mmol/L 21.0*   BUN mg/dL 40*   CREATININE mg/dL 7.80*   GLUCOSE mg/dL 111*   CALCIUM mg/dL 8.8     Results from last 7 days   Lab Units 11/04/19  0434   CK TOTAL U/L 239*             Microbiology   Microbiology Results (last 10 days)     Procedure Component Value - Date/Time    Clostridium Difficile EIA - Stool, Per Rectum [[id number]]  (Normal) Collected:  11/02/19 1348    Lab Status:  Final result Specimen:  Stool from Per Rectum Updated:  11/03/19 0714     C Diff GDH / Toxin Negative    Blood Culture - Blood, Blood, Central Line [[id number]] Collected:  11/01/19 1342    Lab Status:  Preliminary result Specimen:  Blood, Central Line Updated:  11/05/19 1345     Blood Culture No growth at 4 days    Blood Culture - Blood, Arm, Right [[id number]] Collected:  11/01/19 1020    Lab Status:  Final result Specimen:  Blood from Arm, Right Updated:  11/06/19 0945     Blood Culture No growth at 5 days    AFB Culture - Body Fluid, Pleural Cavity [[id number]] Collected:  11/01/19 0202    Lab Status:  Preliminary result Specimen:  Body Fluid from Pleural Cavity Updated:  11/06/19 0239     AFB Culture No AFB isolated at less than 1 week     AFB Stain No acid fast bacilli seen    Body Fluid Culture - Body Fluid, Pleural Cavity [[id number]] Collected:  11/01/19 0202    Lab Status:  Final result Specimen:  Body Fluid from Pleural Cavity Updated:  11/04/19 0706     Body Fluid Culture No growth     Gram Stain Rare (1+) WBCs per low power field      No organisms seen    Fungus Culture - Body Fluid, Pleural Cavity [[id number]] Collected:  11/01/19 0202    Lab Status:  Preliminary result Specimen:  Body Fluid from Pleural Cavity Updated:  11/06/19 0239     Fungus Culture No fungus isolated at less than 1 week     Fungus Smear - Body Fluid, Pleural Cavity [[id number]] Collected:  11/01/19 0202    Lab Status:  Final result Specimen:  Body Fluid from Pleural Cavity Updated:  11/01/19 1122     Fungal Stain No fungal elements seen    Anaerobic Culture - Pleural Fluid, Pleural Cavity [[id number]] Collected:  11/01/19 0201    Lab Status:  Final result Specimen:  Pleural Fluid from Pleural Cavity Updated:  11/06/19 0946     Anaerobic Culture No anaerobes isolated at 5 days    Respiratory Culture - Sputum, ET Suction [[id number]] Collected:  10/28/19 2215    Lab Status:  Final result Specimen:  Sputum from ET Suction Updated:  10/31/19 0937     Respiratory Culture Scant growth (1+) Normal Respiratory Aby     Gram Stain Many (4+) WBCs per low power field      Many (4+) Mixed bacterial morphotypes seen on Gram Stain    MRSA Screen Culture - Swab, Nares [[id number]]  (Normal) Collected:  10/28/19 1515    Lab Status:  Final result Specimen:  Swab from Nares Updated:  10/29/19 1813     MRSA SCREEN CX No Methicillin Resistant Staphylococcus aureus isolated    Respiratory Panel, PCR - Swab, Nasopharynx [[id number]]  (Abnormal) Collected:  10/28/19 1515    Lab Status:  Final result Specimen:  Swab from Nasopharynx Updated:  10/28/19 1741     ADENOVIRUS, PCR Not Detected     Coronavirus 229E Not Detected     Coronavirus HKU1 Not Detected     Coronavirus NL63 Not Detected     Coronavirus OC43 Not Detected     Human Metapneumovirus Not Detected     Human Rhinovirus/Enterovirus Detected     Influenza B PCR Not Detected     Parainfluenza Virus 1 Not Detected     Parainfluenza Virus 2 Not Detected     Parainfluenza Virus 3 Not Detected     Parainfluenza Virus 4 Not Detected     Bordetella pertussis pcr Not Detected     Influenza A H1 2009 PCR Not Detected     Chlamydophila pneumoniae PCR Not Detected     Mycoplasma pneumo by PCR Not Detected     Influenza A PCR Not Detected     Influenza A H3 Not Detected     Influenza A H1 Not Detected     RSV,  PCR Not Detected    Urine Culture - Urine, Urine, Catheter [[id number]]  (Normal) Collected:  10/28/19 1212    Lab Status:  Final result Specimen:  Urine, Catheter Updated:  10/29/19 1210     Urine Culture No growth    Blood Culture - Blood, Arm, Left [[id number]]  (Abnormal) Collected:  10/28/19 1155    Lab Status:  Final result Specimen:  Blood from Arm, Left Updated:  10/31/19 1310     Blood Culture Scant growth (1+) Actinomyces species     Isolated from Anaerobic Bottle     Gram Stain Anaerobic Bottle Gram positive bacilli      Aerobic Bottle Gram positive bacilli    Narrative:       Blood culture does not meet the specified criteria for PCR identification.  If pregnant, immunocompromised, or clinical concern for meningitis, call lab to run BCID for Listeria monocytogenes.          Medication Review:       Schedule Meds    ampicillin-sulbactam 3 g Intravenous Q24H   citalopram 20 mg Oral Daily   heparin (porcine) 5,000 Units Subcutaneous Q8H   ipratropium-albuterol 3 mL Nebulization Q4H - RT   sodium chloride 10 mL Intravenous Q12H   sodium chloride 10 mL Intravenous Q12H   sodium chloride 10 mL Intravenous Q12H   sodium chloride 10 mL Intravenous Q12H       Infusion Meds       PRN Meds  •  acetaminophen  •  albumin human  •  heparin (porcine)  •  hydrALAZINE  •  hydrOXYzine  •  influenza vaccine  •  ondansetron  •  sodium chloride  •  sodium chloride  •  sodium chloride        Assessment/Plan       Antimicrobial Therapy   1.  IV Unasyn     Day 6  2.      Day  3.      Day  4.      Day  5.      Day      Assessment     Positive blood culture for actinomyces species on admission.  Unfortunately ER performed only one set of blood culture.  This is usually not sufficient to diagnose bacteremia.  Actinomyces is a very rare cause of true bacteremia and usually associated with oral maxillary disease or intra-abdominal abscesses or occasionally pulmonary disease.  The patient has pulmonary infiltrates but not consistent  with actinomycosis.  -Repeat blood cultures are negative so far     IV drug use.  Patient was using IV heroin and IV methamphetamine     Hepatitis C infection.  Treatment naïve  Hep Quant- 353,000 IU/ml  HCV log10 5.548 log10 IU/ml  Genotype 1A     Bilateral pulmonary infiltrates associated with bilateral effusion.  Most likely secondary to aspiration pneumonia     Rhabdomyolysis     Acute kidney failure.  Most likely multifactorial secondary to rhabdomyolysis and sepsis.  The patient is currently on hemodialysis     Rhinovirus infection    Diarrhea.  C. difficile colitis screen was negative    Right upper quadrant pain.  CT scan of the abdomen and pelvis was done without contrast and showed thickening of the gallbladder.  The patient is currently asymptomatic.       Plan     Continue patient on Unasyn 3 grams IV daily -patient will need 2 weeks total treatment but will be able to switch patient to p.o. antibiotics at discharge  Okay to place permanent dialysis catheter anytime  Continue supportive care  A.m. labs  Droplet isolation for rhinovirus during this admission   Patient will need to follow with GI as an outpatient for hepatitis C treatment  Illicit drug abuse cessation-patient states that he is interested in drug rehab           STEPHANE Falcon  11/06/19  10:31 AM      Note is dictated utilizing voice recognition software/Dragon    Electronically signed by Valarie Castro MD at 11/06/19 1884       Consult Notes (last 48 hours) (Notes from 11/06/19 0923 through 11/08/19 0923)     No notes of this type exist for this encounter.

## 2019-11-08 NOTE — PLAN OF CARE
Problem: Patient Care Overview  Goal: Plan of Care Review   11/08/19 0041   Coping/Psychosocial   Plan of Care Reviewed With patient   OTHER   Outcome Summary Alert. Amb. on unit ad [place] when bedrest completed at nine thirty pm. Hemodialysis cathetar intact with no drng. or s/s infection. pt. denies pain or discomfort to area. pt. girlfriend in and asks to be called if pt. refuses any treatment or medication. Did not leave phone number and number not on file. Girl friend also stated she is now not getting apartment until next week and pt. is homeless. SS aware and involved.

## 2019-11-08 NOTE — PROGRESS NOTES
Infectious Diseases Progress Note      LOS: 11 days   Patient Care Team:  Gary Llanos MD as PCP - General    Chief Complaint: No new complaints    Subjective         The patient has been afebrile for the last 24 hours.  The patient is on room air, hemodynamically stable, and is tolerating antimicrobial therapy.          Review of Systems:   Review of Systems   Constitutional: Negative.    HENT: Negative.    Eyes: Negative.    Respiratory: Negative.    Cardiovascular: Negative.    Genitourinary: Negative.    Musculoskeletal: Negative.    Skin: Negative.    Neurological: Negative.    Hematological: Negative.    Psychiatric/Behavioral: Negative.         Objective     Vital Signs  Temp:  [97.9 °F (36.6 °C)-98.7 °F (37.1 °C)] 98 °F (36.7 °C)  Heart Rate:  [] 94  Resp:  [16-23] 18  BP: (133-184)/() 172/117    Physical Exam:  Physical Exam   Constitutional: He is oriented to person, place, and time. He appears well-developed and well-nourished.   HENT:   Head: Normocephalic and atraumatic.   Eyes: EOM are normal. Pupils are equal, round, and reactive to light.   Neck: Normal range of motion. Neck supple.   Cardiovascular: Normal rate, regular rhythm and normal heart sounds.   Pulmonary/Chest: Effort normal and breath sounds normal. No respiratory distress. He has no wheezes.   Abdominal: Soft. Bowel sounds are normal. He exhibits no distension and no mass. There is no tenderness. There is no rebound and no guarding.   Musculoskeletal: Normal range of motion. He exhibits no edema or deformity.   Neurological: He is alert and oriented to person, place, and time. No cranial nerve deficit.   Skin: Skin is warm. No rash noted. No erythema.   Psychiatric: He has a normal mood and affect.   Nursing note and vitals reviewed.       Results Review:    I have reviewed all clinical data, test, lab, and imaging results.     Radiology  Ir Insert Tunneled Cv Catheter Without Port 5 Plus    Result Date: 11/7/2019  DATE  OF EXAM: 11/7/2019 3:16 PM  PROCEDURE: IR INS TUNNELED CV CATHETER WO PORT 5 PLUS-  INDICATIONS: KENA; R41.82-Altered mental status, unspecified; J96.01-Acute respiratory failure with hypoxia; N17.9-Acute kidney failure, unspecified; R94.5-Abnormal results of liver function studies; D72.829-Elevated white blood cell count, unspecified; T68.XXXA-Hypothermia, initial encounter rhabdomyolysis, acute tubular necrosis, in need of tunneled dialysis catheter for outpatient hemodialysis until renal recovery.  COMPARISON: No Comparisons Available  FLUOROSCOPIC TIME: 1.1 minute  PHYSICIAN MONITORED CONSCIOUS SEDATION TIME: 20 minutes  CONTRAST MEDIA: 0  DESCRIPTION OF PROCEDURE: The patient's medications were reviewed prior to the procedure. The procedure, risks and alternatives were discussed and informed written consent was obtained. Maximal sterile barrier technique was utilized throughout the procedure. The patient was given a one-time dose of clindamycin preprocedure due to anaphylactic penicillin reaction history. The patient was placed supine on the fluoroscopy table. The existing temporary dialysis catheter was removed from the right neck and hemostasis achieved using manual compression. The right neck was evaluated with ultrasound chest demonstrates a patent and normally compressible right IJ. The right neck and chest were prepped and draped using maximum sterile barrier technique. Lidocaine was used for local anesthesia. Access was obtained to the right IJ under direct ultrasound visualization using a micropuncture set. A J-tipped wire was advanced into the inferior vena cava and the tract dilated to accommodate a 16 Syrian peel-away sheath. Suitable site on the right chest wall was chosen, and using lidocaine for local anesthesia a subcutaneous tunnel was created from the chest site to the neck site. The catheter tubing was threaded through the tunnel and then placed on the right IJ peel-away sheath with the patient  and breath-hold. Spot fluoroscopy image obtained for line placement shows the catheter tip in good position in the high right atrium. The catheter was secured in place at the chest wall using 2-0 nylon suture material. The neck site was closed using 4-0 Vicryl subcuticular suture. Sterile dressings were applied. The catheter flushed and heparinized in the usual manner. The patient tolerated the procedure well with no immediate post procedure complication. The procedure was performed under moderate conscious sedation with continuous monitoring using Versed and fentanyl. 20 minutes of physician monitored sedation were provided.      Placement of tunneled hemodialysis catheter via right IJ approach using ultrasound and fluoroscopic guidance.  Electronically Signed By-Arturo Fischer On:11/7/2019 4:21 PM This report was finalized on [id number] by  Arturo Fischer, .      Cardiology    Laboratory  Results from last 7 days   Lab Units 11/08/19  0317   WBC 10*3/mm3 13.90*   HEMOGLOBIN g/dL 9.7*   HEMATOCRIT % 27.7*   PLATELETS 10*3/mm3 314     Results from last 7 days   Lab Units 11/08/19  0317   SODIUM mmol/L 141   POTASSIUM mmol/L 4.5   CHLORIDE mmol/L 102   CO2 mmol/L 21.0*   BUN mg/dL 40*   CREATININE mg/dL 9.56*   GLUCOSE mg/dL 97   CALCIUM mg/dL 8.7     Results from last 7 days   Lab Units 11/08/19  0317   SODIUM mmol/L 141   POTASSIUM mmol/L 4.5   CHLORIDE mmol/L 102   CO2 mmol/L 21.0*   BUN mg/dL 40*   CREATININE mg/dL 9.56*   GLUCOSE mg/dL 97   CALCIUM mg/dL 8.7     Results from last 7 days   Lab Units 11/04/19  0434   CK TOTAL U/L 239*             Microbiology   Microbiology Results (last 10 days)     Procedure Component Value - Date/Time    Clostridium Difficile EIA - Stool, Per Rectum [[id number]]  (Normal) Collected:  11/02/19 1348    Lab Status:  Final result Specimen:  Stool from Per Rectum Updated:  11/03/19 0714     C Diff GDH / Toxin Negative    Blood Culture - Blood, Blood, Central Line [[id number]] Collected:   11/01/19 1342    Lab Status:  Final result Specimen:  Blood, Central Line Updated:  11/06/19 1345     Blood Culture No growth at 5 days    Blood Culture - Blood, Arm, Right [[id number]] Collected:  11/01/19 1020    Lab Status:  Final result Specimen:  Blood from Arm, Right Updated:  11/06/19 0945     Blood Culture No growth at 5 days    AFB Culture - Body Fluid, Pleural Cavity [[id number]] Collected:  11/01/19 0202    Lab Status:  Preliminary result Specimen:  Body Fluid from Pleural Cavity Updated:  11/08/19 0229     AFB Culture No AFB isolated at 1 week     AFB Stain No acid fast bacilli seen    Body Fluid Culture - Body Fluid, Pleural Cavity [[id number]] Collected:  11/01/19 0202    Lab Status:  Final result Specimen:  Body Fluid from Pleural Cavity Updated:  11/04/19 0706     Body Fluid Culture No growth     Gram Stain Rare (1+) WBCs per low power field      No organisms seen    Fungus Culture - Body Fluid, Pleural Cavity [[id number]] Collected:  11/01/19 0202    Lab Status:  Preliminary result Specimen:  Body Fluid from Pleural Cavity Updated:  11/08/19 0229     Fungus Culture No fungus isolated at 1 week    Fungus Smear - Body Fluid, Pleural Cavity [[id number]] Collected:  11/01/19 0202    Lab Status:  Final result Specimen:  Body Fluid from Pleural Cavity Updated:  11/01/19 1122     Fungal Stain No fungal elements seen    Anaerobic Culture - Pleural Fluid, Pleural Cavity [[id number]] Collected:  11/01/19 0201    Lab Status:  Final result Specimen:  Pleural Fluid from Pleural Cavity Updated:  11/06/19 0946     Anaerobic Culture No anaerobes isolated at 5 days          Medication Review:       Schedule Meds    ampicillin-sulbactam 3 g Intravenous Q24H   citalopram 20 mg Oral Daily   heparin (porcine) 5,000 Units Subcutaneous Q8H   ipratropium-albuterol 3 mL Nebulization Q4H While Awake - RT   sodium chloride 10 mL Intravenous Q12H       Infusion Meds       PRN Meds  •  acetaminophen  •  albumin human  •  heparin  (porcine)  •  hydrALAZINE  •  hydrOXYzine  •  influenza vaccine  •  ondansetron  •  sodium chloride  •  sodium chloride  •  sodium chloride        Assessment/Plan       Antimicrobial Therapy   1.  IV Unasyn     Day 8  2.      Day  3.      Day  4.      Day  5.      Day      Assessment     Positive blood culture for actinomyces species on admission.  Unfortunately ER performed only one set of blood culture.  This is usually not sufficient to diagnose bacteremia.  Actinomyces is a very rare cause of true bacteremia and usually associated with oral maxillary disease or intra-abdominal abscesses or occasionally pulmonary disease.  The patient has pulmonary infiltrates but not consistent with actinomycosis.  -Repeat blood cultures are negative so far     IV drug use.  Patient was using IV heroin and IV methamphetamine     Hepatitis C infection.  Treatment naïve  Hep Quant- 353,000 IU/ml  HCV log10 5.548 log10 IU/ml  Genotype 1A     Bilateral pulmonary infiltrates associated with bilateral effusion.  Most likely secondary to aspiration pneumonia     Rhabdomyolysis     Acute kidney failure.  Most likely multifactorial secondary to rhabdomyolysis and sepsis.  The patient is currently on hemodialysis  -11/7/2019-permanent dialysis catheter placed     Rhinovirus infection    Diarrhea.  C. difficile colitis screen was negative    Right upper quadrant pain.  CT scan of the abdomen and pelvis was done without contrast and showed thickening of the gallbladder.  The patient is currently asymptomatic.       Plan     Continue patient on Unasyn 3 grams IV daily -patient will need 2 weeks total treatment but will be able to switch patient to p.o. Augmentin 875/125 mg twice daily at discharge  Continue supportive care  A.m. labs  Droplet isolation for rhinovirus during this admission   Patient will need to follow with GI as an outpatient for hepatitis C treatment  Illicit drug abuse cessation-patient states that he is interested in drug  rehab           Ashia Childress, APRN  11/08/19  2:30 PM      Note is dictated utilizing voice recognition software/Dragon

## 2019-11-08 NOTE — PROGRESS NOTES
"      [facility] Medicine Services Daily Progress Note      Hospitalist Team  LOS 11 days      Patient Care Team:  Gary Llanos MD as PCP - General        Chief Complaint / Subjective  Chief Complaint   Patient presents with   • Ingestion     heroin and meth use last seen 830 last night pt was found in barn.     Less anxious and doing well. Breathing is stable     Brief Synopsis of Hospital Course/HPI  25 yo male found down d/t overdose and was intubated in manged in the ICU. Now downgraded on IV abx and intermittent HD.           Review of Systems   Constitution: Negative for fever.   Respiratory: Negative for cough.                Objective      Vital Signs  Temp:  [97.9 °F (36.6 °C)-98.7 °F (37.1 °C)] 98 °F (36.7 °C)  Heart Rate:  [] 100  Resp:  [16-22] 20  BP: (133-184)/() 148/100  Oxygen Therapy  SpO2: 98 %  Pulse Oximetry Type: Intermittent  Device (Oxygen Therapy): room air  $ High Flow Nasal Cannula Set-Up: yes  Flow (L/min): 2  Oxygen Concentration (%): 40  Oximetry Probe Site Changed: No  Flowsheet Rows      First Filed Value   Admission Height  172.7 cm (68\") Documented at 10/28/2019 1138   Admission Weight  59 kg (130 lb) Documented at 10/28/2019 1138        Intake & Output (last 3 days)       11/05 0701 - 11/06 0700 11/06 0701 - 11/07 0700 11/07 0701 - 11/08 0700 11/08 0701 - 11/09 0700    P.O. 240 240      I.V. (mL/kg)        IV Piggyback  100      Total Intake(mL/kg) 240 (3.9) 340 (5.5)      Other  135  0    Stool        Total Output  135  0    Net +240 +205  0                Lines, Drains & Airways    Active LDAs     Name:   Placement date:   Placement time:   Site:   Days:    Hemodialysis Cath Double with Pigtail   10/30/19    1300    Internal Jugular   6                  Physical Exam:    Physical Exam   Constitutional: No distress.   HENT:   Head: Normocephalic.   Eyes: Pupils are equal, round, and reactive to light.   Neck:   Dialysis catheter noted to the " right neck   Cardiovascular: Normal rate.   Pulmonary/Chest: Effort normal. No respiratory distress.   Abdominal: Soft. He exhibits no distension.   Neurological: He is alert.   Skin: Skin is warm.   Psychiatric: He has a normal mood and affect.           Results Review:     I reviewed the patient's new clinical results.      Results from last 7 days   Lab Units 11/08/19 0317 11/07/19  0613 11/06/19 0135 11/03/19  0535 11/02/19  0547   WBC 10*3/mm3 13.90* 11.00* 13.10*   < > 13.50* 14.60*   HEMOGLOBIN g/dL 9.7* 10.1* 10.6*   < > 11.0* 10.3*   HEMATOCRIT % 27.7* 27.7* 29.8*   < > 31.2* 29.2*   PLATELETS 10*3/mm3 314 291 233   < > 146 122*   MONOCYTES % %  --   --   --   --  6.0 5.0    < > = values in this interval not displayed.     Results from last 7 days   Lab Units 11/08/19 0317 11/07/19  0752 11/06/19 0135 11/05/19 0457  11/03/19  0535 11/02/19  0547   SODIUM mmol/L 141 138 136   < > 137   < > 138 135*   POTASSIUM mmol/L 4.5 4.3 4.1   < > 3.8   < > 3.6 3.7   CHLORIDE mmol/L 102 102 99   < > 102   < > 102 100   CO2 mmol/L 21.0* 22.0 21.0*   < > 24.0   < > 23.0 21.0*   BUN mg/dL 40* 33* 40*   < > 24*   < > 44* 61*   CREATININE mg/dL 9.56* 7.80* 7.80*   < > 5.08*   < > 5.46* 5.27*   CALCIUM mg/dL 8.7 9.1 8.8   < > 8.5*   < > 8.3* 8.4*   BILIRUBIN mg/dL  --   --   --   --  0.4  --  0.5 0.5   ALK PHOS U/L  --   --   --   --  63  --  57 57   ALT (SGPT) U/L  --   --   --   --  233*  --  413* 627*   AST (SGOT) U/L  --   --   --   --  44*  --  67* 124*   GLUCOSE mg/dL 97 107* 111*   < > 96   < > 88 91    < > = values in this interval not displayed.     Results from last 7 days   Lab Units 11/08/19  0317 11/07/19  0752 11/06/19  0135   MAGNESIUM mg/dL 2.2 2.2 2.0     Lab Results   Component Value Date    CALCIUM 8.7 11/08/2019    PHOS 7.4 (H) 10/28/2019     No results found for: HGBA1C  Results from last 7 days   Lab Units 11/07/19  0752   INR  1.06               Microbiology Results (last 10 days)     Procedure  Component Value - Date/Time    Clostridium Difficile EIA - Stool, Per Rectum [[id number]]  (Normal) Collected:  11/02/19 1348    Lab Status:  Final result Specimen:  Stool from Per Rectum Updated:  11/03/19 0714     C Diff GDH / Toxin Negative    Blood Culture - Blood, Blood, Central Line [[id number]] Collected:  11/01/19 1342    Lab Status:  Final result Specimen:  Blood, Central Line Updated:  11/06/19 1345     Blood Culture No growth at 5 days    Blood Culture - Blood, Arm, Right [[id number]] Collected:  11/01/19 1020    Lab Status:  Final result Specimen:  Blood from Arm, Right Updated:  11/06/19 0945     Blood Culture No growth at 5 days    AFB Culture - Body Fluid, Pleural Cavity [[id number]] Collected:  11/01/19 0202    Lab Status:  Preliminary result Specimen:  Body Fluid from Pleural Cavity Updated:  11/08/19 0229     AFB Culture No AFB isolated at 1 week     AFB Stain No acid fast bacilli seen    Body Fluid Culture - Body Fluid, Pleural Cavity [[id number]] Collected:  11/01/19 0202    Lab Status:  Final result Specimen:  Body Fluid from Pleural Cavity Updated:  11/04/19 0706     Body Fluid Culture No growth     Gram Stain Rare (1+) WBCs per low power field      No organisms seen    Fungus Culture - Body Fluid, Pleural Cavity [[id number]] Collected:  11/01/19 0202    Lab Status:  Preliminary result Specimen:  Body Fluid from Pleural Cavity Updated:  11/08/19 0229     Fungus Culture No fungus isolated at 1 week    Fungus Smear - Body Fluid, Pleural Cavity [[id number]] Collected:  11/01/19 0202    Lab Status:  Final result Specimen:  Body Fluid from Pleural Cavity Updated:  11/01/19 1122     Fungal Stain No fungal elements seen    Anaerobic Culture - Pleural Fluid, Pleural Cavity [[id number]] Collected:  11/01/19 0201    Lab Status:  Final result Specimen:  Pleural Fluid from Pleural Cavity Updated:  11/06/19 0946     Anaerobic Culture No anaerobes isolated at 5 days          ECG/EMG Results (most recent)      Procedure Component Value Units Date/Time    Adult Transthoracic Echo Complete W/ Cont if Necessary Per Protocol [[id number]] Collected:  10/28/19 1814     Updated:  10/28/19 2055     BSA 1.7 m^2       CV ECHO ROCÍO - RVDD 3.2 cm      IVSd 1.1 cm      LVIDd 4.0 cm      LVIDs 2.8 cm      LVPWd 1.0 cm      IVS/LVPW 1.0     FS 29.8 %      EDV(Teich) 69.9 ml      ESV(Teich) 29.7 ml      EF(Teich) 57.5 %      EDV(cubed) 63.9 ml      ESV(cubed) 22.1 ml      EF(cubed) 65.4 %      LV mass(C)d 134.4 grams      LV mass(C)dI 79.0 grams/m^2      SV(Teich) 40.2 ml      SI(Teich) 23.6 ml/m^2      SV(cubed) 41.8 ml      SI(cubed) 24.6 ml/m^2      Ao root diam 3.1 cm      Ao root area 7.7 cm^2      ACS 2.3 cm      LVOT diam 1.8 cm      LVOT area 2.6 cm^2      RVOT diam 1.5 cm      RVOT area 1.8 cm^2      EDV(MOD-sp4) 82.4 ml      ESV(MOD-sp4) 38.5 ml      EF(MOD-sp4) 53.2 %      SV(MOD-sp4) 43.9 ml      SI(MOD-sp4) 25.8 ml/m^2      Ao root area (BSA corrected) 1.8     LV [place] Vol (BSA corrected) 48.4 ml/m^2      LV Sys Vol (BSA corrected) 22.6 ml/m^2      MV E max chloe 93.9 cm/sec      MV A max chloe 50.2 cm/sec      MV E/A 1.9     MV V2 max 108.9 cm/sec      MV max PG 4.7 mmHg      MV V2 mean 67.4 cm/sec      MV mean PG 2.0 mmHg      MV V2 VTI 27.5 cm      MVA(VTI) 1.5 cm^2      MV dec slope 816.8 cm/sec^2      MV dec time 0.11 sec      Ao pk chloe 134.4 cm/sec      Ao max PG 7.2 mmHg      Ao max PG (full) 2.3 mmHg      Ao V2 mean 96.0 cm/sec      Ao mean PG 4.0 mmHg      Ao mean PG (full) 1.5 mmHg      Ao V2 VTI 19.9 cm      KARI(I,A) 2.0 cm^2      KARI(I,D) 2.0 cm^2      KARI(V,A) 2.2 cm^2      KARI(V,D) 2.2 cm^2      LV V1 max PG 4.9 mmHg      LV V1 mean PG 2.5 mmHg      LV V1 max 111.2 cm/sec      LV V1 mean 73.5 cm/sec      LV V1 VTI 15.5 cm      SV(Ao) 153.0 ml      SI(Ao) 89.9 ml/m^2      SV(LVOT) 40.4 ml      SV(RVOT) 18.6 ml      SI(LVOT) 23.7 ml/m^2      PA V2 max 105.4 cm/sec      PA max PG 4.4 mmHg      PA max PG (full) 2.7  mmHg       CV ECHO ROCÍO - PVA(V,A) 1.1 cm^2       CV ECHO ROCÍO - PVA(V,D) 1.1 cm^2      RV V1 max PG 1.7 mmHg      RV V1 mean PG 0.84 mmHg      RV V1 max 65.7 cm/sec      RV V1 mean 43.2 cm/sec      RV V1 VTI 10.2 cm      TR max chloe 226.7 cm/sec      Qp/Qs 0.46      CV ECHO ROCÍO - BZI_BMI 19.8 kilograms/m^2       CV ECHO ROCÍO - BSA(HAYCOCK) 1.7 m^2       CV ECHO ROCÍO - BZI_METRIC_WEIGHT 59.0 kg       CV ECHO ROCÍO - BZI_METRIC_HEIGHT 172.7 cm      EF(MOD-bp) 53.0 %      LA dimension(2D) 3.4 cm      Echo EF Estimated 60 %     Narrative:         · Estimated EF = 60%.  · Left ventricular systolic function is normal.     Indications  Respiratory failure      Technically satisfactory study.  Mitral valve is structurally normal.  Tricuspid valve is structurally normal.  Aortic valve is structurally normal.  Pulmonic valve could not be well visualized.  No evidence for mitral tricuspid or aortic regurgitation is seen by   Doppler study.  Left atrium is normal in size.  Right atrium is significantly enlarged  Left ventricle is normal in size and contractility with ejection fraction   of 60%.  Right ventricle is definitely enlarged  Atrial septum is intact.  Aorta is normal.  No pericardial effusion or intracardiac thrombus is seen.    Impression  Significant right atrium right ventricle enlargement.  No evidence for mitral tricuspid or aortic regurgitation is seen by   Doppler study.  No pericardial effusion or intracardiac thrombus is present.  Left ventricle is normal in size and contractility with ejection fraction   of 60%.  [place] no pericardial effusion or intracardiac thrombus is seen.        ECG 12 Lead [[id number]] Collected:  10/28/19 1214     Updated:  10/29/19 0808    Narrative:       HEART RATE= 119  bpm  RR Interval= 504  ms  ME Interval= 117  ms  P Horizontal Axis= 4  deg  P Front Axis= 86  deg  QRSD Interval= 128  ms  QT Interval= 369  ms  QRS Axis= 112  deg  T Wave Axis= 39  deg  - ABNORMAL ECG  -  Sinus tachycardia  Ventricular premature complex  Nonspecific intraventricular conduction delay  ST elev, probable normal early repol pattern  No previous ECG available for comparison  Electronically Signed By: Antonio Lance (Thomas) 29-Oct-2019 08:08:34  Date and Time of Study: 2019-10-28 12:14:30          Results for orders placed during the hospital encounter of 10/28/19   Duplex Venous Lower Extremity - Bilateral CAR    Narrative · Normal bilateral lower extremity venous duplex scan.          Results for orders placed during the hospital encounter of 10/28/19   Adult Transthoracic Echo Complete W/ Cont if Necessary Per Protocol    Narrative · Estimated EF = 60%.  · Left ventricular systolic function is normal.     Indications  Respiratory failure      Technically satisfactory study.  Mitral valve is structurally normal.  Tricuspid valve is structurally normal.  Aortic valve is structurally normal.  Pulmonic valve could not be well visualized.  No evidence for mitral tricuspid or aortic regurgitation is seen by   Doppler study.  Left atrium is normal in size.  Right atrium is significantly enlarged  Left ventricle is normal in size and contractility with ejection fraction   of 60%.  Right ventricle is definitely enlarged  Atrial septum is intact.  Aorta is normal.  No pericardial effusion or intracardiac thrombus is seen.    Impression  Significant right atrium right ventricle enlargement.  No evidence for mitral tricuspid or aortic regurgitation is seen by   Doppler study.  No pericardial effusion or intracardiac thrombus is present.  Left ventricle is normal in size and contractility with ejection fraction   of 60%.  [place] no pericardial effusion or intracardiac thrombus is seen.           Ct Abdomen Pelvis Without Contrast    Result Date: 11/1/2019   1. Bilateral pleural effusions are moderate in size with areas of airspace disease which may be pneumonia. 2. Gallbladder wall thickening versus pericholecystic  fluid. 3. Dilated small bowel loops throughout the left upper quadrant of the abdomen with decompression of the distal small bowel. The findings are likely secondary to at least incomplete small bowel obstruction. Exact etiology and location is difficult to determine without oral or IV contrast suspected transition point is in the left lower abdomen.  Electronically Signed By-Arturo Thompson On:11/1/2019 10:21 PM This report was finalized on [id number] by  Arturo Thompson, .    Ct Chest Without Contrast    Result Date: 10/31/2019  Interval increase in the size of the pleural effusions and interval worsening of bilateral lower lobe and upper lobe areas of infiltrate and air bronchograms. The findings are consistent with worsening of pneumonia.  Electronically Signed By-Arturo Thompson On:10/31/2019 9:45 PM This report was finalized on [id number] by  Arturo Thompson, .    Us Gallbladder    Result Date: 11/2/2019  A small amount of sludge is present within the gallbladder lumen. Mild gallbladder wall thickening is present which is nonspecific. There is no evidence of biliary ductal dilatation. The sonographic Turner's sign was negative. Acute cholecystitis cannot be excluded.  Electronically Signed By-Joanne Granda On:11/2/2019 5:13 PM This report was finalized on [id number] by  Joanne Granda, .    Xr Chest 1 View    Result Date: 11/1/2019  1.Decreased right pleural effusion and decreased right lung opacities postthoracentesis. No definite pneumothorax is seen. 2.Persistent left pleural effusion with left mid and lower lung airspace opacities and consolidation. 3.Right arm PICC and right IJ catheter appear in expected positions.  Electronically Signed By-DR. Omari Glover MD On:11/1/2019 6:54 AM This report was finalized on [id number] by DR. Omari Glover MD.    Ir Insert Tunneled Cv Catheter Without Port 5 Plus    Result Date: 11/7/2019  Placement of tunneled hemodialysis catheter via right IJ approach using ultrasound  "and fluoroscopic guidance.  Electronically Signed By-Arturo Fischer On:11/7/2019 4:21 PM This report was finalized on [id number] by  Arturo Fischer, .      Xrays, labs reviewed personally by physician.    Medication Review:   I have reviewed the patient's current medication list      Scheduled Meds    amLODIPine 10 mg Oral Q24H   ampicillin-sulbactam 3 g Intravenous Q24H   citalopram 20 mg Oral Daily   heparin (porcine) 5,000 Units Subcutaneous Q8H   ipratropium-albuterol 3 mL Nebulization Q4H While Awake - RT   sodium chloride 10 mL Intravenous Q12H       Meds Infusions       Meds PRN  •  acetaminophen  •  albumin human  •  heparin (porcine)  •  hydrALAZINE  •  hydrOXYzine  •  influenza vaccine  •  ondansetron  •  sodium chloride  •  sodium chloride  •  sodium chloride    I personally reviewed patient's x-ray films     I personally reviewed patient's EKG      Assessment / Plan    Active Hospital Problems    Diagnosis POA   • Acute renal failure (ARF) (CMS/HCC) [N17.9] Yes   • Rhabdomyolysis [M62.82] Yes   • ATN (acute tubular necrosis) (CMS/HCC) [N17.0] Yes   • Hepatitis C [B19.20] Yes   • Polysubstance abuse (CMS/HCC) [F19.10] Yes   • Anxiety [F41.9] Yes     KENA secondary to Rhabdomyolysis and ATN  -nephrology following  -on hemodialysis and will need as outpt  -tunnled catheter placed 11/7/19  -HD today and again in the am d/w nephrology    Acute hypoxic respiratory failure   -initially required mechanical ventilation  -self-extubated on 10/29/2019  -s/p thoracentesis with 1 L fluid removed  -now on room air  -continue duoneb q 4 hours   -encourage incentive spirometry      Acute toxic metabolic Encephalopathy  -likely multifactorial  -resolved      Septic shock  --resolved      Hypothermia due to environmental exposure   -resolved     Possible Bacteremia / Aspiration pneumonia  -Positive blood culture for actinomyces species on admission  -ID consulted; per ID:  \"ER performed only one set of blood culture.  This " "is usually not sufficient to diagnose bacteremia.  Actinomyces is a very rare cause of true bacteremia and usually associated with oral maxillary disease or intra-abdominal abscesses or occasionally pulmonary disease.  The patient has pulmonary infiltrates but not consistent with actinomycosis.\"  -repeat blood cultures are negative so far  -continue Unasyn daily, ID reports 2 weeks total treatment but po abx at d/c        Hepatitis C   -new onset, treatment naive   -needs outpatient follow up with GI     Acute RUQ pain, likely secondary to hepatitis----resolved  -general surgery signed off     Anxiety   -continue hydroxyzine      Polysubstance abuse   -UDS positive for methamphetamine and THC on admission  -encouraged cessation  -DC plan for inpatient rehab program      VTE Prophylaxis - heparin      Discharge Planning    inpt drug rehab will not accept as needs dialysis  Plan for outpt dialysis and has chair time on Tuesday but will get dialysis in am then hopefully al Shane, DO  11/08/19  3:49 PM      "

## 2019-11-08 NOTE — PLAN OF CARE
Problem: Fall Risk (Adult)  Goal: Identify Related Risk Factors and Signs and Symptoms  Outcome: Ongoing (interventions implemented as appropriate)   10/29/19 1826   Fall Risk (Adult)   Related Risk Factors (Fall Risk) history of falls;polypharmacy;environment unfamiliar   Signs and Symptoms (Fall Risk) presence of risk factors     Goal: Absence of Fall  Outcome: Ongoing (interventions implemented as appropriate)   11/08/19 1059   Fall Risk (Adult)   Absence of Fall making progress toward outcome       Problem: Patient Care Overview  Goal: Plan of Care Review  Outcome: Ongoing (interventions implemented as appropriate)   11/08/19 1059   Coping/Psychosocial   Plan of Care Reviewed With patient   Plan of Care Review   Progress improving   OTHER   Outcome Summary Potential D/C today. Plan of care discussed.      Goal: Interprofessional Rounds/Family Conf  Outcome: Ongoing (interventions implemented as appropriate)   11/08/19 1059   Interdisciplinary Rounds/Family Conf   Participants ;nursing;pharmacy;social work/services       Problem: Skin Injury Risk (Adult)  Goal: Identify Related Risk Factors and Signs and Symptoms   10/29/19 1826   Skin Injury Risk (Adult)   Related Risk Factors (Skin Injury Risk) critical care admission;infection;hypothermia/hyperthermia     Goal: Skin Health and Integrity  Outcome: Ongoing (interventions implemented as appropriate)   11/08/19 1059   Skin Injury Risk (Adult)   Skin Health and Integrity making progress toward outcome

## 2019-11-09 VITALS
SYSTOLIC BLOOD PRESSURE: 133 MMHG | WEIGHT: 147.49 LBS | OXYGEN SATURATION: 95 % | BODY MASS INDEX: 22.35 KG/M2 | TEMPERATURE: 98 F | HEART RATE: 91 BPM | DIASTOLIC BLOOD PRESSURE: 91 MMHG | RESPIRATION RATE: 16 BRPM | HEIGHT: 68 IN

## 2019-11-09 LAB
ANION GAP SERPL CALCULATED.3IONS-SCNC: 12 MMOL/L (ref 5–15)
BASOPHILS # BLD AUTO: 0 10*3/MM3 (ref 0–0.2)
BASOPHILS NFR BLD AUTO: 0.3 % (ref 0–1.5)
BUN BLD-MCNC: 22 MG/DL (ref 6–20)
BUN/CREAT SERPL: 3.2 (ref 7–25)
CALCIUM SPEC-SCNC: 9.1 MG/DL (ref 8.6–10.5)
CHLORIDE SERPL-SCNC: 101 MMOL/L (ref 98–107)
CO2 SERPL-SCNC: 25 MMOL/L (ref 22–29)
CREAT BLD-MCNC: 6.8 MG/DL (ref 0.76–1.27)
DEPRECATED RDW RBC AUTO: 41.1 FL (ref 37–54)
EOSINOPHIL # BLD AUTO: 0.3 10*3/MM3 (ref 0–0.4)
EOSINOPHIL NFR BLD AUTO: 3 % (ref 0.3–6.2)
ERYTHROCYTE [DISTWIDTH] IN BLOOD BY AUTOMATED COUNT: 13.3 % (ref 12.3–15.4)
GFR SERPL CREATININE-BSD FRML MDRD: 10 ML/MIN/1.73
GFR SERPL CREATININE-BSD FRML MDRD: ABNORMAL ML/MIN/{1.73_M2}
GLUCOSE BLD-MCNC: 101 MG/DL (ref 65–99)
HCT VFR BLD AUTO: 25.8 % (ref 37.5–51)
HGB BLD-MCNC: 9.2 G/DL (ref 13–17.7)
LYMPHOCYTES # BLD AUTO: 2 10*3/MM3 (ref 0.7–3.1)
LYMPHOCYTES NFR BLD AUTO: 23 % (ref 19.6–45.3)
MAGNESIUM SERPL-MCNC: 2.2 MG/DL (ref 1.6–2.6)
MCH RBC QN AUTO: 31.4 PG (ref 26.6–33)
MCHC RBC AUTO-ENTMCNC: 35.5 G/DL (ref 31.5–35.7)
MCV RBC AUTO: 88.3 FL (ref 79–97)
MONOCYTES # BLD AUTO: 1 10*3/MM3 (ref 0.1–0.9)
MONOCYTES NFR BLD AUTO: 11.7 % (ref 5–12)
NEUTROPHILS # BLD AUTO: 5.5 10*3/MM3 (ref 1.7–7)
NEUTROPHILS NFR BLD AUTO: 62 % (ref 42.7–76)
NRBC BLD AUTO-RTO: 0 /100 WBC (ref 0–0.2)
PLATELET # BLD AUTO: 315 10*3/MM3 (ref 140–450)
PMV BLD AUTO: 6.1 FL (ref 6–12)
POTASSIUM BLD-SCNC: 4.5 MMOL/L (ref 3.5–5.2)
RBC # BLD AUTO: 2.92 10*6/MM3 (ref 4.14–5.8)
SODIUM BLD-SCNC: 138 MMOL/L (ref 136–145)
WBC NRBC COR # BLD: 8.9 10*3/MM3 (ref 3.4–10.8)

## 2019-11-09 PROCEDURE — 99239 HOSP IP/OBS DSCHRG MGMT >30: CPT | Performed by: INTERNAL MEDICINE

## 2019-11-09 PROCEDURE — 94799 UNLISTED PULMONARY SVC/PX: CPT

## 2019-11-09 PROCEDURE — 5A1D70Z PERFORMANCE OF URINARY FILTRATION, INTERMITTENT, LESS THAN 6 HOURS PER DAY: ICD-10-PCS | Performed by: INTERNAL MEDICINE

## 2019-11-09 PROCEDURE — 80048 BASIC METABOLIC PNL TOTAL CA: CPT | Performed by: NURSE PRACTITIONER

## 2019-11-09 PROCEDURE — 83735 ASSAY OF MAGNESIUM: CPT | Performed by: NURSE PRACTITIONER

## 2019-11-09 PROCEDURE — 85025 COMPLETE CBC W/AUTO DIFF WBC: CPT | Performed by: NURSE PRACTITIONER

## 2019-11-09 PROCEDURE — 25010000002 HEPARIN (PORCINE) PER 1000 UNITS: Performed by: INTERNAL MEDICINE

## 2019-11-09 RX ORDER — AMOXICILLIN AND CLAVULANATE POTASSIUM 875; 125 MG/1; MG/1
1 TABLET, FILM COATED ORAL 2 TIMES DAILY
Qty: 14 TABLET | Refills: 0 | Status: SHIPPED | OUTPATIENT
Start: 2019-11-09 | End: 2020-01-11

## 2019-11-09 RX ORDER — ALBUMIN (HUMAN) 12.5 G/50ML
12.5 SOLUTION INTRAVENOUS AS NEEDED
Status: DISCONTINUED | OUTPATIENT
Start: 2019-11-09 | End: 2019-11-09 | Stop reason: HOSPADM

## 2019-11-09 RX ORDER — AMLODIPINE BESYLATE 10 MG/1
10 TABLET ORAL
Qty: 30 TABLET | Refills: 0 | Status: SHIPPED | OUTPATIENT
Start: 2019-11-10 | End: 2023-01-03

## 2019-11-09 RX ADMIN — HEPARIN SODIUM 5000 UNITS: 5000 INJECTION INTRAVENOUS; SUBCUTANEOUS at 06:19

## 2019-11-09 RX ADMIN — AMLODIPINE BESYLATE 10 MG: 5 TABLET ORAL at 08:46

## 2019-11-09 RX ADMIN — CITALOPRAM HYDROBROMIDE 20 MG: 20 TABLET ORAL at 08:46

## 2019-11-09 RX ADMIN — Medication 10 ML: at 08:46

## 2019-11-09 RX ADMIN — IPRATROPIUM BROMIDE AND ALBUTEROL SULFATE 3 ML: .5; 3 SOLUTION RESPIRATORY (INHALATION) at 06:56

## 2019-11-09 RX ADMIN — HEPARIN SODIUM 5000 UNITS: 1000 INJECTION INTRAVENOUS; SUBCUTANEOUS at 10:40

## 2019-11-09 RX ADMIN — HYDROXYZINE HYDROCHLORIDE 50 MG: 25 TABLET, FILM COATED ORAL at 08:06

## 2019-11-09 NOTE — DISCHARGE SUMMARY
"  Date of Admission: 10/28/2019    Date of Discharge:  11/9/2019    Length of stay:  LOS: 12 days     Admission Diagnosis:unresponsiveness    Principal and Active Diagnosis During Hospital Stay:  KENA secondary to Rhabdomyolysis and ATN  -nephrology following  -on hemodialysis and will need as outpt  -tunnled catheter placed 11/7/19  -HD day of d/c   -set up for [employer] [place] with a chair time of T, Th, Sat at 0630     Acute hypoxic respiratory failure   -initially required mechanical ventilation  -self-extubated on 10/29/2019  -s/p thoracentesis with 1 L fluid removed  -now on room air       Acute toxic metabolic Encephalopathy  -likely multifactorial  -resolved      Septic shock  --resolved      Hypothermia due to environmental exposure   -resolved     Possible Bacteremia / Aspiration pneumonia  -Positive blood culture for actinomyces species on admission  -ID consulted; per ID:  \"ER performed only one set of blood culture.  This is usually not sufficient to diagnose bacteremia.  Actinomyces is a very rare cause of true bacteremia and usually associated with oral maxillary disease or intra-abdominal abscesses or occasionally pulmonary disease.  The patient has pulmonary infiltrates but not consistent with actinomycosis.\"  -repeat blood cultures are negative so far  -Unasyn while in hospital daily, ID reports 2 weeks total treatment  -now on Augmentin to complete course         Hepatitis C   -new onset, treatment naive   -needs outpatient follow up with GI     Acute RUQ pain, likely secondary to hepatitis----resolved  -general surgery signed off     Anxiety   -continue hydroxyzine      Polysubstance abuse   -UDS positive for methamphetamine and THC on admission  -encouraged cessation  -DC plan for inpatient rehab program        Hospital Course  Patient is a 24 y.o. male presented with above and was initially managed in the ICU and then was able to be extubated and transition to the floor.  He had problems with " ATN and KENA and was on dialysis.  Patient has been set up for outpatient dialysis at the request of nephrology.  Given that the patient had improved with the above treatment it was felt that he was stable to discharge home.  Initially patient was going to be transferred to the inpatient drug rehab facility however they would not take dialysis so he has had to be discharged home and then once it is determined when he can stop dialysis he can go to the facility at that time.  He was counseled on the detriments of IV drug abuse this will equate to a long-term poor prognosis.  Stable for discharge.        Problem List:  Active Hospital Problems    Diagnosis POA   • Acute renal failure (ARF) (CMS/HCC) [N17.9] Yes   • Rhabdomyolysis [M62.82] Yes   • ATN (acute tubular necrosis) (CMS/HCC) [N17.0] Yes   • Hepatitis C [B19.20] Yes   • Polysubstance abuse (CMS/HCC) [F19.10] Yes   • Anxiety [F41.9] Yes         Procedures Performed:as noted tunneled catheter placement         Consults:   Consults     Date and Time Order Name Status Description    11/4/2019 1015 Inpatient Hospitalist Consult Completed     11/2/2019 1331 Inpatient General Surgery Consult Completed     11/1/2019 0956 Inpatient Infectious Diseases Consult Completed     10/29/2019 1632 Inpatient Cardiology Consult      10/29/2019 0931 Inpatient Nephrology Consult Completed     10/28/2019 1302 Pulmonology (on-call MD unless specified) Completed           Pertinent Test Results:     Lab Results (last 72 hours)     Procedure Component Value Units Date/Time    Basic Metabolic Panel [[id number]]  (Abnormal) Collected:  11/09/19 0328    Specimen:  Blood Updated:  11/09/19 0509     Glucose 101 mg/dL      BUN 22 mg/dL      Creatinine 6.80 mg/dL      Sodium 138 mmol/L      Potassium 4.5 mmol/L      Chloride 101 mmol/L      CO2 25.0 mmol/L      Calcium 9.1 mg/dL      eGFR   Amer --     Comment: <15 Indicative of kidney failure.        eGFR Non  Amer 10  mL/min/1.73      Comment: <15 Indicative of kidney failure.        BUN/Creatinine Ratio 3.2     Anion Gap 12.0 mmol/L     Narrative:       GFR Normal >60  Chronic Kidney Disease <60  Kidney Failure <15    Magnesium [[id number]]  (Normal) Collected:  11/09/19 0328    Specimen:  Blood Updated:  11/09/19 0509     Magnesium 2.2 mg/dL     CBC & Differential [[id number]] Collected:  11/09/19 0328    Specimen:  Blood Updated:  11/09/19 0429    Narrative:       The following orders were created for panel order CBC & Differential.  Procedure                               Abnormality         Status                     ---------                               -----------         ------                     CBC Auto Differential[[id number]]        Abnormal            Final result                 Please view results for these tests on the individual orders.    CBC Auto Differential [[id number]]  (Abnormal) Collected:  11/09/19 0328    Specimen:  Blood Updated:  11/09/19 0429     WBC 8.90 10*3/mm3      RBC 2.92 10*6/mm3      Hemoglobin 9.2 g/dL      Hematocrit 25.8 %      MCV 88.3 fL      MCH 31.4 pg      MCHC 35.5 g/dL      RDW 13.3 %      RDW-SD 41.1 fl      MPV 6.1 fL      Platelets 315 10*3/mm3      Neutrophil % 62.0 %      Lymphocyte % 23.0 %      Monocyte % 11.7 %      Eosinophil % 3.0 %      Basophil % 0.3 %      Neutrophils, Absolute 5.50 10*3/mm3      Lymphocytes, Absolute 2.00 10*3/mm3      Monocytes, Absolute 1.00 10*3/mm3      Eosinophils, Absolute 0.30 10*3/mm3      Basophils, Absolute 0.00 10*3/mm3      nRBC 0.0 /100 WBC     Iron Profile [[id number]]  (Normal) Collected:  11/08/19 1301    Specimen:  Blood Updated:  11/08/19 1340     Iron 93 mcg/dL      Iron Saturation 29 %      Transferrin 217 mg/dL      TIBC 323 mcg/dL     Basic Metabolic Panel [[id number]]  (Abnormal) Collected:  11/08/19 0317    Specimen:  Blood Updated:  11/08/19 0421     Glucose 97 mg/dL      BUN 40 mg/dL      Creatinine 9.56 mg/dL      Sodium 141  mmol/L      Potassium 4.5 mmol/L      Chloride 102 mmol/L      CO2 21.0 mmol/L      Calcium 8.7 mg/dL      eGFR   Amer --     Comment: <15 Indicative of kidney failure.        eGFR Non African Amer 7 mL/min/1.73      Comment: <15 Indicative of kidney failure.        BUN/Creatinine Ratio 4.2     Anion Gap 18.0 mmol/L     Narrative:       GFR Normal >60  Chronic Kidney Disease <60  Kidney Failure <15    Magnesium [[id number]]  (Normal) Collected:  11/08/19 0317    Specimen:  Blood Updated:  11/08/19 0421     Magnesium 2.2 mg/dL     CBC & Differential [[id number]] Collected:  11/08/19 0317    Specimen:  Blood Updated:  11/08/19 0359    Narrative:       The following orders were created for panel order CBC & Differential.  Procedure                               Abnormality         Status                     ---------                               -----------         ------                     CBC Auto Differential[[id number]]        Abnormal            Final result                 Please view results for these tests on the individual orders.    CBC Auto Differential [[id number]]  (Abnormal) Collected:  11/08/19 0317    Specimen:  Blood Updated:  11/08/19 0359     WBC 13.90 10*3/mm3      RBC 3.15 10*6/mm3      Hemoglobin 9.7 g/dL      Hematocrit 27.7 %      MCV 88.0 fL      MCH 30.7 pg      MCHC 34.8 g/dL      RDW 13.7 %      RDW-SD 42.9 fl      MPV 6.5 fL      Platelets 314 10*3/mm3      Neutrophil % 81.2 %      Lymphocyte % 8.9 %      Monocyte % 7.7 %      Eosinophil % 1.9 %      Basophil % 0.3 %      Neutrophils, Absolute 11.30 10*3/mm3      Lymphocytes, Absolute 1.20 10*3/mm3      Monocytes, Absolute 1.10 10*3/mm3      Eosinophils, Absolute 0.30 10*3/mm3      Basophils, Absolute 0.00 10*3/mm3      nRBC 0.0 /100 WBC     AFB Culture - Body Fluid, Pleural Cavity [[id number]] Collected:  11/01/19 0202    Specimen:  Body Fluid from Pleural Cavity Updated:  11/08/19 0229     AFB Culture No AFB isolated at 1 week     AFB  Stain No acid fast bacilli seen    Fungus Culture - Body Fluid, Pleural Cavity [[id number]] Collected:  11/01/19 0202    Specimen:  Body Fluid from Pleural Cavity Updated:  11/08/19 0229     Fungus Culture No fungus isolated at 1 week    Basic Metabolic Panel [[id number]]  (Abnormal) Collected:  11/07/19 0752    Specimen:  Blood Updated:  11/07/19 0920     Glucose 107 mg/dL      BUN 33 mg/dL      Creatinine 7.80 mg/dL      Sodium 138 mmol/L      Potassium 4.3 mmol/L      Chloride 102 mmol/L      CO2 22.0 mmol/L      Calcium 9.1 mg/dL      eGFR   Amer --     Comment: <15 Indicative of kidney failure.        eGFR Non African Amer 8 mL/min/1.73      Comment: <15 Indicative of kidney failure.        BUN/Creatinine Ratio 4.2     Anion Gap 14.0 mmol/L     Narrative:       GFR Normal >60  Chronic Kidney Disease <60  Kidney Failure <15    Magnesium [[id number]]  (Normal) Collected:  11/07/19 0752    Specimen:  Blood Updated:  11/07/19 0920     Magnesium 2.2 mg/dL     Protime-INR [[id number]]  (Normal) Collected:  11/07/19 0752    Specimen:  Blood Updated:  11/07/19 0907     Protime 11.0 Seconds      INR 1.06    aPTT [[id number]]  (Abnormal) Collected:  11/07/19 0752    Specimen:  Blood Updated:  11/07/19 0907     PTT 27.6 seconds     CBC & Differential [[id number]] Collected:  11/07/19 0613    Specimen:  Blood Updated:  11/07/19 0648    Narrative:       The following orders were created for panel order CBC & Differential.  Procedure                               Abnormality         Status                     ---------                               -----------         ------                     CBC Auto Differential[[id number]]        Abnormal            Final result                 Please view results for these tests on the individual orders.    CBC Auto Differential [[id number]]  (Abnormal) Collected:  11/07/19 0613    Specimen:  Blood Updated:  11/07/19 0648     WBC 11.00 10*3/mm3      RBC 3.18 10*6/mm3      Hemoglobin  10.1 g/dL      Hematocrit 27.7 %      MCV 87.3 fL      MCH 31.8 pg      MCHC 36.4 g/dL      RDW 13.2 %      RDW-SD 41.1 fl      MPV 6.6 fL      Platelets 291 10*3/mm3      Neutrophil % 69.1 %      Lymphocyte % 15.5 %      Monocyte % 10.7 %      Eosinophil % 4.1 %      Basophil % 0.6 %      Neutrophils, Absolute 7.60 10*3/mm3      Lymphocytes, Absolute 1.70 10*3/mm3      Monocytes, Absolute 1.20 10*3/mm3      Eosinophils, Absolute 0.50 10*3/mm3      Basophils, Absolute 0.10 10*3/mm3      nRBC 0.0 /100 WBC     Blood Culture - Blood, Blood, Central Line [[id number]] Collected:  11/01/19 1342    Specimen:  Blood, Central Line Updated:  11/06/19 1345     Blood Culture No growth at 5 days               Microbiology Results (last 10 days)     Procedure Component Value - Date/Time    Clostridium Difficile EIA - Stool, Per Rectum [[id number]]  (Normal) Collected:  11/02/19 1348    Lab Status:  Final result Specimen:  Stool from Per Rectum Updated:  11/03/19 0714     C Diff GDH / Toxin Negative    Blood Culture - Blood, Blood, Central Line [[id number]] Collected:  11/01/19 1342    Lab Status:  Final result Specimen:  Blood, Central Line Updated:  11/06/19 1345     Blood Culture No growth at 5 days    Blood Culture - Blood, Arm, Right [[id number]] Collected:  11/01/19 1020    Lab Status:  Final result Specimen:  Blood from Arm, Right Updated:  11/06/19 0945     Blood Culture No growth at 5 days    AFB Culture - Body Fluid, Pleural Cavity [[id number]] Collected:  11/01/19 0202    Lab Status:  Preliminary result Specimen:  Body Fluid from Pleural Cavity Updated:  11/08/19 0229     AFB Culture No AFB isolated at 1 week     AFB Stain No acid fast bacilli seen    Body Fluid Culture - Body Fluid, Pleural Cavity [[id number]] Collected:  11/01/19 0202    Lab Status:  Final result Specimen:  Body Fluid from Pleural Cavity Updated:  11/04/19 0706     Body Fluid Culture No growth     Gram Stain Rare (1+) WBCs per low power field      No  organisms seen    Fungus Culture - Body Fluid, Pleural Cavity [[id number]] Collected:  11/01/19 0202    Lab Status:  Preliminary result Specimen:  Body Fluid from Pleural Cavity Updated:  11/08/19 0229     Fungus Culture No fungus isolated at 1 week    Fungus Smear - Body Fluid, Pleural Cavity [[id number]] Collected:  11/01/19 0202    Lab Status:  Final result Specimen:  Body Fluid from Pleural Cavity Updated:  11/01/19 1122     Fungal Stain No fungal elements seen    Anaerobic Culture - Pleural Fluid, Pleural Cavity [[id number]] Collected:  11/01/19 0201    Lab Status:  Final result Specimen:  Pleural Fluid from Pleural Cavity Updated:  11/06/19 0946     Anaerobic Culture No anaerobes isolated at 5 days            Results for orders placed during the hospital encounter of 10/28/19   Adult Transthoracic Echo Complete W/ Cont if Necessary Per Protocol    Narrative · Estimated EF = 60%.  · Left ventricular systolic function is normal.     Indications  Respiratory failure      Technically satisfactory study.  Mitral valve is structurally normal.  Tricuspid valve is structurally normal.  Aortic valve is structurally normal.  Pulmonic valve could not be well visualized.  No evidence for mitral tricuspid or aortic regurgitation is seen by   Doppler study.  Left atrium is normal in size.  Right atrium is significantly enlarged  Left ventricle is normal in size and contractility with ejection fraction   of 60%.  Right ventricle is definitely enlarged  Atrial septum is intact.  Aorta is normal.  No pericardial effusion or intracardiac thrombus is seen.    Impression  Significant right atrium right ventricle enlargement.  No evidence for mitral tricuspid or aortic regurgitation is seen by   Doppler study.  No pericardial effusion or intracardiac thrombus is present.  Left ventricle is normal in size and contractility with ejection fraction   of 60%.  Round [place] no pericardial effusion or intracardiac thrombus is seen.            Imaging Results (All)     Procedure Component Value Units Date/Time    IR Insert Tunneled CV Catheter Without Port 5 Plus [[id number]] Collected:  11/07/19 1618     Updated:  11/07/19 1623    Narrative:       DATE OF EXAM:  11/7/2019 3:16 PM     PROCEDURE:  IR INS TUNNELED CV CATHETER WO PORT 5 PLUS-     INDICATIONS:  KENA; R41.82-Altered mental status, unspecified; J96.01-Acute respiratory  failure with hypoxia; N17.9-Acute kidney failure, unspecified;  R94.5-Abnormal results of liver function studies; D72.829-Elevated white  blood cell count, unspecified; T68.XXXA-Hypothermia, initial encounter  rhabdomyolysis, acute tubular necrosis, in need of tunneled dialysis  catheter for outpatient hemodialysis until renal recovery.     COMPARISON:  No Comparisons Available     FLUOROSCOPIC TIME:  1.1 minute     PHYSICIAN MONITORED CONSCIOUS SEDATION TIME:  20 minutes     CONTRAST MEDIA:  0     DESCRIPTION OF PROCEDURE:  The patient's medications were reviewed prior to the procedure. The  procedure, risks and alternatives were discussed and informed written  consent was obtained. Maximal sterile barrier technique was utilized  throughout the procedure. The patient was given a one-time dose of  clindamycin preprocedure due to anaphylactic penicillin reaction  history. The patient was placed supine on the fluoroscopy table. The  existing temporary dialysis catheter was removed from the right neck and  hemostasis achieved using manual compression. The right neck was  evaluated with ultrasound chest demonstrates a patent and normally  compressible right IJ. The right neck and chest were prepped and draped  using maximum sterile barrier technique. Lidocaine was used for local  anesthesia. Access was obtained to the right IJ under direct ultrasound  visualization using a micropuncture set. A J-tipped wire was advanced  into the inferior vena cava and the tract dilated to accommodate a 16  Bul[place]ian peel-away sheath. Suitable site  on the right chest wall was  chosen, and using lidocaine for local anesthesia a subcutaneous tunnel  was created from the chest site to the neck site. The catheter tubing  was threaded through the tunnel and then placed on the right IJ  peel-away sheath with the patient and breath-hold. Spot fluoroscopy  image obtained for line placement shows the catheter tip in good  position in the high right atrium. The catheter was secured in place at  the chest wall using 2-0 nylon suture material. The neck site was closed  using 4-0 Vicryl subcuticular suture. Sterile dressings were applied.  The catheter flushed and heparinized in the usual manner. The patient  tolerated the procedure well with no immediate post procedure  complication. The procedure was performed under moderate conscious  sedation with continuous monitoring using Versed and fentanyl. 20  minutes of physician monitored sedation were provided.       Impression:       Placement of tunneled hemodialysis catheter via right IJ approach using  ultrasound and fluoroscopic guidance.     Electronically Signed By-Arturo Fischer On:11/7/2019 4:21 PM  This report was finalized on [id number] by  Arturo Fischer, .    US Gallbladder [[id number]] Collected:  11/02/19 1712     Updated:  11/02/19 1715    Narrative:       US GALLBLADDER-     Date of Exam: 11/2/2019 4:06 PM     Indication: Right upper quadrant tenderness and abdominal CT scan;  R41.82-Altered mental status, unspecified; J96.01-Acute respiratory  failure with hypoxia; N17.9-Acute kidney failure, unspecified;  R94.5-Abnormal results of liver function studies; D72.829-Elevated white  blood cell count, unspecified; T68.XXXA-Hypothermia, initial encounter.     Comparison: CT 11/01/2019     Technique: Transverse and sagittal ultrasound images of the right upper  quadrant were obtained. Doppler evaluation was also conducted.     FINDINGS:  Visualized portions of the head and body of the pancreas are normal.  Evaluation  is limited due to surrounding bowel gas.     Liver has homogenous echogenicity and normal echotexture.  No focal  hepatic lesions.  Portal and hepatic veins are patent and have normal  flow direction and waveforms.      Gallbladder is normal in caliber. Pericholecystic fluid is present.  Sludge is present within the gallbladder lumen. The sonographic Turner's  sign is negative. No definite stones identified. The biliary system is  nondilated.      The right kidney is normal in size with no evidence of hydronephrosis.  No suspicious focal lesions.        Impression:       A small amount of sludge is present within the gallbladder lumen. Mild  gallbladder wall thickening is present which is nonspecific. There is no  evidence of biliary ductal dilatation. The sonographic Turner's sign was  negative. Acute cholecystitis cannot be excluded.     Electronically Signed By-Joanne Granda On:11/2/2019 5:13 PM  This report was finalized on [id number] by  Joanne Granda, .    CT Abdomen Pelvis Without Contrast [[id number]] Collected:  11/01/19 2213     Updated:  11/01/19 2244    Narrative:          DATE OF EXAM:  11/1/2019 10:10 PM     PROCEDURE:  CT ABDOMEN PELVIS WO CONTRAST-     INDICATIONS:  Abdominal pain and bacteremia; R41.82-Altered mental status,  unspecified; J96.01-Acute respiratory failure with hypoxia; N17.9-Acute  kidney failure, unspecified; R94.5-Abnormal results of liver function  studies; D72.829-Elevated white blood cell count, unspecified;  T68.XXXA-Hypothermia, initial encounter     COMPARISON:  No Comparisons Available     TECHNIQUE:  Routine transaxial slices were obtained through the abdomen and pelvis  without the administration of intravenous contrast. Reconstructed  coronal and sagittal images were also obtained. Automated exposure  control and iterative construction methods were used.     FINDINGS:  Moderate size bilateral pleural effusions. Bilateral basilar areas of  airspace disease with air  bronchograms consistent with pneumonia.     There is fluid around the gallbladder versus gallbladder wall  thickening. There is slight fat stranding around the right kidney of  uncertain significance or etiology. The unenhanced kidneys, adrenal  glands, pancreas, liver and spleen are otherwise normal.     There are several dilated loops of small bowel in the left upper  quadrant of the abdomen transition point of the small bowel appears to  be in the lower abdomen near the pelvic inlet. The distal small bowel  loops in the colon are decompressed. There is some fluid in the deep  pelvis. There is a Cannon catheter in bladder.        Impression:          1. Bilateral pleural effusions are moderate in size with areas of  airspace disease which may be pneumonia.  2. Gallbladder wall thickening versus pericholecystic fluid.  3. Dilated small bowel loops throughout the left upper quadrant of the  abdomen with decompression of the distal small bowel. The findings are  likely secondary to at least incomplete small bowel obstruction. Exact  etiology and location is difficult to determine without oral or IV  contrast suspected transition point is in the left lower abdomen.     Electronically Signed By-Arturo Thompson On:11/1/2019 10:21 PM  This report was finalized on [id number] by  Arturo Thompson, .    XR Chest 1 View [[id number]] Collected:  11/01/19 0645     Updated:  11/01/19 0657    Narrative:       DATE OF EXAM:  11/1/2019 1:50 AM     PROCEDURE:  XR CHEST 1 VW-     INDICATIONS:  post thoracentesis; R41.82-Altered mental status, unspecified;  J96.01-Acute respiratory failure with hypoxia; N17.9-Acute kidney  failure, unspecified; R94.5-Abnormal results of liver function studies;  D72.829-Elevated white blood cell count, unspecified;  T68.XXXA-Hypothermia, initial encounter     COMPARISON:  October 31, 2019     TECHNIQUE:   Single radiographic view of the chest was obtained.     FINDINGS:  There appears to have been interval  right thoracentesis with decreased  right pleural effusion. Small right pleural effusion persists. There are  also decreased airspace opacities in the right mid and lower lung.       No definite pneumothorax is seen.  There is a persistent left pleural  effusion with left mid and lower lung airspace opacities and left  basilar consolidation. Right IJ catheter and right PICC terminating in  the lower SVC. Heart size and mediastinal contour appear within normal  limits.       Impression:       1.Decreased right pleural effusion and decreased right lung opacities  postthoracentesis. No definite pneumothorax is seen.  2.Persistent left pleural effusion with left mid and lower lung airspace  opacities and consolidation.  3.Right arm PICC and right IJ catheter appear in expected positions.     Electronically Signed By-DR. Omari Glover MD On:11/1/2019 6:54 AM  This report was finalized on [id number] by DR. Omari Glover MD.    CT Chest Without Contrast [[id number]] Collected:  10/31/19 2140     Updated:  10/31/19 2156    Narrative:          DATE OF EXAM:  10/31/2019 9:19 PM     PROCEDURE:  CT CHEST WO CONTRAST-     INDICATIONS:   Pleural effusion; R41.82-Altered mental status, unspecified;  J96.01-Acute respiratory failure with hypoxia; N17.9-Acute kidney  failure, unspecified; R94.5-Abnormal results of liver function studies;  D72.829-Elevated white blood cell count, unspecified;  T68.XXXA-Hypothermia, initial encounter     COMPARISON:   No Comparisons Available     TECHNIQUE:  Routine transaxial slices were obtained through the chest without the  administration of intravenous contrast. Reconstructed coronal and  sagittal images were also obtained. Automated exposure control and  iterative construction methods were used.     FINDINGS:  There are moderate-sized bilateral pleural effusions. There is bilateral  lower lobe airspace consolidation with air bronchograms most likely  pneumonia. This has worsened. Patchy areas  of groundglass attenuation in  the bilateral upper lobes has also worsened in the interval.        Impression:       Interval increase in the size of the pleural effusions and interval  worsening of bilateral lower lobe and upper lobe areas of infiltrate and  air bronchograms. The findings are consistent with worsening of  pneumonia.     Electronically Signed ByMandeep Thompson On:10/31/2019 9:45 PM  This report was finalized on [id number] by  Arturo Thompson, .    XR Chest 1 View [[id number]] Collected:  10/31/19 1034     Updated:  10/31/19 1038    Narrative:       Examination: XR CHEST 1 VW-     Date of Exam: 10/31/2019 9:41 AM     Indication: SOA; R41.82-Altered mental status, unspecified; J96.01-Acute  respiratory failure with hypoxia; N17.9-Acute kidney failure,  unspecified; R94.5-Abnormal results of liver function studies;  D72.829-Elevated white blood cell count, unspecified;  T68.XXXA-Hypothermia, initial encounter.     Comparison: Chest radiograph dated 10/30/2019     Technique: Portable AP view of the chest was obtained.     Findings:  There is a right-sided central line with tip terminating over the  cavoatrial junction. There is a right-sided PICC line with tip  terminating over the mid SVC. The cardiomediastinal silhouette is within  normal limits. There are bilateral interstitial and alveolar opacities  which appear similar to the prior examination. There are bilateral  layering pleural effusions which appears decreased in size on the right  when compared to the prior exam. This appears similar on the left.       Impression:       1. Support lines and tubes in expected position.  2. Bilateral interstitial and alveolar opacities concerning for edema or  infection.  3. Bilateral layering pleural effusions, right greater than left. The  right-sided pleural effusion is mildly decreased from the prior exam.     Electronically Signed By-Baldemar Dailey On:10/31/2019 10:36 AM  This report was finalized on  [id number] by  Baldemar Dailey .    XR Chest 1 View [[id number]] Collected:  10/30/19 1450     Updated:  10/30/19 1454    Narrative:       DATE OF EXAM:  10/30/2019 2:38 PM     PROCEDURE:  XR CHEST 1 VW-     INDICATIONS:  shiley placement; R41.82-Altered mental status, unspecified;  J96.01-Acute respiratory failure with hypoxia; N17.9-Acute kidney  failure, unspecified; R94.5-Abnormal results of liver function studies;  D72.829-Elevated white blood cell count, unspecified;  T68.XXXA-Hypothermia, initial encounter       COMPARISON:  AP portable chest 10/29/2019     TECHNIQUE:   Single radiographic view of the chest was obtained.     FINDINGS:  Marked interval worsening in chest findings since yesterday's exam. New  dense airspace disease changes have developed within both lungs,  greatest in the mid to lower lung zones. New small to moderate right  pleural effusion has developed. Heart size is stable and within normal  limits. Right arm approach PICC remains in the cavoatrial junction.        A right IJ approach central line has been placed with tip terminating at  the cavoatrial junction. The ET tube is been removed. No visible  pneumothorax.       Impression:          1. Right IJ central line placement with the tip terminating at the  cavoatrial junction. No visible pneumothorax.        2. Marked interval development of mid to lower lung zone dense airspace  disease could reflect changes of pneumonia or sequelae of aspiration.  Development of small to moderate right pleural effusion.  3. ET tube is been removed.     Electronically Signed By-Dr. Usha Guallpa MD On:10/30/2019 2:52 PM  This report was finalized on [id number] by Dr. Usha Guallpa MD.    US Renal Bilateral [[id number]] Collected:  10/29/19 1845     Updated:  10/29/19 1850    Narrative:       DATE OF EXAM:  10/29/2019 4:31 PM     PROCEDURE:  US RENAL BILATERAL-     INDICATIONS:  cayetano; R41.82-Altered mental status, unspecified; J96.01-Acute  respiratory  failure with hypoxia; N17.9-Acute kidney failure, unspecified;  R94.5-Abnormal results of liver function studies; D72.829-Elevated white  blood cell count, unspecified; T68.XXXA-Hypothermia, initial encounter     COMPARISON:  No Comparisons Available     TECHNIQUE:   Grayscale and color Doppler ultrasound evaluation of the kidneys and  urinary bladder was performed.        FINDINGS:  The right kidney is 11.9 x 5.4 x 4.7 cm. The left kidney is 12.1 x 5.5 x  4.9 cm. There is increased cortical echogenicity with prominence of the  renal pyramids consistent with medical renal disease. There is no  hydronephrosis. There is a prominent right extrarenal pelvis. There is  trace perinephric fluid around the right kidney. The bladder is not well  seen. Incidental note is made of a left pleural effusion.        Impression:       Increased renal cortical echogenicity consistent with medical renal  disease. No hydronephrosis. Mild right perinephric fluid with incidental  left pleural effusion.     Electronically Signed By-Arturo Thompson On:10/29/2019 6:47 PM  This report was finalized on [id number] by  Arturo Thompson, .    XR Chest 1 View [[id number]] Collected:  10/29/19 0735     Updated:  10/29/19 0741    Narrative:       XR CHEST 1 VW-     Date of Exam: 10/29/2019 5:50 AM     Indication: DYSPNEA, ON EXERTION.     Comparison Exams: October 28, 2019     Technique: Single AP chest radiograph     FINDINGS:  An endotracheal tube has its tip in the midtrachea. A right-sided PICC  has its tip in the right atrium. There are bibasilar consolidations. The  heart and mediastinal contours appear normal. The pulmonary vasculature  appears normal. The osseous structures appear intact.       Impression:       1.Endotracheal tube in good position.  2.Bibasilar consolidations, which could represent aspiration or  pneumonia.     Electronically Signed By-DR. Bam Rosales MD On:10/29/2019 7:39 AM  This report was finalized on  [id number] by DR. Bam Rosales MD.    CT Head Without Contrast [[id number]] Collected:  10/28/19 2153     Updated:  10/28/19 2205    Narrative:          DATE OF EXAM:  10/28/2019 9:31 PM     PROCEDURE:   CT HEAD WO CONTRAST-     INDICATIONS:   altered mental status; R41.82-Altered mental status, unspecified;  J96.01-Acute respiratory failure with hypoxia; N17.9-Acute kidney  failure, unspecified; R94.5-Abnormal results of liver function studies;  D72.829-Elevated white blood cell count, unspecified;  T68.XXXA-Hypothermia, initial encounter     COMPARISON:  No Comparisons Available     TECHNIQUE:   Routine transaxial cuts were obtained through the head without the  administration of contrast. Automated exposure control and iterative  reconstruction methods were used.     FINDINGS:  The ventricles, sulci, gyri and cisterns have an unremarkable  appearance. There is no mass, mass effect or midline shift. There are no  areas of acute hemorrhage. There are no abnormal extra-axial fluid  collections. The osseous structures are normal.        Impression:       Normal     Electronically Signed By-Arturo Thompson On:10/28/2019 9:54 PM  This report was finalized on [id number] by  Arturo Thompson, .    CT Chest Without Contrast [[id number]] Collected:  10/28/19 2154     Updated:  10/28/19 2205    Narrative:          DATE OF EXAM:  10/28/2019 9:31 PM     PROCEDURE:  CT CHEST WO CONTRAST-     INDICATIONS:   Sepsis; R41.82-Altered mental status, unspecified; J96.01-Acute  respiratory failure with hypoxia; N17.9-Acute kidney failure,  unspecified; R94.5-Abnormal results of liver function studies;  D72.829-Elevated white blood cell count, unspecified;  T68.XXXA-Hypothermia, initial encounter     COMPARISON:   No Comparisons Available     TECHNIQUE:  Routine transaxial slices were obtained through the chest without the  administration of intravenous contrast. Reconstructed coronal and  sagittal images were also obtained. Automated  exposure control and  iterative construction methods were used.     FINDINGS:  There are diffuse bilateral lower lobe areas of airspace consolidation  with air bronchograms consistent with pneumonia. More patchy areas are  seen in the posterior aspect of the upper lobes bilaterally greater on  the right than the left. There is a right-sided central line with the  tip at the cavoatrial junction. There is an endotracheal tube in the  midthoracic trachea. Limited imaging of the upper solid abdominal  structures reveals fluid around the gallbladder and some fluid adjacent  to the liver consistent with ascites.        Impression:       Bilateral lower lobe air bronchograms and dense consolidations  consistent with pneumonia. Patchy areas in the posterior upper lobes are  also most consistent with pneumonia     Electronically Signed By-Arturo Thompson On:10/28/2019 10:03 PM  This report was finalized on [id number] by  Arturo Thompson, .    XR Chest 1 View [[id number]] Collected:  10/28/19 1258     Updated:  10/28/19 1302    Narrative:       DATE OF EXAM:  10/28/2019 12:56 PM     PROCEDURE:  XR CHEST 1 VW-     INDICATIONS:  Severe Sepsis Protocol     COMPARISON:  Study of December 23, 2015     TECHNIQUE:   Single radiographic AP view of the chest was obtained.     FINDINGS:  Today's study was obtained October 28, 2019 at 12:50 PM.     The ET tube is in good position above the martha. The heart size is  normal. The peripheral lung zones are clear. The heart borders and  hemidiaphragms appear sharp except, for mild vague density in the left  lung base which may represent early atelectasis or pneumonia medially.  The upper abdomen is unremarkable. The bony structures are intact.       Impression:          1. Satisfactory placement of ET tube with tip in good position above  martha.  2. Minimal opacity left lung base medially  3. The lungs are otherwise clear  4. Mild change from study of December 23, 2015     Electronically Signed  By-Everett Verdin Jr. On:10/28/2019 1:00 PM  This report was finalized on [id number] by  Everett Verdin Jr., .            Condition on Discharge:  Stable     Vital Signs  Temp:  [97.7 °F (36.5 °C)-99.3 °F (37.4 °C)] 97.7 °F (36.5 °C)  Heart Rate:  [] 90  Resp:  [12-20] 18  BP: (131-158)/() 158/115    Physical Exam:  Physical Exam   Constitutional: No distress.   Cardiovascular: Normal rate and regular rhythm.   Pulmonary/Chest: Effort normal.   Abdominal: Soft.   Neurological: He is alert.   Skin: Skin is warm.   Tunneled catheter to the left chest        Discharge Disposition  Home or Self Care    Discharge Medications     Discharge Medications      New Medications      Instructions Start Date   amLODIPine 10 MG tablet  Commonly known as:  NORVASC   10 mg, Oral, Every 24 Hours Scheduled   Start Date:  11/10/2019     amoxicillin-clavulanate 875-125 MG per tablet  Commonly known as:  AUGMENTIN   1 tablet, Oral, 2 Times Daily         Continue These Medications      Instructions Start Date   citalopram 20 MG tablet  Commonly known as:  CeleXA   20 mg, Oral, Daily      hydrOXYzine 50 MG tablet  Commonly known as:  ATARAX   50 mg, Oral, Every 6 Hours PRN             Discharge Diet:   Diet Instructions     Diet: Renal      Discharge Diet:  Renal          Activity at Discharge:   Activity Instructions     Activity as Tolerated            Follow-up Appointments  No future appointments.  Additional Instructions for the Follow-ups that You Need to Schedule     Discharge Follow-up with PCP   As directed       Currently Documented PCP:    Gary Llanos MD    PCP Phone Number:    [phone]     Follow Up Details:  one week         Discharge Follow-up with Specified Provider: nephrology; 1 Week   As directed      To:  nephrology    Follow Up:  1 Week               Test Results Pending at Discharge   Order Current Status    AFB Culture - Body Fluid, Pleural Cavity Preliminary result    Fungus Culture - Body  Fluid, Pleural Cavity Preliminary result           Risk for Readmission (LACE) Score: 10 (11/9/2019  6:00 AM)          Time: Discharge 38 min with face-to-face history exam, writing of prescriptions, and documenting discharge data including care coordination.      Chirag Shane DO  11/09/19  11:50 AM

## 2019-11-09 NOTE — PROGRESS NOTES
"                                                                                                                                      Nephrology  Progress Note                                        [facility]    Patient Identification    Name: Vicente Cunha  Age: 24 y.o.  Sex: male  :  1995  MRN: [id number]      DATE OF SERVICE:  2019        Subective    better     Objective   Scheduled Meds:    amLODIPine 10 mg Oral Q24H   ampicillin-sulbactam 3 g Intravenous Q24H   citalopram 20 mg Oral Daily   heparin (porcine) 5,000 Units Subcutaneous Q8H   ipratropium-albuterol 3 mL Nebulization Q4H While Awake - RT   sodium chloride 10 mL Intravenous Q12H         Continuous Infusions:       PRN Meds:•  acetaminophen  •  albumin human  •  heparin (porcine)  •  hydrALAZINE  •  hydrOXYzine  •  influenza vaccine  •  ondansetron  •  sodium chloride  •  sodium chloride  •  sodium chloride     Exam:  /90 (BP Location: Right arm, Patient Position: Lying)   Pulse 95   Temp 98.2 °F (36.8 °C) (Oral)   Resp 17   Ht 172.7 cm (68\")   Wt 66.9 kg (147 lb 7.8 oz)   SpO2 96%   BMI 22.43 kg/m²     Intake/Output last 3 shifts:  No intake/output data recorded.    Intake/Output this shift:  No intake/output data recorded.    Physical exam:  Awake and  Alert  PERTL  No JVD  Chest: decreased BS occasional ronchi  CVS Regular rate and rhythm, S1 and S2 normal  Abdomen:  Soft, non-tender, bowel sounds active   LE: trace edema  Skin:  No rashes or lesions       Data Review:  All labs (24hrs):   Recent Results (from the past 24 hour(s))   Iron Profile    Collection Time: 19  1:01 PM   Result Value Ref Range    Iron 93 59 - 158 mcg/dL    Iron Saturation 29 20 - 50 %    Transferrin 217 200 - 360 mg/dL    TIBC 323 298 - 536 mcg/dL   Basic Metabolic Panel    Collection Time: 19  3:28 AM   Result Value Ref Range    Glucose 101 (H) 65 - 99 mg/dL    BUN 22 (H) 6 - 20 mg/dL    Creatinine 6.80 (H) 0.76 - " 1.27 mg/dL    Sodium 138 136 - 145 mmol/L    Potassium 4.5 3.5 - 5.2 mmol/L    Chloride 101 98 - 107 mmol/L    CO2 25.0 22.0 - 29.0 mmol/L    Calcium 9.1 8.6 - 10.5 mg/dL    eGFR  African Amer      eGFR Non African Amer 10 (L) >60 mL/min/1.73    BUN/Creatinine Ratio 3.2 (L) 7.0 - 25.0    Anion Gap 12.0 5.0 - 15.0 mmol/L   Magnesium    Collection Time: 11/09/19  3:28 AM   Result Value Ref Range    Magnesium 2.2 1.6 - 2.6 mg/dL   CBC Auto Differential    Collection Time: 11/09/19  3:28 AM   Result Value Ref Range    WBC 8.90 3.40 - 10.80 10*3/mm3    RBC 2.92 (L) 4.14 - 5.80 10*6/mm3    Hemoglobin 9.2 (L) 13.0 - 17.7 g/dL    Hematocrit 25.8 (L) 37.5 - 51.0 %    MCV 88.3 79.0 - 97.0 fL    MCH 31.4 26.6 - 33.0 pg    MCHC 35.5 31.5 - 35.7 g/dL    RDW 13.3 12.3 - 15.4 %    RDW-SD 41.1 37.0 - 54.0 fl    MPV 6.1 6.0 - 12.0 fL    Platelets 315 140 - 450 10*3/mm3    Neutrophil % 62.0 42.7 - 76.0 %    Lymphocyte % 23.0 19.6 - 45.3 %    Monocyte % 11.7 5.0 - 12.0 %    Eosinophil % 3.0 0.3 - 6.2 %    Basophil % 0.3 0.0 - 1.5 %    Neutrophils, Absolute 5.50 1.70 - 7.00 10*3/mm3    Lymphocytes, Absolute 2.00 0.70 - 3.10 10*3/mm3    Monocytes, Absolute 1.00 (H) 0.10 - 0.90 10*3/mm3    Eosinophils, Absolute 0.30 0.00 - 0.40 10*3/mm3    Basophils, Absolute 0.00 0.00 - 0.20 10*3/mm3    nRBC 0.0 0.0 - 0.2 /100 WBC          Imaging:  [unfilled]    Assessment/Plan:     Acute renal failure (ARF) (CMS/HCC)    Rhabdomyolysis    ATN (acute tubular necrosis) (CMS/HCC)    Hepatitis C    Polysubstance abuse (CMS/HCC)    Anxiety       Acute kidney injury nonoliguric worsening kidney function due to rhabdomyolysis and ATN              -uop better   -  dialysis again today     Rhabdomyolysis              -resolved  Respiratory failure  Metabolic acidosis  Altered mental status  Hypothermia  Hyperphosphatemia  Liver injury  Hypomagnesemia     D/w the patient  s/p tunneled dialysis catheter  Needs outpatient dialysis catheter  HD today then dc

## 2019-11-09 NOTE — PLAN OF CARE
Problem: Fall Risk (Adult)  Goal: Identify Related Risk Factors and Signs and Symptoms  Outcome: Ongoing (interventions implemented as appropriate)      Problem: Patient Care Overview  Goal: Plan of Care Review  Outcome: Ongoing (interventions implemented as appropriate)   11/09/19 0328   Coping/Psychosocial   Plan of Care Reviewed With patient   Plan of Care Review   Progress improving       Problem: Skin Injury Risk (Adult)  Goal: Identify Related Risk Factors and Signs and Symptoms  Outcome: Ongoing (interventions implemented as appropriate)

## 2019-11-10 ENCOUNTER — READMISSION MANAGEMENT (OUTPATIENT)
Dept: CALL CENTER | Facility: HOSPITAL | Age: 24
End: 2019-11-10

## 2019-11-10 NOTE — OUTREACH NOTE
Prep Survey      Responses   Facility patient discharged from?  [facility]   Is patient eligible?  Yes   Discharge diagnosis  KENA secondary to Rhabdomyolysis and ATN   Does the patient have one of the following disease processes/diagnoses(primary or secondary)?  Other   Does the patient have Home health ordered?  No   Is there a DME ordered?  No   Comments regarding appointments  outpatient dialysis at [facility]   Prep survey completed?  Yes          Olga John RN

## 2019-11-11 NOTE — PAYOR COMM NOTE
"RE:  [id number]    UTILIZATION REVIEW  RETURN CONTACT:  DANISH PEÑA RN [facility]  PH:   FAX: [phone]  [facility]  NPI# [id number]  TAX ID # [id number]      DC NOTIFICATION- DC DATE 11/9/2019  PLEASE NOTIFY IF ALL DAYS COVERED         Regina Cunha (24 y.o. Male)     Date of Birth Social Security Number Address Home Phone MRN    [id number]  [id number] [place] [phone] [id number]    Jewish Marital Status          Non-Religious Single       Admission Date Admission Type Admitting Provider Attending Provider Department, Room/Bed    10[facility]    Discharge Date Discharge Disposition Discharge Destination        11/9/2019 Home or Self Care              Attending Provider:  (none)   Allergies:  Penicillins    Isolation:  None   Infection:  None   Code Status:  Prior    Ht:  172.7 cm (68\")   Wt:  66.9 kg (147 lb 7.8 oz)    Admission Cmt:  None   Principal Problem:  Altered mental status [R41.82]                 Active Insurance as of 10/28/2019     Primary Coverage     Payor Plan Insurance Group Employer/Plan Group    [place] MEDICAID [facility] [id number]     Payor Plan Address Payor Plan Phone Number Payor Plan Fax Number Effective Dates    MAIL STOP:   8/1/2019 - None Entered    [id number] [place] [address]       Subscriber Name Subscriber Birth Date Member ID       REGINA CUNHA 1995 [id number]                 Emergency Contacts      (Rel.) Home Phone Work Phone Mobile Phone    CONTACT, [phone]               Discharge Summary      Chirag Shane DO at 11/09/19 1150            Date of Admission: 10/28/2019    Date of Discharge:  11/9/2019    Length of stay:  LOS: 12 days     Admission Diagnosis:unresponsiveness    Principal and Active Diagnosis During Hospital Stay:  KENA secondary to Rhabdomyolysis and ATN  -nephrology " "following  -on hemodialysis and will need as outpt  -tunnled catheter placed 11/7/19  -HD  day of d/c   -set up for [facility] in [place] with a chair time of T, Th, Sat at 0630     Acute hypoxic respiratory failure   -initially required mechanical ventilation  -self-extubated on 10/29/2019  -s/p thoracentesis with 1 L fluid removed  -now on room air       Acute toxic metabolic Encephalopathy  -likely multifactorial  -resolved      Septic shock  --resolved      Hypothermia due to environmental exposure   -resolved     Possible Bacteremia / Aspiration pneumonia  -Positive blood culture for actinomyces species on admission  -ID consulted; per ID:  \"ER performed only one set of blood culture.  This is usually not sufficient to diagnose bacteremia.  Actinomyces is a very rare cause of true bacteremia and usually associated with oral maxillary disease or intra-abdominal abscesses or occasionally pulmonary disease.  The patient has pulmonary infiltrates but not consistent with actinomycosis.\"  -repeat blood cultures are negative so far  -Unasyn while in hospital daily, ID reports 2 weeks total treatment  -now on Augmentin to complete course         Hepatitis C   -new onset, treatment naive   -needs outpatient follow up with GI     Acute RUQ pain, likely secondary to hepatitis----resolved  -general surgery signed off     Anxiety   -continue hydroxyzine      Polysubstance abuse   -UDS positive for methamphetamine and THC on admission  -encouraged cessation  -DC plan for inpatient rehab program        Hospital Course  Patient is a 24 y.o. male presented with above and was initially managed in the ICU and then was able to be extubated and transition to the floor.  He had problems with ATN and KENA and was on dialysis.  Patient has been set up for outpatient dialysis at the request of nephrology.  Given that the patient had improved with the above treatment it was felt that he was stable to discharge home.  Initially patient was " going to be transferred to the inpatient drug rehab facility however they would not take dialysis so he has had to be discharged home and then once it is determined when he can stop dialysis he can go to the facility at that time.  He was counseled on the detriments of IV drug abuse this will equate to a long-term poor prognosis.  Stable for discharge.        Problem List:  Active Hospital Problems    Diagnosis POA   • Acute renal failure (ARF) (CMS/HCC) [N17.9] Yes   • Rhabdomyolysis [M62.82] Yes   • ATN (acute tubular necrosis) (CMS/HCC) [N17.0] Yes   • Hepatitis C [B19.20] Yes   • Polysubstance abuse (CMS/[facility]) [F19.10] Yes   • Anxiety [F41.9] Yes         Procedures Performed:as noted tunneled catheter placement         Consults:   Consults     Date and Time Order Name Status Description    11/4/2019 1015 Inpatient Hospitalist Consult Completed     11/2/2019 1331 Inpatient General Surgery Consult Completed     11/1/2019 0956 Inpatient Infectious Diseases Consult Completed     10/29/2019 1632 Inpatient Cardiology Consult      10/29/2019 0931 Inpatient Nephrology Consult Completed     10/28/2019 1302 Pulmonology (on-call MD unless specified) Completed           Pertinent Test Results:     Lab Results (last 72 hours)     Procedure Component Value Units Date/Time    Basic Metabolic Panel [[id number]]  (Abnormal) Collected:  11/09/19 0328    Specimen:  Blood Updated:  11/09/19 0509     Glucose 101 mg/dL      BUN 22 mg/dL      Creatinine 6.80 mg/dL      Sodium 138 mmol/L      Potassium 4.5 mmol/L      Chloride 101 mmol/L      CO2 25.0 mmol/L      Calcium 9.1 mg/dL      eGFR   Amer --     Comment: <15 Indicative of kidney failure.        eGFR Non African Amer 10 mL/min/1.73      Comment: <15 Indicative of kidney failure.        BUN/Creatinine Ratio 3.2     Anion Gap 12.0 mmol/L     Narrative:       GFR Normal >60  Chronic Kidney Disease <60  Kidney Failure <15    Magnesium [[id number]]  (Normal) Collected:  11/09/19  0328    Specimen:  Blood Updated:  11/09/19 0509     Magnesium 2.2 mg/dL     CBC & Differential [[id number]] Collected:  11/09/19 0328    Specimen:  Blood Updated:  11/09/19 0429    Narrative:       The following orders were created for panel order CBC & Differential.  Procedure                               Abnormality         Status                     ---------                               -----------         ------                     CBC Auto Differential[[id number]]        Abnormal            Final result                 Please view results for these tests on the individual orders.    CBC Auto Differential [[id number]]  (Abnormal) Collected:  11/09/19 0328    Specimen:  Blood Updated:  11/09/19 0429     WBC 8.90 10*3/mm3      RBC 2.92 10*6/mm3      Hemoglobin 9.2 g/dL      Hematocrit 25.8 %      MCV 88.3 fL      MCH 31.4 pg      MCHC 35.5 g/dL      RDW 13.3 %      RDW-SD 41.1 fl      MPV 6.1 fL      Platelets 315 10*3/mm3      Neutrophil % 62.0 %      Lymphocyte % 23.0 %      Monocyte % 11.7 %      Eosinophil % 3.0 %      Basophil % 0.3 %      Neutrophils, Absolute 5.50 10*3/mm3      Lymphocytes, Absolute 2.00 10*3/mm3      Monocytes, Absolute 1.00 10*3/mm3      Eosinophils, Absolute 0.30 10*3/mm3      Basophils, Absolute 0.00 10*3/mm3      nRBC 0.0 /100 WBC     Iron Profile [[id number]]  (Normal) Collected:  11/08/19 1301    Specimen:  Blood Updated:  11/08/19 1340     Iron 93 mcg/dL      Iron Saturation 29 %      Transferrin 217 mg/dL      TIBC 323 mcg/dL     Basic Metabolic Panel [[id number]]  (Abnormal) Collected:  11/08/19 0317    Specimen:  Blood Updated:  11/08/19 0421     Glucose 97 mg/dL      BUN 40 mg/dL      Creatinine 9.56 mg/dL      Sodium 141 mmol/L      Potassium 4.5 mmol/L      Chloride 102 mmol/L      CO2 21.0 mmol/L      Calcium 8.7 mg/dL      eGFR   Amer --     Comment: <15 Indicative of kidney failure.        eGFR Non African Amer 7 mL/min/1.73      Comment: <15 Indicative of kidney  failure.        BUN/Creatinine Ratio 4.2     Anion Gap 18.0 mmol/L     Narrative:       GFR Normal >60  Chronic Kidney Disease <60  Kidney Failure <15    Magnesium [[id number]]  (Normal) Collected:  11/08/19 0317    Specimen:  Blood Updated:  11/08/19 0421     Magnesium 2.2 mg/dL     CBC & Differential [[id number]] Collected:  11/08/19 0317    Specimen:  Blood Updated:  11/08/19 0359    Narrative:       The following orders were created for panel order CBC & Differential.  Procedure                               Abnormality         Status                     ---------                               -----------         ------                     CBC Auto Differential[[id number]]        Abnormal            Final result                 Please view results for these tests on the individual orders.    CBC Auto Differential [[id number]]  (Abnormal) Collected:  11/08/19 0317    Specimen:  Blood Updated:  11/08/19 0359     WBC 13.90 10*3/mm3      RBC 3.15 10*6/mm3      Hemoglobin 9.7 g/dL      Hematocrit 27.7 %      MCV 88.0 fL      MCH 30.7 pg      MCHC 34.8 g/dL      RDW 13.7 %      RDW-SD 42.9 fl      MPV 6.5 fL      Platelets 314 10*3/mm3      Neutrophil % 81.2 %      Lymphocyte % 8.9 %      Monocyte % 7.7 %      Eosinophil % 1.9 %      Basophil % 0.3 %      Neutrophils, Absolute 11.30 10*3/mm3      Lymphocytes, Absolute 1.20 10*3/mm3      Monocytes, Absolute 1.10 10*3/mm3      Eosinophils, Absolute 0.30 10*3/mm3      Basophils, Absolute 0.00 10*3/mm3      nRBC 0.0 /100 WBC     AFB Culture - Body Fluid, Pleural Cavity [[id number]] Collected:  11/01/19 0202    Specimen:  Body Fluid from Pleural Cavity Updated:  11/08/19 0229     AFB Culture No AFB isolated at 1 week     AFB Stain No acid fast bacilli seen    Fungus Culture - Body Fluid, Pleural Cavity [[id number]] Collected:  11/01/19 0202    Specimen:  Body Fluid from Pleural Cavity Updated:  11/08/19 0229     Fungus Culture No fungus isolated at 1 week    Basic Metabolic  Panel [[id number]]  (Abnormal) Collected:  11/07/19 0752    Specimen:  Blood Updated:  11/07/19 0920     Glucose 107 mg/dL      BUN 33 mg/dL      Creatinine 7.80 mg/dL      Sodium 138 mmol/L      Potassium 4.3 mmol/L      Chloride 102 mmol/L      CO2 22.0 mmol/L      Calcium 9.1 mg/dL      eGFR   Amer --     Comment: <15 Indicative of kidney failure.        eGFR Non African Amer 8 mL/min/1.73      Comment: <15 Indicative of kidney failure.        BUN/Creatinine Ratio 4.2     Anion Gap 14.0 mmol/L     Narrative:       GFR Normal >60  Chronic Kidney Disease <60  Kidney Failure <15    Magnesium [[id number]]  (Normal) Collected:  11/07/19 0752    Specimen:  Blood Updated:  11/07/19 0920     Magnesium 2.2 mg/dL     Protime-INR [[id number]]  (Normal) Collected:  11/07/19 0752    Specimen:  Blood Updated:  11/07/19 0907     Protime 11.0 Seconds      INR 1.06    aPTT [[id number]]  (Abnormal) Collected:  11/07/19 0752    Specimen:  Blood Updated:  11/07/19 0907     PTT 27.6 seconds     CBC & Differential [[id number]] Collected:  11/07/19 0613    Specimen:  Blood Updated:  11/07/19 0648    Narrative:       The following orders were created for panel order CBC & Differential.  Procedure                               Abnormality         Status                     ---------                               -----------         ------                     CBC Auto Differential[[id number]]        Abnormal            Final result                 Please view results for these tests on the individual orders.    CBC Auto Differential [[id number]]  (Abnormal) Collected:  11/07/19 0613    Specimen:  Blood Updated:  11/07/19 0648     WBC 11.00 10*3/mm3      RBC 3.18 10*6/mm3      Hemoglobin 10.1 g/dL      Hematocrit 27.7 %      MCV 87.3 fL      MCH 31.8 pg      MCHC 36.4 g/dL      RDW 13.2 %      RDW-SD 41.1 fl      MPV 6.6 fL      Platelets 291 10*3/mm3      Neutrophil % 69.1 %      Lymphocyte % 15.5 %      Monocyte % 10.7 %      Eosinophil  % 4.1 %      Basophil % 0.6 %      Neutrophils, Absolute 7.60 10*3/mm3      Lymphocytes, Absolute 1.70 10*3/mm3      Monocytes, Absolute 1.20 10*3/mm3      Eosinophils, Absolute 0.50 10*3/mm3      Basophils, Absolute 0.10 10*3/mm3      nRBC 0.0 /100 WBC     Blood Culture - Blood, Blood, Central Line [[id number]] Collected:  11/01/19 1342    Specimen:  Blood, Central Line Updated:  11/06/19 1345     Blood Culture No growth at 5 days               Microbiology Results (last 10 days)     Procedure Component Value - Date/Time    Clostridium Difficile EIA - Stool, Per Rectum [[id number]]  (Normal) Collected:  11/02/19 1348    Lab Status:  Final result Specimen:  Stool from Per Rectum Updated:  11/03/19 0714     C Diff GDH / Toxin Negative    Blood Culture - Blood, Blood, Central Line [[id number]] Collected:  11/01/19 1342    Lab Status:  Final result Specimen:  Blood, Central Line Updated:  11/06/19 1345     Blood Culture No growth at 5 days    Blood Culture - Blood, Arm, Right [[id number]] Collected:  11/01/19 1020    Lab Status:  Final result Specimen:  Blood from Arm, Right Updated:  11/06/19 0945     Blood Culture No growth at 5 days    AFB Culture - Body Fluid, Pleural Cavity [[id number]] Collected:  11/01/19 0202    Lab Status:  Preliminary result Specimen:  Body Fluid from Pleural Cavity Updated:  11/08/19 0229     AFB Culture No AFB isolated at 1 week     AFB Stain No acid fast bacilli seen    Body Fluid Culture - Body Fluid, Pleural Cavity [[id number]] Collected:  11/01/19 0202    Lab Status:  Final result Specimen:  Body Fluid from Pleural Cavity Updated:  11/04/19 0706     Body Fluid Culture No growth     Gram Stain Rare (1+) WBCs per low power field      No organisms seen    Fungus Culture - Body Fluid, Pleural Cavity [[id number]] Collected:  11/01/19 0202    Lab Status:  Preliminary result Specimen:  Body Fluid from Pleural Cavity Updated:  11/08/19 0229     Fungus Culture No fungus isolated at 1 week    Fungus  Smear - Body Fluid, Pleural Cavity [[id number]] Collected:  11/01/19 0202    Lab Status:  Final result Specimen:  Body Fluid from Pleural Cavity Updated:  11/01/19 1122     Fungal Stain No fungal elements seen    Anaerobic Culture - Pleural Fluid, Pleural Cavity [[id number]] Collected:  11/01/19 0201    Lab Status:  Final result Specimen:  Pleural Fluid from Pleural Cavity Updated:  11/06/19 0946     Anaerobic Culture No anaerobes isolated at 5 days            Results for orders placed during the hospital encounter of 10/28/19   Adult Transthoracic Echo Complete W/ Cont if Necessary Per Protocol    Narrative · Estimated EF = 60%.  · Left ventricular systolic function is normal.     Indications  Respiratory failure      Technically satisfactory study.  Mitral valve is structurally normal.  Tricuspid valve is structurally normal.  Aortic valve is structurally normal.  Pulmonic valve could not be well visualized.  No evidence for mitral tricuspid or aortic regurgitation is seen by   Doppler study.  Left atrium is normal in size.  Right atrium is significantly enlarged  Left ventricle is normal in size and contractility with ejection fraction   of 60%.  Right ventricle is definitely enlarged  Atrial septum is intact.  Aorta is normal.  No pericardial effusion or intracardiac thrombus is seen.    Impression  Significant right atrium right ventricle enlargement.  No evidence for mitral tricuspid or aortic regurgitation is seen by   Doppler study.  No pericardial effusion or intracardiac thrombus is present.  Left ventricle is normal in size and contractility with ejection fraction   of 60%.  [place] no pericardial effusion or intracardiac thrombus is seen.           Imaging Results (All)     Procedure Component Value Units Date/Time    IR Insert Tunneled CV Catheter Without Port 5 Plus [[id number]] Collected:  11/07/19 1618     Updated:  11/07/19 1623    Narrative:       DATE OF EXAM:  11/7/2019 3:16 PM     PROCEDURE:  IR INS  TUNNELED CV CATHETER WO PORT 5 PLUS-     INDICATIONS:  KENA; R41.82-Altered mental status, unspecified; J96.01-Acute respiratory  failure with hypoxia; N17.9-Acute kidney failure, unspecified;  R94.5-Abnormal results of liver function studies; D72.829-Elevated white  blood cell count, unspecified; T68.XXXA-Hypothermia, initial encounter  rhabdomyolysis, acute tubular necrosis, in need of tunneled dialysis  catheter for outpatient hemodialysis until renal recovery.     COMPARISON:  No Comparisons Available     FLUOROSCOPIC TIME:  1.1 minute     PHYSICIAN MONITORED CONSCIOUS SEDATION TIME:  20 minutes     CONTRAST MEDIA:  0     DESCRIPTION OF PROCEDURE:  The patient's medications were reviewed prior to the procedure. The  procedure, risks and alternatives were discussed and informed written  consent was obtained. Maximal sterile barrier technique was utilized  throughout the procedure. The patient was given a one-time dose of  clindamycin preprocedure due to anaphylactic penicillin reaction  history. The patient was placed supine on the fluoroscopy table. The  existing temporary dialysis catheter was removed from the right neck and  hemostasis achieved using manual compression. The right neck was  evaluated with ultrasound chest demonstrates a patent and normally  compressible right IJ. The right neck and chest were prepped and draped  using maximum sterile barrier technique. Lidocaine was used for local  anesthesia. Access was obtained to the right IJ under direct ultrasound  visualization using a micropuncture set. A J-tipped wire was advanced  into the inferior vena cava and the tract dilated to accommodate a 16  [place] peel-away sheath. Suitable site on the right chest wall was  chosen, and using lidocaine for local anesthesia a subcutaneous tunnel  was created from the chest site to the neck site. The catheter tubing  was threaded through the tunnel and then placed on the right IJ  peel-away sheath with the  patient and breath-hold. Spot fluoroscopy  image obtained for line placement shows the catheter tip in good  position in the high right atrium. The catheter was secured in place at  the chest wall using 2-0 nylon suture material. The neck site was closed  using 4-0 Vicryl subcuticular suture. Sterile dressings were applied.  The catheter flushed and heparinized in the usual manner. The patient  tolerated the procedure well with no immediate post procedure  complication. The procedure was performed under moderate conscious  sedation with continuous monitoring using Versed and fentanyl. 20  minutes of physician monitored sedation were provided.       Impression:       Placement of tunneled hemodialysis catheter via right IJ approach using  ultrasound and fluoroscopic guidance.     Electronically Signed By-Arturo Fischer On:11/7/2019 4:21 PM  This report was finalized on [id number] by  Arturo Fischer, .    US Gallbladder [[id number]] Collected:  11/02/19 1712     Updated:  11/02/19 1715    Narrative:       US GALLBLADDER-     Date of Exam: 11/2/2019 4:06 PM     Indication: Right upper quadrant tenderness and abdominal CT scan;  R41.82-Altered mental status, unspecified; J96.01-Acute respiratory  failure with hypoxia; N17.9-Acute kidney failure, unspecified;  R94.5-Abnormal results of liver function studies; D72.829-Elevated white  blood cell count, unspecified; T68.XXXA-Hypothermia, initial encounter.     Comparison: CT 11/01/2019     Technique: Transverse and sagittal ultrasound images of the right upper  quadrant were obtained. Doppler evaluation was also conducted.     FINDINGS:  Visualized portions of the head and body of the pancreas are normal.  Evaluation is limited due to surrounding bowel gas.     Liver has homogenous echogenicity and normal echotexture.  No focal  hepatic lesions.  Portal and hepatic veins are patent and have normal  flow direction and waveforms.      Gallbladder is normal in caliber.  Pericholecystic fluid is present.  Sludge is present within the gallbladder lumen. The sonographic Turner's  sign is negative. No definite stones identified. The biliary system is  nondilated.      The right kidney is normal in size with no evidence of hydronephrosis.  No suspicious focal lesions.        Impression:       A small amount of sludge is present within the gallbladder lumen. Mild  gallbladder wall thickening is present which is nonspecific. There is no  evidence of biliary ductal dilatation. The sonographic Turner's sign was  negative. Acute cholecystitis cannot be excluded.     Electronically Signed By-Joanne Granda On:11/2/2019 5:13 PM  This report was finalized on [id number] by  Joanne Granda, .    CT Abdomen Pelvis Without Contrast [[id number]] Collected:  11/01/19 2213     Updated:  11/01/19 2244    Narrative:          DATE OF EXAM:  11/1/2019 10:10 PM     PROCEDURE:  CT ABDOMEN PELVIS WO CONTRAST-     INDICATIONS:  Abdominal pain and bacteremia; R41.82-Altered mental status,  unspecified; J96.01-Acute respiratory failure with hypoxia; N17.9-Acute  kidney failure, unspecified; R94.5-Abnormal results of liver function  studies; D72.829-Elevated white blood cell count, unspecified;  T68.XXXA-Hypothermia, initial encounter     COMPARISON:  No Comparisons Available     TECHNIQUE:  Routine transaxial slices were obtained through the abdomen and pelvis  without the administration of intravenous contrast. Reconstructed  coronal and sagittal images were also obtained. Automated exposure  control and iterative construction methods were used.     FINDINGS:  Moderate size bilateral pleural effusions. Bilateral basilar areas of  airspace disease with air bronchograms consistent with pneumonia.     There is fluid around the gallbladder versus gallbladder wall  thickening. There is slight fat stranding around the right kidney of  uncertain significance or etiology. The unenhanced kidneys, adrenal  glands,  pancreas, liver and spleen are otherwise normal.     There are several dilated loops of small bowel in the left upper  quadrant of the abdomen transition point of the small bowel appears to  be in the lower abdomen near the pelvic inlet. The distal small bowel  loops in the colon are decompressed. There is some fluid in the deep  pelvis. There is a Cannon catheter in bladder.        Impression:          1. Bilateral pleural effusions are moderate in size with areas of  airspace disease which may be pneumonia.  2. Gallbladder wall thickening versus pericholecystic fluid.  3. Dilated small bowel loops throughout the left upper quadrant of the  abdomen with decompression of the distal small bowel. The findings are  likely secondary to at least incomplete small bowel obstruction. Exact  etiology and location is difficult to determine without oral or IV  contrast suspected transition point is in the left lower abdomen.     Electronically Signed By-Arturo Thompson On:11/1/2019 10:21 PM  This report was finalized on [id number] by  Arturo Thompson, .    XR Chest 1 View [[id number]] Collected:  11/01/19 0645     Updated:  11/01/19 0657    Narrative:       DATE OF EXAM:  11/1/2019 1:50 AM     PROCEDURE:  XR CHEST 1 VW-     INDICATIONS:  post thoracentesis; R41.82-Altered mental status, unspecified;  J96.01-Acute respiratory failure with hypoxia; N17.9-Acute kidney  failure, unspecified; R94.5-Abnormal results of liver function studies;  D72.829-Elevated white blood cell count, unspecified;  T68.XXXA-Hypothermia, initial encounter     COMPARISON:  October 31, 2019     TECHNIQUE:   Single radiographic view of the chest was obtained.     FINDINGS:  There appears to have been interval right thoracentesis with decreased  right pleural effusion. Small right pleural effusion persists. There are  also decreased airspace opacities in the right mid and lower lung.       No definite pneumothorax is seen.  There is a persistent left  pleural  effusion with left mid and lower lung airspace opacities and left  basilar consolidation. Right IJ catheter and right PICC terminating in  the lower SVC. Heart size and mediastinal contour appear within normal  limits.       Impression:       1.Decreased right pleural effusion and decreased right lung opacities  postthoracentesis. No definite pneumothorax is seen.  2.Persistent left pleural effusion with left mid and lower lung airspace  opacities and consolidation.  3.Right arm PICC and right IJ catheter appear in expected positions.     Electronically Signed By-DR. Omari Glover MD On:11/1/2019 6:54 AM  This report was finalized on [id number] by DR. Omari Glover MD.    CT Chest Without Contrast [[id number]] Collected:  10/31/19 2140     Updated:  10/31/19 2156    Narrative:          DATE OF EXAM:  10/31/2019 9:19 PM     PROCEDURE:  CT CHEST WO CONTRAST-     INDICATIONS:   Pleural effusion; R41.82-Altered mental status, unspecified;  J96.01-Acute respiratory failure with hypoxia; N17.9-Acute kidney  failure, unspecified; R94.5-Abnormal results of liver function studies;  D72.829-Elevated white blood cell count, unspecified;  T68.XXXA-Hypothermia, initial encounter     COMPARISON:   No Comparisons Available     TECHNIQUE:  Routine transaxial slices were obtained through the chest without the  administration of intravenous contrast. Reconstructed coronal and  sagittal images were also obtained. Automated exposure control and  iterative construction methods were used.     FINDINGS:  There are moderate-sized bilateral pleural effusions. There is bilateral  lower lobe airspace consolidation with air bronchograms most likely  pneumonia. This has worsened. Patchy areas of groundglass attenuation in  the bilateral upper lobes has also worsened in the interval.        Impression:       Interval increase in the size of the pleural effusions and interval  worsening of bilateral lower lobe and upper lobe areas of  infiltrate and  air bronchograms. The findings are consistent with worsening of  pneumonia.     Electronically Signed By-Arturo Thompson On:10/31/2019 9:45 PM  This report was finalized on [id number] by  Arturo Thompson, .    XR Chest 1 View [[id number]] Collected:  10/31/19 1034     Updated:  10/31/19 1038    Narrative:       Examination: XR CHEST 1 VW-     Date of Exam: 10/31/2019 9:41 AM     Indication: SOA; R41.82-Altered mental status, unspecified; J96.01-Acute  respiratory failure with hypoxia; N17.9-Acute kidney failure,  unspecified; R94.5-Abnormal results of liver function studies;  D72.829-Elevated white blood cell count, unspecified;  T68.XXXA-Hypothermia, initial encounter.     Comparison: Chest radiograph dated 10/30/2019     Technique: Portable AP view of the chest was obtained.     Findings:  There is a right-sided central line with tip terminating over the  cavoatrial junction. There is a right-sided PICC line with tip  terminating over the mid SVC. The cardiomediastinal silhouette is within  normal limits. There are bilateral interstitial and alveolar opacities  which appear similar to the prior examination. There are bilateral  layering pleural effusions which appears decreased in size on the right  when compared to the prior exam. This appears similar on the left.       Impression:       1. Support lines and tubes in expected position.  2. Bilateral interstitial and alveolar opacities concerning for edema or  infection.  3. Bilateral layering pleural effusions, right greater than left. The  right-sided pleural effusion is mildly decreased from the prior exam.     Electronically Signed By-Baldemar Dailey On:10/31/2019 10:36 AM  This report was finalized on [id number] by  Baldemar Dailey, .    XR Chest 1 View [[id number]] Collected:  10/30/19 1450     Updated:  10/30/19 1454    Narrative:       DATE OF EXAM:  10/30/2019 2:38 PM     PROCEDURE:  XR CHEST 1 VW-     INDICATIONS:  shiley placement;  R41.82-Altered mental status, unspecified;  J96.01-Acute respiratory failure with hypoxia; N17.9-Acute kidney  failure, unspecified; R94.5-Abnormal results of liver function studies;  D72.829-Elevated white blood cell count, unspecified;  T68.XXXA-Hypothermia, initial encounter       COMPARISON:  AP portable chest 10/29/2019     TECHNIQUE:   Single radiographic view of the chest was obtained.     FINDINGS:  Marked interval worsening in chest findings since yesterday's exam. New  dense airspace disease changes have developed within both lungs,  greatest in the mid to lower lung zones. New small to moderate right  pleural effusion has developed. Heart size is stable and within normal  limits. Right arm approach PICC remains in the cavoatrial junction.        A right IJ approach central line has been placed with tip terminating at  the cavoatrial junction. The ET tube is been removed. No visible  pneumothorax.       Impression:          1. Right IJ central line placement with the tip terminating at the  cavoatrial junction. No visible pneumothorax.        2. Marked interval development of mid to lower lung zone dense airspace  disease could reflect changes of pneumonia or sequelae of aspiration.  Development of small to moderate right pleural effusion.  3. ET tube is been removed.     Electronically Signed By-Dr. Usha Guallpa MD On:10/30/2019 2:52 PM  This report was finalized on [id number] by Dr. Usha Guallpa MD.    US Renal Bilateral [[id number]] Collected:  10/29/19 1845     Updated:  10/29/19 1850    Narrative:       DATE OF EXAM:  10/29/2019 4:31 PM     PROCEDURE:  US RENAL BILATERAL-     INDICATIONS:  cayetano; R41.82-Altered mental status, unspecified; J96.01-Acute respiratory  failure with hypoxia; N17.9-Acute kidney failure, unspecified;  R94.5-Abnormal results of liver function studies; D72.829-Elevated white  blood cell count, unspecified; T68.XXXA-Hypothermia, initial encounter     COMPARISON:  No  Comparisons Available     TECHNIQUE:   Grayscale and color Doppler ultrasound evaluation of the kidneys and  urinary bladder was performed.        FINDINGS:  The right kidney is 11.9 x 5.4 x 4.7 cm. The left kidney is 12.1 x 5.5 x  4.9 cm. There is increased cortical echogenicity with prominence of the  renal pyramids consistent with medical renal disease. There is no  hydronephrosis. There is a prominent right extrarenal pelvis. There is  trace perinephric fluid around the right kidney. The bladder is not well  seen. Incidental note is made of a left pleural effusion.        Impression:       Increased renal cortical echogenicity consistent with medical renal  disease. No hydronephrosis. Mild right perinephric fluid with incidental  left pleural effusion.     Electronically Signed By-Arturo Thompson On:10/29/2019 6:47 PM  This report was finalized on [id number] by  Arturo Thompson, .    XR Chest 1 View [[id number]] Collected:  10/29/19 0735     Updated:  10/29/19 0741    Narrative:       XR CHEST 1 VW-     Date of Exam: 10/29/2019 5:50 AM     Indication: DYSPNEA, ON EXERTION.     Comparison Exams: October 28, 2019     Technique: Single AP chest radiograph     FINDINGS:  An endotracheal tube has its tip in the midtrachea. A right-sided PICC  has its tip in the right atrium. There are bibasilar consolidations. The  heart and mediastinal contours appear normal. The pulmonary vasculature  appears normal. The osseous structures appear intact.       Impression:       1.Endotracheal tube in good position.  2.Bibasilar consolidations, which could represent aspiration or  pneumonia.     Electronically Signed By-DR. Bam Rosales MD On:10/29/2019 7:39 AM  This report was finalized on [id number] by DR. Bam Rosales MD.    CT Head Without Contrast [[id number]] Collected:  10/28/19 2153     Updated:  10/28/19 2205    Narrative:          DATE OF EXAM:  10/28/2019 9:31 PM     PROCEDURE:   CT HEAD WO CONTRAST-      INDICATIONS:   altered mental status; R41.82-Altered mental status, unspecified;  J96.01-Acute respiratory failure with hypoxia; N17.9-Acute kidney  failure, unspecified; R94.5-Abnormal results of liver function studies;  D72.829-Elevated white blood cell count, unspecified;  T68.XXXA-Hypothermia, initial encounter     COMPARISON:  No Comparisons Available     TECHNIQUE:   Routine transaxial cuts were obtained through the head without the  administration of contrast. Automated exposure control and iterative  reconstruction methods were used.     FINDINGS:  The ventricles, sulci, gyri and cisterns have an unremarkable  appearance. There is no mass, mass effect or midline shift. There are no  areas of acute hemorrhage. There are no abnormal extra-axial fluid  collections. The osseous structures are normal.        Impression:       Normal     Electronically Signed By-Arturo Thompson On:10/28/2019 9:54 PM  This report was finalized on [id number] by  Arturo Thompson, .    CT Chest Without Contrast [[id number]] Collected:  10/28/19 2154     Updated:  10/28/19 2205    Narrative:          DATE OF EXAM:  10/28/2019 9:31 PM     PROCEDURE:  CT CHEST WO CONTRAST-     INDICATIONS:   Sepsis; R41.82-Altered mental status, unspecified; J96.01-Acute  respiratory failure with hypoxia; N17.9-Acute kidney failure,  unspecified; R94.5-Abnormal results of liver function studies;  D72.829-Elevated white blood cell count, unspecified;  T68.XXXA-Hypothermia, initial encounter     COMPARISON:   No Comparisons Available     TECHNIQUE:  Routine transaxial slices were obtained through the chest without the  administration of intravenous contrast. Reconstructed coronal and  sagittal images were also obtained. Automated exposure control and  iterative construction methods were used.     FINDINGS:  There are diffuse bilateral lower lobe areas of airspace consolidation  with air bronchograms consistent with pneumonia. More patchy areas are  seen in  the posterior aspect of the upper lobes bilaterally greater on  the right than the left. There is a right-sided central line with the  tip at the cavoatrial junction. There is an endotracheal tube in the  midthoracic trachea. Limited imaging of the upper solid abdominal  structures reveals fluid around the gallbladder and some fluid adjacent  to the liver consistent with ascites.        Impression:       Bilateral lower lobe air bronchograms and dense consolidations  consistent with pneumonia. Patchy areas in the posterior upper lobes are  also most consistent with pneumonia     Electronically Signed By-Arturo Thompson On:10/28/2019 10:03 PM  This report was finalized on [id number] by  Arturo Thompson, .    XR Chest 1 View [[id number]] Collected:  10/28/19 1258     Updated:  10/28/19 1302    Narrative:       DATE OF EXAM:  10/28/2019 12:56 PM     PROCEDURE:  XR CHEST 1 VW-     INDICATIONS:  Severe Sepsis Protocol     COMPARISON:  Study of December 23, 2015     TECHNIQUE:   Single radiographic AP view of the chest was obtained.     FINDINGS:  Today's study was obtained October 28, 2019 at 12:50 PM.     The ET tube is in good position above the martha. The heart size is  normal. The peripheral lung zones are clear. The heart borders and  hemidiaphragms appear sharp except, for mild vague density in the left  lung base which may represent early atelectasis or pneumonia medially.  The upper abdomen is unremarkable. The bony structures are intact.       Impression:          1. Satisfactory placement of ET tube with tip in good position above  martha.  2. Minimal opacity left lung base medially  3. The lungs are otherwise clear  4. Mild change from study of December 23, 2015     Electronically Signed By-Everett Verdin Jr. On:10/28/2019 1:00 PM  This report was finalized on [id number] by  Everett Verdin Jr., .            Condition on Discharge:  Stable     Vital Signs  Temp:  [97.7 °F (36.5 °C)-99.3 °F (37.4 °C)] 97.7 °F (36.5  °C)  Heart Rate:  [] 90  Resp:  [12-20] 18  BP: (131-158)/() 158/115    Physical Exam:  Physical Exam   Constitutional: No distress.   Cardiovascular: Normal rate and regular rhythm.   Pulmonary/Chest: Effort normal.   Abdominal: Soft.   Neurological: He is alert.   Skin: Skin is warm.   Tunneled catheter to the left chest        Discharge Disposition  Home or Self Care    Discharge Medications     Discharge Medications      New Medications      Instructions Start Date   amLODIPine 10 MG tablet  Commonly known as:  NORVASC   10 mg, Oral, Every 24 Hours Scheduled   Start Date:  11/10/2019     amoxicillin-clavulanate 875-125 MG per tablet  Commonly known as:  AUGMENTIN   1 tablet, Oral, 2 Times Daily         Continue These Medications      Instructions Start Date   citalopram 20 MG tablet  Commonly known as:  CeleXA   20 mg, Oral, Daily      hydrOXYzine 50 MG tablet  Commonly known as:  ATARAX   50 mg, Oral, Every 6 Hours PRN             Discharge Diet:   Diet Instructions     Diet: Renal      Discharge Diet:  Renal          Activity at Discharge:   Activity Instructions     Activity as Tolerated            Follow-up Appointments  No future appointments.  Additional Instructions for the Follow-ups that You Need to Schedule     Discharge Follow-up with PCP   As directed       Currently Documented PCP:    Gary Llanos MD    PCP Phone Number:    [phone]     Follow Up Details:  one week         Discharge Follow-up with Specified Provider: nephrology; 1 Week   As directed      To:  nephrology    Follow Up:  1 Week               Test Results Pending at Discharge   Order Current Status    AFB Culture - Body Fluid, Pleural Cavity Preliminary result    Fungus Culture - Body Fluid, Pleural Cavity Preliminary result           Risk for Readmission (LACE) Score: 10 (11/9/2019  6:00 AM)          Time: Discharge 38 min with face-to-face history exam, writing of prescriptions, and documenting discharge data  including care coordination.      Chirag Shane DO  11/09/19  11:50 AM              Electronically signed by Chirag Shane DO at 11/09/19 0780

## 2019-11-12 ENCOUNTER — READMISSION MANAGEMENT (OUTPATIENT)
Dept: CALL CENTER | Facility: HOSPITAL | Age: 24
End: 2019-11-12

## 2019-11-12 NOTE — OUTREACH NOTE
Medical Week 1 Survey      Responses   Facility patient discharged from?  [facility]   Does the patient have one of the following disease processes/diagnoses(primary or secondary)?  Other   Is there a successful TCM telephone encounter documented?  No   Week 1 attempt successful?  Yes   Call start time  1814   Call end time  1818   Discharge diagnosis  KENA secondary to Rhabdomyolysis and ATN   Meds reviewed with patient/caregiver?  Yes   Is the patient having any side effects they believe may be caused by any medication additions or changes?  No   Does the patient have all medications ordered at discharge?  Yes   Is the patient taking all medications as directed (includes completed medication regime)?  Yes   Comments regarding appointments  outpatient dialysis at [facility]   Does the patient have a primary care provider?   Yes   Does the patient have an appointment with their PCP within 7 days of discharge?  Yes   Has the patient kept scheduled appointments due by today?  Yes   Comments  Patient has started dialysis and has appt with his PCP   Has home health visited the patient within 72 hours of discharge?  N/A   Did the patient receive a copy of their discharge instructions?  Yes   Nursing interventions  Reviewed instructions with patient   What is the patient's perception of their health status since discharge?  Improving   Is the patient/caregiver able to teach back signs and symptoms related to disease process for when to call PCP?  Yes   Is the patient/caregiver able to teach back signs and symptoms related to disease process for when to call 911?  Yes   Is the patient/caregiver able to teach back the hierarchy of who to call/visit for symptoms/problems? PCP, Specialist, Home health nurse, Urgent Care, ED, 911  Yes   Additional teach back comments  Patient wanting phone # to Dr. Ortega to make an appt.  Phone # given.  States he is also needing a statement that he can not work due to him having dialysis advised to  speak with the doctor at the diaylsis center and they could give him that or his PCP.   Week 1 call completed?  Yes   Wrap up additional comments  Patient has appt and medications.  Has gone to scheduled dialysis.            Shweta Granda LPN

## 2019-11-13 ENCOUNTER — HOSPITAL ENCOUNTER (EMERGENCY)
Facility: HOSPITAL | Age: 24
Discharge: HOME | End: 2019-11-13
Attending: EMERGENCY MEDICINE | Admitting: EMERGENCY MEDICINE

## 2019-11-13 VITALS
DIASTOLIC BLOOD PRESSURE: 88 MMHG | HEIGHT: 69 IN | OXYGEN SATURATION: 98 % | HEART RATE: 88 BPM | BODY MASS INDEX: 21.55 KG/M2 | SYSTOLIC BLOOD PRESSURE: 143 MMHG | RESPIRATION RATE: 17 BRPM | WEIGHT: 145.5 LBS | TEMPERATURE: 98.3 F

## 2019-11-13 DIAGNOSIS — I82.4Y2 ACUTE DEEP VEIN THROMBOSIS (DVT) OF PROXIMAL VEIN OF LEFT LOWER EXTREMITY (HCC): ICD-10-CM

## 2019-11-13 DIAGNOSIS — N18.9 CHRONIC RENAL FAILURE, UNSPECIFIED CKD STAGE: ICD-10-CM

## 2019-11-13 DIAGNOSIS — R79.89 ELEVATED D-DIMER: ICD-10-CM

## 2019-11-13 DIAGNOSIS — R60.0 LEG EDEMA, LEFT: Primary | ICD-10-CM

## 2019-11-13 LAB
ANION GAP SERPL CALCULATED.3IONS-SCNC: 15 MMOL/L (ref 5–15)
BUN BLD-MCNC: 25 MG/DL (ref 6–20)
BUN/CREAT SERPL: 4.3 (ref 7–25)
CALCIUM SPEC-SCNC: 9.2 MG/DL (ref 8.6–10.5)
CHLORIDE SERPL-SCNC: 102 MMOL/L (ref 98–107)
CO2 SERPL-SCNC: 25 MMOL/L (ref 22–29)
CREAT BLD-MCNC: 5.82 MG/DL (ref 0.76–1.27)
D DIMER PPP FEU-MCNC: 2.83 MCGFEU/ML (ref 0.17–0.59)
GFR SERPL CREATININE-BSD FRML MDRD: 12 ML/MIN/1.73
GFR SERPL CREATININE-BSD FRML MDRD: ABNORMAL ML/MIN/{1.73_M2}
GLUCOSE BLD-MCNC: 91 MG/DL (ref 65–99)
POTASSIUM BLD-SCNC: 4.8 MMOL/L (ref 3.5–5.2)
SODIUM BLD-SCNC: 142 MMOL/L (ref 136–145)

## 2019-11-13 PROCEDURE — 85379 FIBRIN DEGRADATION QUANT: CPT | Performed by: EMERGENCY MEDICINE

## 2019-11-13 PROCEDURE — 99283 EMERGENCY DEPT VISIT LOW MDM: CPT

## 2019-11-13 PROCEDURE — 96372 THER/PROPH/DIAG INJ SC/IM: CPT

## 2019-11-13 PROCEDURE — 25010000002 ENOXAPARIN PER 10 MG

## 2019-11-13 PROCEDURE — 80048 BASIC METABOLIC PNL TOTAL CA: CPT | Performed by: EMERGENCY MEDICINE

## 2019-11-13 RX ADMIN — ENOXAPARIN SODIUM 30 MG: 30 INJECTION SUBCUTANEOUS at 21:32

## 2019-11-14 ENCOUNTER — TRANSCRIBE ORDERS (OUTPATIENT)
Dept: ADMINISTRATIVE | Facility: HOSPITAL | Age: 24
End: 2019-11-14

## 2019-11-14 NOTE — DISCHARGE INSTRUCTIONS
Continue home medications and care.  Follow-up in the morning at 8 AM or immediately after your dialysis for outpatient DVT ultrasound and follow-up with MD later that day for results.

## 2019-11-14 NOTE — ED PROVIDER NOTES
Subjective   Chief complaint leg swelling.  This is a 24-year-old presents with swelling on left leg patient has had days.  He has a history of renal failure for which she gets dialysis secondary to a previous overdose.  He denies any chest pain or shortness breath fevers or chills he denies any other alleviating or exacerbating factors.  No prior history of DVT.  No history of trauma.  He denies any redness or fever.  He denies any numbness or weakness he states he does have some pain to the area he rates as 3/10 sharp stabbing nonradiating pain with no other alleviating or exacerbating factors        History provided by:  Patient      Review of Systems   Constitutional: Negative for chills and fever.   HENT: Negative for nosebleeds and sore throat.    Eyes: Negative for redness.   Respiratory: Negative for shortness of breath.    Cardiovascular: Positive for leg swelling. Negative for chest pain.   Gastrointestinal: Negative for abdominal pain.   Endocrine: Negative for polyuria.   Genitourinary: Negative for decreased urine volume.   Musculoskeletal: Negative for arthralgias and myalgias.   Skin: Negative for rash and wound.   Allergic/Immunologic: Negative for immunocompromised state.   Neurological: Negative for weakness.   Hematological: Does not bruise/bleed easily.   Psychiatric/Behavioral: Negative for behavioral problems.   All other systems reviewed and are negative.      Past Medical History:   Diagnosis Date   • Anxiety    • Depression        Allergies   Allergen Reactions   • Penicillins Anxiety       History reviewed. No pertinent surgical history.    Family History   Family history unknown: Yes       Social History     Socioeconomic History   • Marital status: Single     Spouse name: Not on file   • Number of children: Not on file   • Years of education: Not on file   • Highest education level: Not on file   Tobacco Use   • Smoking status: Current Every Day Smoker   • Smokeless tobacco: Never Used    Substance and Sexual Activity   • Drug use: Yes     Types: Methamphetamines, Marijuana           Objective   Physical Exam   Constitutional: He is oriented to person, place, and time. He appears well-developed and well-nourished. No distress.   HENT:   Head: Normocephalic and atraumatic.   Right Ear: External ear normal.   Left Ear: External ear normal.   Nose: Nose normal.   Mouth/Throat: Oropharynx is clear and moist.   Eyes: Conjunctivae and EOM are normal. Pupils are equal, round, and reactive to light.   Neck: Normal range of motion. Neck supple. No JVD present. No thyromegaly present.   Cardiovascular: Normal rate, regular rhythm, normal heart sounds and intact distal pulses.   Pulmonary/Chest: Effort normal and breath sounds normal. No stridor.   Abdominal: Soft. Bowel sounds are normal. There is no tenderness.   Musculoskeletal: Normal range of motion.   Nonpitting left lower extremity edema.  No Homans sign.  Full range of motion about the shoulder elbow wrist hips knees ankles without any difficulty.   Lymphadenopathy:     He has no cervical adenopathy.   Neurological: He is alert and oriented to person, place, and time.   Skin: Skin is warm and dry. Capillary refill takes less than 2 seconds. No rash noted.   Psychiatric: He has a normal mood and affect.   Nursing note and vitals reviewed.      Procedures           ED Course        No orders to display     Lab Results (last 72 hours)     Procedure Component Value Units Date/Time    D-dimer, Quantitative [[id number]]  (Abnormal) Collected:  11/13/19 2048    Specimen:  Blood Updated:  11/13/19 2107     D-Dimer, Quantitative 2.83 MCGFEU/mL     Narrative:       Reference Range  --------------------------------------------------------------------     < 0.50   Negative Predictive Value  0.50-0.59   Indeterminate    >= 0.60   Probable VTE             A very low percentage of patients with DVT may yield D-Dimer results   below the cut-off of 0.50 MCGFEU/mL.   "This is known to be more   prevalent in patients with distal DVT.             Results of this test should always be interpreted in conjunction with   the patient's medical history, clinical presentation and other   findings.  Clinical diagnosis should not be based on the result of   IN[facility]OVANCE D-Dimer alone.    Basic Metabolic Panel [[id number]]  (Abnormal) Collected:  11/13/19 2048    Specimen:  Blood Updated:  11/13/19 2114     Glucose 91 mg/dL      BUN 25 mg/dL      Creatinine 5.82 mg/dL      Sodium 142 mmol/L      Potassium 4.8 mmol/L      Chloride 102 mmol/L      CO2 25.0 mmol/L      Calcium 9.2 mg/dL      eGFR   Amer --     Comment: <15 Indicative of kidney failure.        eGFR Non African Amer 12 mL/min/1.73      Comment: <15 Indicative of kidney failure.        BUN/Creatinine Ratio 4.3     Anion Gap 15.0 mmol/L     Narrative:       GFR Normal >60  Chronic Kidney Disease <60  Kidney Failure <15        Medications   enoxaparin (LOVENOX) syringe 30 mg (not administered)     BP (!) 151/105   Pulse 105   Temp 98.2 °F (36.8 °C)   Resp 20   Ht 175.3 cm (69\")   Wt 66 kg (145 lb 8.1 oz)   SpO2 99%   BMI 21.49 kg/m²   No orders to display     Lab Results (last 72 hours)     Procedure Component Value Units Date/Time    D-dimer, Quantitative [[id number]]  (Abnormal) Collected:  11/13/19 2048    Specimen:  Blood Updated:  11/13/19 2107     D-Dimer, Quantitative 2.83 MCGFEU/mL     Narrative:       Reference Range  --------------------------------------------------------------------     < 0.50   Negative Predictive Value  0.50-0.59   Indeterminate    >= 0.60   Probable VTE             A very low percentage of patients with DVT may yield D-Dimer results   below the cut-off of 0.50 MCGFEU/mL.  This is known to be more   prevalent in patients with distal DVT.             Results of this test should always be interpreted in conjunction with   the patient's medical history, clinical presentation and other   findings. " " Clinical diagnosis should not be based on the result of   INNOVANCE D-Dimer alone.    Basic Metabolic Panel [[id number]]  (Abnormal) Collected:  11/13/19 2048    Specimen:  Blood Updated:  11/13/19 2114     Glucose 91 mg/dL      BUN 25 mg/dL      Creatinine 5.82 mg/dL      Sodium 142 mmol/L      Potassium 4.8 mmol/L      Chloride 102 mmol/L      CO2 25.0 mmol/L      Calcium 9.2 mg/dL      eGFR   Amer --     Comment: <15 Indicative of kidney failure.        eGFR Non African Amer 12 mL/min/1.73      Comment: <15 Indicative of kidney failure.        BUN/Creatinine Ratio 4.3     Anion Gap 15.0 mmol/L     Narrative:       GFR Normal >60  Chronic Kidney Disease <60  Kidney Failure <15        Medications   enoxaparin (LOVENOX) syringe 30 mg (not administered)     BP (!) 151/105   Pulse 105   Temp 98.2 °F (36.8 °C)   Resp 20   Ht 175.3 cm (69\")   Wt 66 kg (145 lb 8.1 oz)   SpO2 99%   BMI 21.49 kg/m²     Discussed results with patient as well as follow care instructions with patient expressed understanding.  Patient is given renal dose of Lovenox and is to follow-up in the morning for DVT ultrasound        MDM    Final diagnoses:   Leg edema, left   Elevated d-dimer   Acute deep vein thrombosis (DVT) of proximal vein of left lower extremity (CMS/HCC)   Chronic renal failure, unspecified CKD stage              Timothy Marin MD  11/13/19 2126    "

## 2019-11-15 ENCOUNTER — TRANSCRIBE ORDERS (OUTPATIENT)
Dept: ADMINISTRATIVE | Facility: HOSPITAL | Age: 24
End: 2019-11-15

## 2019-11-15 ENCOUNTER — HOSPITAL ENCOUNTER (OUTPATIENT)
Dept: CARDIOLOGY | Facility: HOSPITAL | Age: 24
Discharge: HOME | End: 2019-11-15
Admitting: EMERGENCY MEDICINE

## 2019-11-15 DIAGNOSIS — I82.412 DVT FEMORAL (DEEP VENOUS THROMBOSIS) WITH THROMBOPHLEBITIS, LEFT (HCC): Primary | ICD-10-CM

## 2019-11-15 DIAGNOSIS — I82.412 DVT FEMORAL (DEEP VENOUS THROMBOSIS) WITH THROMBOPHLEBITIS, LEFT (HCC): ICD-10-CM

## 2019-11-15 LAB
BH CV LOWER VASCULAR LEFT COMMON FEMORAL AUGMENT: NORMAL
BH CV LOWER VASCULAR LEFT COMMON FEMORAL COMPETENT: NORMAL
BH CV LOWER VASCULAR LEFT COMMON FEMORAL COMPRESS: NORMAL
BH CV LOWER VASCULAR LEFT COMMON FEMORAL PHASIC: NORMAL
BH CV LOWER VASCULAR LEFT COMMON FEMORAL SPONT: NORMAL
BH CV LOWER VASCULAR LEFT DISTAL FEMORAL COMPRESS: NORMAL
BH CV LOWER VASCULAR LEFT GASTRONEMIUS COMPRESS: NORMAL
BH CV LOWER VASCULAR LEFT GREATER SAPH AK COMPRESS: NORMAL
BH CV LOWER VASCULAR LEFT GREATER SAPH BK COMPRESS: NORMAL
BH CV LOWER VASCULAR LEFT LESSER SAPH COMPRESS: NORMAL
BH CV LOWER VASCULAR LEFT MID FEMORAL AUGMENT: NORMAL
BH CV LOWER VASCULAR LEFT MID FEMORAL COMPETENT: NORMAL
BH CV LOWER VASCULAR LEFT MID FEMORAL COMPRESS: NORMAL
BH CV LOWER VASCULAR LEFT MID FEMORAL PHASIC: NORMAL
BH CV LOWER VASCULAR LEFT MID FEMORAL SPONT: NORMAL
BH CV LOWER VASCULAR LEFT PERONEAL COMPRESS: NORMAL
BH CV LOWER VASCULAR LEFT POPLITEAL AUGMENT: NORMAL
BH CV LOWER VASCULAR LEFT POPLITEAL COMPETENT: NORMAL
BH CV LOWER VASCULAR LEFT POPLITEAL COMPRESS: NORMAL
BH CV LOWER VASCULAR LEFT POPLITEAL PHASIC: NORMAL
BH CV LOWER VASCULAR LEFT POPLITEAL SPONT: NORMAL
BH CV LOWER VASCULAR LEFT POSTERIOR TIBIAL COMPRESS: NORMAL
BH CV LOWER VASCULAR LEFT PROXIMAL FEMORAL COMPRESS: NORMAL
BH CV LOWER VASCULAR LEFT SAPHENOFEMORAL JUNCTION AUGMENT: NORMAL
BH CV LOWER VASCULAR LEFT SAPHENOFEMORAL JUNCTION COMPETENT: NORMAL
BH CV LOWER VASCULAR LEFT SAPHENOFEMORAL JUNCTION COMPRESS: NORMAL
BH CV LOWER VASCULAR LEFT SAPHENOFEMORAL JUNCTION PHASIC: NORMAL
BH CV LOWER VASCULAR LEFT SAPHENOFEMORAL JUNCTION SPONT: NORMAL
BH CV LOWER VASCULAR RIGHT COMMON FEMORAL AUGMENT: NORMAL
BH CV LOWER VASCULAR RIGHT COMMON FEMORAL COMPETENT: NORMAL
BH CV LOWER VASCULAR RIGHT COMMON FEMORAL COMPRESS: NORMAL
BH CV LOWER VASCULAR RIGHT COMMON FEMORAL PHASIC: NORMAL
BH CV LOWER VASCULAR RIGHT COMMON FEMORAL SPONT: NORMAL

## 2019-11-15 PROCEDURE — 93971 EXTREMITY STUDY: CPT

## 2019-11-19 ENCOUNTER — READMISSION MANAGEMENT (OUTPATIENT)
Dept: CALL CENTER | Facility: HOSPITAL | Age: 24
End: 2019-11-19

## 2019-11-19 NOTE — OUTREACH NOTE
Medical Week 2 Survey      Responses   Facility patient discharged from?  [facility]   Does the patient have one of the following disease processes/diagnoses(primary or secondary)?  Other   Week 2 attempt successful?  No   Revoke  Decline to participate [No answer/No voicemail]          Shweta Granda LPN

## 2019-11-27 ENCOUNTER — HOSPITAL ENCOUNTER (OUTPATIENT)
Dept: INTERVENTIONAL RADIOLOGY/VASCULAR | Facility: HOSPITAL | Age: 24
Discharge: HOME | End: 2019-11-27
Admitting: INTERNAL MEDICINE

## 2019-11-27 DIAGNOSIS — N17.9 AKI (ACUTE KIDNEY INJURY) (HCC): ICD-10-CM

## 2019-11-27 NOTE — DISCHARGE INSTRUCTIONS
You will have a dressing on your left upper chest when you leave, keep it intact and dry for the next 2 days. Keep your activity level light for the rest of the day today. If you develop any pain, bleeding, or shortness of breath that concerns you, come to the ER for evaluation. Follow up with Dr. Lozano.

## 2019-11-27 NOTE — NURSING NOTE
Catheter out by md and manual pressure held to site. Hemostasis achieved. Sterile technique observed. Sterile dressing; 4x4, betadine, tegaderm. Discharge instruction given and pt d/c to home.

## 2019-11-27 NOTE — NURSING NOTE
Pt in  bay for permanent hemodialysis catheter removal. Catheter on left chest sutures removed and area prepped and draped in sterile fashion. Pt is awake and talking, denies any needs at this time.

## 2019-11-29 LAB — FUNGUS WND CULT: NORMAL

## 2019-12-13 LAB
MYCOBACTERIUM SPEC CULT: NORMAL
NIGHT BLUE STAIN TISS: NORMAL

## 2020-01-11 ENCOUNTER — HOSPITAL ENCOUNTER (OUTPATIENT)
Facility: HOSPITAL | Age: 25
Setting detail: OBSERVATION
Discharge: HOME | End: 2020-01-12
Attending: INTERNAL MEDICINE | Admitting: INTERNAL MEDICINE

## 2020-01-11 ENCOUNTER — APPOINTMENT (OUTPATIENT)
Dept: GENERAL RADIOLOGY | Facility: HOSPITAL | Age: 25
End: 2020-01-11

## 2020-01-11 DIAGNOSIS — F10.10 ETOH ABUSE: Primary | ICD-10-CM

## 2020-01-11 DIAGNOSIS — R11.10 VOMITING, INTRACTABILITY OF VOMITING NOT SPECIFIED, PRESENCE OF NAUSEA NOT SPECIFIED, UNSPECIFIED VOMITING TYPE: ICD-10-CM

## 2020-01-11 DIAGNOSIS — F10.920 ACUTE ALCOHOLIC INTOXICATION WITHOUT COMPLICATION (HCC): ICD-10-CM

## 2020-01-11 DIAGNOSIS — D72.829 LEUKOCYTOSIS, UNSPECIFIED TYPE: ICD-10-CM

## 2020-01-11 DIAGNOSIS — R46.89 COMBATIVE BEHAVIOR: ICD-10-CM

## 2020-01-11 PROBLEM — F10.20 CHRONIC ALCOHOLISM (HCC): Status: ACTIVE | Noted: 2020-01-11

## 2020-01-11 LAB
ALBUMIN SERPL-MCNC: 5 G/DL (ref 3.5–5.2)
ALBUMIN/GLOB SERPL: 1.7 G/DL
ALP SERPL-CCNC: 70 U/L (ref 39–117)
ALT SERPL W P-5'-P-CCNC: 12 U/L (ref 1–41)
AMPHET+METHAMPHET UR QL: NEGATIVE
ANION GAP SERPL CALCULATED.3IONS-SCNC: 18 MMOL/L (ref 5–15)
APAP SERPL-MCNC: <5 MCG/ML (ref 10–30)
AST SERPL-CCNC: 20 U/L (ref 1–40)
BARBITURATES UR QL SCN: NEGATIVE
BENZODIAZ UR QL SCN: NEGATIVE
BILIRUB SERPL-MCNC: 0.3 MG/DL (ref 0.2–1.2)
BUN BLD-MCNC: 10 MG/DL (ref 6–20)
BUN/CREAT SERPL: 11.2 (ref 7–25)
CALCIUM SPEC-SCNC: 10.1 MG/DL (ref 8.6–10.5)
CANNABINOIDS SERPL QL: NEGATIVE
CHLORIDE SERPL-SCNC: 102 MMOL/L (ref 98–107)
CO2 SERPL-SCNC: 24 MMOL/L (ref 22–29)
COCAINE UR QL: NEGATIVE
CREAT BLD-MCNC: 0.89 MG/DL (ref 0.76–1.27)
DEPRECATED RDW RBC AUTO: 42.4 FL (ref 37–54)
EOSINOPHIL # BLD MANUAL: 0.99 10*3/MM3 (ref 0–0.4)
EOSINOPHIL NFR BLD MANUAL: 6 % (ref 0.3–6.2)
ERYTHROCYTE [DISTWIDTH] IN BLOOD BY AUTOMATED COUNT: 13.7 % (ref 12.3–15.4)
ETHANOL UR QL: 0.3 %
GFR SERPL CREATININE-BSD FRML MDRD: 103 ML/MIN/1.73
GLOBULIN UR ELPH-MCNC: 3 GM/DL
GLUCOSE BLD-MCNC: 109 MG/DL (ref 65–99)
HCT VFR BLD AUTO: 43.1 % (ref 37.5–51)
HGB BLD-MCNC: 15.3 G/DL (ref 13–17.7)
LYMPHOCYTES # BLD MANUAL: 5.61 10*3/MM3 (ref 0.7–3.1)
LYMPHOCYTES NFR BLD MANUAL: 34 % (ref 19.6–45.3)
LYMPHOCYTES NFR BLD MANUAL: 8 % (ref 5–12)
MAGNESIUM SERPL-MCNC: 2.1 MG/DL (ref 1.6–2.6)
MCH RBC QN AUTO: 31.1 PG (ref 26.6–33)
MCHC RBC AUTO-ENTMCNC: 35.5 G/DL (ref 31.5–35.7)
MCV RBC AUTO: 87.7 FL (ref 79–97)
METAMYELOCYTES NFR BLD MANUAL: 2 % (ref 0–0)
METHADONE UR QL SCN: NEGATIVE
MONOCYTES # BLD AUTO: 1.32 10*3/MM3 (ref 0.1–0.9)
NEUTROPHILS # BLD AUTO: 6.44 10*3/MM3 (ref 1.7–7)
NEUTROPHILS NFR BLD MANUAL: 39 % (ref 42.7–76)
OPIATES UR QL: NEGATIVE
OXYCODONE UR QL SCN: NEGATIVE
PLAT MORPH BLD: NORMAL
PLATELET # BLD AUTO: 365 10*3/MM3 (ref 140–450)
PMV BLD AUTO: 6.8 FL (ref 6–12)
POTASSIUM BLD-SCNC: 3.8 MMOL/L (ref 3.5–5.2)
PROT SERPL-MCNC: 8 G/DL (ref 6–8.5)
RBC # BLD AUTO: 4.91 10*6/MM3 (ref 4.14–5.8)
RBC MORPH BLD: NORMAL
SALICYLATES SERPL-MCNC: <0.3 MG/DL
SCAN SLIDE: NORMAL
SODIUM BLD-SCNC: 144 MMOL/L (ref 136–145)
VARIANT LYMPHS NFR BLD MANUAL: 11 % (ref 0–5)
WBC MORPH BLD: NORMAL
WBC NRBC COR # BLD: 16.5 10*3/MM3 (ref 3.4–10.8)

## 2020-01-11 PROCEDURE — 99285 EMERGENCY DEPT VISIT HI MDM: CPT

## 2020-01-11 PROCEDURE — 99220 PR INITIAL OBSERVATION CARE/DAY 70 MINUTES: CPT | Performed by: INTERNAL MEDICINE

## 2020-01-11 PROCEDURE — 99284 EMERGENCY DEPT VISIT MOD MDM: CPT

## 2020-01-11 PROCEDURE — 80307 DRUG TEST PRSMV CHEM ANLYZR: CPT | Performed by: NURSE PRACTITIONER

## 2020-01-11 PROCEDURE — 83735 ASSAY OF MAGNESIUM: CPT | Performed by: NURSE PRACTITIONER

## 2020-01-11 PROCEDURE — G0378 HOSPITAL OBSERVATION PER HR: HCPCS

## 2020-01-11 PROCEDURE — 96365 THER/PROPH/DIAG IV INF INIT: CPT

## 2020-01-11 PROCEDURE — 25010000002 DIPHENHYDRAMINE PER 50 MG: Performed by: NURSE PRACTITIONER

## 2020-01-11 PROCEDURE — 25010000002 HALOPERIDOL LACTATE PER 5 MG: Performed by: NURSE PRACTITIONER

## 2020-01-11 PROCEDURE — 96366 THER/PROPH/DIAG IV INF ADDON: CPT

## 2020-01-11 PROCEDURE — 25010000002 MAGNESIUM SULFATE PER 500 MG OF MAGNESIUM: Performed by: NURSE PRACTITIONER

## 2020-01-11 PROCEDURE — 71045 X-RAY EXAM CHEST 1 VIEW: CPT

## 2020-01-11 PROCEDURE — 25010000002 THIAMINE PER 100 MG: Performed by: NURSE PRACTITIONER

## 2020-01-11 PROCEDURE — 85007 BL SMEAR W/DIFF WBC COUNT: CPT | Performed by: NURSE PRACTITIONER

## 2020-01-11 PROCEDURE — 80053 COMPREHEN METABOLIC PANEL: CPT | Performed by: NURSE PRACTITIONER

## 2020-01-11 PROCEDURE — 96375 TX/PRO/DX INJ NEW DRUG ADDON: CPT

## 2020-01-11 PROCEDURE — 25010000002 ONDANSETRON PER 1 MG: Performed by: NURSE PRACTITIONER

## 2020-01-11 PROCEDURE — 85025 COMPLETE CBC W/AUTO DIFF WBC: CPT | Performed by: NURSE PRACTITIONER

## 2020-01-11 RX ORDER — NICOTINE 21 MG/24HR
1 PATCH, TRANSDERMAL 24 HOURS TRANSDERMAL EVERY 24 HOURS
Status: DISCONTINUED | OUTPATIENT
Start: 2020-01-12 | End: 2020-01-12 | Stop reason: HOSPADM

## 2020-01-11 RX ORDER — ACETAMINOPHEN 650 MG/1
650 SUPPOSITORY RECTAL EVERY 4 HOURS PRN
Status: DISCONTINUED | OUTPATIENT
Start: 2020-01-11 | End: 2020-01-12 | Stop reason: HOSPADM

## 2020-01-11 RX ORDER — ONDANSETRON 4 MG/1
4 TABLET, FILM COATED ORAL EVERY 6 HOURS PRN
Status: DISCONTINUED | OUTPATIENT
Start: 2020-01-11 | End: 2020-01-12 | Stop reason: HOSPADM

## 2020-01-11 RX ORDER — DIPHENHYDRAMINE HYDROCHLORIDE 50 MG/ML
25 INJECTION INTRAMUSCULAR; INTRAVENOUS ONCE
Status: COMPLETED | OUTPATIENT
Start: 2020-01-11 | End: 2020-01-11

## 2020-01-11 RX ORDER — SODIUM CHLORIDE 0.9 % (FLUSH) 0.9 %
10 SYRINGE (ML) INJECTION AS NEEDED
Status: DISCONTINUED | OUTPATIENT
Start: 2020-01-11 | End: 2020-01-12 | Stop reason: HOSPADM

## 2020-01-11 RX ORDER — LORAZEPAM 1 MG/1
1 TABLET ORAL EVERY 8 HOURS
Status: DISCONTINUED | OUTPATIENT
Start: 2020-01-12 | End: 2020-01-12 | Stop reason: HOSPADM

## 2020-01-11 RX ORDER — ONDANSETRON 2 MG/ML
4 INJECTION INTRAMUSCULAR; INTRAVENOUS ONCE
Status: COMPLETED | OUTPATIENT
Start: 2020-01-11 | End: 2020-01-11

## 2020-01-11 RX ORDER — AMLODIPINE BESYLATE 5 MG/1
10 TABLET ORAL
Status: DISCONTINUED | OUTPATIENT
Start: 2020-01-12 | End: 2020-01-12 | Stop reason: HOSPADM

## 2020-01-11 RX ORDER — ACETAMINOPHEN 160 MG/5ML
325 SOLUTION ORAL EVERY 4 HOURS PRN
Status: DISCONTINUED | OUTPATIENT
Start: 2020-01-11 | End: 2020-01-12 | Stop reason: HOSPADM

## 2020-01-11 RX ORDER — SODIUM CHLORIDE, SODIUM LACTATE, POTASSIUM CHLORIDE, CALCIUM CHLORIDE 600; 310; 30; 20 MG/100ML; MG/100ML; MG/100ML; MG/100ML
150 INJECTION, SOLUTION INTRAVENOUS CONTINUOUS
Status: DISCONTINUED | OUTPATIENT
Start: 2020-01-12 | End: 2020-01-12 | Stop reason: HOSPADM

## 2020-01-11 RX ORDER — THIAMINE HCL 100 MG
100 TABLET ORAL DAILY
Status: DISCONTINUED | OUTPATIENT
Start: 2020-01-12 | End: 2020-01-12 | Stop reason: HOSPADM

## 2020-01-11 RX ORDER — ACETAMINOPHEN 325 MG/1
325 TABLET ORAL EVERY 4 HOURS PRN
Status: DISCONTINUED | OUTPATIENT
Start: 2020-01-11 | End: 2020-01-12 | Stop reason: HOSPADM

## 2020-01-11 RX ORDER — CITALOPRAM 20 MG/1
20 TABLET ORAL DAILY
Status: DISCONTINUED | OUTPATIENT
Start: 2020-01-12 | End: 2020-01-12 | Stop reason: HOSPADM

## 2020-01-11 RX ORDER — LORAZEPAM 2 MG/ML
2 INJECTION INTRAMUSCULAR
Status: DISCONTINUED | OUTPATIENT
Start: 2020-01-11 | End: 2020-01-12

## 2020-01-11 RX ORDER — LORAZEPAM 1 MG/1
1 TABLET ORAL
Status: DISCONTINUED | OUTPATIENT
Start: 2020-01-11 | End: 2020-01-12 | Stop reason: HOSPADM

## 2020-01-11 RX ORDER — HEPARIN SODIUM 5000 [USP'U]/ML
5000 INJECTION, SOLUTION INTRAVENOUS; SUBCUTANEOUS EVERY 12 HOURS SCHEDULED
Status: DISCONTINUED | OUTPATIENT
Start: 2020-01-12 | End: 2020-01-12 | Stop reason: HOSPADM

## 2020-01-11 RX ORDER — HALOPERIDOL 5 MG/ML
2 INJECTION INTRAMUSCULAR ONCE
Status: COMPLETED | OUTPATIENT
Start: 2020-01-11 | End: 2020-01-11

## 2020-01-11 RX ORDER — LORAZEPAM 1 MG/1
2 TABLET ORAL
Status: DISCONTINUED | OUTPATIENT
Start: 2020-01-11 | End: 2020-01-12 | Stop reason: HOSPADM

## 2020-01-11 RX ORDER — CARVEDILOL 6.25 MG/1
6.25 TABLET ORAL 2 TIMES DAILY WITH MEALS
Status: DISCONTINUED | OUTPATIENT
Start: 2020-01-12 | End: 2020-01-12 | Stop reason: HOSPADM

## 2020-01-11 RX ORDER — SODIUM CHLORIDE 0.9 % (FLUSH) 0.9 %
10 SYRINGE (ML) INJECTION EVERY 12 HOURS SCHEDULED
Status: DISCONTINUED | OUTPATIENT
Start: 2020-01-12 | End: 2020-01-12 | Stop reason: HOSPADM

## 2020-01-11 RX ORDER — FOLIC ACID 1 MG/1
1 TABLET ORAL DAILY
Status: DISCONTINUED | OUTPATIENT
Start: 2020-01-12 | End: 2020-01-12 | Stop reason: HOSPADM

## 2020-01-11 RX ORDER — LORAZEPAM 1 MG/1
1 TABLET ORAL EVERY 6 HOURS
Status: DISCONTINUED | OUTPATIENT
Start: 2020-01-11 | End: 2020-01-12 | Stop reason: HOSPADM

## 2020-01-11 RX ORDER — ONDANSETRON 2 MG/ML
4 INJECTION INTRAMUSCULAR; INTRAVENOUS EVERY 6 HOURS PRN
Status: DISCONTINUED | OUTPATIENT
Start: 2020-01-11 | End: 2020-01-12 | Stop reason: HOSPADM

## 2020-01-11 RX ORDER — BISACODYL 10 MG
10 SUPPOSITORY, RECTAL RECTAL DAILY PRN
Status: DISCONTINUED | OUTPATIENT
Start: 2020-01-11 | End: 2020-01-12 | Stop reason: HOSPADM

## 2020-01-11 RX ORDER — CARVEDILOL 3.12 MG/1
6.25 TABLET ORAL 2 TIMES DAILY WITH MEALS
COMMUNITY

## 2020-01-11 RX ORDER — LORAZEPAM 2 MG/ML
1 INJECTION INTRAMUSCULAR
Status: DISCONTINUED | OUTPATIENT
Start: 2020-01-11 | End: 2020-01-12 | Stop reason: HOSPADM

## 2020-01-11 RX ADMIN — DIPHENHYDRAMINE HYDROCHLORIDE 25 MG: 50 INJECTION, SOLUTION INTRAMUSCULAR; INTRAVENOUS at 16:03

## 2020-01-11 RX ADMIN — HALOPERIDOL LACTATE 2 MG: 5 INJECTION INTRAMUSCULAR at 16:03

## 2020-01-11 RX ADMIN — FOLIC ACID 1000 ML/HR: 5 INJECTION, SOLUTION INTRAMUSCULAR; INTRAVENOUS; SUBCUTANEOUS at 16:08

## 2020-01-11 RX ADMIN — ONDANSETRON 4 MG: 2 INJECTION INTRAMUSCULAR; INTRAVENOUS at 15:40

## 2020-01-11 NOTE — H&P
"[facility] HOSPITALIST     Rene Delarosa MD    Patient Care Team:  Rene Delarosa MD as PCP - General (Sports Medicine)    CHIEF COMPLAINT:     Chief Complaint   Patient presents with   • Alcohol Intoxication       HISTORY OF PRESENT ILLNESS:       Context: patient is a 24 yom with s/o with vomiting. Patient is largely uncooperative and combative with staff, nonfocal. S/o at bedside reports etoh use, states he also has a hx of meth use, former IVDA. She reports no fever diarrhea cp or soa. States he was supposed to go to rehab a couple months ago but they gave away his bed since he was on dialysis. She states he was cleared from nephro last month and tunnel cath was pulled.     Duration: unknown     Timing:unknown     Severity: flacc 0   .  Associated symptoms:unknown         Past Medical History:   Diagnosis Date   • Anxiety    • Depression      No past surgical history on file.  Family History   Family history unknown: Yes     Social History     Tobacco Use   • Smoking status: Current Every Day Smoker   • Smokeless tobacco: Never Used   Substance Use Topics   • Alcohol use: Not on file   • Drug use: Yes     Types: Methamphetamines, Marijuana       (Not in a hospital admission)  Allergies:  Penicillins    There is no immunization history for the selected administration types on file for this patient.        REVIEW OF SYSTEMS:     Review of Systems   Unable to perform ROS: acuity of condition       Vital Signs  Temp:  [98.6 °F (37 °C)] 98.6 °F (37 °C)  Heart Rate:  [58-84] 84  Resp:  [19] 19  BP: (104-137)/(55-96) 114/73    Flowsheet Rows      First Filed Value   Admission Height  177.8 cm (70\") Documented at 01/11/2020 1536   Admission Weight  68 kg (150 lb) Documented at 01/11/2020 1536           Physical Exam:    Physical Exam   Constitutional: He is oriented to person, place, and time. He appears well-developed and well-nourished. No distress.   HENT:   Head: Normocephalic " and atraumatic.   Right Ear: External ear normal.   Left Ear: External ear normal.   Nose: Nose normal.   Mouth/Throat: Oropharynx is clear and moist. No oropharyngeal exudate.   Eyes: Pupils are equal, round, and reactive to light. Conjunctivae and EOM are normal. Right eye exhibits no discharge. Left eye exhibits no discharge. No scleral icterus.   Neck: Normal range of motion. No JVD present. No tracheal deviation present. No thyromegaly present.   Cardiovascular: Normal rate, regular rhythm and normal heart sounds. Exam reveals no gallop and no friction rub.   No murmur heard.  Pulmonary/Chest: Effort normal and breath sounds normal. No stridor. No respiratory distress. He has no wheezes. He has no rales. He exhibits no tenderness.   Abdominal: Soft. Bowel sounds are normal. He exhibits no distension and no mass. There is no tenderness. There is no rebound and no guarding. No hernia.   Musculoskeletal: Normal range of motion. He exhibits no edema, tenderness or deformity.   Lymphadenopathy:     He has no cervical adenopathy.   Neurological: He is alert and oriented to person, place, and time. No cranial nerve deficit. He exhibits normal muscle tone.   Patient arousable but obtunded hence limited exam   Skin: Skin is warm and dry. No rash noted. He is not diaphoretic. No erythema.   Psychiatric: He has a normal mood and affect. His behavior is normal.   Nursing note and vitals reviewed.    Emotional Behavior:   Appropriate  Debilities:  Age appropriate         Results from last 7 days   Lab Units 01/11/20  1536   WBC 10*3/mm3 16.50*   HEMOGLOBIN g/dL 15.3   HEMATOCRIT % 43.1   PLATELETS 10*3/mm3 365   MONOCYTES % % 8.0     Results from last 7 days   Lab Units 01/11/20  1536   SODIUM mmol/L 144   POTASSIUM mmol/L 3.8   CHLORIDE mmol/L 102   CO2 mmol/L 24.0   BUN mg/dL 10   CREATININE mg/dL 0.89   GLUCOSE mg/dL 109*   CALCIUM mg/dL 10.1     Results from last 7 days   Lab Units 01/11/20  1536   SODIUM mmol/L 144    POTASSIUM mmol/L 3.8   CHLORIDE mmol/L 102   CO2 mmol/L 24.0   BUN mg/dL 10   CREATININE mg/dL 0.89   CALCIUM mg/dL 10.1   BILIRUBIN mg/dL 0.3   ALK PHOS U/L 70   ALT (SGPT) U/L 12   AST (SGOT) U/L 20   GLUCOSE mg/dL 109*     Results from last 7 days   Lab Units 01/11/20  1536   MAGNESIUM mg/dL 2.1     Lab Results   Component Value Date    CALCIUM 10.1 01/11/2020    PHOS 7.4 (H) 10/28/2019     No results found for: HGBA1C  No results found for: CHOL, CHLPL, TRIG, HDL, LDL, LDLDIRECT  No results found for: LIPASE        Lab Results   Lab Value Date/Time    FINALDX  11/01/2019 0202     Pleural fluid, smears and cytospin preparation:    Benign mesothelial cells, histiocytes and acute and chronic inflammatory cells    Negative for malignant cells    JPR/sms          Microbiology Results (last 10 days)     ** No results found for the last 240 hours. **          ECG/EMG Results (most recent)     None          Results for orders placed during the hospital encounter of 11/15/19   Duplex venous lower extremity left CAR    Narrative · Normal left lower extremity venous duplex scan.  · Left lower extremity images labeled right.          Results for orders placed during the hospital encounter of 10/28/19   Adult Transthoracic Echo Complete W/ Cont if Necessary Per Protocol    Narrative · Estimated EF = 60%.  · Left ventricular systolic function is normal.     Indications  Respiratory failure      Technically satisfactory study.  Mitral valve is structurally normal.  Tricuspid valve is structurally normal.  Aortic valve is structurally normal.  Pulmonic valve could not be well visualized.  No evidence for mitral tricuspid or aortic regurgitation is seen by   Doppler study.  Left atrium is normal in size.  Right atrium is significantly enlarged  Left ventricle is normal in size and contractility with ejection fraction   of 60%.  Right ventricle is definitely enlarged  Atrial septum is intact.  Aorta is normal.  No pericardial  effusion or intracardiac thrombus is seen.    Impression  Significant right atrium right ventricle enlargement.  No evidence for mitral tricuspid or aortic regurgitation is seen by   Doppler study.  No pericardial effusion or intracardiac thrombus is present.  Left ventricle is normal in size and contractility with ejection fraction   of 60%.  [place] no pericardial effusion or intracardiac thrombus is seen.           Xr Chest 1 View    Result Date: 1/11/2020  No acute process.  Electronically Signed By-Mane Ralph On:1/11/2020 4:57 PM This report was finalized on [id number] by  Mane Ralph, .      Results Review:    I reviewed the patient's new clinical results.    Assessment/Plan     No notes have been filed under this hospital service.  Service: Hospitalist    Resolved Hospital Problems:  No notes have been filed under this hospital service.  Service: Hospitalist      Estimated Creatinine Clearance: 121 mL/min (by C-G formula based on SCr of 0.89 mg/dL).    Code Status and Medical Interventions:   Ordered at: 01/11/20 1800     Code Status:    CPR     Medical Interventions (Level of Support Prior to Arrest):    Full       I personally reviewed patient's x-ray films and my findings are: 0    I personally reviewed patient's EKG strip and my findings are: 0    Plan    Care coordination with nursing and emergency room for current care.  No family at bedside discussed with the girlfriend.  01/11/20 6:03 PM    Disposition        Destination      Coordination has not been started for this encounter.      Durable Medical Equipment      Coordination has not been started for this encounter.      Dialysis/Infusion      Coordination has not been started for this encounter.      Home Medical Care      Coordination has not been started for this encounter.      Therapy      Coordination has not been started for this encounter.      Community Resources      Coordination has not been started for this encounter.          Chacho  MD Princess  01/11/20  6:03 PM

## 2020-01-11 NOTE — ED PROVIDER NOTES
Subjective   Chief complaint: vomiting      Context: patient is a 24 yom with s/o with vomiting. Patient is largely uncooperative and combative with staff, nonfocal. S/o at bedside reports etoh use, states he also has a hx of meth use, former IVDA. She reports no fever diarrhea cp or soa. States he was supposed to go to rehab a couple months ago but they gave away his bed since he was on dialysis. She states he was cleared from nephro last month and tunnel cath was pulled.    Duration: unknown    Timing:unknown    Severity: flacc 0    Associated symptoms:unknown          PCP: lizbet            Review of Systems   Unable to perform ROS: Acuity of condition       Past Medical History:   Diagnosis Date   • Anxiety    • Depression        Allergies   Allergen Reactions   • Penicillins Anxiety       No past surgical history on file.    Family History   Family history unknown: Yes       Social History     Socioeconomic History   • Marital status: Single     Spouse name: Not on file   • Number of children: Not on file   • Years of education: Not on file   • Highest education level: Not on file   Tobacco Use   • Smoking status: Current Every Day Smoker   • Smokeless tobacco: Never Used   Substance and Sexual Activity   • Drug use: Yes     Types: Methamphetamines, Marijuana           Objective   Physical Exam     Vital signs and triage nurse note reviewed.   Constitutional: appears intoxicated, smells of etoh. Nonfocal. Combative and agitated  HEENT: Normocephalic, atraumatic; pupils are PERRL with intact EOM; oropharynx is pink and moist without exudate or erythema.   Neck: Supple, full range of motion without pain;    Cardiovascular: Regular rate and rhythm, normal S1-S2.   Pulmonary: Respiratory effort regular nonlabored, breath sounds rhonchi RUL   Abdomen: Soft, nontender nondistended with normoactive bowel sounds; no rebound or guarding.   Musculoskeletal: 5 point restraints   Neuro:  Speech is clear   Skin:   Fleshtone warm, dry, intact;       Procedures           ED Course      Labs Reviewed   ACETAMINOPHEN LEVEL - Abnormal; Notable for the following components:       Result Value    Acetaminophen <5.0 (*)     All other components within normal limits    Narrative:     Acetaminophen Therapeutic Range  5-20 ug/mL      Hours after ingestion            Toxic Value    4 Hours                           150 ug/mL    8 Hours                            70 ug/mL   12 Hours                            40 ug/mL   16 Hours                            20 ug/mL    These values apply to a single ingestion only.    COMPREHENSIVE METABOLIC PANEL - Abnormal; Notable for the following components:    Glucose 109 (*)     Anion Gap 18.0 (*)     All other components within normal limits    Narrative:     GFR Normal >60  Chronic Kidney Disease <60  Kidney Failure <15     CBC WITH AUTO DIFFERENTIAL - Abnormal; Notable for the following components:    WBC 16.50 (*)     All other components within normal limits   MANUAL DIFFERENTIAL - Abnormal; Notable for the following components:    Neutrophil % 39.0 (*)     Metamyelocyte % 2.0 (*)     Atypical Lymphocyte % 11.0 (*)     Lymphocytes Absolute 5.61 (*)     Monocytes Absolute 1.32 (*)     Eosinophils Absolute 0.99 (*)     All other components within normal limits   SALICYLATE LEVEL - Normal   URINE DRUG SCREEN - Normal    Narrative:     Negative Thresholds For Drugs Screened:     Amphetamines               500 ng/ml   Barbiturates               200 ng/ml   Benzodiazepines            100 ng/ml   Cocaine                    300 ng/ml   Methadone                  300 ng/ml   Opiates                    300 ng/ml   Oxycodone                  100 ng/ml   THC                        50 ng/ml    The Normal Value for all drugs tested is negative. This report includes final unconfirmed screening results to be used for medical treatment purposes only. Unconfirmed results must not be used for non-medical purposes  such as employment or legal testing. Clinical consideration should be applied to any drug of abuse test, particulary when unconfirmed results are used.  All urine drugs of abuse requests without chain of custody are for medical screening purposes only.  False positives are possible.     MAGNESIUM - Normal   ETHANOL    Narrative:     Plasma Ethanol Clinical Symptoms:    ETOH (%)               Clinical Symptom  .01-.05              No apparent influence  .03-.12              Euphoria, Diminished judgment and attention   .09-.25              Impaired comprehension, Muscle incoordination  .18-.30              Confusion, Staggered gait, Slurred speech  .25-.40              Markedly decreased response to stimuli, unable to stand or                        walk, vomitting, sleep or stupor  .35-.50              Comatose, Anesthesia, Subnormal body temperature       SCAN SLIDE   CBC AND DIFFERENTIAL    Narrative:     The following orders were created for panel order CBC & Differential.  Procedure                               Abnormality         Status                     ---------                               -----------         ------                     CBC Auto Differential[[id number]]        Abnormal            Final result                 Please view results for these tests on the individual orders.     Medications   sodium chloride 0.9 % flush 10 mL (has no administration in time range)   ondansetron (ZOFRAN) injection 4 mg (has no administration in time range)   multiple vitamin (M.V.I. Adult) 10 mL, thiamine (B-1) 100 mg, folic acid 1 mg, magnesium sulfate 2 g in sodium chloride 0.9 % 1,000 mL infusion (1,000 mL/hr Intravenous New Bag 1/11/20 1608)   diphenhydrAMINE (BENADRYL) injection 25 mg (25 mg Intravenous Given 1/11/20 1603)   haloperidol lactate (HALDOL) injection 2 mg (2 mg Intravenous Given 1/11/20 1603)     Xr Chest 1 View    Result Date: 1/11/2020  No acute process.  Electronically Signed By-Mane Ralph  On:1/11/2020 4:57 PM This report was finalized on [id number] by  Mane Ralph, .                                             MDM  Number of Diagnoses or Management Options  Combative behavior:   ETOH abuse:   Leukocytosis, unspecified type:   Vomiting, intractability of vomiting not specified, presence of nausea not specified, unspecified vomiting type:   Diagnosis management comments: Chart review: 10/28-11/9/19: KENA secondary to rhabdo and ATN on hemodialysis, toxic metabolic encephalopathy, shock, aspiration pna, acute resp failure with intubation, hep c, polysubstance abuse (+ for meth and thc)    11/13/19: DVT LLE    Medical decision  Comorbidities:  has a past medical history of Anxiety and Depression.  Differentials: Intoxication aspiration pneumonia  Discussion with provider: Hospitalist  Radiology interpretation:  X-rays reviewed by me and interpreted by radiologist,   Xr Chest 1 View    Result Date: 1/11/2020  No acute process.  Electronically Signed By-Mane Ralph On:1/11/2020 4:57 PM This report was finalized on [id number] by  Mane Ralph, .    Lab interpretation:  Labs viewed by me significant for,etoh 0.30, wbc 16    Patient was extremely combative with staff and was placed in restraints, he was given Benadryl and Haldol.  On reexam the vomiting has subsided and patient has settled.    i discussed findings with significant other who voices understanding of admission        Final diagnoses:   ETOH abuse   Combative behavior   Leukocytosis, unspecified type   Vomiting, intractability of vomiting not specified, presence of nausea not specified, unspecified vomiting type            Fartun Neville, APRN  01/11/20 8412

## 2020-01-11 NOTE — ED NOTES
Pt has a history of kidney failure, was temporally on dialysis, finished 1 month ago.     Pt actively vomiting in room.      Rivka Walden, RN  01/11/20 0983

## 2020-01-12 VITALS
SYSTOLIC BLOOD PRESSURE: 116 MMHG | BODY MASS INDEX: 20.24 KG/M2 | TEMPERATURE: 98.8 F | RESPIRATION RATE: 19 BRPM | OXYGEN SATURATION: 99 % | WEIGHT: 136.69 LBS | DIASTOLIC BLOOD PRESSURE: 73 MMHG | HEIGHT: 69 IN | HEART RATE: 62 BPM

## 2020-01-12 LAB
ANION GAP SERPL CALCULATED.3IONS-SCNC: 13 MMOL/L (ref 5–15)
BASOPHILS # BLD AUTO: 0.1 10*3/MM3 (ref 0–0.2)
BASOPHILS NFR BLD AUTO: 0.6 % (ref 0–1.5)
BUN BLD-MCNC: 11 MG/DL (ref 6–20)
BUN/CREAT SERPL: 11.5 (ref 7–25)
CALCIUM SPEC-SCNC: 9.5 MG/DL (ref 8.6–10.5)
CHLORIDE SERPL-SCNC: 100 MMOL/L (ref 98–107)
CO2 SERPL-SCNC: 28 MMOL/L (ref 22–29)
CREAT BLD-MCNC: 0.96 MG/DL (ref 0.76–1.27)
DEPRECATED RDW RBC AUTO: 42 FL (ref 37–54)
EOSINOPHIL # BLD AUTO: 0.3 10*3/MM3 (ref 0–0.4)
EOSINOPHIL NFR BLD AUTO: 2.6 % (ref 0.3–6.2)
ERYTHROCYTE [DISTWIDTH] IN BLOOD BY AUTOMATED COUNT: 13.7 % (ref 12.3–15.4)
GFR SERPL CREATININE-BSD FRML MDRD: 95 ML/MIN/1.73
GLUCOSE BLD-MCNC: 84 MG/DL (ref 65–99)
HCT VFR BLD AUTO: 37 % (ref 37.5–51)
HGB BLD-MCNC: 13.6 G/DL (ref 13–17.7)
LYMPHOCYTES # BLD AUTO: 3.5 10*3/MM3 (ref 0.7–3.1)
LYMPHOCYTES NFR BLD AUTO: 36.1 % (ref 19.6–45.3)
MCH RBC QN AUTO: 32.3 PG (ref 26.6–33)
MCHC RBC AUTO-ENTMCNC: 36.9 G/DL (ref 31.5–35.7)
MCV RBC AUTO: 87.6 FL (ref 79–97)
MONOCYTES # BLD AUTO: 0.7 10*3/MM3 (ref 0.1–0.9)
MONOCYTES NFR BLD AUTO: 7 % (ref 5–12)
NEUTROPHILS # BLD AUTO: 5.3 10*3/MM3 (ref 1.7–7)
NEUTROPHILS NFR BLD AUTO: 53.7 % (ref 42.7–76)
NRBC BLD AUTO-RTO: 0 /100 WBC (ref 0–0.2)
PLATELET # BLD AUTO: 272 10*3/MM3 (ref 140–450)
PMV BLD AUTO: 6.5 FL (ref 6–12)
POTASSIUM BLD-SCNC: 3.6 MMOL/L (ref 3.5–5.2)
RBC # BLD AUTO: 4.22 10*6/MM3 (ref 4.14–5.8)
SODIUM BLD-SCNC: 141 MMOL/L (ref 136–145)
WBC NRBC COR # BLD: 9.8 10*3/MM3 (ref 3.4–10.8)

## 2020-01-12 PROCEDURE — 85025 COMPLETE CBC W/AUTO DIFF WBC: CPT | Performed by: INTERNAL MEDICINE

## 2020-01-12 PROCEDURE — 80048 BASIC METABOLIC PNL TOTAL CA: CPT | Performed by: INTERNAL MEDICINE

## 2020-01-12 PROCEDURE — 99217 PR OBSERVATION CARE DISCHARGE MANAGEMENT: CPT | Performed by: INTERNAL MEDICINE

## 2020-01-12 PROCEDURE — G0378 HOSPITAL OBSERVATION PER HR: HCPCS

## 2020-01-12 RX ORDER — LANOLIN ALCOHOL/MO/W.PET/CERES
100 CREAM (GRAM) TOPICAL DAILY
Qty: 360 TABLET | Refills: 0 | Status: SHIPPED | OUTPATIENT
Start: 2020-01-13 | End: 2021-01-12

## 2020-01-12 RX ORDER — CHLORDIAZEPOXIDE HYDROCHLORIDE 5 MG/1
10 CAPSULE, GELATIN COATED ORAL 3 TIMES DAILY
Qty: 20 CAPSULE | Refills: 0 | Status: SHIPPED | OUTPATIENT
Start: 2020-01-12 | End: 2023-01-03

## 2020-01-12 RX ORDER — FOLIC ACID 1 MG/1
1 TABLET ORAL DAILY
Qty: 360 TABLET | Refills: 0 | Status: SHIPPED | OUTPATIENT
Start: 2020-01-13 | End: 2021-01-12

## 2020-01-12 RX ADMIN — Medication 10 ML: at 09:46

## 2020-01-12 RX ADMIN — CARVEDILOL 6.25 MG: 6.25 TABLET, FILM COATED ORAL at 09:50

## 2020-01-12 RX ADMIN — AMLODIPINE BESYLATE 10 MG: 5 TABLET ORAL at 09:45

## 2020-01-12 RX ADMIN — Medication 10 ML: at 01:31

## 2020-01-12 RX ADMIN — Medication 100 MG: at 09:45

## 2020-01-12 RX ADMIN — CITALOPRAM HYDROBROMIDE 20 MG: 20 TABLET ORAL at 09:45

## 2020-01-12 RX ADMIN — FOLIC ACID 1 MG: 1 TABLET ORAL at 09:45

## 2020-01-12 NOTE — NURSING NOTE
Patient arrived on unit at 2030. Patient was not in restraints and was calm and resting peacefully. Will continue to monitor patient.

## 2020-01-12 NOTE — SIGNIFICANT NOTE
Kat Doll, patients significant other, aware patient discharging and said to discharge patient with her son who is at bedside.

## 2020-01-12 NOTE — PROGRESS NOTES
Discharge Planning Assessment   Ronald     Patient Name: Vicente Cunha  MRN: [id number]  Today's Date: 1/12/2020    Admit Date: 1/11/2020    Discharge Needs Assessment     Row Name 01/12/20 1120       Living Environment    Primary Care Provided by  self    Provides Primary Care For  no one, unable/limited ability to care for self    Family Caregiver if Needed  significant other    Quality of Family Relationships  helpful;involved;supportive    Able to Return to Prior Arrangements  yes       Resource/Environmental Concerns    Transportation Concerns  car, none       Transition Planning    Patient/Family Anticipates Transition to  home    Patient/Family Anticipated Services at Transition  other (see comments) drug rehabilitiation services    Transportation Anticipated  family or friend will provide       Discharge Needs Assessment    Readmission Within the Last 30 Days  no previous admission in last 30 days    Concerns to be Addressed  substance/tobacco abuse/use    Equipment Currently Used at Home  none    Anticipated Changes Related to Illness  none    Equipment Needed After Discharge  none    Discharge Coordination/Progress  DC plan: routine home. Resources provided for outpatient substance abuse treatment.        Discharge Plan     Row Name 01/12/20 1133       Plan    Plan  DC plan: routine home. Resources provided for outpatient substance abuse treatment.            Demographic Summary     Row Name 01/12/20 1118       General Information    Admission Type  observation    Arrived From  emergency department    Referral Source  physician    Reason for Consult  substance use concerns    Preferred Language  English       Contact Information    Contact Information Obtained for             Name,   Katharina LIU RN    Phone,   [phone]            Katharina Oconnell RN

## 2020-01-12 NOTE — PLAN OF CARE
Patient CIWA is 0, no complications noted, very pleasant and cooperative, plans to discharge to home today.

## 2020-01-12 NOTE — DISCHARGE SUMMARY
Date of Admission: 1/11/2020    Date of Discharge:  1/12/2020    Length of stay:  LOS: 0 days     Presenting Problem/History of Present Illness   Present on Admission:  • Chronic alcoholism (CMS/HCC)  • Hepatitis C  • Acute alcoholic intoxication without complication (CMS/HCC)        Hospital Course    Chief Complaint   Patient presents with   • Alcohol Intoxication       Context: patient is a 24 yom with s/o with vomiting. Patient is largely uncooperative and combative with staff, nonfocal. S/o at bedside reports etoh use, states he also has a hx of meth use, former IVDA. She reports no fever diarrhea cp or soa. States he was supposed to go to rehab a couple months ago but they gave away his bed since he was on dialysis. She states he was cleared from nephro last month and tunnel cath was pulled.     Duration: unknown     Timing:unknown     Severity: flacc 0   .  Associated symptoms:unknown         Review of Systems   Constitution: Negative.   HENT: Negative.    Eyes: Negative.    Cardiovascular: Negative.    Respiratory: Negative.    Endocrine: Negative.    Hematologic/Lymphatic: Negative.    Skin: Negative.    Musculoskeletal: Negative.    Gastrointestinal: Negative.    Genitourinary: Negative.    Neurological: Negative.    Psychiatric/Behavioral: Negative.    Allergic/Immunologic: Negative.    All other systems reviewed and are negative.      Past Medical History:     Past Medical History:   Diagnosis Date   • Anxiety    • Depression        Past Surgical History:   No past surgical history on file.    Social History:   Social History     Socioeconomic History   • Marital status: Single     Spouse name: Not on file   • Number of children: Not on file   • Years of education: Not on file   • Highest education level: Not on file   Tobacco Use   • Smoking status: Current Every Day Smoker     Packs/day: 1.00   • Smokeless tobacco: Never Used   Substance and Sexual Activity   • Alcohol use: Yes   • Drug use: Yes      Types: Methamphetamines, Marijuana   • Sexual activity: Defer       Vital Signs  Temp:  [97.5 °F (36.4 °C)-98.8 °F (37.1 °C)] 98.8 °F (37.1 °C)  Heart Rate:  [58-84] 62  Resp:  [14-19] 19  BP: ()/(49-96) 116/73    Physical Exam:  Physical Exam  Physical Exam   Constitutional: He is oriented to person, place, and time. He appears well-developed and well-nourished. No distress.   HENT:   Head: Normocephalic and atraumatic.   Right Ear: External ear normal.   Left Ear: External ear normal.   Nose: Nose normal.   Mouth/Throat: Oropharynx is clear and moist. No oropharyngeal exudate.   Eyes: Pupils are equal, round, and reactive to light. Conjunctivae and EOM are normal. Right eye exhibits no discharge. Left eye exhibits no discharge. No scleral icterus.   Neck: Normal range of motion. No JVD present. No tracheal deviation present. No thyromegaly present.   Cardiovascular: Normal rate, regular rhythm and normal heart sounds. Exam reveals no gallop and no friction rub.   No murmur heard.  Pulmonary/Chest: Effort normal and breath sounds normal. No stridor. No respiratory distress. He has no wheezes. He has no rales. He exhibits no tenderness.   Abdominal: Soft. Bowel sounds are normal. He exhibits no distension and no mass. There is no tenderness. There is no rebound and no guarding. No hernia.   Musculoskeletal: Normal range of motion. He exhibits no edema, tenderness or deformity.   Lymphadenopathy:     He has no cervical adenopathy.   Neurological: He is alert and oriented to person, place, and time. No cranial nerve deficit. He exhibits normal muscle tone.   Patient arousable but obtunded hence limited exam   Skin: Skin is warm and dry. No rash noted. He is not diaphoretic. No erythema.   Psychiatric: He has a normal mood and affect. His behavior is normal.   Nursing note and vitals reviewed.     Emotional Behavior:   Appropriate  Debilities:  Age appropriate .        Discharge Diagnosis:     Active Hospital  Problems    Diagnosis  POA   • **Acute alcoholic intoxication without complication (CMS/HCC) [F10.920]  Yes     Priority: High   • Chronic alcoholism (CMS/HCC) [F10.20]  Yes   • Hepatitis C [B19.20]  Yes      Resolved Hospital Problems   No resolved problems to display.       Estimated Creatinine Clearance: 102.3 mL/min (by C-G formula based on SCr of 0.96 mg/dL).    Discharge Disposition    Destination      Coordination has not been started for this encounter.      Durable Medical Equipment      Coordination has not been started for this encounter.      Dialysis/Infusion      Coordination has not been started for this encounter.      Home Medical Care      Coordination has not been started for this encounter.      Therapy      Coordination has not been started for this encounter.      Community Resources      Coordination has not been started for this encounter.              PT Recommendation and Plan          Home or Self Care           Discharge Medications      New Medications      Instructions Start Date   chlordiazePOXIDE 5 MG capsule  Commonly known as:  LIBRIUM   10 mg, Oral, 3 Times Daily      folic acid 1 MG tablet  Commonly known as:  FOLVITE   1 mg, Oral, Daily   Start Date:  January 13, 2020     thiamine 100 MG tablet  Commonly known as:  VITAMIN B1   100 mg, Oral, Daily   Start Date:  January 13, 2020        Continue These Medications      Instructions Start Date   amLODIPine 10 MG tablet  Commonly known as:  NORVASC   10 mg, Oral, Every 24 Hours Scheduled      carvedilol 3.125 MG tablet  Commonly known as:  COREG   6.25 mg, Oral, 2 Times Daily With Meals      citalopram 20 MG tablet  Commonly known as:  CeleXA   20 mg, Oral, Daily      hydrOXYzine 50 MG tablet  Commonly known as:  ATARAX   50 mg, Oral, Every 6 Hours PRN                 Consults:   Consults     Date and Time Order Name Status Description    1/11/2020 1705 Hospitalist (on-call MD unless specified) Completed           Procedures  Performed:         Pertinent Test Results:     I reviewed the patient's new clinical results.    Results from last 7 days   Lab Units 01/12/20  0816 01/11/20  1536   WBC 10*3/mm3 9.80 16.50*   HEMOGLOBIN g/dL 13.6 15.3   HEMATOCRIT % 37.0* 43.1   PLATELETS 10*3/mm3 272 365   MONOCYTES % %  --  8.0     Results from last 7 days   Lab Units 01/12/20  0510 01/11/20  1536   SODIUM mmol/L 141 144   POTASSIUM mmol/L 3.6 3.8   CHLORIDE mmol/L 100 102   CO2 mmol/L 28.0 24.0   BUN mg/dL 11 10   CREATININE mg/dL 0.96 0.89   CALCIUM mg/dL 9.5 10.1   BILIRUBIN mg/dL  --  0.3   ALK PHOS U/L  --  70   ALT (SGPT) U/L  --  12   AST (SGOT) U/L  --  20   GLUCOSE mg/dL 84 109*     Results from last 7 days   Lab Units 01/11/20  1536   MAGNESIUM mg/dL 2.1     Lab Results   Component Value Date    CALCIUM 9.5 01/12/2020    PHOS 7.4 (H) 10/28/2019     No results found for: HGBA1C  No results found for: CHOL, CHLPL, TRIG, HDL, LDL, LDLDIRECT  No results found for: LIPASE        Lab Results   Lab Value Date/Time    FINALDX  11/01/2019 0202     Pleural fluid, smears and cytospin preparation:    Benign mesothelial cells, histiocytes and acute and chronic inflammatory cells    Negative for malignant cells    [facility]          Microbiology Results (last 10 days)     ** No results found for the last 240 hours. **          ECG/EMG Results (most recent)     None          Results for orders placed during the hospital encounter of 11/15/19   Duplex venous lower extremity left CAR    Narrative · Normal left lower extremity venous duplex scan.  · Left lower extremity images labeled right.          Results for orders placed during the hospital encounter of 10/28/19   Adult Transthoracic Echo Complete W/ Cont if Necessary Per Protocol    Narrative · Estimated EF = 60%.  · Left ventricular systolic function is normal.     Indications  Respiratory failure      Technically satisfactory study.  Mitral valve is structurally normal.  Tricuspid valve is  structurally normal.  Aortic valve is structurally normal.  Pulmonic valve could not be well visualized.  No evidence for mitral tricuspid or aortic regurgitation is seen by   Doppler study.  Left atrium is normal in size.  Right atrium is significantly enlarged  Left ventricle is normal in size and contractility with ejection fraction   of 60%.  Right ventricle is definitely enlarged  Atrial septum is intact.  Aorta is normal.  No pericardial effusion or intracardiac thrombus is seen.    Impression  Significant right atrium right ventricle enlargement.  No evidence for mitral tricuspid or aortic regurgitation is seen by   Doppler study.  No pericardial effusion or intracardiac thrombus is present.  Left ventricle is normal in size and contractility with ejection fraction   of 60%.  [place] no pericardial effusion or intracardiac thrombus is seen.           Xr Chest 1 View    Result Date: 1/11/2020  No acute process.  Electronically Signed By-Mane Ralhp On:1/11/2020 4:57 PM This report was finalized on [id number] by  Mane Ralph, .      Xrays, labs reviewed personally by physician.    Condition on Discharge:    Stable    Discharge Diet:   Dietary Orders (From admission, onward)     Start     Ordered    01/12/20 0820  Diet Regular  Diet Effective Now     Question:  Diet / Texture / Consistency  Answer:  Regular    01/12/20 0820                Activity at Discharge:   Activity Instructions     Activity as Tolerated            Follow-up Appointments  No future appointments.  Additional Instructions for the Follow-ups that You Need to Schedule     Discharge Follow-up with PCP   As directed       Currently Documented PCP:    Rene Delarosa MD    PCP Phone Number:    [phone]     Follow Up Details:  If no PCP, call MD finder at [phone]         Discharge Follow-up with Specified Provider: Psychiatric/behavioral health; 1 Week   As directed      To:  Psychiatric/behavioral health    Follow Up:  1 Week                Test Results Pending at Discharge       Risk for Readmission (LACE) Score: 5 (1/12/2020  6:00 AM)          Chacho Sánchez MD  01/12/20  12:26 PM

## 2020-01-13 NOTE — PROGRESS NOTES
Case Management Discharge Note      Final Note: home                Final Discharge Disposition Code: 01 - home or self-care

## 2022-06-24 ENCOUNTER — HOSPITAL ENCOUNTER (EMERGENCY)
Facility: HOSPITAL | Age: 27
Discharge: HOME | End: 2022-06-25
Attending: EMERGENCY MEDICINE | Admitting: EMERGENCY MEDICINE

## 2022-06-24 VITALS
RESPIRATION RATE: 17 BRPM | SYSTOLIC BLOOD PRESSURE: 138 MMHG | BODY MASS INDEX: 27.53 KG/M2 | WEIGHT: 181.66 LBS | HEART RATE: 84 BPM | HEIGHT: 68 IN | OXYGEN SATURATION: 97 % | DIASTOLIC BLOOD PRESSURE: 89 MMHG | TEMPERATURE: 98.1 F

## 2022-06-24 DIAGNOSIS — T23.271A PARTIAL THICKNESS BURN OF RIGHT WRIST, INITIAL ENCOUNTER: ICD-10-CM

## 2022-06-24 DIAGNOSIS — T23.041A BURN OF MULTIPLE DIGITS OF RIGHT HAND INCLUDING BURN OF THUMB, UNSPECIFIED BURN DEGREE, INITIAL ENCOUNTER: Primary | ICD-10-CM

## 2022-06-24 PROCEDURE — 99283 EMERGENCY DEPT VISIT LOW MDM: CPT

## 2022-06-24 RX ORDER — IBUPROFEN 800 MG/1
800 TABLET ORAL EVERY 8 HOURS PRN
Qty: 15 TABLET | Refills: 0 | Status: SHIPPED | OUTPATIENT
Start: 2022-06-24 | End: 2023-01-03

## 2022-06-24 RX ORDER — IBUPROFEN 400 MG/1
800 TABLET ORAL ONCE
Status: COMPLETED | OUTPATIENT
Start: 2022-06-24 | End: 2022-06-24

## 2022-06-24 RX ADMIN — SILVER SULFADIAZINE 1 APPLICATION: 10 CREAM TOPICAL at 23:48

## 2022-06-24 RX ADMIN — IBUPROFEN 800 MG: 400 TABLET, FILM COATED ORAL at 23:59

## 2022-06-25 NOTE — ED PROVIDER NOTES
Subjective   History of Present Illness  27-year-old male presents after was cleaning grill at work tonight when break he was using to clean it slipped and splashed hot grease or liquid onto his hand burning hand and wrist.  He he has no complaints of injury other than to his hand.  Review of Systems  Right-hand-dominant  Past Medical History:   Diagnosis Date   • Anxiety    • Depression        Allergies   Allergen Reactions   • Enoxaparin GI Intolerance   • Penicillins Anxiety     Other reaction(s): hives       No past surgical history on file.    Family History   Family history unknown: Yes       Social History     Socioeconomic History   • Marital status: Single   Tobacco Use   • Smoking status: Current Every Day Smoker     Packs/day: 1.00   • Smokeless tobacco: Never Used   Substance and Sexual Activity   • Alcohol use: Yes   • Drug use: Yes     Types: Methamphetamines, Marijuana   • Sexual activity: Defer     Prior to Admission medications    Medication Sig Start Date End Date Taking? Authorizing Provider   amLODIPine (NORVASC) 10 MG tablet Take 1 tablet by mouth Daily. 11/10/19   Chirag Shane DO   carvedilol (COREG) 3.125 MG tablet Take 6.25 mg by mouth 2 (Two) Times a Day With Meals.    ProviderAshvin MD   chlordiazePOXIDE (LIBRIUM) 5 MG capsule Take 2 capsules by mouth 3 (Three) Times a Day. 1/12/20   Chacho Sánchez MD   citalopram (CeleXA) 20 MG tablet Take 20 mg by mouth Daily.    ProviderAshvin MD   hydrOXYzine (ATARAX) 50 MG tablet Take 50 mg by mouth Every 6 (Six) Hours As Needed for Itching, Allergies or Anxiety.    ProviderAshvin MD   Suboxone          Objective   Physical Exam  27-year-old male awake alert.  Examination of right hand and wrist reveals erythema to palm and volar aspect of fingers.  There is partial-thickness burn to volar aspect of wrist about 6 cm in size.  There is no devitalized skin.  He is neuro vasc intact distally.  There is no dorsal injury.  Procedures      "      ED Course        No radiology results for the last day  Medications   ibuprofen (ADVIL,MOTRIN) tablet 800 mg (has no administration in time range)   silver sulfadiazine (SILVADENE, SSD) 1 % cream 1 application (1 application Topical Given 6/24/22 3474)     /89 (BP Location: Left arm, Patient Position: Sitting)   Pulse 84   Temp 98.1 °F (36.7 °C) (Oral)   Resp 17   Ht 172.7 cm (68\")   Wt 82.4 kg (181 lb 10.5 oz)   SpO2 97%   BMI 27.62 kg/m²                                          [facility]  Patient's tetanus immunization updated.  Burn was dressed with Silvadene sterile dressing.  He was given ibuprofen for pain.  Findings were discussed with him he was discharged.  Advised to do dressing change tomorrow.  He does have some Silvadene he can use but any antibiotic ointment Polysporin or bacitracin would be fine.  He was given ibuprofen for pain.  Final diagnoses:   Burn of multiple digits of right hand including burn of thumb, unspecified burn degree, initial encounter   Partial thickness burn of right wrist, initial encounter       ED Disposition  ED Disposition     ED Disposition   Discharge    Condition   Stable    Comment   --             Rene Delarosa MD  [address] [place] [address] [address] [place] [id number] [phone]    Schedule an appointment as soon as possible for a visit   monday         Medication List      New Prescriptions    ibuprofen 800 MG tablet  Commonly known as: ADVIL,MOTRIN  Take 1 tablet by mouth Every 8 (Eight) Hours As Needed for Mild Pain  or Moderate Pain .           Where to Get Your Medications      Information about where to get these medications is not yet available    Ask your nurse or doctor about these medications  · ibuprofen 800 MG tablet          Derick Elena MD  06/24/22 5591    "

## 2022-06-25 NOTE — ED NOTES
Pt A&Ox 4 with lungs CTA bases. Pt ambulatory at this time.   Discharge instruction given to pt with verbal understanding received.   Pt discharged with education, RX medication, follow-up care, and personal belongings.

## 2022-06-25 NOTE — ED NOTES
Pt reports cleaning grill at work with a grill brick. Pt reports grill brick moved toward right hand and wrist and splattered upon the right hand and wrist.

## 2023-01-02 ENCOUNTER — APPOINTMENT (OUTPATIENT)
Dept: GENERAL RADIOLOGY | Facility: HOSPITAL | Age: 28
End: 2023-01-02
Payer: MEDICAID

## 2023-01-02 ENCOUNTER — HOSPITAL ENCOUNTER (OUTPATIENT)
Facility: HOSPITAL | Age: 28
Setting detail: OBSERVATION
Discharge: HOME | End: 2023-01-03
Attending: EMERGENCY MEDICINE | Admitting: INTERNAL MEDICINE
Payer: MEDICAID

## 2023-01-02 DIAGNOSIS — R09.02 HYPOXIA: ICD-10-CM

## 2023-01-02 DIAGNOSIS — R11.2 NAUSEA AND VOMITING, UNSPECIFIED VOMITING TYPE: ICD-10-CM

## 2023-01-02 DIAGNOSIS — J10.1 INFLUENZA A: Primary | ICD-10-CM

## 2023-01-02 LAB
ALBUMIN SERPL-MCNC: 5 G/DL (ref 3.5–5.2)
ALBUMIN/GLOB SERPL: 1.7 G/DL
ALP SERPL-CCNC: 93 U/L (ref 39–117)
ALT SERPL W P-5'-P-CCNC: 37 U/L (ref 1–41)
AMPHET+METHAMPHET UR QL: NEGATIVE
ANION GAP SERPL CALCULATED.3IONS-SCNC: 12 MMOL/L (ref 5–15)
APAP SERPL-MCNC: <5 MCG/ML (ref 0–30)
AST SERPL-CCNC: 36 U/L (ref 1–40)
B PARAPERT DNA SPEC QL NAA+PROBE: NOT DETECTED
B PERT DNA SPEC QL NAA+PROBE: NOT DETECTED
BACTERIA UR QL AUTO: ABNORMAL /HPF
BARBITURATES UR QL SCN: NEGATIVE
BASOPHILS # BLD AUTO: 0.1 10*3/MM3 (ref 0–0.2)
BASOPHILS NFR BLD AUTO: 0.5 % (ref 0–1.5)
BENZODIAZ UR QL SCN: NEGATIVE
BILIRUB SERPL-MCNC: 0.4 MG/DL (ref 0–1.2)
BILIRUB UR QL STRIP: NEGATIVE
BUN SERPL-MCNC: 14 MG/DL (ref 6–20)
BUN/CREAT SERPL: 13 (ref 7–25)
C PNEUM DNA NPH QL NAA+NON-PROBE: NOT DETECTED
CALCIUM SPEC-SCNC: 9.9 MG/DL (ref 8.6–10.5)
CANNABINOIDS SERPL QL: NEGATIVE
CHLORIDE SERPL-SCNC: 97 MMOL/L (ref 98–107)
CLARITY UR: CLEAR
CO2 SERPL-SCNC: 26 MMOL/L (ref 22–29)
COCAINE UR QL: NEGATIVE
COLOR UR: YELLOW
CREAT SERPL-MCNC: 1.08 MG/DL (ref 0.76–1.27)
D-LACTATE SERPL-SCNC: 0.9 MMOL/L (ref 0.5–2)
DEPRECATED RDW RBC AUTO: 37.2 FL (ref 37–54)
EGFRCR SERPLBLD CKD-EPI 2021: 95.3 ML/MIN/1.73
EOSINOPHIL # BLD AUTO: 0.1 10*3/MM3 (ref 0–0.4)
EOSINOPHIL NFR BLD AUTO: 0.9 % (ref 0.3–6.2)
ERYTHROCYTE [DISTWIDTH] IN BLOOD BY AUTOMATED COUNT: 14.2 % (ref 12.3–15.4)
ETHANOL UR QL: <0.01 %
FLUAV H3 RNA NPH QL NAA+PROBE: DETECTED
FLUBV RNA ISLT QL NAA+PROBE: NOT DETECTED
GLOBULIN UR ELPH-MCNC: 3 GM/DL
GLUCOSE SERPL-MCNC: 121 MG/DL (ref 65–99)
GLUCOSE UR STRIP-MCNC: NEGATIVE MG/DL
HADV DNA SPEC NAA+PROBE: NOT DETECTED
HCOV 229E RNA SPEC QL NAA+PROBE: NOT DETECTED
HCOV HKU1 RNA SPEC QL NAA+PROBE: NOT DETECTED
HCOV NL63 RNA SPEC QL NAA+PROBE: NOT DETECTED
HCOV OC43 RNA SPEC QL NAA+PROBE: NOT DETECTED
HCT VFR BLD AUTO: 41 % (ref 37.5–51)
HGB BLD-MCNC: 13.3 G/DL (ref 13–17.7)
HGB UR QL STRIP.AUTO: NEGATIVE
HMPV RNA NPH QL NAA+NON-PROBE: NOT DETECTED
HPIV1 RNA ISLT QL NAA+PROBE: NOT DETECTED
HPIV2 RNA SPEC QL NAA+PROBE: NOT DETECTED
HPIV3 RNA NPH QL NAA+PROBE: NOT DETECTED
HPIV4 P GENE NPH QL NAA+PROBE: NOT DETECTED
HYALINE CASTS UR QL AUTO: ABNORMAL /LPF
KETONES UR QL STRIP: ABNORMAL
LEUKOCYTE ESTERASE UR QL STRIP.AUTO: NEGATIVE
LIPASE SERPL-CCNC: 16 U/L (ref 13–60)
LYMPHOCYTES # BLD AUTO: 0.8 10*3/MM3 (ref 0.7–3.1)
LYMPHOCYTES NFR BLD AUTO: 5.5 % (ref 19.6–45.3)
M PNEUMO IGG SER IA-ACNC: NOT DETECTED
MAGNESIUM SERPL-MCNC: 1.6 MG/DL (ref 1.6–2.6)
MCH RBC QN AUTO: 24.4 PG (ref 26.6–33)
MCHC RBC AUTO-ENTMCNC: 32.4 G/DL (ref 31.5–35.7)
MCV RBC AUTO: 75.4 FL (ref 79–97)
METHADONE UR QL SCN: POSITIVE
MONOCYTES # BLD AUTO: 0.8 10*3/MM3 (ref 0.1–0.9)
MONOCYTES NFR BLD AUTO: 5.5 % (ref 5–12)
NEUTROPHILS NFR BLD AUTO: 11.9 10*3/MM3 (ref 1.7–7)
NEUTROPHILS NFR BLD AUTO: 87.6 % (ref 42.7–76)
NITRITE UR QL STRIP: NEGATIVE
NRBC BLD AUTO-RTO: 0 /100 WBC (ref 0–0.2)
OPIATES UR QL: NEGATIVE
OXYCODONE UR QL SCN: NEGATIVE
PH UR STRIP.AUTO: 6 [PH] (ref 5–8)
PLATELET # BLD AUTO: 218 10*3/MM3 (ref 140–450)
PMV BLD AUTO: 6.8 FL (ref 6–12)
POTASSIUM SERPL-SCNC: 3.6 MMOL/L (ref 3.5–5.2)
PROT SERPL-MCNC: 8 G/DL (ref 6–8.5)
PROT UR QL STRIP: ABNORMAL
RBC # BLD AUTO: 5.44 10*6/MM3 (ref 4.14–5.8)
RBC # UR STRIP: ABNORMAL /HPF
REF LAB TEST METHOD: ABNORMAL
RHINOVIRUS RNA SPEC NAA+PROBE: NOT DETECTED
RSV RNA NPH QL NAA+NON-PROBE: NOT DETECTED
SALICYLATES SERPL-MCNC: <0.3 MG/DL
SARS-COV-2 RNA NPH QL NAA+NON-PROBE: NOT DETECTED
SODIUM SERPL-SCNC: 135 MMOL/L (ref 136–145)
SP GR UR STRIP: 1.02 (ref 1–1.03)
SQUAMOUS #/AREA URNS HPF: ABNORMAL /HPF
UROBILINOGEN UR QL STRIP: ABNORMAL
WBC # UR STRIP: ABNORMAL /HPF
WBC NRBC COR # BLD: 13.6 10*3/MM3 (ref 3.4–10.8)

## 2023-01-02 PROCEDURE — 83690 ASSAY OF LIPASE: CPT | Performed by: EMERGENCY MEDICINE

## 2023-01-02 PROCEDURE — 83735 ASSAY OF MAGNESIUM: CPT | Performed by: EMERGENCY MEDICINE

## 2023-01-02 PROCEDURE — 80307 DRUG TEST PRSMV CHEM ANLYZR: CPT | Performed by: EMERGENCY MEDICINE

## 2023-01-02 PROCEDURE — 80143 DRUG ASSAY ACETAMINOPHEN: CPT | Performed by: EMERGENCY MEDICINE

## 2023-01-02 PROCEDURE — 99284 EMERGENCY DEPT VISIT MOD MDM: CPT

## 2023-01-02 PROCEDURE — 71045 X-RAY EXAM CHEST 1 VIEW: CPT

## 2023-01-02 PROCEDURE — 80179 DRUG ASSAY SALICYLATE: CPT | Performed by: EMERGENCY MEDICINE

## 2023-01-02 PROCEDURE — 99285 EMERGENCY DEPT VISIT HI MDM: CPT

## 2023-01-02 PROCEDURE — 85025 COMPLETE CBC W/AUTO DIFF WBC: CPT | Performed by: EMERGENCY MEDICINE

## 2023-01-02 PROCEDURE — 25010000002 ONDANSETRON PER 1 MG: Performed by: EMERGENCY MEDICINE

## 2023-01-02 PROCEDURE — 82077 ASSAY SPEC XCP UR&BREATH IA: CPT | Performed by: EMERGENCY MEDICINE

## 2023-01-02 PROCEDURE — 0202U NFCT DS 22 TRGT SARS-COV-2: CPT | Performed by: EMERGENCY MEDICINE

## 2023-01-02 PROCEDURE — 81001 URINALYSIS AUTO W/SCOPE: CPT | Performed by: EMERGENCY MEDICINE

## 2023-01-02 PROCEDURE — 83605 ASSAY OF LACTIC ACID: CPT

## 2023-01-02 PROCEDURE — 96375 TX/PRO/DX INJ NEW DRUG ADDON: CPT

## 2023-01-02 PROCEDURE — 96374 THER/PROPH/DIAG INJ IV PUSH: CPT

## 2023-01-02 PROCEDURE — G0378 HOSPITAL OBSERVATION PER HR: HCPCS

## 2023-01-02 PROCEDURE — 80053 COMPREHEN METABOLIC PANEL: CPT | Performed by: EMERGENCY MEDICINE

## 2023-01-02 RX ORDER — ONDANSETRON 2 MG/ML
4 INJECTION INTRAMUSCULAR; INTRAVENOUS EVERY 6 HOURS PRN
Status: DISCONTINUED | OUTPATIENT
Start: 2023-01-02 | End: 2023-01-03 | Stop reason: HOSPADM

## 2023-01-02 RX ORDER — OSELTAMIVIR PHOSPHATE 75 MG/1
75 CAPSULE ORAL 2 TIMES DAILY
Qty: 10 CAPSULE | Refills: 0 | Status: SHIPPED | OUTPATIENT
Start: 2023-01-02

## 2023-01-02 RX ORDER — ACETAMINOPHEN 500 MG
1000 TABLET ORAL ONCE
Status: COMPLETED | OUTPATIENT
Start: 2023-01-02 | End: 2023-01-02

## 2023-01-02 RX ORDER — SODIUM CHLORIDE 0.9 % (FLUSH) 0.9 %
10 SYRINGE (ML) INJECTION AS NEEDED
Status: DISCONTINUED | OUTPATIENT
Start: 2023-01-02 | End: 2023-01-03 | Stop reason: HOSPADM

## 2023-01-02 RX ORDER — OSELTAMIVIR PHOSPHATE 75 MG/1
75 CAPSULE ORAL ONCE
Status: COMPLETED | OUTPATIENT
Start: 2023-01-02 | End: 2023-01-03

## 2023-01-02 RX ORDER — PANTOPRAZOLE SODIUM 40 MG/10ML
40 INJECTION, POWDER, LYOPHILIZED, FOR SOLUTION INTRAVENOUS ONCE
Status: COMPLETED | OUTPATIENT
Start: 2023-01-02 | End: 2023-01-02

## 2023-01-02 RX ORDER — ACETAMINOPHEN 325 MG/1
650 TABLET ORAL EVERY 6 HOURS PRN
Status: DISCONTINUED | OUTPATIENT
Start: 2023-01-02 | End: 2023-01-03 | Stop reason: HOSPADM

## 2023-01-02 RX ORDER — ALBUTEROL SULFATE 90 UG/1
2 AEROSOL, METERED RESPIRATORY (INHALATION) EVERY 6 HOURS PRN
Status: DISCONTINUED | OUTPATIENT
Start: 2023-01-02 | End: 2023-01-03 | Stop reason: HOSPADM

## 2023-01-02 RX ORDER — GUAIFENESIN 200 MG/10ML
200 LIQUID ORAL EVERY 4 HOURS PRN
Qty: 118 ML | Refills: 0 | Status: SHIPPED | OUTPATIENT
Start: 2023-01-02

## 2023-01-02 RX ORDER — ONDANSETRON 4 MG/1
4 TABLET, ORALLY DISINTEGRATING ORAL EVERY 6 HOURS PRN
Qty: 20 TABLET | Refills: 0 | Status: SHIPPED | OUTPATIENT
Start: 2023-01-02

## 2023-01-02 RX ORDER — ONDANSETRON 2 MG/ML
4 INJECTION INTRAMUSCULAR; INTRAVENOUS ONCE
Status: COMPLETED | OUTPATIENT
Start: 2023-01-02 | End: 2023-01-02

## 2023-01-02 RX ORDER — SODIUM CHLORIDE 9 MG/ML
100 INJECTION, SOLUTION INTRAVENOUS CONTINUOUS
Status: DISCONTINUED | OUTPATIENT
Start: 2023-01-03 | End: 2023-01-03 | Stop reason: HOSPADM

## 2023-01-02 RX ADMIN — ACETAMINOPHEN 1000 MG: 500 TABLET ORAL at 23:49

## 2023-01-02 RX ADMIN — SODIUM CHLORIDE 1000 ML: 9 INJECTION, SOLUTION INTRAVENOUS at 23:49

## 2023-01-02 RX ADMIN — ONDANSETRON 4 MG: 2 INJECTION INTRAMUSCULAR; INTRAVENOUS at 21:02

## 2023-01-02 RX ADMIN — SODIUM CHLORIDE 1000 ML: 9 INJECTION, SOLUTION INTRAVENOUS at 21:01

## 2023-01-02 RX ADMIN — PANTOPRAZOLE SODIUM 40 MG: 40 INJECTION, POWDER, FOR SOLUTION INTRAVENOUS at 21:01

## 2023-01-02 NOTE — Clinical Note
Level of Care: Med/Surg [1]   Diagnosis: Influenza A [809001]   Admitting Physician: DONYA CHARLES [[id number]]   Attending Physician: DONYA CHARLES [[id number]]   Bed Request Comments: cardiac monitor

## 2023-01-03 VITALS
HEIGHT: 66 IN | SYSTOLIC BLOOD PRESSURE: 115 MMHG | BODY MASS INDEX: 26.81 KG/M2 | HEART RATE: 108 BPM | RESPIRATION RATE: 12 BRPM | WEIGHT: 166.8 LBS | TEMPERATURE: 99.5 F | DIASTOLIC BLOOD PRESSURE: 82 MMHG | OXYGEN SATURATION: 95 %

## 2023-01-03 PROCEDURE — G0378 HOSPITAL OBSERVATION PER HR: HCPCS

## 2023-01-03 PROCEDURE — 96361 HYDRATE IV INFUSION ADD-ON: CPT

## 2023-01-03 RX ORDER — OMEPRAZOLE 40 MG/1
40 CAPSULE, DELAYED RELEASE ORAL DAILY
COMMUNITY

## 2023-01-03 RX ORDER — BUPRENORPHINE HYDROCHLORIDE AND NALOXONE HYDROCHLORIDE DIHYDRATE 8; 2 MG/1; MG/1
TABLET SUBLINGUAL EVERY MORNING
COMMUNITY

## 2023-01-03 RX ORDER — BUPROPION HYDROCHLORIDE 150 MG/1
300 TABLET ORAL DAILY
COMMUNITY

## 2023-01-03 RX ORDER — VORTIOXETINE 20 MG/1
20 TABLET, FILM COATED ORAL
COMMUNITY

## 2023-01-03 RX ORDER — QUETIAPINE FUMARATE 50 MG/1
50 TABLET, FILM COATED ORAL NIGHTLY
COMMUNITY

## 2023-01-03 RX ORDER — BUPRENORPHINE HYDROCHLORIDE AND NALOXONE HYDROCHLORIDE DIHYDRATE 2; .5 MG/1; MG/1
1 TABLET SUBLINGUAL DAILY
COMMUNITY
End: 2023-01-03

## 2023-01-03 RX ORDER — ALBUTEROL SULFATE 0.63 MG/3ML
1 SOLUTION RESPIRATORY (INHALATION) EVERY 6 HOURS PRN
Qty: 3 ML | Refills: 2 | Status: SHIPPED | OUTPATIENT
Start: 2023-01-03 | End: 2023-04-03

## 2023-01-03 RX ORDER — BUPRENORPHINE HYDROCHLORIDE AND NALOXONE HYDROCHLORIDE DIHYDRATE 8; 2 MG/1; MG/1
1 TABLET SUBLINGUAL EVERY EVENING
COMMUNITY

## 2023-01-03 RX ORDER — ALBUTEROL SULFATE 2.5 MG/3ML
2.5 SOLUTION RESPIRATORY (INHALATION) EVERY 4 HOURS PRN
COMMUNITY

## 2023-01-03 RX ORDER — DOXEPIN HYDROCHLORIDE 10 MG/1
3 CAPSULE ORAL NIGHTLY
COMMUNITY

## 2023-01-03 RX ORDER — QUETIAPINE FUMARATE 300 MG/1
300 TABLET, FILM COATED ORAL DAILY
COMMUNITY

## 2023-01-03 RX ADMIN — OSELTAMIVIR PHOSPHATE 75 MG: 75 CAPSULE ORAL at 00:07

## 2023-01-03 RX ADMIN — ACETAMINOPHEN 650 MG: 325 TABLET, FILM COATED ORAL at 05:40

## 2023-01-03 RX ADMIN — SODIUM CHLORIDE 100 ML/HR: 9 INJECTION, SOLUTION INTRAVENOUS at 00:11

## 2023-01-03 NOTE — DISCHARGE INSTRUCTIONS
Patient was advised to follow-up with his primary care physician who will review his current medications.    Patient was advised to follow-up with his pulmonologist who will reassess his pulmonary function.    Patient was advised to return to the emergency department if he experiences any recurrence of his symptoms.

## 2023-01-03 NOTE — ED NOTES
Once reassessed for discharge, Pt found to be diaphoretic, pale, sleepy. Pt's temp rechecked. Temp 103.1 oral. Vitals rechecked. MD notified. Provider arrived at bedside immediately and discussed further care with pt.

## 2023-01-03 NOTE — H&P
[facility] Medicine Services      Patient Name: Vicente Cunha  : 1995  MRN: [id number]  Primary Care Physician:  Rene Delarosa MD  Date of admission: 2023      Subjective    From previous notes and with minor updates.    Chief Complaint: Fever and vomiting.    History of Present Illness: Vicente Cunha is a 27 y.o. male who presented to [facility] on 2023 complaining of fever and vomiting.  Patient is a 27-year-old male with a past medical history of polysubstance abuse, chronic alcoholism, hepatitis C, anxiety disorder who presented to the emergency room because of fever, vomiting and shortness of breath.  Patient was seen in the emergency room and was diagnosed with influenza A virus infection.  Patient on arrival to the ED room did appear quite distressed.  He was vomiting.  He is tachycardic and febrile.  On reevaluation he is significantly improved.  He feels much better.  He never did have abdominal pain and is nontender on exam.  So I do not think he has appendicitis at this point.  I did not order CT scan because he had no signs of pain.  And again now that he is improved with antinausea medicine and fluids I think that it is less likely to be appendicitis or any emergent intra-abdominal pathology.  His flu test is positive.  He has had a cough and fever and likely his symptoms are secondary to influenza A.  His lactic is normal.  He states he has not been using IV drugs.  Originally was going to discharge, however on reevaluation he is oxygenating at 89% on room air.  Now placed him on 2 L he is still tachycardic and his temperature is 103.  Tylenol has been ordered and at this point will place in the hospital for his hypoxia secondary to influenza    Patient reported no headache or visual obscurations, no chest pain or abdominal pain, no hot or cold intolerance, no constipation or diarrhea and no leg swelling or leg pain.      Review of Systems    Constitutional: Positive for fever.   HENT: Negative.    Eyes: Negative.    Cardiovascular: Negative.    Respiratory: Positive for shortness of breath.    Endocrine: Negative.    Hematologic/Lymphatic: Negative.    Skin: Negative.    Musculoskeletal: Negative.    Gastrointestinal: Positive for vomiting.   Genitourinary: Negative.    Neurological: Negative.    Psychiatric/Behavioral: Negative.    Allergic/Immunologic: Negative.         Personal History     Past Medical History:   Diagnosis Date   • Anxiety    • Depression    Hepatitis C.  Polysubstance abuse.  Chronic alcoholism.      Past medical history: None.    Family History: Family history is unknown by patient.       Social History:  reports that he has been smoking. He has been smoking an average of 1 pack per day. He has never used smokeless tobacco. He reports current alcohol use. He reports current drug use. Drugs: Methamphetamines and Marijuana.    Home Medications:  Prior to Admission Medications     Prescriptions Last Dose Informant Patient Reported? Taking?    albuterol (PROVENTIL) (2.5 MG/3ML) 0.083% nebulizer solution   Yes Yes    Take 2.5 mg by nebulization Every 4 (Four) Hours As Needed for Wheezing.    buprenorphine-naloxone (SUBOXONE) 8-2 MG per SL tablet   Yes Yes    Place  under the tongue Every Morning.    buprenorphine-naloxone (SUBOXONE) 8-2 MG per SL tablet   Yes Yes    Place 1 tablet under the tongue Every Evening.    buPROPion XL (WELLBUTRIN XL) 150 MG 24 hr tablet   Yes Yes    Take 300 mg by mouth Daily.    carvedilol (COREG) 3.125 MG tablet   Yes Yes    Take 6.25 mg by mouth 2 (Two) Times a Day With Meals.    doxepin (SINEquan) 10 MG capsule   Yes Yes    Take 3 mg by mouth Every Night.    hydrOXYzine (ATARAX) 50 MG tablet   Yes Yes    Take 50 mg by mouth Every 6 (Six) Hours As Needed for Itching, Allergies or Anxiety.    omeprazole (priLOSEC) 40 MG capsule   Yes Yes    Take 40 mg by mouth Daily.    QUEtiapine (SEROquel) 300 MG tablet    Yes Yes    Take 300 mg by mouth Daily.    QUEtiapine (SEROquel) 50 MG tablet   Yes Yes    Take 50 mg by mouth Every Night.    Vortioxetine HBr (Trintellix) 20 MG tablet   Yes Yes    Take 20 mg by mouth Daily With Breakfast.            Allergies:  Allergies   Allergen Reactions   • Enoxaparin GI Intolerance   • Penicillins Anxiety     Other reaction(s): hives       Objective      Vitals:   Temp:  [99.5 °F (37.5 °C)-103.1 °F (39.5 °C)] 99.5 °F (37.5 °C)  Heart Rate:  [] 108  Resp:  [12-24] 12  BP: (104-130)/(72-83) 115/82  Flow (L/min):  [2] 2    Physical Exam  Vitals reviewed.   Constitutional:       General: He is not in acute distress.  HENT:      Head: Normocephalic.      Nose: Nose normal.      Mouth/Throat:      Mouth: Mucous membranes are dry.      Pharynx: Oropharynx is clear.   Eyes:      Extraocular Movements: Extraocular movements intact.      Conjunctiva/sclera: Conjunctivae normal.      Pupils: Pupils are equal, round, and reactive to light.   Cardiovascular:      Pulses: Normal pulses.      Heart sounds: No murmur heard.    No friction rub. No gallop.      Comments: S1 and S2 present.  No tachycardia.  Pulmonary:      Effort: No respiratory distress.      Breath sounds: No stridor. No wheezing, rhonchi or rales.      Comments: Decreased air entry bilaterally.  Chest:      Chest wall: No tenderness.   Abdominal:      General: Bowel sounds are normal. There is no distension.      Palpations: Abdomen is soft. There is no mass.      Tenderness: There is no abdominal tenderness. There is no right CVA tenderness, left CVA tenderness, guarding or rebound.      Hernia: No hernia is present.   Musculoskeletal:         General: No swelling, tenderness, deformity or signs of injury.      Cervical back: Normal range of motion. No rigidity.      Right lower leg: No edema.      Left lower leg: No edema.   Skin:     General: Skin is warm and dry.      Capillary Refill: Capillary refill takes less than 2 seconds.       Coloration: Skin is not jaundiced.      Findings: No bruising, erythema, lesion or rash.   Neurological:      Mental Status: He is alert.      Comments: No facial asymmetry noted.  Gait and station not tested.   Psychiatric:      Comments: No agitation.            Result Review    Result Review:  I have personally reviewed the results from the time of this admission to 1/3/2023 16:56 EST and agree with these findings:  []  Laboratory  []  Microbiology  []  Radiology  []  EKG/Telemetry   []  Cardiology/Vascular   []  Pathology  []  Old records  []  Other:  Most notable findings include:       Assessment & Plan        Active Hospital Problems:  Active Hospital Problems    Diagnosis    • **Influenza A    • Chronic alcoholism (HCC)    • Hepatitis C    • Polysubstance abuse (HCC)    • Anxiety          Plan:      -Continue appropriate patient's home medications for other chronic medical conditions.  -Continue the present level of care.  -Patient and family agreed with the plan of care.  -Treat influenza A infection with Tamiflu.  -Treat hypoxia with oxygen therapy.      DVT prophylaxis:  Mechanical DVT prophylaxis orders are present.    CODE STATUS:    Code Status (Patient has no pulse and is not breathing): CPR (Attempt to Resuscitate)  Medical Interventions (Patient has pulse or is breathing): Full Support    Admission Status:  I believe this patient meets observation status.    I discussed the patient's findings and my recommendations with patient, family, nursing staff, primary care team and consulting provider.    This patient has been examined wearing appropriate Personal Protective Equipment and discussed with hospital infection control department, [facility] department, infectious disease specialist and pulmonologist. 01/03/23      Signature: Electronically signed by Adams Sheffield MD, FACP, 01/03/23, 5:02 PM EST.

## 2023-01-03 NOTE — PLAN OF CARE
Problem: Adult Inpatient Plan of Care  Goal: Plan of Care Review  1/3/2023 1300 by Diana Tamez RN  Outcome: Met  1/3/2023 1202 by Diana Tamez RN  Flowsheets (Taken 1/3/2023 1202)  Progress: no change  Goal: Patient-Specific Goal (Individualized)  Outcome: Met  Goal: Absence of Hospital-Acquired Illness or Injury  Outcome: Met  Intervention: Identify and Manage Fall Risk  Recent Flowsheet Documentation  Taken 1/3/2023 0730 by Diana Tamez RN  Safety Promotion/Fall Prevention:   activity supervised   clutter free environment maintained   assistive device/personal items within reach   safety round/check completed  Intervention: Prevent Skin Injury  Recent Flowsheet Documentation  Taken 1/3/2023 0730 by Diana Tamez RN  Body Position: position changed independently  Skin Protection: adhesive use limited  Intervention: Prevent and Manage VTE (Venous Thromboembolism) Risk  Recent Flowsheet Documentation  Taken 1/3/2023 0730 by Diana Tamez RN  Activity Management: activity adjusted per tolerance  Range of Motion: active ROM (range of motion) encouraged  Intervention: Prevent Infection  Recent Flowsheet Documentation  Taken 1/3/2023 0730 by Diana Tamez RN  Infection Prevention: environmental surveillance performed  Goal: Optimal Comfort and Wellbeing  Outcome: Met  Intervention: Provide Person-Centered Care  Recent Flowsheet Documentation  Taken 1/3/2023 0730 by Diana Tamez RN  Trust Relationship/Rapport:   care explained   choices provided   emotional support provided   empathic listening provided   questions answered   questions encouraged   reassurance provided   thoughts/feelings acknowledged  Goal: Readiness for Transition of Care  Outcome: Met  Intervention: Mutually Develop Transition Plan  Recent Flowsheet Documentation  Taken 1/3/2023 1207 by Diana Tamez RN  Transportation Anticipated: family or friend will provide  Patient/Family Anticipated Services at Transition:  none  Patient/Family Anticipates Transition to: home  Taken 1/3/2023 1206 by Diana Tamez, RN  Equipment Currently Used at Home: none     Problem: Breathing Pattern Ineffective  Goal: Effective Breathing Pattern  Outcome: Met  Intervention: Promote Improved Breathing Pattern  Recent Flowsheet Documentation  Taken 1/3/2023 0730 by Diana Tamez, RN  Head of Bed (HOB) Positioning:   HOB elevated   HOB at 30-45 degrees   Goal Outcome Evaluation:           Progress: no change

## 2023-01-03 NOTE — DISCHARGE SUMMARY
[facility] Medicine Services  DISCHARGE SUMMARY    Patient Name: Vicente Cunha  : 1995  MRN: [id number]    Date of Admission: 2023  Discharge Diagnosis: Influenza A virus infection.  Date of Discharge: 2023.  Primary Care Physician: Rene Delarosa MD      Presenting Problem:   Influenza A [J10.1]    Active and Resolved Hospital Problems:  Active Hospital Problems    Diagnosis POA   • **Influenza A [J10.1] Yes     Priority: High   • Chronic alcoholism (HCC) [F10.20] Yes   • Hepatitis C [B19.20] Yes   • Polysubstance abuse (HCC) [F19.10] Yes   • Anxiety [F41.9] Yes      Resolved Hospital Problems   No resolved problems to display.         Hospital Course   From previous notes and with minor updates.    Hospital Course:      Patient is a 27-year-old male with a past medical history of polysubstance abuse, chronic alcoholism, hepatitis C, anxiety disorder who presented to the emergency room because of fever, vomiting and shortness of breath.  Patient was seen in the emergency room and was diagnosed with influenza A virus infection.  Patient on arrival to the ED room did appear quite distressed.  He was vomiting.  He is tachycardic and febrile.  On reevaluation he is significantly improved.  He feels much better.  He never did have abdominal pain and is nontender on exam.  So I do not think he has appendicitis at this point.  I did not order CT scan because he had no signs of pain.  And again now that he is improved with antinausea medicine and fluids I think that it is less likely to be appendicitis or any emergent intra-abdominal pathology.  His flu test is positive.  He has had a cough and fever and likely his symptoms are secondary to influenza A.  His lactic is normal.  He states he has not been using IV drugs.  Originally was going to discharge, however on reevaluation he is oxygenating at 89% on room air.  Now placed him on 2 L he is still tachycardic and  his temperature is 103.  Tylenol has been ordered and at this point will place in the hospital for his hypoxia secondary to influenza  Influenza A virus was a treated with Tamiflu.  Hypoxia was treated with oxygen therapy and nebs.  Patient reported significant improvement in his symptoms after over 24 hours in the hospital and requested to be discharged home.  Patient's family also reported the patient was close to his baseline.  Patient was noted to be on room air.  Patient was advised to take his medications as prescribed.  The discharge medications are as per medication reconciliation list.  Patient was advised to follow-up with his primary care physician within 3 days of discharge.  Patient was advised to follow-up with his pulmonologist within 7 days of discharge.  Patient was advised to return to the emergency department if he experiences any recurrence of his symptoms.  Patient and family agreed with the plan and patient was discharged in stable condition.            DISCHARGE Follow Up Recommendations for labs and diagnostics:           Reasons For Change In Medications and Indications for New Medications:      Day of Discharge     Vital Signs:  Temp:  [99.5 °F (37.5 °C)-103.1 °F (39.5 °C)] 99.5 °F (37.5 °C)  Heart Rate:  [] 108  Resp:  [12-24] 12  BP: (104-130)/(72-83) 115/82  Flow (L/min):  [2] 2    Physical Exam:  Physical Exam  Vitals reviewed.   Constitutional:       General: He is not in acute distress.  HENT:      Head: Normocephalic.      Nose: Nose normal.      Mouth/Throat:      Mouth: Mucous membranes are dry.      Pharynx: Oropharynx is clear.   Eyes:      Extraocular Movements: Extraocular movements intact.      Conjunctiva/sclera: Conjunctivae normal.      Pupils: Pupils are equal, round, and reactive to light.   Cardiovascular:      Pulses: Normal pulses.      Heart sounds: No murmur heard.    No friction rub. No gallop.      Comments: S1 and S2 present.  No tachycardia.  Pulmonary:       Effort: No respiratory distress.      Breath sounds: No stridor. No wheezing, rhonchi or rales.   Chest:      Chest wall: No tenderness.   Abdominal:      General: Bowel sounds are normal. There is no distension.      Palpations: Abdomen is soft. There is no mass.      Tenderness: There is no abdominal tenderness. There is no right CVA tenderness, left CVA tenderness, guarding or rebound.      Hernia: No hernia is present.   Musculoskeletal:         General: No swelling, tenderness, deformity or signs of injury.      Cervical back: Normal range of motion. No rigidity.      Right lower leg: No edema.      Left lower leg: No edema.   Skin:     General: Skin is warm and dry.      Capillary Refill: Capillary refill takes less than 2 seconds.      Coloration: Skin is not jaundiced.      Findings: No bruising, erythema, lesion or rash.   Neurological:      Mental Status: He is alert.      Comments: No facial asymmetry noted.  Gait and station not tested.   Psychiatric:      Comments: No agitation.              Pertinent  and/or Most Recent Results     LAB RESULTS:      Lab 01/02/23  2106 01/02/23 2058   WBC  --  13.60*   HEMOGLOBIN  --  13.3   HEMATOCRIT  --  41.0   PLATELETS  --  218   NEUTROS ABS  --  11.90*   LYMPHS ABS  --  0.80   MONOS ABS  --  0.80   EOS ABS  --  0.10   MCV  --  75.4*   LACTATE 0.9  --          Lab 01/02/23 2058   SODIUM 135*   POTASSIUM 3.6   CHLORIDE 97*   CO2 26.0   ANION GAP 12.0   BUN 14   CREATININE 1.08   EGFR 95.3   GLUCOSE 121*   CALCIUM 9.9   MAGNESIUM 1.6         Lab 01/02/23 2058   TOTAL PROTEIN 8.0   ALBUMIN 5.0   GLOBULIN 3.0   ALT (SGPT) 37   AST (SGOT) 36   BILIRUBIN 0.4   ALK PHOS 93   LIPASE 16                     Brief Urine Lab Results  (Last result in the past 365 days)      Color   Clarity   Blood   Leuk Est   Nitrite   Protein   CREAT   Urine HCG        01/02/23 2223 Yellow   Clear   Negative   Negative   Negative   30 mg/dL (1+)               Microbiology Results (last 10  days)     Procedure Component Value - Date/Time    Respiratory Panel PCR w/COVID-19(SARS-CoV-2) JANELLE/NICKY/STONE/PAD/COR/MAD/ANNA In-House, NP Swab in UTM/VTM, 3-4 HR TAT - Swab, Nasopharynx [[id number]]  (Abnormal) Collected: 01/02/23 2058    Lab Status: Final result Specimen: Swab from Nasopharynx Updated: 01/02/23 2242     ADENOVIRUS, PCR Not Detected     Coronavirus 229E Not Detected     Coronavirus HKU1 Not Detected     Coronavirus NL63 Not Detected     Coronavirus OC43 Not Detected     COVID19 Not Detected     Human Metapneumovirus Not Detected     Human Rhinovirus/Enterovirus Not Detected     Influenza A H3 Detected     Influenza B PCR Not Detected     Parainfluenza Virus 1 Not Detected     Parainfluenza Virus 2 Not Detected     Parainfluenza Virus 3 Not Detected     Parainfluenza Virus 4 Not Detected     RSV, PCR Not Detected     Bordetella pertussis pcr Not Detected     Bordetella parapertussis PCR Not Detected     Chlamydophila pneumoniae PCR Not Detected     Mycoplasma pneumo by PCR Not Detected    Narrative:      In the setting of a positive respiratory panel with a viral infection PLUS a negative procalcitonin without other underlying concern for bacterial infection, consider observing off antibiotics or discontinuation of antibiotics and continue supportive care. If the respiratory panel is positive for atypical bacterial infection (Bordetella pertussis, Chlamydophila pneumoniae, or Mycoplasma pneumoniae), consider antibiotic de-escalation to target atypical bacterial infection.          XR Chest 1 View    Result Date: 1/2/2023  Impression: No acute process.  Electronically Signed By-Mane Ralph MD On:1/2/2023 9:28 PM This report was finalized on [id number] by  Mane Ralph MD.      Results for orders placed during the hospital encounter of 11/15/19    Duplex venous lower extremity left CAR    Interpretation Summary  · Normal left lower extremity venous duplex scan.  · Left lower extremity images labeled  right.      Results for orders placed during the hospital encounter of 11/15/19    Duplex venous lower extremity left CAR    Interpretation Summary  · Normal left lower extremity venous duplex scan.  · Left lower extremity images labeled right.      Results for orders placed during the hospital encounter of 10/28/19    Adult Transthoracic Echo Complete W/ Cont if Necessary Per Protocol    Interpretation Summary  · Estimated EF = 60%.  · Left ventricular systolic function is normal.    Indications  Respiratory failure      Technically satisfactory study.  Mitral valve is structurally normal.  Tricuspid valve is structurally normal.  Aortic valve is structurally normal.  Pulmonic valve could not be well visualized.  No evidence for mitral tricuspid or aortic regurgitation is seen by Doppler study.  Left atrium is normal in size.  Right atrium is significantly enlarged  Left ventricle is normal in size and contractility with ejection fraction of 60%.  Right ventricle is definitely enlarged  Atrial septum is intact.  Aorta is normal.  No pericardial effusion or intracardiac thrombus is seen.    Impression  Significant right atrium right ventricle enlargement.  No evidence for mitral tricuspid or aortic regurgitation is seen by Doppler study.  No pericardial effusion or intracardiac thrombus is present.  Left ventricle is normal in size and contractility with ejection fraction of 60%.  [place] no pericardial effusion or intracardiac thrombus is seen.      Labs Pending at Discharge:      Procedures Performed           Consults:   Consults     Date and Time Order Name Status Description    1/2/2023 11:49 PM Hospitalist (on-call MD unless specified)              Discharge Details        Discharge Medications      New Medications      Instructions Start Date   guaifenesin 100 MG/5ML liquid  Commonly known as: ROBITUSSIN   200 mg, Oral, Every 4 Hours PRN      ondansetron ODT 4 MG disintegrating tablet  Commonly known as:  ZOFRAN-ODT   4 mg, Translingual, Every 6 Hours PRN      oseltamivir 75 MG capsule  Commonly known as: TAMIFLU   75 mg, Oral, 2 Times Daily         Changes to Medications      Instructions Start Date   albuterol (2.5 MG/3ML) 0.083% nebulizer solution  Commonly known as: PROVENTIL  What changed: Another medication with the same name was added. Make sure you understand how and when to take each.   2.5 mg, Nebulization, Every 4 Hours PRN      albuterol 0.63 MG/3ML nebulizer solution  Commonly known as: ACCUNEB  What changed: You were already taking a medication with the same name, and this prescription was added. Make sure you understand how and when to take each.   0.63 mg, Nebulization, Every 6 Hours PRN         Continue These Medications      Instructions Start Date   buprenorphine-naloxone 8-2 MG per SL tablet  Commonly known as: SUBOXONE   Sublingual, Every Morning      buprenorphine-naloxone 8-2 MG per SL tablet  Commonly known as: SUBOXONE   1 tablet, Sublingual, Every Evening      buPROPion  MG 24 hr tablet  Commonly known as: WELLBUTRIN XL   300 mg, Oral, Daily      carvedilol 3.125 MG tablet  Commonly known as: COREG   6.25 mg, Oral, 2 Times Daily With Meals      doxepin 10 MG capsule  Commonly known as: SINEquan   3 mg, Oral, Nightly      hydrOXYzine 50 MG tablet  Commonly known as: ATARAX   50 mg, Oral, Every 6 Hours PRN      omeprazole 40 MG capsule  Commonly known as: priLOSEC   40 mg, Oral, Daily      QUEtiapine 300 MG tablet  Commonly known as: SEROquel   300 mg, Oral, Daily      QUEtiapine 50 MG tablet  Commonly known as: SEROquel   50 mg, Oral, Nightly      Trintellix 20 MG tablet  Generic drug: Vortioxetine HBr   20 mg, Oral, Daily With Breakfast             Allergies   Allergen Reactions   • Enoxaparin GI Intolerance   • Penicillins Anxiety     Other reaction(s): hives         Discharge Disposition: Stable.  Home or Self Care    Diet:  Hospital:  Diet Order   Procedures   • Diet: Regular/House  Diet; Texture: Regular Texture (IDDSI 7); Fluid Consistency: Thin (IDDSI 0)         Discharge Activity: As tolerated.        CODE STATUS:  Code Status and Medical Interventions:   Ordered at: 01/02/23 0783     Code Status (Patient has no pulse and is not breathing):    CPR (Attempt to Resuscitate)     Medical Interventions (Patient has pulse or is breathing):    Full Support         No future appointments.        Time spent on Discharge including face to face service: 55 minutes    This patient has been examined wearing appropriate Personal Protective Equipment and discussed with hospital infection control department, [facility], infectious disease specialist and pulmonologist. 01/03/23      Signature: Electronically signed by Adams Sheffield MD, FACP, 01/03/23, 12:37 PM EST.

## 2023-01-03 NOTE — PLAN OF CARE
Problem: Adult Inpatient Plan of Care  Goal: Plan of Care Review  Flowsheets (Taken 1/3/2023 1202)  Progress: no change   Goal Outcome Evaluation:           Progress: no change     Patient wants to go home today, MD will dc home

## 2023-01-03 NOTE — ED PROVIDER NOTES
Subjective   History of Present Illness  Chief complaint: Patient is a pleasant 27-year-old.  For the last couple days he had fever cough and vomiting.  He has had some diarrhea as well.  He states that has been persistent over the last couple of days.  No sore throat.  No headache.  No rash.  States that he does not have any abdominal discomfort.  He has not any sick contacts that he knows of.  No chest pain.  No shortness of breath.  He denies drug or alcohol use currently.  No abdominal pain.    Context: As above    Duration: Almost 2 days    Timing: Persistent    Severity: No severe pain    Associated Symptoms:         PCP:          Review of Systems   Constitutional: Positive for fatigue and fever.   HENT: Negative for sore throat.    Respiratory: Positive for cough.    Gastrointestinal: Positive for diarrhea, nausea and vomiting. Negative for abdominal pain.       Past Medical History:   Diagnosis Date   • Anxiety    • Depression        Allergies   Allergen Reactions   • Enoxaparin GI Intolerance   • Penicillins Anxiety     Other reaction(s): hives       No past surgical history on file.    Family History   Family history unknown: Yes       Social History     Socioeconomic History   • Marital status: Single   Tobacco Use   • Smoking status: Every Day     Packs/day: 1.00     Types: Cigarettes   • Smokeless tobacco: Never   Substance and Sexual Activity   • Alcohol use: Yes   • Drug use: Yes     Types: Methamphetamines, Marijuana   • Sexual activity: Defer           Objective   Physical Exam  Vitals and nursing note reviewed.   Constitutional:       General: He is in acute distress.   HENT:      Head: Normocephalic and atraumatic.   Eyes:      Extraocular Movements: Extraocular movements intact.      Pupils: Pupils are equal, round, and reactive to light.   Cardiovascular:      Rate and Rhythm: Tachycardia present.      Pulses: Normal pulses.      Heart sounds: Normal heart sounds.   Pulmonary:      Effort:  Pulmonary effort is normal.      Breath sounds: Normal breath sounds.   Abdominal:      General: Abdomen is flat.      Palpations: Abdomen is soft.      Tenderness: There is no abdominal tenderness.   Musculoskeletal:         General: No swelling or tenderness.   Skin:     General: Skin is warm and dry.   Neurological:      General: No focal deficit present.      Mental Status: He is alert and oriented to person, place, and time.   Psychiatric:         Mood and Affect: Mood normal.         Behavior: Behavior normal.         Thought Content: Thought content normal.         Judgment: Judgment normal.         Procedures           ED Course           Results for orders placed or performed during the hospital encounter of 01/02/23   Respiratory Panel PCR w/COVID-19(SARS-CoV-2) JANELLE/NICKY/STONE/PAD/COR/MAD/ANNA In-House, NP Swab in UTM/VTM, 3-4 HR TAT - Swab, Nasopharynx    Specimen: Nasopharynx; Swab   Result Value Ref Range    ADENOVIRUS, PCR Not Detected Not Detected    Coronavirus 229E Not Detected Not Detected    Coronavirus HKU1 Not Detected Not Detected    Coronavirus NL63 Not Detected Not Detected    Coronavirus OC43 Not Detected Not Detected    COVID19 Not Detected Not Detected - Ref. Range    Human Metapneumovirus Not Detected Not Detected    Human Rhinovirus/Enterovirus Not Detected Not Detected    Influenza A H3 Detected (A) Not Detected    Influenza B PCR Not Detected Not Detected    Parainfluenza Virus 1 Not Detected Not Detected    Parainfluenza Virus 2 Not Detected Not Detected    Parainfluenza Virus 3 Not Detected Not Detected    Parainfluenza Virus 4 Not Detected Not Detected    RSV, PCR Not Detected Not Detected    Bordetella pertussis pcr Not Detected Not Detected    Bordetella parapertussis PCR Not Detected Not Detected    Chlamydophila pneumoniae PCR Not Detected Not Detected    Mycoplasma pneumo by PCR Not Detected Not Detected   Comprehensive Metabolic Panel    Specimen: Blood   Result Value Ref Range     Glucose 121 (H) 65 - 99 mg/dL    BUN 14 6 - 20 mg/dL    Creatinine 1.08 0.76 - 1.27 mg/dL    Sodium 135 (L) 136 - 145 mmol/L    Potassium 3.6 3.5 - 5.2 mmol/L    Chloride 97 (L) 98 - 107 mmol/L    CO2 26.0 22.0 - 29.0 mmol/L    Calcium 9.9 8.6 - 10.5 mg/dL    Total Protein 8.0 6.0 - 8.5 g/dL    Albumin 5.0 3.5 - 5.2 g/dL    ALT (SGPT) 37 1 - 41 U/L    AST (SGOT) 36 1 - 40 U/L    Alkaline Phosphatase 93 39 - 117 U/L    Total Bilirubin 0.4 0.0 - 1.2 mg/dL    Globulin 3.0 gm/dL    A/G Ratio 1.7 g/dL    BUN/Creatinine Ratio 13.0 7.0 - 25.0    Anion Gap 12.0 5.0 - 15.0 mmol/L    eGFR 95.3 >60.0 mL/min/1.73   Lipase    Specimen: Blood   Result Value Ref Range    Lipase 16 13 - 60 U/L   Urinalysis With Culture If Indicated - Urine, Clean Catch    Specimen: Urine, Clean Catch   Result Value Ref Range    Color, UA Yellow Yellow, Straw    Appearance, UA Clear Clear    pH, UA 6.0 5.0 - 8.0    Specific Gravity, UA 1.022 1.005 - 1.030    Glucose, UA Negative Negative    Ketones, UA Trace (A) Negative    Bilirubin, UA Negative Negative    Blood, UA Negative Negative    Protein, UA 30 mg/dL (1+) (A) Negative    Leuk Esterase, UA Negative Negative    Nitrite, UA Negative Negative    Urobilinogen, UA 1.0 E.U./dL 0.2 - 1.0 E.U./dL   Magnesium    Specimen: Blood   Result Value Ref Range    Magnesium 1.6 1.6 - 2.6 mg/dL   CBC Auto Differential    Specimen: Blood   Result Value Ref Range    WBC 13.60 (H) 3.40 - 10.80 10*3/mm3    RBC 5.44 4.14 - 5.80 10*6/mm3    Hemoglobin 13.3 13.0 - 17.7 g/dL    Hematocrit 41.0 37.5 - 51.0 %    MCV 75.4 (L) 79.0 - 97.0 fL    MCH 24.4 (L) 26.6 - 33.0 pg    MCHC 32.4 31.5 - 35.7 g/dL    RDW 14.2 12.3 - 15.4 %    RDW-SD 37.2 37.0 - 54.0 fl    MPV 6.8 6.0 - 12.0 fL    Platelets 218 140 - 450 10*3/mm3    Neutrophil % 87.6 (H) 42.7 - 76.0 %    Lymphocyte % 5.5 (L) 19.6 - 45.3 %    Monocyte % 5.5 5.0 - 12.0 %    Eosinophil % 0.9 0.3 - 6.2 %    Basophil % 0.5 0.0 - 1.5 %    Neutrophils, Absolute 11.90 (H) 1.70  - 7.00 10*3/mm3    Lymphocytes, Absolute 0.80 0.70 - 3.10 10*3/mm3    Monocytes, Absolute 0.80 0.10 - 0.90 10*3/mm3    Eosinophils, Absolute 0.10 0.00 - 0.40 10*3/mm3    Basophils, Absolute 0.10 0.00 - 0.20 10*3/mm3    nRBC 0.0 0.0 - 0.2 /100 WBC   Urine Drug Screen - Urine, Clean Catch    Specimen: Urine, Clean Catch   Result Value Ref Range    Amphet/Methamphet, Screen Negative Negative    Barbiturates Screen, Urine Negative Negative    Benzodiazepine Screen, Urine Negative Negative    Cocaine Screen, Urine Negative Negative    Opiate Screen Negative Negative    THC, Screen, Urine Negative Negative    Methadone Screen, Urine Positive (A) Negative    Oxycodone Screen, Urine Negative Negative   Ethanol    Specimen: Blood   Result Value Ref Range    Ethanol % <0.010 %   Acetaminophen Level    Specimen: Blood   Result Value Ref Range    Acetaminophen <5.0 0.0 - 30.0 mcg/mL   Salicylate Level    Specimen: Blood   Result Value Ref Range    Salicylate <0.3 <=30.0 mg/dL   Urinalysis, Microscopic Only - Urine, Clean Catch    Specimen: Urine, Clean Catch   Result Value Ref Range    RBC, UA 0-2 (A) None Seen /HPF    WBC, UA 0-2 (A) None Seen /HPF    Bacteria, UA None Seen None Seen /HPF    Squamous Epithelial Cells, UA 0-2 None Seen, 0-2 /HPF    Hyaline Casts, UA None Seen None Seen /LPF    Methodology Automated Microscopy    POC Lactate    Specimen: Blood   Result Value Ref Range    Lactate 0.9 0.5 - 2.0 mmol/L     XR Chest 1 View    Result Date: 1/2/2023  No acute process.  Electronically Signed By-Mane Ralph MD On:1/2/2023 9:28 PM This report was finalized on [id number] by  Mane Ralph MD.                                    Medical Decision Making  Differential diagnosis includes gastroenteritis, appendicitis, small bowel obstruction, drug use, sepsis, influenza, COVID 19    Patient on arrival to the ED room did appear quite distressed.  He was vomiting.  He is tachycardic and febrile.  On reevaluation he is  significantly improved.  He feels much better.  He never did have abdominal pain and is nontender on exam.  So I do not think he has appendicitis at this point.  I did not order CT scan because he had no signs of pain.  And again now that he is improved with antinausea medicine and fluids I think that it is less likely to be appendicitis or any emergent intra-abdominal pathology.  His flu test is positive.  He has had a cough and fever and likely his symptoms are secondary to influenza A.  His lactic is normal.  He states he has not been using IV drugs.  Originally was going to discharge, however on reevaluation he is oxygenating at 89% on room air.  Now placed him on 2 L he is still tachycardic and his temperature is 103.  Tylenol has been ordered and at this point will place in the hospital for his hypoxia secondary to influenza    Influenza A: acute illness or injury  Nausea and vomiting, unspecified vomiting type: acute illness or injury  Amount and/or Complexity of Data Reviewed  Labs: ordered.  Radiology: ordered.      Risk  OTC drugs.  Prescription drug management.  Decision regarding hospitalization.          Final diagnoses:   None     Influenza A  Nausea and vomiting  Hypoxia  ED Disposition  ED Disposition     None          No follow-up provider specified.       Medication List      No changes were made to your prescriptions during this visit.          Jorge Martines, DO  01/02/23 2320       Jorge Martines, DO  01/02/23 2320       Jorge Martines, DO  01/02/23 2349       Jorge Martines, DO  01/02/23 234

## 2023-01-03 NOTE — CASE MANAGEMENT/SOCIAL WORK
Continued Stay Note   Ronald     Patient Name: Vicente Cunha  MRN: [id number]  Today's Date: 1/3/2023    Admit Date: 1/2/2023    Plan: home   Discharge Plan     Row Name 01/03/23 1235       Plan    Final Discharge Disposition Code 01 - home or self-care    Final Note Home    Row Name 01/03/23 1234       Plan    Plan home    Patient/Family in Agreement with Plan yes    Plan Comments Met with Patient at bedside Lives at home with family. [facility] PCP and Pharmacy verified, able to afford medications. Denies any needs and discharging home.                  Laya Spring RN

## 2025-01-02 ENCOUNTER — OFFICE VISIT (OUTPATIENT)
Dept: PSYCHIATRY | Facility: CLINIC | Age: 30
End: 2025-01-02
Payer: MEDICAID

## 2025-01-02 DIAGNOSIS — F33.1 MODERATE EPISODE OF RECURRENT MAJOR DEPRESSIVE DISORDER: Primary | ICD-10-CM

## 2025-01-02 DIAGNOSIS — F41.1 GAD (GENERALIZED ANXIETY DISORDER): ICD-10-CM

## 2025-01-02 RX ORDER — TRAZODONE HYDROCHLORIDE 50 MG/1
50 TABLET, FILM COATED ORAL NIGHTLY
Qty: 30 TABLET | Refills: 1 | Status: SHIPPED | OUTPATIENT
Start: 2025-01-02

## 2025-01-02 NOTE — PROGRESS NOTES
"  Chief complaint: Depression, anxiety, mood swings       Subjective      History of present illness: initial appt for medication management of anxiety and depression   In recovery since 2014, intermittent periods of sobriety     Intermittent marijuana use , xanax once within the last month   No meth or heroin use since approx 2020; current suboxone therapy   Onset of use 13 yo    Suboxone therapy with [facility]     Reports fidgeting, restless  Unable to relax   Sleep disturbance, fragmented sleep   Guilt associated with substance use   Endorsing \"mood swings\" and depression associated with depression, withdrawn, anhedonia,  irritability, inappropriate reactions to stressors    Anxiety, worry inappropriate to situation   Taking hydroxyzine taking 250 mg BID, not effective     Denies current self injurious behavior  Denies current symptoms of psychosis, sara, hypomania  Denies current symptoms of panic  Denies current SI/HI/AVH   Denies any current unwanted or adverse medication side effects     Emotional distress, effects interpersonal relationship  Social support with girlfriend and some friends     Denies history of or current seizures, denies history of head injury     Psychiatric History    Hepatitis C: history of IV drug use approx 10 years, polysubstance abuse Meth, heroin , benzodiazapine's,  oral prescription medications, thc   Hx of MI , kidney failure with methamphetamine OD 2020  Suboxone, methadone   Depression, anxiety, adhd   Hx of incarceration, secondary to substance use   In recovery since 2014, intermittent periods of sobriety     Intermittent marijuana use , xanax once within the last month      2015: traumatic loss of parent   Estranged from mother     Previous Diagnoses: Depression, anxiety, adhd   Previous Psychotropic medications: Quetiapine (300 mg BID tired during the day, dc), citalopram  (ineffective) , hydroxyzine (taking excessive amount not helpful), trintellix  (ineffective) , " doxepin  (ineffective for sleep) , vraylar (ineffective)  , gabapentin (restless leg, not effective)   Psychotherapy: Current substance use groups, prev individual therapy   Hospitalization hx: Multiple times, 2019 last admission for depression and substance, inpatient rehab, IOP    Previous SI/HI/AVH: One as an adolescent 11 yo drank bleach, hospitalized at [facility]     Social History     Socioeconomic History    Marital status: Significant Other    Number of children: 0    Highest education level: High school graduate   Tobacco Use    Smoking status: Every Day     Current packs/day: 1.00     Types: Cigarettes    Smokeless tobacco: Never   Vaping Use    Vaping status: Never Used   Substance and Sexual Activity    Alcohol use: Yes     Alcohol/week: 1.0 standard drink of alcohol     Types: 1 Cans of beer per week    Drug use: Yes     Types: Methamphetamines, Marijuana    Sexual activity: Defer       Current Outpatient Medications:       Current Outpatient Medications:     albuterol (PROVENTIL) (2.5 MG/3ML) 0.083% nebulizer solution, Take 2.5 mg by nebulization Every 4 (Four) Hours As Needed for Wheezing., Disp: , Rfl:     buprenorphine-naloxone (SUBOXONE) 8-2 MG per SL tablet, Place 2 tablets under the tongue Every Morning., Disp: , Rfl:     carvedilol (COREG) 3.125 MG tablet, Take 2 tablets by mouth 2 (Two) Times a Day With Meals., Disp: , Rfl:     hydrOXYzine (ATARAX) 50 MG tablet, Take 1 tablet by mouth Every 6 (Six) Hours As Needed for Itching, Allergies or Anxiety., Disp: , Rfl:     omeprazole (priLOSEC) 40 MG capsule, Take 1 capsule by mouth Daily., Disp: , Rfl:     buprenorphine-naloxone (SUBOXONE) 8-2 MG per SL tablet, Place 1 tablet under the tongue Every Evening., Disp: , Rfl:     ondansetron ODT (ZOFRAN-ODT) 4 MG disintegrating tablet, Place 1 tablet on the tongue Every 6 (Six) Hours As Needed for Vomiting or Nausea., Disp: 20 tablet, Rfl: 0    traZODone (DESYREL) 50 MG tablet, Take 1 tablet by mouth  Every Night., Disp: 30 tablet, Rfl: 1         Allergies:  Allergies   Allergen Reactions    Enoxaparin GI Intolerance    Penicillins Anxiety     Other reaction(s): hives        PHQ9    PHQ-9 Depression Screening  Little interest or pleasure in doing things? Several days   Feeling down, depressed, or hopeless? Over half   PHQ-2 Total Score 3   Trouble falling or staying asleep, or sleeping too much? Almost all   Feeling tired or having little energy? Over half   Poor appetite or overeating? Over half   Feeling bad about yourself - or that you are a failure or have let yourself or your family down? Not at all   Trouble concentrating on things, such as reading the newspaper or watching television? Not at all   Moving or speaking so slowly that other people could have noticed? Or the opposite - being so fidgety or restless that you have been moving around a lot more than usual? Almost all   Thoughts that you would be better off dead, or of hurting yourself in some way? Not at all   PHQ-9 Total Score 13   If you checked off any problems, how difficult have these problems made it for you to do your work, take care of things at home, or get along with other people? Somewhat difficult        WARD-7:   Over the last two weeks, how often have you been bothered by the following problems?  Feeling nervous, anxious or on edge: Nearly every day  Not being able to stop or control worrying: Several days  Worrying too much about different things: More than half the days  Trouble Relaxing: More than half the days  Being so restless that it is hard to sit still: More than half the days  Becoming easily annoyed or irritable: Not at all  Feeling afraid as if something awful might happen: Not at all  WARD 7 Total Score: 10  If you checked any problems, how difficult have these problems made it for you to do your work, take care of things at home, or get along with other people: Somewhat difficult]    Objective:     Vital Signs   There were  no vitals filed for this visit.     MENTAL STATUS EXAM   General Appearance:  Cleanly groomed and dressed  Eye Contact:  Good eye contact  Attitude:  Cooperative and polite  Motor Activity:  Fidgety  Muscle Strength:  Normal  Speech:  Normal rate, tone, volume  Language:  Spontaneous  Mood and affect:  Normal, pleasant and anxious  Hopelessness:  Denies  Thought Process:  Logical, goal-directed and linear  Associations/ Thought Content:  No delusions  Hallucinations:  None  Suicidal Ideations:  Not present  Homicidal Ideation:  Not present  Sensorium:  Alert and clear  Orientation:  Person, place, situation and time  Attention Span/ Concentration:  Good  Fund of Knowledge:  Appropriate for age and educational level  Intellectual Functioning:  Average range  Insight:  Fair  Judgement:  Fair and limited  Reliability:  Fair  Impulse Control:  Fair        Assessment & Plan   Diagnoses and all orders for this visit:    Diagnoses and all orders for this visit:    1. Moderate episode of recurrent major depressive disorder (Primary)  -     traZODone (DESYREL) 50 MG tablet; Take 1 tablet by mouth Every Night.  Dispense: 30 tablet; Refill: 1    2. WARD (generalized anxiety disorder)  -     traZODone (DESYREL) 50 MG tablet; Take 1 tablet by mouth Every Night.  Dispense: 30 tablet; Refill: 1         Treatment Plan:  Continue supportive psychotherapy efforts and medications as indicated. Treatment and medication options discussed during today's visit. Patient ackowledged and verbally consented to continue with current treatment plan and was educated on the importance of compliance with treatment and follow-up appointments.     Medication side effects reviewed and discussed.  Patient agrees to call office with worsening symptoms or adverse medication side effects.   Continue supportive psychotherapy efforts and medications as indicated. Treatment and medication options discussed during today's visit. Patient ackowledged and  verbally consented to continue with current treatment plan and was educated on the importance of compliance with treatment and follow-up appointments.     Significant hx of polysubstance abuse , current suboxone therapy   Intermittent marijuana use and recent BZD use for anxiety   Discussed effects and risks with prescription medications , pt expressed intent to stop all use   Start trazodone for sleep and anxiety   Follow up 4-6 weeks     Reduce hydroxyzine with intent to d/c ; ineffective , taking 250 mg BID   Wean scheduled discussed     Start Trazodone 50 mg nightly once hydroxyzine is reduced to normal dose range for depression, anxiety, and sleep       Short Term Goals: Continue to establish rapport with pt.    Long Term Goals: Pt will experience remission of distressing symptoms     Pt agrees with a safety plan for any thoughts of self injurious behavior. Pt will call this office at [phone] or the suicide hotline at 628.  In an emergency the pt agrees to call 911.    Referring MD has access to consult report and progress notes in EMR     Roselia Lawler DNP, APRN   01/02/2025   08:56 EST

## 2025-03-11 DIAGNOSIS — F33.1 MODERATE EPISODE OF RECURRENT MAJOR DEPRESSIVE DISORDER: ICD-10-CM

## 2025-03-11 DIAGNOSIS — F41.1 GAD (GENERALIZED ANXIETY DISORDER): ICD-10-CM

## 2025-03-11 NOTE — TELEPHONE ENCOUNTER
Rx Refill Note  Requested Prescriptions     Pending Prescriptions Disp Refills    traZODone (DESYREL) 50 MG tablet [Pharmacy Med Name: TRAZODONE 50 MG TABLET] 30 tablet 1     Sig: TAKE 1 TABLET BY MOUTH EVERY DAY AT NIGHT      Last office visit with prescribing clinician: 1/2/2025   Last telemedicine visit with prescribing clinician: Visit date not found   Next office visit with prescribing clinician: Visit date not found   Office Visit with Roselia Lawler, PRISCA, APRN (01/02/2025)                       Would you like a call back once the refill request has been completed: [] Yes [] No    If the office needs to give you a call back, can they leave a voicemail: [] Yes [] No    Polly Cagle MA  03/11/25, 09:02 EDT

## 2025-03-12 RX ORDER — TRAZODONE HYDROCHLORIDE 50 MG/1
50 TABLET ORAL
Qty: 30 TABLET | Refills: 1 | Status: SHIPPED | OUTPATIENT
Start: 2025-03-12

## 2025-05-07 DIAGNOSIS — F33.1 MODERATE EPISODE OF RECURRENT MAJOR DEPRESSIVE DISORDER: ICD-10-CM

## 2025-05-07 DIAGNOSIS — F41.1 GAD (GENERALIZED ANXIETY DISORDER): ICD-10-CM

## 2025-05-08 NOTE — TELEPHONE ENCOUNTER
Rx Refill Note  Requested Prescriptions     Pending Prescriptions Disp Refills    traZODone (DESYREL) 50 MG tablet [Pharmacy Med Name: TRAZODONE 50 MG TABLET] 30 tablet 1     Sig: TAKE 1 TABLET BY MOUTH EVERY DAY AT NIGHT        Last office visit with prescribing clinician: 1/2/2025     Next office visit with prescribing clinician: Visit date not found     Office Visit with Roselia Lawler, PRISCA, APRN (01/02/2025)     Nisha Ghosh  05/08/25, 09:22 EDT

## 2025-05-09 RX ORDER — TRAZODONE HYDROCHLORIDE 50 MG/1
50 TABLET ORAL
Qty: 30 TABLET | Refills: 0 | Status: SHIPPED | OUTPATIENT
Start: 2025-05-09

## 2025-06-09 DIAGNOSIS — F41.1 GAD (GENERALIZED ANXIETY DISORDER): ICD-10-CM

## 2025-06-09 DIAGNOSIS — F33.1 MODERATE EPISODE OF RECURRENT MAJOR DEPRESSIVE DISORDER: ICD-10-CM

## 2025-06-09 RX ORDER — TRAZODONE HYDROCHLORIDE 50 MG/1
50 TABLET ORAL
Qty: 30 TABLET | Refills: 0 | Status: SHIPPED | OUTPATIENT
Start: 2025-06-09

## 2025-07-07 DIAGNOSIS — F41.1 GAD (GENERALIZED ANXIETY DISORDER): ICD-10-CM

## 2025-07-07 DIAGNOSIS — F33.1 MODERATE EPISODE OF RECURRENT MAJOR DEPRESSIVE DISORDER: ICD-10-CM

## 2025-07-07 RX ORDER — TRAZODONE HYDROCHLORIDE 50 MG/1
50 TABLET ORAL
Qty: 30 TABLET | Refills: 0 | Status: SHIPPED | OUTPATIENT
Start: 2025-07-07

## 2025-08-02 DIAGNOSIS — F41.1 GAD (GENERALIZED ANXIETY DISORDER): ICD-10-CM

## 2025-08-02 DIAGNOSIS — F33.1 MODERATE EPISODE OF RECURRENT MAJOR DEPRESSIVE DISORDER: ICD-10-CM

## 2025-08-04 RX ORDER — TRAZODONE HYDROCHLORIDE 50 MG/1
50 TABLET ORAL
Qty: 30 TABLET | Refills: 0 | OUTPATIENT
Start: 2025-08-04

## 2025-08-07 ENCOUNTER — OFFICE VISIT (OUTPATIENT)
Dept: PSYCHIATRY | Facility: CLINIC | Age: 30
End: 2025-08-07
Payer: OTHER GOVERNMENT

## 2025-08-07 DIAGNOSIS — F41.1 GAD (GENERALIZED ANXIETY DISORDER): Chronic | ICD-10-CM

## 2025-08-07 DIAGNOSIS — F33.1 MODERATE EPISODE OF RECURRENT MAJOR DEPRESSIVE DISORDER: Primary | Chronic | ICD-10-CM

## 2025-08-07 PROCEDURE — 1160F RVW MEDS BY RX/DR IN RCRD: CPT

## 2025-08-07 PROCEDURE — 1159F MED LIST DOCD IN RCRD: CPT

## 2025-08-07 PROCEDURE — 99214 OFFICE O/P EST MOD 30 MIN: CPT

## 2025-08-07 PROCEDURE — 96127 BRIEF EMOTIONAL/BEHAV ASSMT: CPT

## 2025-08-18 RX ORDER — LAMOTRIGINE 25 MG/1
TABLET ORAL
Qty: 42 TABLET | Refills: 0 | Status: SHIPPED | OUTPATIENT
Start: 2025-08-18 | End: 2025-09-15